# Patient Record
Sex: FEMALE | Race: WHITE | Employment: OTHER | URBAN - NONMETROPOLITAN AREA
[De-identification: names, ages, dates, MRNs, and addresses within clinical notes are randomized per-mention and may not be internally consistent; named-entity substitution may affect disease eponyms.]

---

## 2018-11-04 ENCOUNTER — APPOINTMENT (OUTPATIENT)
Dept: GENERAL RADIOLOGY | Age: 78
DRG: 216 | End: 2018-11-04
Payer: MEDICARE

## 2018-11-04 ENCOUNTER — APPOINTMENT (OUTPATIENT)
Dept: CT IMAGING | Age: 78
DRG: 216 | End: 2018-11-04
Payer: MEDICARE

## 2018-11-04 ENCOUNTER — HOSPITAL ENCOUNTER (INPATIENT)
Age: 78
LOS: 16 days | Discharge: NURSING, REHAB OR LONG-TERM CARE | DRG: 216 | End: 2018-11-20
Attending: INTERNAL MEDICINE | Admitting: INTERNAL MEDICINE
Payer: MEDICARE

## 2018-11-04 DIAGNOSIS — I48.91 ATRIAL FIBRILLATION WITH RVR (HCC): Primary | ICD-10-CM

## 2018-11-04 LAB
ALBUMIN SERPL-MCNC: 3.7 G/DL (ref 3.5–5.1)
ALP BLD-CCNC: 210 U/L (ref 38–126)
ALT SERPL-CCNC: 129 U/L (ref 11–66)
ANION GAP SERPL CALCULATED.3IONS-SCNC: 16 MEQ/L (ref 8–16)
APTT: 23.9 SECONDS (ref 22–38)
AST SERPL-CCNC: 105 U/L (ref 5–40)
BACTERIA: ABNORMAL /HPF
BASOPHILS # BLD: 0.3 %
BASOPHILS ABSOLUTE: 0 THOU/MM3 (ref 0–0.1)
BILIRUB SERPL-MCNC: 0.7 MG/DL (ref 0.3–1.2)
BILIRUBIN DIRECT: 0.3 MG/DL (ref 0–0.3)
BILIRUBIN URINE: NEGATIVE
BLOOD, URINE: NEGATIVE
BUN BLDV-MCNC: 20 MG/DL (ref 7–22)
CALCIUM SERPL-MCNC: 8.9 MG/DL (ref 8.5–10.5)
CASTS 2: ABNORMAL /LPF
CASTS UA: ABNORMAL /LPF
CHARACTER, URINE: CLEAR
CHLORIDE BLD-SCNC: 104 MEQ/L (ref 98–111)
CO2: 19 MEQ/L (ref 23–33)
COLOR: YELLOW
CREAT SERPL-MCNC: 0.8 MG/DL (ref 0.4–1.2)
CRYSTALS, UA: ABNORMAL
EKG ATRIAL RATE: 187 BPM
EKG Q-T INTERVAL: 244 MS
EKG QRS DURATION: 66 MS
EKG QTC CALCULATION (BAZETT): 410 MS
EKG R AXIS: 86 DEGREES
EKG T AXIS: 22 DEGREES
EKG VENTRICULAR RATE: 170 BPM
EOSINOPHIL # BLD: 0.9 %
EOSINOPHILS ABSOLUTE: 0.1 THOU/MM3 (ref 0–0.4)
EPITHELIAL CELLS, UA: ABNORMAL /HPF
ERYTHROCYTE [DISTWIDTH] IN BLOOD BY AUTOMATED COUNT: 14.1 % (ref 11.5–14.5)
ERYTHROCYTE [DISTWIDTH] IN BLOOD BY AUTOMATED COUNT: 49.1 FL (ref 35–45)
GFR SERPL CREATININE-BSD FRML MDRD: 70 ML/MIN/1.73M2
GLUCOSE BLD-MCNC: 139 MG/DL (ref 70–108)
GLUCOSE URINE: NEGATIVE MG/DL
HCT VFR BLD CALC: 37.9 % (ref 37–47)
HEMOGLOBIN: 12.4 GM/DL (ref 12–16)
IMMATURE GRANS (ABS): 0.02 THOU/MM3 (ref 0–0.07)
IMMATURE GRANULOCYTES: 0.2 %
KETONES, URINE: NEGATIVE
LEUKOCYTE ESTERASE, URINE: NEGATIVE
LIPASE: 22.2 U/L (ref 5.6–51.3)
LYMPHOCYTES # BLD: 11.1 %
LYMPHOCYTES ABSOLUTE: 1 THOU/MM3 (ref 1–4.8)
MCH RBC QN AUTO: 31.2 PG (ref 26–33)
MCHC RBC AUTO-ENTMCNC: 32.7 GM/DL (ref 32.2–35.5)
MCV RBC AUTO: 95.5 FL (ref 81–99)
MISCELLANEOUS 2: ABNORMAL
MONOCYTES # BLD: 9 %
MONOCYTES ABSOLUTE: 0.8 THOU/MM3 (ref 0.4–1.3)
MUCUS: ABNORMAL
NITRITE, URINE: NEGATIVE
NUCLEATED RED BLOOD CELLS: 0 /100 WBC
OSMOLALITY CALCULATION: 282.4 MOSMOL/KG (ref 275–300)
PH UA: 5
PLATELET # BLD: 202 THOU/MM3 (ref 130–400)
PMV BLD AUTO: 12.1 FL (ref 9.4–12.4)
POTASSIUM SERPL-SCNC: 4 MEQ/L (ref 3.5–5.2)
PRO-BNP: 3062 PG/ML (ref 0–1800)
PROTEIN UA: 30
RBC # BLD: 3.97 MILL/MM3 (ref 4.2–5.4)
RBC URINE: ABNORMAL /HPF
RENAL EPITHELIAL, UA: ABNORMAL
SEG NEUTROPHILS: 78.5 %
SEGMENTED NEUTROPHILS ABSOLUTE COUNT: 7.1 THOU/MM3 (ref 1.8–7.7)
SODIUM BLD-SCNC: 139 MEQ/L (ref 135–145)
SPECIFIC GRAVITY, URINE: > 1.03 (ref 1–1.03)
TOTAL PROTEIN: 6.8 G/DL (ref 6.1–8)
TROPONIN T: < 0.01 NG/ML
TSH SERPL DL<=0.05 MIU/L-ACNC: 2.64 UIU/ML (ref 0.4–4.2)
UROBILINOGEN, URINE: 1 EU/DL
WBC # BLD: 9.1 THOU/MM3 (ref 4.8–10.8)
WBC UA: ABNORMAL /HPF
YEAST: ABNORMAL

## 2018-11-04 PROCEDURE — 74177 CT ABD & PELVIS W/CONTRAST: CPT

## 2018-11-04 PROCEDURE — 80074 ACUTE HEPATITIS PANEL: CPT

## 2018-11-04 PROCEDURE — 71045 X-RAY EXAM CHEST 1 VIEW: CPT

## 2018-11-04 PROCEDURE — B32T1ZZ COMPUTERIZED TOMOGRAPHY (CT SCAN) OF LEFT PULMONARY ARTERY USING LOW OSMOLAR CONTRAST: ICD-10-PCS | Performed by: RADIOLOGY

## 2018-11-04 PROCEDURE — 93005 ELECTROCARDIOGRAM TRACING: CPT | Performed by: PHYSICIAN ASSISTANT

## 2018-11-04 PROCEDURE — B3201ZZ COMPUTERIZED TOMOGRAPHY (CT SCAN) OF THORACIC AORTA USING LOW OSMOLAR CONTRAST: ICD-10-PCS | Performed by: RADIOLOGY

## 2018-11-04 PROCEDURE — 80053 COMPREHEN METABOLIC PANEL: CPT

## 2018-11-04 PROCEDURE — 85025 COMPLETE CBC W/AUTO DIFF WBC: CPT

## 2018-11-04 PROCEDURE — 6360000004 HC RX CONTRAST MEDICATION: Performed by: PHYSICIAN ASSISTANT

## 2018-11-04 PROCEDURE — B32S1ZZ COMPUTERIZED TOMOGRAPHY (CT SCAN) OF RIGHT PULMONARY ARTERY USING LOW OSMOLAR CONTRAST: ICD-10-PCS | Performed by: RADIOLOGY

## 2018-11-04 PROCEDURE — 83690 ASSAY OF LIPASE: CPT

## 2018-11-04 PROCEDURE — 2500000003 HC RX 250 WO HCPCS: Performed by: PHYSICIAN ASSISTANT

## 2018-11-04 PROCEDURE — 96376 TX/PRO/DX INJ SAME DRUG ADON: CPT

## 2018-11-04 PROCEDURE — 84484 ASSAY OF TROPONIN QUANT: CPT

## 2018-11-04 PROCEDURE — 84443 ASSAY THYROID STIM HORMONE: CPT

## 2018-11-04 PROCEDURE — 83880 ASSAY OF NATRIURETIC PEPTIDE: CPT

## 2018-11-04 PROCEDURE — 6360000002 HC RX W HCPCS: Performed by: PHYSICIAN ASSISTANT

## 2018-11-04 PROCEDURE — 81001 URINALYSIS AUTO W/SCOPE: CPT

## 2018-11-04 PROCEDURE — 2709999900 HC NON-CHARGEABLE SUPPLY

## 2018-11-04 PROCEDURE — 36415 COLL VENOUS BLD VENIPUNCTURE: CPT

## 2018-11-04 PROCEDURE — 85730 THROMBOPLASTIN TIME PARTIAL: CPT

## 2018-11-04 PROCEDURE — 82248 BILIRUBIN DIRECT: CPT

## 2018-11-04 PROCEDURE — 71275 CT ANGIOGRAPHY CHEST: CPT

## 2018-11-04 PROCEDURE — 93010 ELECTROCARDIOGRAM REPORT: CPT | Performed by: INTERNAL MEDICINE

## 2018-11-04 PROCEDURE — 87086 URINE CULTURE/COLONY COUNT: CPT

## 2018-11-04 PROCEDURE — 96374 THER/PROPH/DIAG INJ IV PUSH: CPT

## 2018-11-04 PROCEDURE — 99285 EMERGENCY DEPT VISIT HI MDM: CPT

## 2018-11-04 PROCEDURE — 2140000000 HC CCU INTERMEDIATE R&B

## 2018-11-04 RX ORDER — HEPARIN SODIUM 1000 [USP'U]/ML
80 INJECTION, SOLUTION INTRAVENOUS; SUBCUTANEOUS ONCE
Status: COMPLETED | OUTPATIENT
Start: 2018-11-04 | End: 2018-11-04

## 2018-11-04 RX ORDER — HEPARIN SODIUM 1000 [USP'U]/ML
80 INJECTION, SOLUTION INTRAVENOUS; SUBCUTANEOUS PRN
Status: DISCONTINUED | OUTPATIENT
Start: 2018-11-04 | End: 2018-11-12

## 2018-11-04 RX ORDER — HEPARIN SODIUM 1000 [USP'U]/ML
40 INJECTION, SOLUTION INTRAVENOUS; SUBCUTANEOUS PRN
Status: DISCONTINUED | OUTPATIENT
Start: 2018-11-04 | End: 2018-11-12

## 2018-11-04 RX ORDER — DILTIAZEM HYDROCHLORIDE 5 MG/ML
10 INJECTION INTRAVENOUS ONCE
Status: COMPLETED | OUTPATIENT
Start: 2018-11-04 | End: 2018-11-04

## 2018-11-04 RX ORDER — BUMETANIDE 0.25 MG/ML
1 INJECTION, SOLUTION INTRAMUSCULAR; INTRAVENOUS ONCE
Status: COMPLETED | OUTPATIENT
Start: 2018-11-04 | End: 2018-11-04

## 2018-11-04 RX ORDER — HEPARIN SODIUM 10000 [USP'U]/100ML
18 INJECTION, SOLUTION INTRAVENOUS CONTINUOUS
Status: DISCONTINUED | OUTPATIENT
Start: 2018-11-04 | End: 2018-11-12

## 2018-11-04 RX ORDER — METOPROLOL TARTRATE 5 MG/5ML
5 INJECTION INTRAVENOUS EVERY 5 MIN PRN
Status: COMPLETED | OUTPATIENT
Start: 2018-11-04 | End: 2018-11-04

## 2018-11-04 RX ADMIN — DILTIAZEM HYDROCHLORIDE 10 MG: 5 INJECTION INTRAVENOUS at 15:49

## 2018-11-04 RX ADMIN — IOPAMIDOL 80 ML: 755 INJECTION, SOLUTION INTRAVENOUS at 16:57

## 2018-11-04 RX ADMIN — METOPROLOL TARTRATE 5 MG: 5 INJECTION, SOLUTION INTRAVENOUS at 18:07

## 2018-11-04 RX ADMIN — HEPARIN SODIUM 18 UNITS/KG/HR: 10000 INJECTION, SOLUTION INTRAVENOUS at 21:12

## 2018-11-04 RX ADMIN — METOPROLOL TARTRATE 5 MG: 5 INJECTION, SOLUTION INTRAVENOUS at 16:34

## 2018-11-04 RX ADMIN — AMIODARONE HYDROCHLORIDE 0.5 MG/MIN: 1.8 INJECTION, SOLUTION INTRAVENOUS at 18:54

## 2018-11-04 RX ADMIN — HEPARIN SODIUM 4540 UNITS: 1000 INJECTION INTRAVENOUS; SUBCUTANEOUS at 21:08

## 2018-11-04 RX ADMIN — METOPROLOL TARTRATE 5 MG: 5 INJECTION, SOLUTION INTRAVENOUS at 16:28

## 2018-11-04 RX ADMIN — BUMETANIDE 1 MG: 0.25 INJECTION INTRAMUSCULAR; INTRAVENOUS at 18:54

## 2018-11-04 ASSESSMENT — PAIN DESCRIPTION - LOCATION
LOCATION: CHEST

## 2018-11-04 ASSESSMENT — PAIN DESCRIPTION - PAIN TYPE
TYPE: ACUTE PAIN

## 2018-11-04 ASSESSMENT — PAIN DESCRIPTION - FREQUENCY
FREQUENCY: CONTINUOUS
FREQUENCY: CONTINUOUS

## 2018-11-04 ASSESSMENT — PAIN DESCRIPTION - ORIENTATION
ORIENTATION: MID

## 2018-11-04 ASSESSMENT — PAIN DESCRIPTION - ONSET: ONSET: GRADUAL

## 2018-11-04 ASSESSMENT — ENCOUNTER SYMPTOMS
RHINORRHEA: 0
EYE PAIN: 0
COUGH: 1
WHEEZING: 0
NAUSEA: 0
ABDOMINAL PAIN: 1
BACK PAIN: 0
EYE DISCHARGE: 0
DIARRHEA: 0
SHORTNESS OF BREATH: 1
SORE THROAT: 0
VOMITING: 0

## 2018-11-04 ASSESSMENT — PAIN SCALES - GENERAL
PAINLEVEL_OUTOF10: 0
PAINLEVEL_OUTOF10: 5
PAINLEVEL_OUTOF10: 5
PAINLEVEL_OUTOF10: 2

## 2018-11-04 ASSESSMENT — PAIN DESCRIPTION - DESCRIPTORS: DESCRIPTORS: TENDER

## 2018-11-04 NOTE — LETTER
1-800- MEDICARE (3-371.728.4837). TTY users should call 7-649.795.2008. · To find a different doctor, visit Medicare's Physician Compare website, DragonRADTapes.co.nz, or call 1-800-MEDICARE (132 1130). TTY users should call 6-126.798.1499. · To find a different skilled nursing facility, visit [employer] website, https://www.Iddiction/, or call 1-800-MEDICARE (8- 132-593-656-626-7802). TTY users should call 1-566.354.1187. · To find a different long term care hospital, visit [facility] [address] Compare website, [facility], or call 1-800- MEDICARE (118 7224). TTY users should call 9-644.359.1559. · To find a different inpatient rehabilitation facility, visit [facility] [address] Compare website, www.medicare.gov/ inpatientrehabilitation facilitycompare, or call 1-800-MEDICARE (0-353.377.5056). TTY users should call 5- [phone]. · To find a different home health agency, visit [address] website, www.medicare.gov/homehealthcompare, or call 1-800-MEDICARE (2-509- 215-7700). TTY users should call [phone].

## 2018-11-04 NOTE — ED PROVIDER NOTES
Radiologic studies within the department revealed small bilateral pleural effusions with adjacent atelectasis/infiltrate. Laboratory results showed elevated alk phos and BNP. She was treated initially with cardizem, followed by 3 doses of metoprolol. HR improved to the low 100s, patient's symptoms persist but are improved. I consulted Dr. Geovany Venegas (cardiology), per patient request, who graciously agreed to admit the patient. Amiodarone drip, heparin drip and IV diuretics initiated, per his recommendations. I explained my proposed course of treatment to the patient, who was amenable to my decision, and I answered all questions that were asked. T    CRITICAL CARE:   There was a high probability of clinically significant life threatening deterioration in this patient's condition which required the urgent intervention of the Midlevel provider. Total critical care time was 30 minutes. This excludes any time for separately reportable procedures. CONSULTS:  Dr. Geovany Venegas (cardiology)     PROCEDURES:  None     FINAL IMPRESSION      1. Atrial fibrillation with RVR (HCC)          DISPOSITION/PLAN   Admit     PATIENT REFERRED TO:  No follow-up provider specified. DISCHARGE MEDICATIONS:  New Prescriptions    No medications on file       (Please note that portions of this note were completed with a voice recognition program.  Efforts weremade to edit the dictations but occasionally words aremis-transcribed.)    The patient was given an opportunity to see the Emergency Attending. Thepatient voiced understanding that I was a Mid-LevelProvider and was in agreement with being seen independently by myself. Scribe:  Alex Chaves 11/4/18 4:24 PM Scribing for and in the presence of Yoni Edmondson, [facility].     Signed by: Raina Gutierrez, 11/04/18 6:19 PM    Provider:  I personallyperformed the services described in the documentation,reviewed and edited the documentation which was dictated to the scribe in my presence, and it

## 2018-11-05 LAB
APTT: 118.8 SECONDS (ref 22–38)
APTT: 68.9 SECONDS (ref 22–38)
APTT: 79 SECONDS (ref 22–38)
APTT: > 200 SECONDS (ref 22–38)
LV EF: 43 %
LVEF MODALITY: NORMAL
ORGANISM: ABNORMAL
TROPONIN T: < 0.01 NG/ML
TROPONIN T: < 0.01 NG/ML
URINE CULTURE REFLEX: ABNORMAL

## 2018-11-05 PROCEDURE — 2500000003 HC RX 250 WO HCPCS: Performed by: PHYSICIAN ASSISTANT

## 2018-11-05 PROCEDURE — 6360000002 HC RX W HCPCS: Performed by: PHYSICIAN ASSISTANT

## 2018-11-05 PROCEDURE — 2709999900 HC NON-CHARGEABLE SUPPLY

## 2018-11-05 PROCEDURE — 84484 ASSAY OF TROPONIN QUANT: CPT

## 2018-11-05 PROCEDURE — 2140000000 HC CCU INTERMEDIATE R&B

## 2018-11-05 PROCEDURE — 85730 THROMBOPLASTIN TIME PARTIAL: CPT

## 2018-11-05 PROCEDURE — 36415 COLL VENOUS BLD VENIPUNCTURE: CPT

## 2018-11-05 PROCEDURE — 6370000000 HC RX 637 (ALT 250 FOR IP): Performed by: INTERNAL MEDICINE

## 2018-11-05 PROCEDURE — 93306 TTE W/DOPPLER COMPLETE: CPT

## 2018-11-05 PROCEDURE — 84132 ASSAY OF SERUM POTASSIUM: CPT

## 2018-11-05 PROCEDURE — 6360000002 HC RX W HCPCS: Performed by: INTERNAL MEDICINE

## 2018-11-05 RX ORDER — FUROSEMIDE 10 MG/ML
20 INJECTION INTRAMUSCULAR; INTRAVENOUS 2 TIMES DAILY
Status: DISCONTINUED | OUTPATIENT
Start: 2018-11-05 | End: 2018-11-06

## 2018-11-05 RX ORDER — AMIODARONE HYDROCHLORIDE 200 MG/1
200 TABLET ORAL 2 TIMES DAILY
Status: DISCONTINUED | OUTPATIENT
Start: 2018-11-05 | End: 2018-11-07

## 2018-11-05 RX ADMIN — HEPARIN SODIUM 15 UNITS/KG/HR: 10000 INJECTION, SOLUTION INTRAVENOUS at 06:16

## 2018-11-05 RX ADMIN — AMIODARONE HYDROCHLORIDE 0.5 MG/MIN: 1.8 INJECTION, SOLUTION INTRAVENOUS at 18:20

## 2018-11-05 RX ADMIN — AMIODARONE HYDROCHLORIDE 0.5 MG/MIN: 1.8 INJECTION, SOLUTION INTRAVENOUS at 07:14

## 2018-11-05 RX ADMIN — FUROSEMIDE 20 MG: 10 INJECTION, SOLUTION INTRAMUSCULAR; INTRAVENOUS at 17:40

## 2018-11-05 RX ADMIN — AMIODARONE HYDROCHLORIDE 200 MG: 200 TABLET ORAL at 21:45

## 2018-11-05 RX ADMIN — METOPROLOL TARTRATE 25 MG: 25 TABLET ORAL at 21:45

## 2018-11-05 ASSESSMENT — PAIN SCALES - GENERAL
PAINLEVEL_OUTOF10: 0

## 2018-11-05 NOTE — FLOWSHEET NOTE
11/05/18 1303   Encounter Summary   Services provided to: Patient and family together   Referral/Consult From: Rounding   Continue Visiting Yes  (11/5/18)   Complexity of Encounter Low   Length of Encounter 15 minutes   Routine   Type Initial   Assessment Calm; Approachable   Intervention Active listening;Nurtured hope   Outcome Acceptance;Comfort;Expressed gratitude   Spiritual/Hindu   Type Spiritual support   During my contact with the patient, her family was also present. The pt was listed as a consult and asked about DNR information. The pt stated that she was fine and didn't need the information at this time. Plan: Continued support would be helpful.

## 2018-11-06 ENCOUNTER — APPOINTMENT (OUTPATIENT)
Dept: GENERAL RADIOLOGY | Age: 78
DRG: 216 | End: 2018-11-06
Payer: MEDICARE

## 2018-11-06 LAB
ANION GAP SERPL CALCULATED.3IONS-SCNC: 13 MEQ/L (ref 8–16)
APTT: 76.5 SECONDS (ref 22–38)
APTT: 79.5 SECONDS (ref 22–38)
APTT: 86.7 SECONDS (ref 22–38)
BUN BLDV-MCNC: 29 MG/DL (ref 7–22)
CALCIUM SERPL-MCNC: 8.6 MG/DL (ref 8.5–10.5)
CHLORIDE BLD-SCNC: 103 MEQ/L (ref 98–111)
CO2: 23 MEQ/L (ref 23–33)
CREAT SERPL-MCNC: 0.9 MG/DL (ref 0.4–1.2)
ERYTHROCYTE [DISTWIDTH] IN BLOOD BY AUTOMATED COUNT: 14.4 % (ref 11.5–14.5)
ERYTHROCYTE [DISTWIDTH] IN BLOOD BY AUTOMATED COUNT: 48.8 FL (ref 35–45)
GFR SERPL CREATININE-BSD FRML MDRD: 61 ML/MIN/1.73M2
GLUCOSE BLD-MCNC: 130 MG/DL (ref 70–108)
HCT VFR BLD CALC: 33.8 % (ref 37–47)
HEMOGLOBIN: 11.2 GM/DL (ref 12–16)
MCH RBC QN AUTO: 31 PG (ref 26–33)
MCHC RBC AUTO-ENTMCNC: 33.1 GM/DL (ref 32.2–35.5)
MCV RBC AUTO: 93.6 FL (ref 81–99)
PLATELET # BLD: 169 THOU/MM3 (ref 130–400)
PLATELET # BLD: 189 THOU/MM3 (ref 130–400)
PMV BLD AUTO: 12.8 FL (ref 9.4–12.4)
POTASSIUM SERPL-SCNC: 3.5 MEQ/L (ref 3.5–5.2)
POTASSIUM SERPL-SCNC: 4.4 MEQ/L (ref 3.5–5.2)
RBC # BLD: 3.61 MILL/MM3 (ref 4.2–5.4)
SODIUM BLD-SCNC: 139 MEQ/L (ref 135–145)
WBC # BLD: 10.4 THOU/MM3 (ref 4.8–10.8)

## 2018-11-06 PROCEDURE — 85027 COMPLETE CBC AUTOMATED: CPT

## 2018-11-06 PROCEDURE — 85730 THROMBOPLASTIN TIME PARTIAL: CPT

## 2018-11-06 PROCEDURE — 6360000002 HC RX W HCPCS: Performed by: PHYSICIAN ASSISTANT

## 2018-11-06 PROCEDURE — 6360000002 HC RX W HCPCS: Performed by: INTERNAL MEDICINE

## 2018-11-06 PROCEDURE — 71045 X-RAY EXAM CHEST 1 VIEW: CPT

## 2018-11-06 PROCEDURE — 2709999900 HC NON-CHARGEABLE SUPPLY

## 2018-11-06 PROCEDURE — 36415 COLL VENOUS BLD VENIPUNCTURE: CPT

## 2018-11-06 PROCEDURE — 6370000000 HC RX 637 (ALT 250 FOR IP): Performed by: INTERNAL MEDICINE

## 2018-11-06 PROCEDURE — 85049 AUTOMATED PLATELET COUNT: CPT

## 2018-11-06 PROCEDURE — 2140000000 HC CCU INTERMEDIATE R&B

## 2018-11-06 PROCEDURE — 80048 BASIC METABOLIC PNL TOTAL CA: CPT

## 2018-11-06 RX ORDER — ATORVASTATIN CALCIUM 20 MG/1
20 TABLET, FILM COATED ORAL NIGHTLY
Status: DISCONTINUED | OUTPATIENT
Start: 2018-11-06 | End: 2018-11-12

## 2018-11-06 RX ORDER — ASPIRIN 81 MG/1
81 TABLET, CHEWABLE ORAL DAILY
Status: DISCONTINUED | OUTPATIENT
Start: 2018-11-06 | End: 2018-11-12

## 2018-11-06 RX ADMIN — ATORVASTATIN CALCIUM 20 MG: 20 TABLET, FILM COATED ORAL at 21:09

## 2018-11-06 RX ADMIN — HEPARIN SODIUM 15 UNITS/KG/HR: 10000 INJECTION, SOLUTION INTRAVENOUS at 01:37

## 2018-11-06 RX ADMIN — METOPROLOL TARTRATE 25 MG: 25 TABLET ORAL at 21:09

## 2018-11-06 RX ADMIN — METOPROLOL TARTRATE 25 MG: 25 TABLET ORAL at 09:10

## 2018-11-06 RX ADMIN — AMIODARONE HYDROCHLORIDE 200 MG: 200 TABLET ORAL at 09:10

## 2018-11-06 RX ADMIN — POTASSIUM BICARBONATE 40 MEQ: 782 TABLET, EFFERVESCENT ORAL at 04:42

## 2018-11-06 RX ADMIN — ASPIRIN 81 MG 81 MG: 81 TABLET ORAL at 21:10

## 2018-11-06 RX ADMIN — FUROSEMIDE 20 MG: 10 INJECTION, SOLUTION INTRAMUSCULAR; INTRAVENOUS at 09:09

## 2018-11-06 RX ADMIN — AMIODARONE HYDROCHLORIDE 200 MG: 200 TABLET ORAL at 21:09

## 2018-11-06 ASSESSMENT — PAIN SCALES - GENERAL
PAINLEVEL_OUTOF10: 0
PAINLEVEL_OUTOF10: 4

## 2018-11-06 ASSESSMENT — PAIN DESCRIPTION - LOCATION: LOCATION: HEAD

## 2018-11-06 NOTE — PROGRESS NOTES
Patient had 2 black watery stools this AM. Patient just went to [facility] and did not have any BMs. Hemoglobin 11/4 was 12.4    And hemoglobin 11/6 is 11.2    Dr. Hope Just will be notified.

## 2018-11-07 ENCOUNTER — APPOINTMENT (OUTPATIENT)
Dept: INTERVENTIONAL RADIOLOGY/VASCULAR | Age: 78
DRG: 216 | End: 2018-11-07
Payer: MEDICARE

## 2018-11-07 LAB
ABO: NORMAL
ALBUMIN SERPL-MCNC: 3 G/DL (ref 3.5–5.1)
ALP BLD-CCNC: 148 U/L (ref 38–126)
ALT SERPL-CCNC: 63 U/L (ref 11–66)
ANION GAP SERPL CALCULATED.3IONS-SCNC: 13 MEQ/L (ref 8–16)
ANTIBODY SCREEN: NORMAL
APTT: 63.8 SECONDS (ref 22–38)
APTT: 75.4 SECONDS (ref 22–38)
APTT: 80.4 SECONDS (ref 22–38)
AST SERPL-CCNC: 30 U/L (ref 5–40)
BILIRUB SERPL-MCNC: 0.4 MG/DL (ref 0.3–1.2)
BUN BLDV-MCNC: 25 MG/DL (ref 7–22)
CALCIUM SERPL-MCNC: 8.6 MG/DL (ref 8.5–10.5)
CHLORIDE BLD-SCNC: 102 MEQ/L (ref 98–111)
CO2: 24 MEQ/L (ref 23–33)
COLLECTED BY:: ABNORMAL
COLLECTED BY:: NORMAL
CREAT SERPL-MCNC: 0.8 MG/DL (ref 0.4–1.2)
EKG ATRIAL RATE: 102 BPM
EKG Q-T INTERVAL: 316 MS
EKG QRS DURATION: 84 MS
EKG QTC CALCULATION (BAZETT): 452 MS
EKG R AXIS: 59 DEGREES
EKG T AXIS: 35 DEGREES
EKG VENTRICULAR RATE: 123 BPM
ERYTHROCYTE [DISTWIDTH] IN BLOOD BY AUTOMATED COUNT: 14.4 % (ref 11.5–14.5)
ERYTHROCYTE [DISTWIDTH] IN BLOOD BY AUTOMATED COUNT: 49.3 FL (ref 35–45)
GFR SERPL CREATININE-BSD FRML MDRD: 70 ML/MIN/1.73M2
GLUCOSE BLD-MCNC: 119 MG/DL (ref 70–108)
HCT VFR BLD CALC: 31.6 % (ref 37–47)
HEMOGLOBIN: 10.2 GM/DL (ref 12–16)
MCH RBC QN AUTO: 30.7 PG (ref 26–33)
MCHC RBC AUTO-ENTMCNC: 32.3 GM/DL (ref 32.2–35.5)
MCV RBC AUTO: 95.2 FL (ref 81–99)
MRSA SCREEN RT-PCR: NEGATIVE
PLATELET # BLD: 171 THOU/MM3 (ref 130–400)
PMV BLD AUTO: 12.5 FL (ref 9.4–12.4)
POC O2 SATURATION: 44 % (ref 94–97)
POC O2 SATURATION: 44 % (ref 94–97)
POC O2 SATURATION: 58 % (ref 94–97)
POC O2 SATURATION: 94 % (ref 94–97)
POTASSIUM REFLEX MAGNESIUM: 3.7 MEQ/L (ref 3.5–5.2)
RBC # BLD: 3.32 MILL/MM3 (ref 4.2–5.4)
RH FACTOR: NORMAL
SODIUM BLD-SCNC: 139 MEQ/L (ref 135–145)
SOURCE, BLOOD GAS: ABNORMAL
SOURCE, BLOOD GAS: NORMAL
TOTAL PROTEIN: 5.9 G/DL (ref 6.1–8)
VANCOMYCIN RESISTANT ENTEROCOCCUS: NEGATIVE
WBC # BLD: 8.2 THOU/MM3 (ref 4.8–10.8)

## 2018-11-07 PROCEDURE — 6370000000 HC RX 637 (ALT 250 FOR IP): Performed by: THORACIC SURGERY (CARDIOTHORACIC VASCULAR SURGERY)

## 2018-11-07 PROCEDURE — 2709999900 HC NON-CHARGEABLE SUPPLY

## 2018-11-07 PROCEDURE — 87641 MR-STAPH DNA AMP PROBE: CPT

## 2018-11-07 PROCEDURE — 99223 1ST HOSP IP/OBS HIGH 75: CPT | Performed by: THORACIC SURGERY (CARDIOTHORACIC VASCULAR SURGERY)

## 2018-11-07 PROCEDURE — C1894 INTRO/SHEATH, NON-LASER: HCPCS

## 2018-11-07 PROCEDURE — C1760 CLOSURE DEV, VASC: HCPCS

## 2018-11-07 PROCEDURE — 87500 VANOMYCIN DNA AMP PROBE: CPT

## 2018-11-07 PROCEDURE — C1887 CATHETER, GUIDING: HCPCS

## 2018-11-07 PROCEDURE — 85730 THROMBOPLASTIN TIME PARTIAL: CPT

## 2018-11-07 PROCEDURE — 2500000003 HC RX 250 WO HCPCS

## 2018-11-07 PROCEDURE — 2580000003 HC RX 258: Performed by: INTERNAL MEDICINE

## 2018-11-07 PROCEDURE — 86901 BLOOD TYPING SEROLOGIC RH(D): CPT

## 2018-11-07 PROCEDURE — 2000000000 HC ICU R&B

## 2018-11-07 PROCEDURE — 85027 COMPLETE CBC AUTOMATED: CPT

## 2018-11-07 PROCEDURE — 6360000002 HC RX W HCPCS: Performed by: THORACIC SURGERY (CARDIOTHORACIC VASCULAR SURGERY)

## 2018-11-07 PROCEDURE — 2500000003 HC RX 250 WO HCPCS: Performed by: INTERNAL MEDICINE

## 2018-11-07 PROCEDURE — 93880 EXTRACRANIAL BILAT STUDY: CPT

## 2018-11-07 PROCEDURE — 93010 ELECTROCARDIOGRAM REPORT: CPT | Performed by: NUCLEAR MEDICINE

## 2018-11-07 PROCEDURE — 4A023N8 MEASUREMENT OF CARDIAC SAMPLING AND PRESSURE, BILATERAL, PERCUTANEOUS APPROACH: ICD-10-PCS | Performed by: INTERNAL MEDICINE

## 2018-11-07 PROCEDURE — B2151ZZ FLUOROSCOPY OF LEFT HEART USING LOW OSMOLAR CONTRAST: ICD-10-PCS | Performed by: INTERNAL MEDICINE

## 2018-11-07 PROCEDURE — B2111ZZ FLUOROSCOPY OF MULTIPLE CORONARY ARTERIES USING LOW OSMOLAR CONTRAST: ICD-10-PCS | Performed by: INTERNAL MEDICINE

## 2018-11-07 PROCEDURE — 80053 COMPREHEN METABOLIC PANEL: CPT

## 2018-11-07 PROCEDURE — 6360000002 HC RX W HCPCS: Performed by: INTERNAL MEDICINE

## 2018-11-07 PROCEDURE — 82810 BLOOD GASES O2 SAT ONLY: CPT

## 2018-11-07 PROCEDURE — 6360000002 HC RX W HCPCS

## 2018-11-07 PROCEDURE — 86850 RBC ANTIBODY SCREEN: CPT

## 2018-11-07 PROCEDURE — 6370000000 HC RX 637 (ALT 250 FOR IP): Performed by: INTERNAL MEDICINE

## 2018-11-07 PROCEDURE — 93005 ELECTROCARDIOGRAM TRACING: CPT | Performed by: INTERNAL MEDICINE

## 2018-11-07 PROCEDURE — 93971 EXTREMITY STUDY: CPT

## 2018-11-07 PROCEDURE — 6360000002 HC RX W HCPCS: Performed by: PHYSICIAN ASSISTANT

## 2018-11-07 PROCEDURE — 93460 R&L HRT ART/VENTRICLE ANGIO: CPT | Performed by: INTERNAL MEDICINE

## 2018-11-07 PROCEDURE — 6360000004 HC RX CONTRAST MEDICATION: Performed by: INTERNAL MEDICINE

## 2018-11-07 PROCEDURE — 86900 BLOOD TYPING SEROLOGIC ABO: CPT

## 2018-11-07 PROCEDURE — 2700000000 HC OXYGEN THERAPY PER DAY

## 2018-11-07 PROCEDURE — B41F1ZZ FLUOROSCOPY OF RIGHT LOWER EXTREMITY ARTERIES USING LOW OSMOLAR CONTRAST: ICD-10-PCS | Performed by: INTERNAL MEDICINE

## 2018-11-07 PROCEDURE — 36415 COLL VENOUS BLD VENIPUNCTURE: CPT

## 2018-11-07 PROCEDURE — B41G1ZZ FLUOROSCOPY OF LEFT LOWER EXTREMITY ARTERIES USING LOW OSMOLAR CONTRAST: ICD-10-PCS | Performed by: INTERNAL MEDICINE

## 2018-11-07 PROCEDURE — C1769 GUIDE WIRE: HCPCS

## 2018-11-07 PROCEDURE — 87081 CULTURE SCREEN ONLY: CPT

## 2018-11-07 RX ORDER — SODIUM CHLORIDE 9 MG/ML
INJECTION, SOLUTION INTRAVENOUS CONTINUOUS
Status: DISCONTINUED | OUTPATIENT
Start: 2018-11-07 | End: 2018-11-07

## 2018-11-07 RX ORDER — FUROSEMIDE 10 MG/ML
40 INJECTION INTRAMUSCULAR; INTRAVENOUS 3 TIMES DAILY
Status: DISCONTINUED | OUTPATIENT
Start: 2018-11-07 | End: 2018-11-10

## 2018-11-07 RX ORDER — ATENOLOL 25 MG/1
25 TABLET ORAL DAILY
Status: DISCONTINUED | OUTPATIENT
Start: 2018-11-07 | End: 2018-11-12

## 2018-11-07 RX ORDER — ATROPINE SULFATE 0.4 MG/ML
0.5 AMPUL (ML) INJECTION
Status: ACTIVE | OUTPATIENT
Start: 2018-11-07 | End: 2018-11-07

## 2018-11-07 RX ORDER — DIGOXIN 0.25 MG/ML
250 INJECTION INTRAMUSCULAR; INTRAVENOUS ONCE
Status: COMPLETED | OUTPATIENT
Start: 2018-11-07 | End: 2018-11-07

## 2018-11-07 RX ORDER — DIPHENHYDRAMINE HCL 25 MG
50 TABLET ORAL ONCE
Status: DISCONTINUED | OUTPATIENT
Start: 2018-11-07 | End: 2018-11-12

## 2018-11-07 RX ORDER — SODIUM CHLORIDE 0.9 % (FLUSH) 0.9 %
10 SYRINGE (ML) INJECTION EVERY 12 HOURS SCHEDULED
Status: DISCONTINUED | OUTPATIENT
Start: 2018-11-07 | End: 2018-11-07 | Stop reason: SDUPTHER

## 2018-11-07 RX ORDER — ALPRAZOLAM 0.5 MG/1
0.5 TABLET ORAL
Status: ACTIVE | OUTPATIENT
Start: 2018-11-07 | End: 2018-11-07

## 2018-11-07 RX ORDER — FUROSEMIDE 10 MG/ML
40 INJECTION INTRAMUSCULAR; INTRAVENOUS 2 TIMES DAILY
Status: DISCONTINUED | OUTPATIENT
Start: 2018-11-07 | End: 2018-11-07

## 2018-11-07 RX ORDER — SODIUM CHLORIDE 0.9 % (FLUSH) 0.9 %
10 SYRINGE (ML) INJECTION PRN
Status: DISCONTINUED | OUTPATIENT
Start: 2018-11-07 | End: 2018-11-12

## 2018-11-07 RX ORDER — NITROGLYCERIN 0.4 MG/1
0.4 TABLET SUBLINGUAL EVERY 5 MIN PRN
Status: DISCONTINUED | OUTPATIENT
Start: 2018-11-07 | End: 2018-11-11

## 2018-11-07 RX ORDER — SODIUM CHLORIDE 0.9 % (FLUSH) 0.9 %
10 SYRINGE (ML) INJECTION PRN
Status: DISCONTINUED | OUTPATIENT
Start: 2018-11-07 | End: 2018-11-07 | Stop reason: SDUPTHER

## 2018-11-07 RX ORDER — ONDANSETRON 2 MG/ML
4 INJECTION INTRAMUSCULAR; INTRAVENOUS EVERY 6 HOURS PRN
Status: DISCONTINUED | OUTPATIENT
Start: 2018-11-07 | End: 2018-11-12

## 2018-11-07 RX ORDER — ACETAMINOPHEN 325 MG/1
650 TABLET ORAL EVERY 4 HOURS PRN
Status: DISCONTINUED | OUTPATIENT
Start: 2018-11-07 | End: 2018-11-12

## 2018-11-07 RX ORDER — DIPHENHYDRAMINE HCL 25 MG
25 TABLET ORAL
Status: ACTIVE | OUTPATIENT
Start: 2018-11-07 | End: 2018-11-07

## 2018-11-07 RX ORDER — POTASSIUM CHLORIDE 20 MEQ/1
20 TABLET, EXTENDED RELEASE ORAL
Status: DISCONTINUED | OUTPATIENT
Start: 2018-11-07 | End: 2018-11-12

## 2018-11-07 RX ORDER — SODIUM CHLORIDE 0.9 % (FLUSH) 0.9 %
10 SYRINGE (ML) INJECTION EVERY 12 HOURS SCHEDULED
Status: DISCONTINUED | OUTPATIENT
Start: 2018-11-07 | End: 2018-11-12

## 2018-11-07 RX ADMIN — ATORVASTATIN CALCIUM 20 MG: 20 TABLET, FILM COATED ORAL at 21:05

## 2018-11-07 RX ADMIN — DIGOXIN 250 MCG: 0.25 INJECTION INTRAMUSCULAR; INTRAVENOUS at 17:26

## 2018-11-07 RX ADMIN — ASPIRIN 81 MG 81 MG: 81 TABLET ORAL at 06:27

## 2018-11-07 RX ADMIN — AMIODARONE HYDROCHLORIDE 150 MG: 1.5 INJECTION, SOLUTION INTRAVENOUS at 17:29

## 2018-11-07 RX ADMIN — SODIUM CHLORIDE: 9 INJECTION, SOLUTION INTRAVENOUS at 05:03

## 2018-11-07 RX ADMIN — HEPARIN SODIUM 15 UNITS/KG/HR: 10000 INJECTION, SOLUTION INTRAVENOUS at 11:02

## 2018-11-07 RX ADMIN — METOPROLOL TARTRATE 25 MG: 25 TABLET ORAL at 12:02

## 2018-11-07 RX ADMIN — Medication 10 ML: at 21:05

## 2018-11-07 RX ADMIN — IOPAMIDOL 100 ML: 755 INJECTION, SOLUTION INTRAVENOUS at 07:37

## 2018-11-07 RX ADMIN — ACETAMINOPHEN 650 MG: 325 TABLET ORAL at 16:21

## 2018-11-07 RX ADMIN — ATENOLOL 25 MG: 25 TABLET ORAL at 17:31

## 2018-11-07 RX ADMIN — AMIODARONE HYDROCHLORIDE 1 MG/MIN: 1.8 INJECTION, SOLUTION INTRAVENOUS at 17:38

## 2018-11-07 RX ADMIN — ONDANSETRON HYDROCHLORIDE 4 MG: 2 SOLUTION INTRAMUSCULAR; INTRAVENOUS at 09:19

## 2018-11-07 RX ADMIN — FUROSEMIDE 40 MG: 10 INJECTION, SOLUTION INTRAMUSCULAR; INTRAVENOUS at 21:05

## 2018-11-07 RX ADMIN — FUROSEMIDE 40 MG: 10 INJECTION, SOLUTION INTRAMUSCULAR; INTRAVENOUS at 14:52

## 2018-11-07 RX ADMIN — AMIODARONE HYDROCHLORIDE 200 MG: 200 TABLET ORAL at 11:04

## 2018-11-07 RX ADMIN — POTASSIUM CHLORIDE 20 MEQ: 20 TABLET, EXTENDED RELEASE ORAL at 18:03

## 2018-11-07 RX ADMIN — Medication 10 ML: at 11:06

## 2018-11-07 RX ADMIN — POTASSIUM CHLORIDE 20 MEQ: 20 TABLET, EXTENDED RELEASE ORAL at 13:19

## 2018-11-07 ASSESSMENT — ENCOUNTER SYMPTOMS
EYE DISCHARGE: 0
ABDOMINAL PAIN: 0
SHORTNESS OF BREATH: 1
COLOR CHANGE: 0

## 2018-11-07 ASSESSMENT — PAIN SCALES - GENERAL
PAINLEVEL_OUTOF10: 0
PAINLEVEL_OUTOF10: 5
PAINLEVEL_OUTOF10: 0
PAINLEVEL_OUTOF10: 0

## 2018-11-07 NOTE — OP NOTE
[address] [address]  Sedation/Analgesia Post Sedation Record      Pt Name: Henrique Mcdaniel  MRN: [id number]  YOB: 1940  Procedure Performed By: Torie Choudhary MD  Primary Care Physician: Paloma Mccray MD    POST-PROCEDURE    Physician: Torie Choudhary MD    Procedure Performed:  Left Heart Catheterization and Right Heart Catheterization    Sedation/Anesthesia:  Local Anesthesia and IV Conscious Sedation with continuous O2 monitoring    Estimated Blood Loss:  Minimal    Specimens Removed:  None    Complications:  None     Post Procedure Diagnosis/Findings:  Coronary Artery Disease severe MR , PULMONARY HTN     Recommendations:  SURGICAL CONSULTATION , Natalie Saldivar MD  Electronically signed 11/7/2018 at 7:44 AM

## 2018-11-07 NOTE — PROCEDURES
[address] [id number]                            CARDIAC CATHETERIZATION    PATIENT NAME: Celestine Coreas                    :        1940  MED REC NO:   [id number]                           ROOM:       0006  ACCOUNT NO:   [de-identified]                           ADMIT DATE: 2018  PROVIDER:     Geovany Laguna M.D.      Bhupendra Aden:  2018    INDICATION FOR PROCEDURE:  This is a patient who is a 47-year-old lady  admitted with congestive heart failure, atrial fibrillation,  echocardiogram showed severe mitral regurgitation, and enlargement of  the left atrium with pulmonary hypertension. This patient admitted for  heart cath, possible intervention. The patient understands the  procedure, the benefits, the risks, and agrees to have it done. PROCEDURES PERFORMED:  1.  IV conscious sedation:  The patient was given IV conscious sedation  in incremental dosage by the circulating cath lab RN, monitored by the  cath lab monitor tech under my supervision. Procedure was started at  07:00 a.m. and finished at 07:30 a.m. The patient had to be given Romazicon and Narcan in order to reverse the  sedation effect at the end of the procedure she stopped coming around. 2.  Right heart cath. The right femoral artery was cannulated with the  5-Swiss sheath. The RA pressure was around 22, RV pressure was 52/8, and VA pressure was  53/15. No wedge was performed. There was no step-up in the oxygen  saturation in the different chambers of the right side of the heart. The multipurpose catheter was then removed. 3.  Left heart cath: The left femoral artery was cannulated with a  6-Swiss sheath. The coronary angiogram showed the RCA is a dominant  artery. RCA has recalcified in the distal part and there is severe  stenosis of the proximal RCA.   There is filling defect in the mid-RCA  with severe disease that

## 2018-11-07 NOTE — CONSULTS
was elevated. Right ventricular systolic pressure of 55 mm Hg consistent with moderate   pulmonary hypertension. Pericardial Effusion   There is a small pericardial effusion noted. Pleural Effusion   Moderate bilateral pleural effusion. Aorta / Great Vessels   The aorta is within normal limits.      M-Mode/2D Measurements & Calculations      LV Diastolic   LV Systolic Dimension:    AV Cusp Separation: 1.9 cmLA   Dimension: 4.1 2.9 cm                    Dimension: 3.9 cmAO Root   cm             LV Volume Diastolic: 75.4 Dimension: 2.9 cmLA Area: 27.5   LV FS:29.3 %   ml                        cm^2   LV PW          LV Volume Systolic: 80.8   Diastolic: 0.7 ml   cm             LV EDV/LV EDV Index: 74.2   Septum         ml/46 m^2LV ESV/LV ESV    RV Diastolic Dimension: 2.7 cm   Diastolic: 0.8 Index: 43.9 ml/20 m^2   cm             EF Calculated: 56.6 %     LA/Aorta: 1.34                                               LA volume/Index: 98.2 ml /61m^2     Doppler Measurements & Calculations      MV Peak E-Wave: 122 cm/s AV Peak Velocity: 112  LVOT Peak Velocity: 86.6                            cm/s                   cm/s   MV Peak Gradient: 5.95   AV Peak Gradient: 5.02 LVOT Peak Gradient: 3 mmHg   mmHg                     mmHg                                                   TV Peak E-Wave: 56.2 cm/s   MV Deceleration Time:   151 msec                                        TV Peak Gradient: 1.26                                                   mmHg                            IVRT: 49 msec          TR Velocity:338 cm/s                                                   TR Gradient:45.7 mmHg                                                   PV Peak Velocity: 56.8                            AV DVI (Vmax):0.77     cm/s   MR Velocity: 565 cm/s                           PV Peak Gradient: 1.29                                                   mmHg                                                      [place] [facility]

## 2018-11-07 NOTE — H&P
[address] [place] [address]                              HISTORY AND PHYSICAL    PATIENT NAME: Leilani Dubon                    :        1940  MED REC NO:   [id number]                           ROOM:       0006  ACCOUNT NO:   [de-identified]                           ADMIT DATE: 2018  PROVIDER:     Ronald Ojeda. Herminia Mckay M.D. REASON FOR EVALUATION:  Shortness of breath and chest pain. HISTORY OF PRESENT ILLNESS:  This is a patient, 60-year-old lady, who  has been healthy until the last two weeks when she started noticing that  she is getting more short of breath, pedal edema. Her symptoms got  worse to the point that she could not walk even across the vaz. She  came to the emergency room. She was found to be in atrial fibrillation  and also was found to be in congestive heart failure. The patient was  seen and evaluated by the ER physician and subsequently admitted to the  hospital under my service with diagnoses of an acute congestive heart  failure and new-onset atrial fib. The patient is not aware of prior  history of MI. She has no prior history of congestive heart failure. The patient has no history of CVA or TIA. She is not aware that she has  atrial fibrillation. The patient's liver enzymes were found to be  elevated in the emergency room. The patient has no fever, chills, or  rigors. She has no cough. She did notice pedal edema. She had  orthopnea. She had dyspnea on minimal exertion. She had no history of  GI bleed or bleeding disorder. She is not aware of the diabetes  mellitus. The patient has no change in her weight. She has no  psychological disorders. No musculoskeletal disorder. SOCIAL HISTORY:  The patient denied smoking or alcohol abuse. PRIOR SURGERY:  She had history of hysterectomy. ALLERGIES:  The patient has no known allergy. CODE STATUS:  She is a full code.     PHYSICAL

## 2018-11-08 LAB
ANION GAP SERPL CALCULATED.3IONS-SCNC: 11 MEQ/L (ref 8–16)
APTT: 107.2 SECONDS (ref 22–38)
APTT: 75.3 SECONDS (ref 22–38)
APTT: 92.3 SECONDS (ref 22–38)
BUN BLDV-MCNC: 17 MG/DL (ref 7–22)
CALCIUM SERPL-MCNC: 8.3 MG/DL (ref 8.5–10.5)
CHLORIDE BLD-SCNC: 98 MEQ/L (ref 98–111)
CO2: 29 MEQ/L (ref 23–33)
CREAT SERPL-MCNC: 0.8 MG/DL (ref 0.4–1.2)
ERYTHROCYTE [DISTWIDTH] IN BLOOD BY AUTOMATED COUNT: 14.1 % (ref 11.5–14.5)
ERYTHROCYTE [DISTWIDTH] IN BLOOD BY AUTOMATED COUNT: 48.7 FL (ref 35–45)
GFR SERPL CREATININE-BSD FRML MDRD: 70 ML/MIN/1.73M2
GLUCOSE BLD-MCNC: 111 MG/DL (ref 70–108)
HAV IGM SER IA-ACNC: NEGATIVE
HCT VFR BLD CALC: 29.9 % (ref 37–47)
HEMOGLOBIN: 10 GM/DL (ref 12–16)
HEPATITIS B CORE IGM ANTIBODY: NEGATIVE
HEPATITIS B SURFACE ANTIGEN: NEGATIVE
HEPATITIS C ANTIBODY: NEGATIVE
MCH RBC QN AUTO: 31.5 PG (ref 26–33)
MCHC RBC AUTO-ENTMCNC: 33.4 GM/DL (ref 32.2–35.5)
MCV RBC AUTO: 94.3 FL (ref 81–99)
PLATELET # BLD: 155 THOU/MM3 (ref 130–400)
PMV BLD AUTO: 12.2 FL (ref 9.4–12.4)
POTASSIUM SERPL-SCNC: 3.6 MEQ/L (ref 3.5–5.2)
RBC # BLD: 3.17 MILL/MM3 (ref 4.2–5.4)
SODIUM BLD-SCNC: 138 MEQ/L (ref 135–145)
WBC # BLD: 7.8 THOU/MM3 (ref 4.8–10.8)

## 2018-11-08 PROCEDURE — G8979 MOBILITY GOAL STATUS: HCPCS

## 2018-11-08 PROCEDURE — 6370000000 HC RX 637 (ALT 250 FOR IP): Performed by: INTERNAL MEDICINE

## 2018-11-08 PROCEDURE — 6370000000 HC RX 637 (ALT 250 FOR IP): Performed by: THORACIC SURGERY (CARDIOTHORACIC VASCULAR SURGERY)

## 2018-11-08 PROCEDURE — G8978 MOBILITY CURRENT STATUS: HCPCS

## 2018-11-08 PROCEDURE — 97166 OT EVAL MOD COMPLEX 45 MIN: CPT

## 2018-11-08 PROCEDURE — 2500000003 HC RX 250 WO HCPCS: Performed by: INTERNAL MEDICINE

## 2018-11-08 PROCEDURE — 2700000000 HC OXYGEN THERAPY PER DAY

## 2018-11-08 PROCEDURE — 80048 BASIC METABOLIC PNL TOTAL CA: CPT

## 2018-11-08 PROCEDURE — 97530 THERAPEUTIC ACTIVITIES: CPT

## 2018-11-08 PROCEDURE — 36415 COLL VENOUS BLD VENIPUNCTURE: CPT

## 2018-11-08 PROCEDURE — 6360000002 HC RX W HCPCS: Performed by: THORACIC SURGERY (CARDIOTHORACIC VASCULAR SURGERY)

## 2018-11-08 PROCEDURE — 97162 PT EVAL MOD COMPLEX 30 MIN: CPT

## 2018-11-08 PROCEDURE — 6360000002 HC RX W HCPCS: Performed by: PHYSICIAN ASSISTANT

## 2018-11-08 PROCEDURE — 2709999900 HC NON-CHARGEABLE SUPPLY

## 2018-11-08 PROCEDURE — G8987 SELF CARE CURRENT STATUS: HCPCS

## 2018-11-08 PROCEDURE — 85027 COMPLETE CBC AUTOMATED: CPT

## 2018-11-08 PROCEDURE — 99233 SBSQ HOSP IP/OBS HIGH 50: CPT | Performed by: THORACIC SURGERY (CARDIOTHORACIC VASCULAR SURGERY)

## 2018-11-08 PROCEDURE — 2580000003 HC RX 258: Performed by: INTERNAL MEDICINE

## 2018-11-08 PROCEDURE — 2140000000 HC CCU INTERMEDIATE R&B

## 2018-11-08 PROCEDURE — 85730 THROMBOPLASTIN TIME PARTIAL: CPT

## 2018-11-08 PROCEDURE — 97116 GAIT TRAINING THERAPY: CPT

## 2018-11-08 PROCEDURE — G8988 SELF CARE GOAL STATUS: HCPCS

## 2018-11-08 RX ORDER — AMIODARONE HYDROCHLORIDE 200 MG/1
200 TABLET ORAL 2 TIMES DAILY
Status: DISCONTINUED | OUTPATIENT
Start: 2018-11-08 | End: 2018-11-12

## 2018-11-08 RX ADMIN — FUROSEMIDE 40 MG: 10 INJECTION, SOLUTION INTRAMUSCULAR; INTRAVENOUS at 14:39

## 2018-11-08 RX ADMIN — HEPARIN SODIUM 13 UNITS/KG/HR: 10000 INJECTION, SOLUTION INTRAVENOUS at 16:16

## 2018-11-08 RX ADMIN — FUROSEMIDE 40 MG: 10 INJECTION, SOLUTION INTRAMUSCULAR; INTRAVENOUS at 09:21

## 2018-11-08 RX ADMIN — Medication 10 ML: at 20:09

## 2018-11-08 RX ADMIN — ATORVASTATIN CALCIUM 20 MG: 20 TABLET, FILM COATED ORAL at 20:09

## 2018-11-08 RX ADMIN — AMIODARONE HYDROCHLORIDE 200 MG: 200 TABLET ORAL at 20:09

## 2018-11-08 RX ADMIN — ASPIRIN 81 MG 81 MG: 81 TABLET ORAL at 09:19

## 2018-11-08 RX ADMIN — AMIODARONE HYDROCHLORIDE 200 MG: 200 TABLET ORAL at 09:19

## 2018-11-08 RX ADMIN — POTASSIUM CHLORIDE 20 MEQ: 20 TABLET, EXTENDED RELEASE ORAL at 13:27

## 2018-11-08 RX ADMIN — ATENOLOL 25 MG: 25 TABLET ORAL at 09:19

## 2018-11-08 RX ADMIN — FUROSEMIDE 40 MG: 10 INJECTION, SOLUTION INTRAMUSCULAR; INTRAVENOUS at 20:09

## 2018-11-08 RX ADMIN — POTASSIUM CHLORIDE 20 MEQ: 20 TABLET, EXTENDED RELEASE ORAL at 09:20

## 2018-11-08 RX ADMIN — AMIODARONE HYDROCHLORIDE 0.5 MG/MIN: 1.8 INJECTION, SOLUTION INTRAVENOUS at 01:18

## 2018-11-08 RX ADMIN — Medication 10 ML: at 09:25

## 2018-11-08 ASSESSMENT — PAIN SCALES - GENERAL
PAINLEVEL_OUTOF10: 0
PAINLEVEL_OUTOF10: 0

## 2018-11-08 NOTE — PROGRESS NOTES
[address] ([place] [address] THERAPY  [facility] ICU 4D  EVALUATION    Time:  Time In: 0802  Time Out: 0840  Timed Code Treatment Minutes: 23 Minutes  Minutes: 38          Date: 2018  Patient Name: Adin Fernández,   Gender: female      MRN: [id number]  : 1940  (70 y.o.)  Referring Practitioner: Hans Pierre MD  Diagnosis: atrial fibrillation  Additional Pertinent Hx:  Patricia Almazan is a 70 y.o. female who presents to the Emergency Department for the evaluation of intermittent shortness of breath onset around 1 week ago. She states the pain is an intermittent pain to the right chest that occurs with coughing. The patient does not take any medication, does not see a cardiologist and has not seen her PCP is 30 years. Cardiac cath on  showed MVCAD and CABG recommended. Restrictions/Precautions:  General Precautions, Fall Risk                            History reviewed. No pertinent past medical history. Past Surgical History:   Procedure Laterality Date    HYSTERECTOMY             Subjective  Chart Reviewed: Yes (H&P, orders, progress notes)  Patient assessed for rehabilitation services?: Yes  Family / Caregiver Present: No    Subjective: Pt supine in bed upon arrival, agreeable to OT session. Comments: RN ok'd session, reporting pt's surgery is tentatively scheduled for 18.      General:  Overall Orientation Status: Within Normal Limits    Vision: Within Functional Limits    Hearing: Within functional limits         Pain:  Pain Assessment  Patient Currently in Pain: Denies       Social/Functional History:  Lives With: Sp[facility] ([facility]been there ~1 week))  Type of Home: [address]: One level, Laundry in basement  Home Access: Stairs to enter with rails, Ramped entrance  [address] - Number of Steps: 3  Home Equipment: 4 wheeled walker, Walker-Lift, [address] 450 bed (all are 's DME; pt was not using

## 2018-11-08 NOTE — CARE COORDINATION
DISCHARGE BARRIERS  11/8/18, 8:53 AM    Reason for Referral: Follow for discharge needs please, ?TCU/ECF. Mental Status: Patient is alert and oriented  Decision Making: Patient is making her own decisions. Family/Social/Home Environment: Assessment completed with Patient. Patient resides at home with her spouse. Spouse is currently at [facility] [address] [place] [address] completing rehab and has been there approximately 1 week. Patient stated that she was mostly independent at home prior to admission. Patient stated that she mostly cared for her spouse at home when he needed assistance. They have one daughter who lives in [place] and who works. SW discussed discharge options with Patient. Patient is hopeful in being able to return home and but does not have anyone to assist her for 2 weeks. Patient stated that she would prefer to stay at [facility] for TCU/Rehab before having to go to [facility] [address] [place] [address] at discharge. Current Services: none  Current Equipment: none  Payment Source: Medicare  Concerns or Barriers to Discharge: not at this time  Collabrative List of ECF/HH were provided: offered and declined, discussed options of TCU/Rehab vs ECF. Teach Back Method used with Patient regarding care plan and discharge plan. Patient  verbalize understanding of the plan of care and contribute to goal setting. Anticipated Needs/Discharge Plan: Patient discharge to TCU/Rehab vs ECF after open heart surgery scheduled on 11/12/18.     Electronically signed by SHARON Nelson, ARTURO on 11/8/2018 at 8:53 AM

## 2018-11-09 LAB
APTT: 58.8 SECONDS (ref 22–38)
APTT: 60.3 SECONDS (ref 22–38)
APTT: 67.1 SECONDS (ref 22–38)
MRSA SCREEN: NORMAL

## 2018-11-09 PROCEDURE — 97530 THERAPEUTIC ACTIVITIES: CPT

## 2018-11-09 PROCEDURE — 97110 THERAPEUTIC EXERCISES: CPT

## 2018-11-09 PROCEDURE — 6370000000 HC RX 637 (ALT 250 FOR IP): Performed by: THORACIC SURGERY (CARDIOTHORACIC VASCULAR SURGERY)

## 2018-11-09 PROCEDURE — 6370000000 HC RX 637 (ALT 250 FOR IP): Performed by: INTERNAL MEDICINE

## 2018-11-09 PROCEDURE — 6360000002 HC RX W HCPCS: Performed by: THORACIC SURGERY (CARDIOTHORACIC VASCULAR SURGERY)

## 2018-11-09 PROCEDURE — 2580000003 HC RX 258: Performed by: INTERNAL MEDICINE

## 2018-11-09 PROCEDURE — 2709999900 HC NON-CHARGEABLE SUPPLY

## 2018-11-09 PROCEDURE — 36415 COLL VENOUS BLD VENIPUNCTURE: CPT

## 2018-11-09 PROCEDURE — 2140000000 HC CCU INTERMEDIATE R&B

## 2018-11-09 PROCEDURE — 2700000000 HC OXYGEN THERAPY PER DAY

## 2018-11-09 PROCEDURE — 85730 THROMBOPLASTIN TIME PARTIAL: CPT

## 2018-11-09 RX ADMIN — AMIODARONE HYDROCHLORIDE 200 MG: 200 TABLET ORAL at 08:07

## 2018-11-09 RX ADMIN — FUROSEMIDE 40 MG: 10 INJECTION, SOLUTION INTRAMUSCULAR; INTRAVENOUS at 20:31

## 2018-11-09 RX ADMIN — FUROSEMIDE 40 MG: 10 INJECTION, SOLUTION INTRAMUSCULAR; INTRAVENOUS at 14:00

## 2018-11-09 RX ADMIN — FUROSEMIDE 40 MG: 10 INJECTION, SOLUTION INTRAMUSCULAR; INTRAVENOUS at 08:08

## 2018-11-09 RX ADMIN — ATORVASTATIN CALCIUM 20 MG: 20 TABLET, FILM COATED ORAL at 20:31

## 2018-11-09 RX ADMIN — Medication 10 ML: at 20:31

## 2018-11-09 RX ADMIN — Medication 10 ML: at 08:08

## 2018-11-09 RX ADMIN — POTASSIUM CHLORIDE 20 MEQ: 20 TABLET, EXTENDED RELEASE ORAL at 08:08

## 2018-11-09 RX ADMIN — POTASSIUM CHLORIDE 20 MEQ: 20 TABLET, EXTENDED RELEASE ORAL at 17:17

## 2018-11-09 RX ADMIN — ATENOLOL 25 MG: 25 TABLET ORAL at 08:07

## 2018-11-09 RX ADMIN — AMIODARONE HYDROCHLORIDE 200 MG: 200 TABLET ORAL at 20:31

## 2018-11-09 RX ADMIN — POTASSIUM CHLORIDE 20 MEQ: 20 TABLET, EXTENDED RELEASE ORAL at 12:00

## 2018-11-09 RX ADMIN — ASPIRIN 81 MG 81 MG: 81 TABLET ORAL at 08:07

## 2018-11-09 ASSESSMENT — PAIN SCALES - GENERAL
PAINLEVEL_OUTOF10: 0

## 2018-11-09 NOTE — PROGRESS NOTES
Training, Home Exercise Program, Patient/Caregiver Education & Training, Equipment Evaluation, Education, & procurement    Goals:  Patient goals : return home    Short term goals  Time Frame for Short term goals: 2 weeks  Short term goal 1: pt will sit <> stand with S with sternal precautions to demonstrate safety with standing  Short term goal 2: pt will ambulate >300 feet with RW and SBA for improved endurance  Short term goal 3: pt will supine <> sit at EOB with CGA with sternal precautions for safety with bed mobility  Short term goal 4: pt will negotiate 4 6\" steps with SBA and 5 second pause between steps for safety with stairs                 AM-PAC Inpatient Mobility without Stair Climbing Raw Score : 15  AM-PAC Inpatient without Stair Climbing T-Scale Score : 43.03  Mobility Inpatient CMS 0-100% Score: 47.43  Mobility Inpatient without Stair CMS G-Code Modifier : CK

## 2018-11-10 LAB
ANION GAP SERPL CALCULATED.3IONS-SCNC: 10 MEQ/L (ref 8–16)
APTT: 53.2 SECONDS (ref 22–38)
APTT: 69 SECONDS (ref 22–38)
APTT: 86 SECONDS (ref 22–38)
BASOPHILS # BLD: 0.4 %
BASOPHILS ABSOLUTE: 0 THOU/MM3 (ref 0–0.1)
BUN BLDV-MCNC: 27 MG/DL (ref 7–22)
CALCIUM SERPL-MCNC: 9.7 MG/DL (ref 8.5–10.5)
CHLORIDE BLD-SCNC: 93 MEQ/L (ref 98–111)
CO2: 36 MEQ/L (ref 23–33)
CREAT SERPL-MCNC: 1 MG/DL (ref 0.4–1.2)
EOSINOPHIL # BLD: 3.3 %
EOSINOPHILS ABSOLUTE: 0.3 THOU/MM3 (ref 0–0.4)
ERYTHROCYTE [DISTWIDTH] IN BLOOD BY AUTOMATED COUNT: 14 % (ref 11.5–14.5)
ERYTHROCYTE [DISTWIDTH] IN BLOOD BY AUTOMATED COUNT: 49.8 FL (ref 35–45)
GFR SERPL CREATININE-BSD FRML MDRD: 54 ML/MIN/1.73M2
GLUCOSE BLD-MCNC: 83 MG/DL (ref 70–108)
HCT VFR BLD CALC: 35.9 % (ref 37–47)
HEMOGLOBIN: 11.5 GM/DL (ref 12–16)
IMMATURE GRANS (ABS): 0.02 THOU/MM3 (ref 0–0.07)
IMMATURE GRANULOCYTES: 0.2 %
LYMPHOCYTES # BLD: 10.2 %
LYMPHOCYTES ABSOLUTE: 1 THOU/MM3 (ref 1–4.8)
MCH RBC QN AUTO: 31.1 PG (ref 26–33)
MCHC RBC AUTO-ENTMCNC: 32 GM/DL (ref 32.2–35.5)
MCV RBC AUTO: 97 FL (ref 81–99)
MONOCYTES # BLD: 9.2 %
MONOCYTES ABSOLUTE: 0.9 THOU/MM3 (ref 0.4–1.3)
NUCLEATED RED BLOOD CELLS: 0 /100 WBC
PLATELET # BLD: 199 THOU/MM3 (ref 130–400)
PLATELET # BLD: 203 THOU/MM3 (ref 130–400)
PMV BLD AUTO: 12.5 FL (ref 9.4–12.4)
POTASSIUM SERPL-SCNC: 4.6 MEQ/L (ref 3.5–5.2)
RBC # BLD: 3.7 MILL/MM3 (ref 4.2–5.4)
SEG NEUTROPHILS: 76.7 %
SEGMENTED NEUTROPHILS ABSOLUTE COUNT: 7.3 THOU/MM3 (ref 1.8–7.7)
SODIUM BLD-SCNC: 139 MEQ/L (ref 135–145)
WBC # BLD: 9.5 THOU/MM3 (ref 4.8–10.8)

## 2018-11-10 PROCEDURE — 36415 COLL VENOUS BLD VENIPUNCTURE: CPT

## 2018-11-10 PROCEDURE — 2140000000 HC CCU INTERMEDIATE R&B

## 2018-11-10 PROCEDURE — 85049 AUTOMATED PLATELET COUNT: CPT

## 2018-11-10 PROCEDURE — 94760 N-INVAS EAR/PLS OXIMETRY 1: CPT

## 2018-11-10 PROCEDURE — 2580000003 HC RX 258: Performed by: INTERNAL MEDICINE

## 2018-11-10 PROCEDURE — 6360000002 HC RX W HCPCS: Performed by: PHYSICIAN ASSISTANT

## 2018-11-10 PROCEDURE — 6370000000 HC RX 637 (ALT 250 FOR IP): Performed by: INTERNAL MEDICINE

## 2018-11-10 PROCEDURE — 80048 BASIC METABOLIC PNL TOTAL CA: CPT

## 2018-11-10 PROCEDURE — 6370000000 HC RX 637 (ALT 250 FOR IP): Performed by: THORACIC SURGERY (CARDIOTHORACIC VASCULAR SURGERY)

## 2018-11-10 PROCEDURE — 85730 THROMBOPLASTIN TIME PARTIAL: CPT

## 2018-11-10 PROCEDURE — 85025 COMPLETE CBC W/AUTO DIFF WBC: CPT

## 2018-11-10 RX ORDER — BUMETANIDE 1 MG/1
1 TABLET ORAL 2 TIMES DAILY
Status: DISCONTINUED | OUTPATIENT
Start: 2018-11-10 | End: 2018-11-12

## 2018-11-10 RX ADMIN — POTASSIUM CHLORIDE 20 MEQ: 20 TABLET, EXTENDED RELEASE ORAL at 13:13

## 2018-11-10 RX ADMIN — AMIODARONE HYDROCHLORIDE 200 MG: 200 TABLET ORAL at 10:33

## 2018-11-10 RX ADMIN — ATENOLOL 25 MG: 25 TABLET ORAL at 15:07

## 2018-11-10 RX ADMIN — AMIODARONE HYDROCHLORIDE 200 MG: 200 TABLET ORAL at 19:56

## 2018-11-10 RX ADMIN — POTASSIUM CHLORIDE 20 MEQ: 20 TABLET, EXTENDED RELEASE ORAL at 16:38

## 2018-11-10 RX ADMIN — Medication 10 ML: at 19:56

## 2018-11-10 RX ADMIN — BUMETANIDE 1 MG: 1 TABLET ORAL at 17:37

## 2018-11-10 RX ADMIN — ASPIRIN 81 MG 81 MG: 81 TABLET ORAL at 08:49

## 2018-11-10 RX ADMIN — POTASSIUM CHLORIDE 20 MEQ: 20 TABLET, EXTENDED RELEASE ORAL at 08:50

## 2018-11-10 RX ADMIN — ATORVASTATIN CALCIUM 20 MG: 20 TABLET, FILM COATED ORAL at 19:56

## 2018-11-10 RX ADMIN — BUMETANIDE 1 MG: 1 TABLET ORAL at 12:46

## 2018-11-10 RX ADMIN — HEPARIN SODIUM 12 UNITS/KG/HR: 10000 INJECTION, SOLUTION INTRAVENOUS at 06:15

## 2018-11-10 ASSESSMENT — PAIN SCALES - GENERAL: PAINLEVEL_OUTOF10: 0

## 2018-11-11 LAB
ABO: NORMAL
ANTIBODY SCREEN: NORMAL
APTT: 119.1 SECONDS (ref 22–38)
APTT: 63.3 SECONDS (ref 22–38)
APTT: 78.4 SECONDS (ref 22–38)
RH FACTOR: NORMAL

## 2018-11-11 PROCEDURE — 2580000003 HC RX 258: Performed by: INTERNAL MEDICINE

## 2018-11-11 PROCEDURE — 2709999900 HC NON-CHARGEABLE SUPPLY

## 2018-11-11 PROCEDURE — 6370000000 HC RX 637 (ALT 250 FOR IP): Performed by: THORACIC SURGERY (CARDIOTHORACIC VASCULAR SURGERY)

## 2018-11-11 PROCEDURE — 6360000002 HC RX W HCPCS: Performed by: THORACIC SURGERY (CARDIOTHORACIC VASCULAR SURGERY)

## 2018-11-11 PROCEDURE — P9016 RBC LEUKOCYTES REDUCED: HCPCS

## 2018-11-11 PROCEDURE — 85730 THROMBOPLASTIN TIME PARTIAL: CPT

## 2018-11-11 PROCEDURE — 2060000000 HC ICU INTERMEDIATE R&B

## 2018-11-11 PROCEDURE — 86850 RBC ANTIBODY SCREEN: CPT

## 2018-11-11 PROCEDURE — 86901 BLOOD TYPING SEROLOGIC RH(D): CPT

## 2018-11-11 PROCEDURE — 6370000000 HC RX 637 (ALT 250 FOR IP): Performed by: INTERNAL MEDICINE

## 2018-11-11 PROCEDURE — 36415 COLL VENOUS BLD VENIPUNCTURE: CPT

## 2018-11-11 PROCEDURE — 2580000003 HC RX 258: Performed by: THORACIC SURGERY (CARDIOTHORACIC VASCULAR SURGERY)

## 2018-11-11 PROCEDURE — 86900 BLOOD TYPING SEROLOGIC ABO: CPT

## 2018-11-11 PROCEDURE — 6360000002 HC RX W HCPCS: Performed by: PHYSICIAN ASSISTANT

## 2018-11-11 PROCEDURE — 86923 COMPATIBILITY TEST ELECTRIC: CPT

## 2018-11-11 RX ORDER — CHLORHEXIDINE GLUCONATE 0.12 MG/ML
15 RINSE ORAL ONCE
Status: COMPLETED | OUTPATIENT
Start: 2018-11-12 | End: 2018-11-12

## 2018-11-11 RX ORDER — CHLORHEXIDINE GLUCONATE 4 G/100ML
SOLUTION TOPICAL SEE ADMIN INSTRUCTIONS
Status: DISCONTINUED | OUTPATIENT
Start: 2018-11-11 | End: 2018-11-12 | Stop reason: HOSPADM

## 2018-11-11 RX ADMIN — HEPARIN SODIUM 11 UNITS/KG/HR: 10000 INJECTION, SOLUTION INTRAVENOUS at 13:27

## 2018-11-11 RX ADMIN — AMIODARONE HYDROCHLORIDE 200 MG: 200 TABLET ORAL at 08:39

## 2018-11-11 RX ADMIN — AMIODARONE HYDROCHLORIDE 200 MG: 200 TABLET ORAL at 19:51

## 2018-11-11 RX ADMIN — ATENOLOL 25 MG: 25 TABLET ORAL at 12:18

## 2018-11-11 RX ADMIN — POTASSIUM CHLORIDE 20 MEQ: 20 TABLET, EXTENDED RELEASE ORAL at 17:36

## 2018-11-11 RX ADMIN — ATORVASTATIN CALCIUM 20 MG: 20 TABLET, FILM COATED ORAL at 19:51

## 2018-11-11 RX ADMIN — Medication 10 ML: at 08:40

## 2018-11-11 RX ADMIN — MAGNESIUM SULFATE HEPTAHYDRATE 1.5 G: 500 INJECTION, SOLUTION INTRAMUSCULAR; INTRAVENOUS at 20:55

## 2018-11-11 RX ADMIN — ASPIRIN 81 MG 81 MG: 81 TABLET ORAL at 08:39

## 2018-11-11 RX ADMIN — BUMETANIDE 1 MG: 1 TABLET ORAL at 08:39

## 2018-11-11 RX ADMIN — Medication 10 ML: at 20:59

## 2018-11-11 RX ADMIN — POTASSIUM CHLORIDE 20 MEQ: 20 TABLET, EXTENDED RELEASE ORAL at 08:39

## 2018-11-11 RX ADMIN — BUMETANIDE 1 MG: 1 TABLET ORAL at 17:36

## 2018-11-11 RX ADMIN — POTASSIUM CHLORIDE 20 MEQ: 20 TABLET, EXTENDED RELEASE ORAL at 12:19

## 2018-11-11 ASSESSMENT — PAIN SCALES - GENERAL
PAINLEVEL_OUTOF10: 0

## 2018-11-12 ENCOUNTER — APPOINTMENT (OUTPATIENT)
Dept: GENERAL RADIOLOGY | Age: 78
DRG: 216 | End: 2018-11-12
Payer: MEDICARE

## 2018-11-12 ENCOUNTER — ANESTHESIA EVENT (OUTPATIENT)
Dept: OPERATING ROOM | Age: 78
DRG: 216 | End: 2018-11-12
Payer: MEDICARE

## 2018-11-12 ENCOUNTER — ANESTHESIA (OUTPATIENT)
Dept: OPERATING ROOM | Age: 78
DRG: 216 | End: 2018-11-12
Payer: MEDICARE

## 2018-11-12 VITALS
SYSTOLIC BLOOD PRESSURE: 135 MMHG | TEMPERATURE: 96.6 F | DIASTOLIC BLOOD PRESSURE: 96 MMHG | RESPIRATION RATE: 9 BRPM | OXYGEN SATURATION: 100 %

## 2018-11-12 PROBLEM — E43 SEVERE MALNUTRITION (HCC): Chronic | Status: ACTIVE | Noted: 2018-11-12

## 2018-11-12 LAB
ACTIVATED CLOTTING TIME: 467 SECONDS (ref 99–130)
ACTIVATED CLOTTING TIME: 520 SECONDS (ref 99–130)
ACTIVATED CLOTTING TIME: 656 SECONDS (ref 99–130)
ACTIVATED CLOTTING TIME: 681 SECONDS (ref 99–130)
ACTIVATED CLOTTING TIME: 696 SECONDS (ref 99–130)
ACTIVATED CLOTTING TIME: 743 SECONDS (ref 99–130)
ACTIVATED CLOTTING TIME: 95 SECONDS (ref 99–130)
ALLEN TEST: POSITIVE
ANION GAP SERPL CALCULATED.3IONS-SCNC: 12 MEQ/L (ref 8–16)
APTT: 27.6 SECONDS (ref 22–38)
BASE EXCESS (CALCULATED): -0.8 MMOL/L (ref -2.5–2.5)
BASE EXCESS (CALCULATED): -2.5 MMOL/L (ref -2.5–2.5)
BASE EXCESS (CALCULATED): 2.7 MMOL/L (ref -2.5–2.5)
BASE EXCESS (CALCULATED): 3 MMOL/L (ref -2.5–2.5)
BASE EXCESS (CALCULATED): 3.5 MMOL/L (ref -2.5–2.5)
BASE EXCESS (CALCULATED): 3.8 MMOL/L (ref -2.5–2.5)
BASE EXCESS (CALCULATED): 5 MMOL/L (ref -2.5–2.5)
BASE EXCESS (CALCULATED): 7.6 MMOL/L (ref -2.5–2.5)
BASE EXCESS (CALCULATED): 8.2 MMOL/L (ref -2.5–2.5)
BASE EXCESS (CALCULATED): 8.6 MMOL/L (ref -2.5–2.5)
BASE EXCESS (CALCULATED): 9.6 MMOL/L (ref -2.5–2.5)
BASE EXCESS MIXED: 7.7 MMOL/L (ref -2–3)
BUN BLDV-MCNC: 21 MG/DL (ref 7–22)
CALCIUM IONIZED SERUM: 0.84 MMOL/L (ref 1.12–1.32)
CALCIUM IONIZED SERUM: 0.84 MMOL/L (ref 1.12–1.32)
CALCIUM IONIZED SERUM: 0.87 MMOL/L (ref 1.12–1.32)
CALCIUM IONIZED SERUM: 0.89 MMOL/L (ref 1.12–1.32)
CALCIUM IONIZED SERUM: 0.91 MMOL/L (ref 1.12–1.32)
CALCIUM IONIZED SERUM: 1.06 MMOL/L (ref 1.12–1.32)
CALCIUM IONIZED SERUM: 1.07 MMOL/L (ref 1.12–1.32)
CALCIUM IONIZED SERUM: 1.18 MMOL/L (ref 1.12–1.32)
CALCIUM IONIZED: 1.03 MMOL/L (ref 1.12–1.32)
CALCIUM IONIZED: 1.03 MMOL/L (ref 1.12–1.32)
CALCIUM SERPL-MCNC: 7 MG/DL (ref 8.5–10.5)
CARTRIDGE COLOR: NORMAL
CHLORIDE BLD-SCNC: 106 MEQ/L (ref 98–111)
CO2: 23 MEQ/L (ref 23–33)
COLLECTED BY:: ABNORMAL
COMMENT: ABNORMAL
CREAT SERPL-MCNC: 0.7 MG/DL (ref 0.4–1.2)
DEVICE: ABNORMAL
ERYTHROCYTE [DISTWIDTH] IN BLOOD BY AUTOMATED COUNT: 15.8 % (ref 11.5–14.5)
ERYTHROCYTE [DISTWIDTH] IN BLOOD BY AUTOMATED COUNT: 53.8 FL (ref 35–45)
GFR SERPL CREATININE-BSD FRML MDRD: 81 ML/MIN/1.73M2
GLUCOSE BLD-MCNC: 95 MG/DL (ref 70–108)
GLUCOSE, WHOLE BLOOD: 101 MG/DL (ref 70–108)
GLUCOSE, WHOLE BLOOD: 101 MG/DL (ref 70–108)
GLUCOSE, WHOLE BLOOD: 103 MG/DL (ref 70–108)
GLUCOSE, WHOLE BLOOD: 121 MG/DL (ref 70–108)
GLUCOSE, WHOLE BLOOD: 130 MG/DL (ref 70–108)
GLUCOSE, WHOLE BLOOD: 147 MG/DL (ref 70–108)
GLUCOSE, WHOLE BLOOD: 149 MG/DL (ref 70–108)
GLUCOSE, WHOLE BLOOD: 154 MG/DL (ref 70–108)
GLUCOSE, WHOLE BLOOD: 155 MG/DL (ref 70–108)
GLUCOSE, WHOLE BLOOD: 158 MG/DL (ref 70–108)
GLUCOSE, WHOLE BLOOD: 167 MG/DL (ref 70–108)
GLUCOSE, WHOLE BLOOD: 174 MG/DL (ref 70–108)
GLUCOSE, WHOLE BLOOD: 185 MG/DL (ref 70–108)
GLUCOSE, WHOLE BLOOD: 191 MG/DL (ref 70–108)
GLUCOSE, WHOLE BLOOD: 77 MG/DL (ref 70–108)
GLUCOSE, WHOLE BLOOD: 82 MG/DL (ref 70–108)
GLUCOSE, WHOLE BLOOD: 89 MG/DL (ref 70–108)
GLUCOSE, WHOLE BLOOD: 97 MG/DL (ref 70–108)
HCO3, MIXED: 32 MMOL/L (ref 23–28)
HCO3: 23 MMOL/L (ref 23–28)
HCO3: 24 MMOL/L (ref 23–28)
HCO3: 25 MMOL/L (ref 23–28)
HCO3: 26 MMOL/L (ref 23–28)
HCO3: 27 MMOL/L (ref 23–28)
HCO3: 30 MMOL/L (ref 23–28)
HCO3: 31 MMOL/L (ref 23–28)
HCO3: 31 MMOL/L (ref 23–28)
HCO3: 32 MMOL/L (ref 23–28)
HCO3: 32 MMOL/L (ref 23–28)
HCO3: 34 MMOL/L (ref 23–28)
HCT VFR BLD CALC: 21.7 % (ref 37–47)
HCT VFR BLD CALC: 24.5 % (ref 37–47)
HCT VFR BLD CALC: 32.3 % (ref 37–47)
HEMOGLOBIN FINGERSTICK, POC: 10.2 G/DL (ref 12–16)
HEMOGLOBIN FINGERSTICK, POC: 6.3 G/DL (ref 12–16)
HEMOGLOBIN FINGERSTICK, POC: 6.6 G/DL (ref 12–16)
HEMOGLOBIN FINGERSTICK, POC: 6.7 G/DL (ref 12–16)
HEMOGLOBIN FINGERSTICK, POC: 7.4 G/DL (ref 12–16)
HEMOGLOBIN FINGERSTICK, POC: 7.5 G/DL (ref 12–16)
HEMOGLOBIN FINGERSTICK, POC: 8 G/DL (ref 12–16)
HEMOGLOBIN: 11 GM/DL (ref 12–16)
HEMOGLOBIN: 7.4 GM/DL (ref 12–16)
HEMOGLOBIN: 8 GM/DL (ref 12–16)
IFIO2: 35
IFIO2: 35
IFIO2: 80
MAGNESIUM: 4.3 MG/DL (ref 1.6–2.4)
MCH RBC QN AUTO: 30.8 PG (ref 26–33)
MCHC RBC AUTO-ENTMCNC: 32.7 GM/DL (ref 32.2–35.5)
MCV RBC AUTO: 94.2 FL (ref 81–99)
MODE: ABNORMAL
MODE: ABNORMAL
O2 SAT, MIXED: 75 %
O2 SATURATION: 100 %
O2 SATURATION: 98 %
O2 SATURATION: 98 %
PATIENT HEPARIN CONCENTRATION: 0
PATIENT HEPARIN CONCENTRATION: 2
PATIENT HEPARIN CONCENTRATION: 4
PCO2, MIXED VENOUS: 41 MMHG (ref 41–51)
PCO2: 28 MMHG (ref 35–45)
PCO2: 34 MMHG (ref 35–45)
PCO2: 34 MMHG (ref 35–45)
PCO2: 35 MMHG (ref 35–45)
PCO2: 37 MMHG (ref 35–45)
PCO2: 40 MMHG (ref 35–45)
PCO2: 43 MMHG (ref 35–45)
PCO2: 44 MMHG (ref 35–45)
PCO2: 45 MMHG (ref 35–45)
PCO2: 52 MMHG (ref 35–45)
PCO2: 53 MMHG (ref 35–45)
PH BLOOD GAS: 7.34 (ref 7.35–7.45)
PH BLOOD GAS: 7.37 (ref 7.35–7.45)
PH BLOOD GAS: 7.38 (ref 7.35–7.45)
PH BLOOD GAS: 7.41 (ref 7.35–7.45)
PH BLOOD GAS: 7.47 (ref 7.35–7.45)
PH BLOOD GAS: 7.49 (ref 7.35–7.45)
PH BLOOD GAS: 7.49 (ref 7.35–7.45)
PH BLOOD GAS: 7.51 (ref 7.35–7.45)
PH BLOOD GAS: 7.52 (ref 7.35–7.45)
PH BLOOD GAS: 7.56 (ref 7.35–7.45)
PH BLOOD GAS: 7.57 (ref 7.35–7.45)
PH, MIXED: 7.49 (ref 7.31–7.41)
PLATELET # BLD: 224 THOU/MM3 (ref 130–400)
PLATELET # BLD: 227 THOU/MM3 (ref 130–400)
PLATELET # BLD: 65 THOU/MM3 (ref 130–400)
PLATELET # BLD: 99 THOU/MM3 (ref 130–400)
PMV BLD AUTO: 11.7 FL (ref 9.4–12.4)
PO2 MIXED: 37 MMHG (ref 25–40)
PO2: 105 MMHG (ref 71–104)
PO2: 111 MMHG (ref 71–104)
PO2: 186 MMHG (ref 71–104)
PO2: 272 MMHG (ref 71–104)
PO2: 315 MMHG (ref 71–104)
PO2: 316 MMHG (ref 71–104)
PO2: 318 MMHG (ref 71–104)
PO2: 330 MMHG (ref 71–104)
PO2: 490 MMHG (ref 71–104)
PO2: 522 MMHG (ref 71–104)
PO2: 558 MMHG (ref 71–104)
POTASSIUM SERPL-SCNC: 3.6 MEQ/L (ref 3.5–5.2)
POTASSIUM SERPL-SCNC: 4.3 MEQ/L (ref 3.5–5.2)
POTASSIUM SERPL-SCNC: 4.5 MEQ/L (ref 3.5–5.2)
POTASSIUM, WHOLE BLOOD: 3.9 MEQ/L (ref 3.5–4.9)
POTASSIUM, WHOLE BLOOD: 4.3 MEQ/L (ref 3.5–4.9)
POTASSIUM, WHOLE BLOOD: 4.4 MEQ/L (ref 3.5–4.9)
POTASSIUM, WHOLE BLOOD: 4.4 MEQ/L (ref 3.5–4.9)
POTASSIUM, WHOLE BLOOD: 4.5 MEQ/L (ref 3.5–4.9)
POTASSIUM, WHOLE BLOOD: 4.6 MEQ/L (ref 3.5–4.9)
POTASSIUM, WHOLE BLOOD: 4.8 MEQ/L (ref 3.5–4.9)
POTASSIUM, WHOLE BLOOD: 4.9 MEQ/L (ref 3.5–4.9)
RANGE: NORMAL
RBC # BLD: 2.6 MILL/MM3 (ref 4.2–5.4)
SCAN OF BLOOD SMEAR: NORMAL
SET PEEP: 5 MMHG
SET PRESS SUPP: 10 CMH2O
SET RESPIRATORY RATE: 10 BPM
SET RESPIRATORY RATE: 12 BPM
SET RESPIRATORY RATE: 16 BPM
SET TIDAL VOLUME: 470 ML
SET TIDAL VOLUME: 470 ML
SODIUM BLD-SCNC: 141 MEQ/L (ref 135–145)
SODIUM, WHOLE BLOOD: 136 MEQ/L (ref 138–146)
SODIUM, WHOLE BLOOD: 136 MEQ/L (ref 138–146)
SODIUM, WHOLE BLOOD: 138 MEQ/L (ref 138–146)
SODIUM, WHOLE BLOOD: 140 MEQ/L (ref 138–146)
SODIUM, WHOLE BLOOD: 141 MEQ/L (ref 138–146)
SOURCE, BLOOD GAS: ABNORMAL
WBC # BLD: 14.4 THOU/MM3 (ref 4.8–10.8)

## 2018-11-12 PROCEDURE — 2709999900 HC NON-CHARGEABLE SUPPLY: Performed by: THORACIC SURGERY (CARDIOTHORACIC VASCULAR SURGERY)

## 2018-11-12 PROCEDURE — 2000000000 HC ICU R&B

## 2018-11-12 PROCEDURE — 33464 VALVULOPLASTY TRICUSPID: CPT | Performed by: THORACIC SURGERY (CARDIOTHORACIC VASCULAR SURGERY)

## 2018-11-12 PROCEDURE — 36415 COLL VENOUS BLD VENIPUNCTURE: CPT

## 2018-11-12 PROCEDURE — 85018 HEMOGLOBIN: CPT

## 2018-11-12 PROCEDURE — 6360000002 HC RX W HCPCS: Performed by: THORACIC SURGERY (CARDIOTHORACIC VASCULAR SURGERY)

## 2018-11-12 PROCEDURE — 2580000003 HC RX 258: Performed by: THORACIC SURGERY (CARDIOTHORACIC VASCULAR SURGERY)

## 2018-11-12 PROCEDURE — 84132 ASSAY OF SERUM POTASSIUM: CPT

## 2018-11-12 PROCEDURE — 02100Z9 BYPASS CORONARY ARTERY, ONE ARTERY FROM LEFT INTERNAL MAMMARY, OPEN APPROACH: ICD-10-PCS | Performed by: THORACIC SURGERY (CARDIOTHORACIC VASCULAR SURGERY)

## 2018-11-12 PROCEDURE — 6370000000 HC RX 637 (ALT 250 FOR IP): Performed by: THORACIC SURGERY (CARDIOTHORACIC VASCULAR SURGERY)

## 2018-11-12 PROCEDURE — 02L70ZK OCCLUSION OF LEFT ATRIAL APPENDAGE, OPEN APPROACH: ICD-10-PCS | Performed by: THORACIC SURGERY (CARDIOTHORACIC VASCULAR SURGERY)

## 2018-11-12 PROCEDURE — 33426 REPAIR OF MITRAL VALVE: CPT | Performed by: THORACIC SURGERY (CARDIOTHORACIC VASCULAR SURGERY)

## 2018-11-12 PROCEDURE — 2500000003 HC RX 250 WO HCPCS: Performed by: THORACIC SURGERY (CARDIOTHORACIC VASCULAR SURGERY)

## 2018-11-12 PROCEDURE — 6360000002 HC RX W HCPCS: Performed by: NURSE ANESTHETIST, CERTIFIED REGISTERED

## 2018-11-12 PROCEDURE — 36620 INSERTION CATHETER ARTERY: CPT

## 2018-11-12 PROCEDURE — 36592 COLLECT BLOOD FROM PICC: CPT

## 2018-11-12 PROCEDURE — 5A1221Z PERFORMANCE OF CARDIAC OUTPUT, CONTINUOUS: ICD-10-PCS | Performed by: THORACIC SURGERY (CARDIOTHORACIC VASCULAR SURGERY)

## 2018-11-12 PROCEDURE — P9045 ALBUMIN (HUMAN), 5%, 250 ML: HCPCS | Performed by: NURSE ANESTHETIST, CERTIFIED REGISTERED

## 2018-11-12 PROCEDURE — 33970 AORTIC CIRCULATION ASSIST: CPT | Performed by: PHYSICIAN ASSISTANT

## 2018-11-12 PROCEDURE — 2500000003 HC RX 250 WO HCPCS: Performed by: NURSE ANESTHETIST, CERTIFIED REGISTERED

## 2018-11-12 PROCEDURE — 32551 INSERTION OF CHEST TUBE: CPT

## 2018-11-12 PROCEDURE — 3700000001 HC ADD 15 MINUTES (ANESTHESIA): Performed by: THORACIC SURGERY (CARDIOTHORACIC VASCULAR SURGERY)

## 2018-11-12 PROCEDURE — P9016 RBC LEUKOCYTES REDUCED: HCPCS

## 2018-11-12 PROCEDURE — 82330 ASSAY OF CALCIUM: CPT

## 2018-11-12 PROCEDURE — 3600000008 HC SURGERY OHS BASE: Performed by: THORACIC SURGERY (CARDIOTHORACIC VASCULAR SURGERY)

## 2018-11-12 PROCEDURE — 2500000003 HC RX 250 WO HCPCS

## 2018-11-12 PROCEDURE — 33464 VALVULOPLASTY TRICUSPID: CPT | Performed by: PHYSICIAN ASSISTANT

## 2018-11-12 PROCEDURE — 2720000010 HC SURG SUPPLY STERILE: Performed by: THORACIC SURGERY (CARDIOTHORACIC VASCULAR SURGERY)

## 2018-11-12 PROCEDURE — 80048 BASIC METABOLIC PNL TOTAL CA: CPT

## 2018-11-12 PROCEDURE — 85730 THROMBOPLASTIN TIME PARTIAL: CPT

## 2018-11-12 PROCEDURE — 3700000000 HC ANESTHESIA ATTENDED CARE: Performed by: THORACIC SURGERY (CARDIOTHORACIC VASCULAR SURGERY)

## 2018-11-12 PROCEDURE — 84295 ASSAY OF SERUM SODIUM: CPT

## 2018-11-12 PROCEDURE — 02580ZZ DESTRUCTION OF CONDUCTION MECHANISM, OPEN APPROACH: ICD-10-PCS | Performed by: THORACIC SURGERY (CARDIOTHORACIC VASCULAR SURGERY)

## 2018-11-12 PROCEDURE — 33259 ABLATE ATRIA W/BYPASS ADD-ON: CPT | Performed by: PHYSICIAN ASSISTANT

## 2018-11-12 PROCEDURE — 93503 INSERT/PLACE HEART CATHETER: CPT

## 2018-11-12 PROCEDURE — C1773 RET DEV, INSERTABLE: HCPCS | Performed by: THORACIC SURGERY (CARDIOTHORACIC VASCULAR SURGERY)

## 2018-11-12 PROCEDURE — 85027 COMPLETE CBC AUTOMATED: CPT

## 2018-11-12 PROCEDURE — 33259 ABLATE ATRIA W/BYPASS ADD-ON: CPT | Performed by: THORACIC SURGERY (CARDIOTHORACIC VASCULAR SURGERY)

## 2018-11-12 PROCEDURE — 2780000010 HC IMPLANT OTHER: Performed by: THORACIC SURGERY (CARDIOTHORACIC VASCULAR SURGERY)

## 2018-11-12 PROCEDURE — B246ZZ4 ULTRASONOGRAPHY OF RIGHT AND LEFT HEART, TRANSESOPHAGEAL: ICD-10-PCS | Performed by: THORACIC SURGERY (CARDIOTHORACIC VASCULAR SURGERY)

## 2018-11-12 PROCEDURE — 82947 ASSAY GLUCOSE BLOOD QUANT: CPT

## 2018-11-12 PROCEDURE — 02UG0JZ SUPPLEMENT MITRAL VALVE WITH SYNTHETIC SUBSTITUTE, OPEN APPROACH: ICD-10-PCS | Performed by: THORACIC SURGERY (CARDIOTHORACIC VASCULAR SURGERY)

## 2018-11-12 PROCEDURE — 33970 AORTIC CIRCULATION ASSIST: CPT | Performed by: THORACIC SURGERY (CARDIOTHORACIC VASCULAR SURGERY)

## 2018-11-12 PROCEDURE — 2780000006 HC MISC HEART VALVE: Performed by: THORACIC SURGERY (CARDIOTHORACIC VASCULAR SURGERY)

## 2018-11-12 PROCEDURE — 71045 X-RAY EXAM CHEST 1 VIEW: CPT

## 2018-11-12 PROCEDURE — 83735 ASSAY OF MAGNESIUM: CPT

## 2018-11-12 PROCEDURE — 33426 REPAIR OF MITRAL VALVE: CPT | Performed by: PHYSICIAN ASSISTANT

## 2018-11-12 PROCEDURE — 37799 UNLISTED PX VASCULAR SURGERY: CPT

## 2018-11-12 PROCEDURE — 2709999900 HC NON-CHARGEABLE SUPPLY

## 2018-11-12 PROCEDURE — 3600000018 HC SURGERY OHS ADDTL 15MIN: Performed by: THORACIC SURGERY (CARDIOTHORACIC VASCULAR SURGERY)

## 2018-11-12 PROCEDURE — 99999 PR OFFICE/OUTPT VISIT,PROCEDURE ONLY: CPT | Performed by: PHYSICIAN ASSISTANT

## 2018-11-12 PROCEDURE — 6370000000 HC RX 637 (ALT 250 FOR IP): Performed by: NURSE ANESTHETIST, CERTIFIED REGISTERED

## 2018-11-12 PROCEDURE — 36556 INSERT NON-TUNNEL CV CATH: CPT

## 2018-11-12 PROCEDURE — 94002 VENT MGMT INPAT INIT DAY: CPT

## 2018-11-12 PROCEDURE — 85049 AUTOMATED PLATELET COUNT: CPT

## 2018-11-12 PROCEDURE — 82803 BLOOD GASES ANY COMBINATION: CPT

## 2018-11-12 PROCEDURE — P9045 ALBUMIN (HUMAN), 5%, 250 ML: HCPCS | Performed by: THORACIC SURGERY (CARDIOTHORACIC VASCULAR SURGERY)

## 2018-11-12 PROCEDURE — 51702 INSERT TEMP BLADDER CATH: CPT

## 2018-11-12 PROCEDURE — C9248 INJ, CLEVIDIPINE BUTYRATE: HCPCS

## 2018-11-12 PROCEDURE — S0028 INJECTION, FAMOTIDINE, 20 MG: HCPCS | Performed by: THORACIC SURGERY (CARDIOTHORACIC VASCULAR SURGERY)

## 2018-11-12 PROCEDURE — 33533 CABG ARTERIAL SINGLE: CPT | Performed by: THORACIC SURGERY (CARDIOTHORACIC VASCULAR SURGERY)

## 2018-11-12 PROCEDURE — 33533 CABG ARTERIAL SINGLE: CPT | Performed by: PHYSICIAN ASSISTANT

## 2018-11-12 PROCEDURE — 2580000003 HC RX 258: Performed by: NURSE ANESTHETIST, CERTIFIED REGISTERED

## 2018-11-12 PROCEDURE — 5A02210 ASSISTANCE WITH CARDIAC OUTPUT USING BALLOON PUMP, CONTINUOUS: ICD-10-PCS | Performed by: THORACIC SURGERY (CARDIOTHORACIC VASCULAR SURGERY)

## 2018-11-12 PROCEDURE — 85520 HEPARIN ASSAY: CPT

## 2018-11-12 PROCEDURE — 02UJ0JZ SUPPLEMENT TRICUSPID VALVE WITH SYNTHETIC SUBSTITUTE, OPEN APPROACH: ICD-10-PCS | Performed by: THORACIC SURGERY (CARDIOTHORACIC VASCULAR SURGERY)

## 2018-11-12 PROCEDURE — 6360000002 HC RX W HCPCS

## 2018-11-12 DEVICE — ZINACTIVE USE 2540329 DEVICE OCCL CLP L40MM PLUNG GRP FLX SHFT FOR GILLINOV: Type: IMPLANTABLE DEVICE | Site: HEART | Status: FUNCTIONAL

## 2018-11-12 DEVICE — IMPLANTABLE DEVICE: Type: IMPLANTABLE DEVICE | Site: HEART | Status: FUNCTIONAL

## 2018-11-12 RX ORDER — PROPOFOL 10 MG/ML
INJECTION, EMULSION INTRAVENOUS PRN
Status: DISCONTINUED | OUTPATIENT
Start: 2018-11-12 | End: 2018-11-12 | Stop reason: SDUPTHER

## 2018-11-12 RX ORDER — DEXTROSE MONOHYDRATE 50 MG/ML
100 INJECTION, SOLUTION INTRAVENOUS PRN
Status: DISCONTINUED | OUTPATIENT
Start: 2018-11-12 | End: 2018-11-14

## 2018-11-12 RX ORDER — SODIUM CHLORIDE 0.9 % (FLUSH) 0.9 %
10 SYRINGE (ML) INJECTION PRN
Status: DISCONTINUED | OUTPATIENT
Start: 2018-11-12 | End: 2018-11-20 | Stop reason: HOSPADM

## 2018-11-12 RX ORDER — CALCIUM CHLORIDE 100 MG/ML
INJECTION INTRAVENOUS; INTRAVENTRICULAR
Status: COMPLETED
Start: 2018-11-12 | End: 2018-11-12

## 2018-11-12 RX ORDER — OXYCODONE HYDROCHLORIDE 5 MG/1
10 TABLET ORAL EVERY 4 HOURS PRN
Status: DISCONTINUED | OUTPATIENT
Start: 2018-11-12 | End: 2018-11-20 | Stop reason: HOSPADM

## 2018-11-12 RX ORDER — SENNOSIDES 8.8 MG/5ML
10 LIQUID ORAL DAILY PRN
Status: DISCONTINUED | OUTPATIENT
Start: 2018-11-12 | End: 2018-11-20 | Stop reason: HOSPADM

## 2018-11-12 RX ORDER — SUCCINYLCHOLINE CHLORIDE 20 MG/ML
INJECTION INTRAMUSCULAR; INTRAVENOUS PRN
Status: DISCONTINUED | OUTPATIENT
Start: 2018-11-12 | End: 2018-11-12 | Stop reason: SDUPTHER

## 2018-11-12 RX ORDER — M-VIT,TX,IRON,MINS/CALC/FOLIC 27MG-0.4MG
1 TABLET ORAL
Status: DISCONTINUED | OUTPATIENT
Start: 2018-11-13 | End: 2018-11-20 | Stop reason: HOSPADM

## 2018-11-12 RX ORDER — SODIUM CHLORIDE 9 MG/ML
INJECTION, SOLUTION INTRAVENOUS CONTINUOUS PRN
Status: DISCONTINUED | OUTPATIENT
Start: 2018-11-12 | End: 2018-11-12 | Stop reason: SDUPTHER

## 2018-11-12 RX ORDER — MILRINONE LACTATE 1 MG/ML
INJECTION, SOLUTION INTRAVENOUS PRN
Status: DISCONTINUED | OUTPATIENT
Start: 2018-11-12 | End: 2018-11-12 | Stop reason: SDUPTHER

## 2018-11-12 RX ORDER — NITROGLYCERIN 20 MG/100ML
INJECTION INTRAVENOUS PRN
Status: DISCONTINUED | OUTPATIENT
Start: 2018-11-12 | End: 2018-11-12 | Stop reason: SDUPTHER

## 2018-11-12 RX ORDER — ALBUTEROL SULFATE 90 UG/1
2 AEROSOL, METERED RESPIRATORY (INHALATION) EVERY 6 HOURS PRN
Status: DISCONTINUED | OUTPATIENT
Start: 2018-11-12 | End: 2018-11-20 | Stop reason: HOSPADM

## 2018-11-12 RX ORDER — AMIODARONE HYDROCHLORIDE 200 MG/1
200 TABLET ORAL 2 TIMES DAILY
Status: DISCONTINUED | OUTPATIENT
Start: 2018-11-12 | End: 2018-11-13

## 2018-11-12 RX ORDER — ALBUMIN, HUMAN INJ 5% 5 %
SOLUTION INTRAVENOUS PRN
Status: DISCONTINUED | OUTPATIENT
Start: 2018-11-12 | End: 2018-11-12 | Stop reason: SDUPTHER

## 2018-11-12 RX ORDER — MORPHINE SULFATE 2 MG/ML
2 INJECTION, SOLUTION INTRAMUSCULAR; INTRAVENOUS
Status: DISCONTINUED | OUTPATIENT
Start: 2018-11-12 | End: 2018-11-14

## 2018-11-12 RX ORDER — BISACODYL 10 MG
10 SUPPOSITORY, RECTAL RECTAL DAILY PRN
Status: DISCONTINUED | OUTPATIENT
Start: 2018-11-12 | End: 2018-11-20 | Stop reason: HOSPADM

## 2018-11-12 RX ORDER — ALBUMIN, HUMAN INJ 5% 5 %
25 SOLUTION INTRAVENOUS PRN
Status: DISCONTINUED | OUTPATIENT
Start: 2018-11-12 | End: 2018-11-14

## 2018-11-12 RX ORDER — FAMOTIDINE 20 MG/1
20 TABLET, FILM COATED ORAL 2 TIMES DAILY
Status: DISCONTINUED | OUTPATIENT
Start: 2018-11-14 | End: 2018-11-12

## 2018-11-12 RX ORDER — DOCUSATE SODIUM 100 MG/1
100 CAPSULE, LIQUID FILLED ORAL 2 TIMES DAILY
Status: DISCONTINUED | OUTPATIENT
Start: 2018-11-13 | End: 2018-11-20 | Stop reason: HOSPADM

## 2018-11-12 RX ORDER — FENTANYL CITRATE 50 UG/ML
INJECTION, SOLUTION INTRAMUSCULAR; INTRAVENOUS PRN
Status: DISCONTINUED | OUTPATIENT
Start: 2018-11-12 | End: 2018-11-12 | Stop reason: SDUPTHER

## 2018-11-12 RX ORDER — NICOTINE POLACRILEX 4 MG
15 LOZENGE BUCCAL PRN
Status: DISCONTINUED | OUTPATIENT
Start: 2018-11-12 | End: 2018-11-14

## 2018-11-12 RX ORDER — ACETAMINOPHEN 325 MG/1
650 TABLET ORAL EVERY 4 HOURS PRN
Status: DISCONTINUED | OUTPATIENT
Start: 2018-11-12 | End: 2018-11-20 | Stop reason: HOSPADM

## 2018-11-12 RX ORDER — OXYCODONE HYDROCHLORIDE 5 MG/1
5 TABLET ORAL EVERY 4 HOURS PRN
Status: DISCONTINUED | OUTPATIENT
Start: 2018-11-12 | End: 2018-11-20 | Stop reason: HOSPADM

## 2018-11-12 RX ORDER — DEXTROSE MONOHYDRATE 25 G/50ML
12.5 INJECTION, SOLUTION INTRAVENOUS PRN
Status: DISCONTINUED | OUTPATIENT
Start: 2018-11-12 | End: 2018-11-14

## 2018-11-12 RX ORDER — CALCIUM CHLORIDE 100 MG/ML
1 INJECTION INTRAVENOUS; INTRAVENTRICULAR ONCE
Status: COMPLETED | OUTPATIENT
Start: 2018-11-12 | End: 2018-11-12

## 2018-11-12 RX ORDER — PROTAMINE SULFATE 10 MG/ML
INJECTION, SOLUTION INTRAVENOUS PRN
Status: DISCONTINUED | OUTPATIENT
Start: 2018-11-12 | End: 2018-11-12 | Stop reason: SDUPTHER

## 2018-11-12 RX ORDER — SODIUM CHLORIDE 9 MG/ML
INJECTION, SOLUTION INTRAVENOUS CONTINUOUS
Status: DISCONTINUED | OUTPATIENT
Start: 2018-11-12 | End: 2018-11-14

## 2018-11-12 RX ORDER — HEPARIN SODIUM 1000 [USP'U]/ML
INJECTION, SOLUTION INTRAVENOUS; SUBCUTANEOUS PRN
Status: DISCONTINUED | OUTPATIENT
Start: 2018-11-12 | End: 2018-11-12 | Stop reason: SDUPTHER

## 2018-11-12 RX ORDER — ROCURONIUM BROMIDE 10 MG/ML
INJECTION, SOLUTION INTRAVENOUS PRN
Status: DISCONTINUED | OUTPATIENT
Start: 2018-11-12 | End: 2018-11-12 | Stop reason: SDUPTHER

## 2018-11-12 RX ORDER — VECURONIUM BROMIDE 1 MG/ML
INJECTION, POWDER, LYOPHILIZED, FOR SOLUTION INTRAVENOUS PRN
Status: DISCONTINUED | OUTPATIENT
Start: 2018-11-12 | End: 2018-11-12 | Stop reason: SDUPTHER

## 2018-11-12 RX ORDER — ONDANSETRON 2 MG/ML
4 INJECTION INTRAMUSCULAR; INTRAVENOUS EVERY 6 HOURS PRN
Status: DISCONTINUED | OUTPATIENT
Start: 2018-11-12 | End: 2018-11-20 | Stop reason: HOSPADM

## 2018-11-12 RX ORDER — ENALAPRILAT 2.5 MG/2ML
0.62 INJECTION INTRAVENOUS
Status: DISCONTINUED | OUTPATIENT
Start: 2018-11-12 | End: 2018-11-14

## 2018-11-12 RX ORDER — METOPROLOL TARTRATE 5 MG/5ML
2.5 INJECTION INTRAVENOUS EVERY 10 MIN PRN
Status: DISCONTINUED | OUTPATIENT
Start: 2018-11-12 | End: 2018-11-14

## 2018-11-12 RX ORDER — PAPAVERINE HYDROCHLORIDE 30 MG/ML
INJECTION INTRAMUSCULAR; INTRAVENOUS PRN
Status: DISCONTINUED | OUTPATIENT
Start: 2018-11-12 | End: 2018-11-12 | Stop reason: HOSPADM

## 2018-11-12 RX ORDER — FAMOTIDINE 20 MG/1
20 TABLET, FILM COATED ORAL DAILY
Status: DISCONTINUED | OUTPATIENT
Start: 2018-11-14 | End: 2018-11-14

## 2018-11-12 RX ORDER — ACETAMINOPHEN 650 MG/1
650 SUPPOSITORY RECTAL EVERY 4 HOURS PRN
Status: DISCONTINUED | OUTPATIENT
Start: 2018-11-12 | End: 2018-11-14

## 2018-11-12 RX ORDER — ATORVASTATIN CALCIUM 20 MG/1
20 TABLET, FILM COATED ORAL NIGHTLY
Status: DISCONTINUED | OUTPATIENT
Start: 2018-11-13 | End: 2018-11-20 | Stop reason: HOSPADM

## 2018-11-12 RX ORDER — PROTAMINE SULFATE 10 MG/ML
50 INJECTION, SOLUTION INTRAVENOUS
Status: ACTIVE | OUTPATIENT
Start: 2018-11-12 | End: 2018-11-12

## 2018-11-12 RX ORDER — POTASSIUM CHLORIDE 29.8 MG/ML
20 INJECTION INTRAVENOUS PRN
Status: DISCONTINUED | OUTPATIENT
Start: 2018-11-13 | End: 2018-11-14

## 2018-11-12 RX ORDER — CEFAZOLIN SODIUM 1 G/50ML
1 INJECTION, SOLUTION INTRAVENOUS EVERY 8 HOURS
Status: COMPLETED | OUTPATIENT
Start: 2018-11-12 | End: 2018-11-14

## 2018-11-12 RX ORDER — SODIUM CHLORIDE 0.9 % (FLUSH) 0.9 %
10 SYRINGE (ML) INJECTION EVERY 12 HOURS SCHEDULED
Status: DISCONTINUED | OUTPATIENT
Start: 2018-11-12 | End: 2018-11-20 | Stop reason: HOSPADM

## 2018-11-12 RX ORDER — EPINEPHRINE 1 MG/ML
INJECTION, SOLUTION, CONCENTRATE INTRAVENOUS PRN
Status: DISCONTINUED | OUTPATIENT
Start: 2018-11-12 | End: 2018-11-12 | Stop reason: SDUPTHER

## 2018-11-12 RX ORDER — MIDAZOLAM HYDROCHLORIDE 1 MG/ML
INJECTION INTRAMUSCULAR; INTRAVENOUS PRN
Status: DISCONTINUED | OUTPATIENT
Start: 2018-11-12 | End: 2018-11-12 | Stop reason: SDUPTHER

## 2018-11-12 RX ADMIN — MIDAZOLAM HYDROCHLORIDE 5 MG: 1 INJECTION, SOLUTION INTRAMUSCULAR; INTRAVENOUS at 12:07

## 2018-11-12 RX ADMIN — MORPHINE SULFATE 2 MG: 2 INJECTION, SOLUTION INTRAMUSCULAR; INTRAVENOUS at 23:47

## 2018-11-12 RX ADMIN — PROPOFOL 50 MG: 10 INJECTION, EMULSION INTRAVENOUS at 07:59

## 2018-11-12 RX ADMIN — NITROGLYCERIN 40 MCG: 20 INJECTION INTRAVENOUS at 12:50

## 2018-11-12 RX ADMIN — SODIUM CHLORIDE: 9 INJECTION, SOLUTION INTRAVENOUS at 15:19

## 2018-11-12 RX ADMIN — SODIUM CHLORIDE: 9 INJECTION, SOLUTION INTRAVENOUS at 07:58

## 2018-11-12 RX ADMIN — PHENYLEPHRINE HYDROCHLORIDE 400 MCG: 10 INJECTION INTRAVENOUS at 08:05

## 2018-11-12 RX ADMIN — AMINOCAPROIC ACID 5 G: 250 INJECTION, SOLUTION INTRAVENOUS at 09:01

## 2018-11-12 RX ADMIN — ROCURONIUM BROMIDE 50 MG: 10 INJECTION INTRAVENOUS at 08:18

## 2018-11-12 RX ADMIN — PHENYLEPHRINE HYDROCHLORIDE 200 MCG: 10 INJECTION INTRAVENOUS at 09:42

## 2018-11-12 RX ADMIN — MORPHINE SULFATE 2 MG: 2 INJECTION, SOLUTION INTRAMUSCULAR; INTRAVENOUS at 21:45

## 2018-11-12 RX ADMIN — FENTANYL CITRATE 100 MCG: 50 INJECTION INTRAMUSCULAR; INTRAVENOUS at 09:47

## 2018-11-12 RX ADMIN — MILRINONE LACTATE 0.12 MCG: 1 INJECTION, SOLUTION INTRAVENOUS at 13:48

## 2018-11-12 RX ADMIN — Medication 2 G: at 08:39

## 2018-11-12 RX ADMIN — FENTANYL CITRATE 150 MCG: 50 INJECTION INTRAMUSCULAR; INTRAVENOUS at 07:59

## 2018-11-12 RX ADMIN — FENTANYL CITRATE 50 MCG: 50 INJECTION INTRAMUSCULAR; INTRAVENOUS at 13:31

## 2018-11-12 RX ADMIN — CEFAZOLIN SODIUM 1 G: 1 INJECTION, SOLUTION INTRAVENOUS at 20:34

## 2018-11-12 RX ADMIN — FENTANYL CITRATE 100 MCG: 50 INJECTION INTRAMUSCULAR; INTRAVENOUS at 09:45

## 2018-11-12 RX ADMIN — FENTANYL CITRATE 100 MCG: 50 INJECTION INTRAMUSCULAR; INTRAVENOUS at 09:48

## 2018-11-12 RX ADMIN — PHENYLEPHRINE HYDROCHLORIDE 200 MCG: 10 INJECTION INTRAVENOUS at 08:03

## 2018-11-12 RX ADMIN — Medication 10 ML: at 21:46

## 2018-11-12 RX ADMIN — DEXMEDETOMIDINE 0.2 MCG/KG/HR: 100 INJECTION, SOLUTION, CONCENTRATE INTRAVENOUS at 21:37

## 2018-11-12 RX ADMIN — AMIODARONE HYDROCHLORIDE 150 MG: 1.5 INJECTION, SOLUTION INTRAVENOUS at 15:30

## 2018-11-12 RX ADMIN — HEPARIN SODIUM 18000 UNITS: 1000 INJECTION, SOLUTION INTRAVENOUS; SUBCUTANEOUS at 09:42

## 2018-11-12 RX ADMIN — Medication 1 G: at 12:37

## 2018-11-12 RX ADMIN — PHENYLEPHRINE HYDROCHLORIDE 200 MCG: 10 INJECTION INTRAVENOUS at 08:22

## 2018-11-12 RX ADMIN — AMIODARONE HYDROCHLORIDE 200 MG: 200 TABLET ORAL at 06:27

## 2018-11-12 RX ADMIN — PHENYLEPHRINE HYDROCHLORIDE 200 MCG: 10 INJECTION INTRAVENOUS at 08:04

## 2018-11-12 RX ADMIN — Medication 10 ML: at 23:48

## 2018-11-12 RX ADMIN — FENTANYL CITRATE 100 MCG: 50 INJECTION INTRAMUSCULAR; INTRAVENOUS at 08:53

## 2018-11-12 RX ADMIN — MORPHINE SULFATE 2 MG: 2 INJECTION, SOLUTION INTRAMUSCULAR; INTRAVENOUS at 21:02

## 2018-11-12 RX ADMIN — ALBUMIN (HUMAN) 25 G: 12.5 INJECTION, SOLUTION INTRAVENOUS at 14:22

## 2018-11-12 RX ADMIN — FENTANYL CITRATE 250 MCG: 50 INJECTION INTRAMUSCULAR; INTRAVENOUS at 08:54

## 2018-11-12 RX ADMIN — MIDAZOLAM HYDROCHLORIDE 5 MG: 1 INJECTION, SOLUTION INTRAMUSCULAR; INTRAVENOUS at 07:59

## 2018-11-12 RX ADMIN — HEPARIN SODIUM 2500 UNITS: 1000 INJECTION, SOLUTION INTRAVENOUS; SUBCUTANEOUS at 08:28

## 2018-11-12 RX ADMIN — ALBUMIN (HUMAN) 25 G: 12.5 INJECTION, SOLUTION INTRAVENOUS at 15:04

## 2018-11-12 RX ADMIN — EPINEPHRINE 20 MCG: 1 INJECTION, SOLUTION, CONCENTRATE INTRAVENOUS at 12:44

## 2018-11-12 RX ADMIN — HEPARIN SODIUM 2500 UNITS: 1000 INJECTION, SOLUTION INTRAVENOUS; SUBCUTANEOUS at 09:17

## 2018-11-12 RX ADMIN — FAMOTIDINE 20 MG: 10 INJECTION, SOLUTION INTRAVENOUS at 15:50

## 2018-11-12 RX ADMIN — EPINEPHRINE 2 MCG/MIN: 1 INJECTION, SOLUTION, CONCENTRATE INTRAVENOUS at 19:05

## 2018-11-12 RX ADMIN — AMINOCAPROIC ACID 5 G/HR: 250 INJECTION, SOLUTION INTRAVENOUS at 12:56

## 2018-11-12 RX ADMIN — ALBUMIN (HUMAN) 250 ML: 12.5 SOLUTION INTRAVENOUS at 13:17

## 2018-11-12 RX ADMIN — Medication 15 ML: at 06:27

## 2018-11-12 RX ADMIN — VECURONIUM BROMIDE 6 MG: 10 INJECTION, POWDER, LYOPHILIZED, FOR SOLUTION INTRAVENOUS at 09:30

## 2018-11-12 RX ADMIN — Medication 4 UNITS/HR: at 10:05

## 2018-11-12 RX ADMIN — PHENYLEPHRINE HYDROCHLORIDE 100 MCG: 10 INJECTION INTRAVENOUS at 12:57

## 2018-11-12 RX ADMIN — SUCCINYLCHOLINE CHLORIDE 100 MG: 20 INJECTION, SOLUTION INTRAMUSCULAR; INTRAVENOUS at 07:59

## 2018-11-12 RX ADMIN — PHENYLEPHRINE HYDROCHLORIDE 100 MCG: 10 INJECTION INTRAVENOUS at 09:58

## 2018-11-12 RX ADMIN — Medication 10 ML: at 21:32

## 2018-11-12 RX ADMIN — FENTANYL CITRATE 100 MCG: 50 INJECTION INTRAMUSCULAR; INTRAVENOUS at 12:48

## 2018-11-12 RX ADMIN — SODIUM CHLORIDE: 9 INJECTION, SOLUTION INTRAVENOUS at 13:27

## 2018-11-12 RX ADMIN — PHENYLEPHRINE HYDROCHLORIDE 200 MCG: 10 INJECTION INTRAVENOUS at 08:12

## 2018-11-12 RX ADMIN — POTASSIUM CHLORIDE 20 MEQ: 400 INJECTION, SOLUTION INTRAVENOUS at 23:08

## 2018-11-12 RX ADMIN — EPINEPHRINE 20 MCG: 1 INJECTION, SOLUTION, CONCENTRATE INTRAVENOUS at 12:42

## 2018-11-12 RX ADMIN — Medication 10 ML: at 20:30

## 2018-11-12 RX ADMIN — AMIODARONE HYDROCHLORIDE 200 MG: 200 TABLET ORAL at 15:38

## 2018-11-12 RX ADMIN — ONDANSETRON 4 MG: 2 INJECTION INTRAMUSCULAR; INTRAVENOUS at 20:22

## 2018-11-12 RX ADMIN — ALBUMIN (HUMAN) 25 G: 12.5 INJECTION, SOLUTION INTRAVENOUS at 17:54

## 2018-11-12 RX ADMIN — PROTAMINE SULFATE 160 MG: 10 INJECTION, SOLUTION INTRAVENOUS at 12:49

## 2018-11-12 RX ADMIN — PHENYLEPHRINE HYDROCHLORIDE 200 MCG: 10 INJECTION INTRAVENOUS at 08:23

## 2018-11-12 RX ADMIN — PHENYLEPHRINE HYDROCHLORIDE 200 MCG: 10 INJECTION INTRAVENOUS at 08:37

## 2018-11-12 RX ADMIN — CALCIUM CHLORIDE 1 G: 100 INJECTION INTRAVENOUS; INTRAVENTRICULAR at 21:11

## 2018-11-12 RX ADMIN — AMIODARONE HYDROCHLORIDE 200 MG: 200 TABLET ORAL at 22:09

## 2018-11-12 RX ADMIN — EPINEPHRINE 10 MCG: 1 INJECTION, SOLUTION, CONCENTRATE INTRAVENOUS at 12:43

## 2018-11-12 RX ADMIN — MAGNESIUM HYDROXIDE 30 ML: 400 SUSPENSION ORAL at 15:50

## 2018-11-12 RX ADMIN — Medication 17.6 MG: at 15:50

## 2018-11-12 RX ADMIN — POTASSIUM CHLORIDE 40 MEQ: 400 INJECTION, SOLUTION INTRAVENOUS at 15:05

## 2018-11-12 RX ADMIN — FENTANYL CITRATE 50 MCG: 50 INJECTION INTRAMUSCULAR; INTRAVENOUS at 13:25

## 2018-11-12 RX ADMIN — Medication 10 ML: at 21:42

## 2018-11-12 RX ADMIN — PHENYLEPHRINE HYDROCHLORIDE 200 MCG: 10 INJECTION INTRAVENOUS at 09:43

## 2018-11-12 RX ADMIN — CALCIUM CHLORIDE 1 G: 100 INJECTION, SOLUTION INTRAVENOUS; INTRAVENTRICULAR at 21:11

## 2018-11-12 RX ADMIN — FENTANYL CITRATE 100 MCG: 50 INJECTION INTRAMUSCULAR; INTRAVENOUS at 12:50

## 2018-11-12 RX ADMIN — EPINEPHRINE 3.33 MCG/MIN: 1 INJECTION, SOLUTION INTRAMUSCULAR; INTRAVENOUS; SUBCUTANEOUS at 12:33

## 2018-11-12 RX ADMIN — MILRINONE LACTATE 0.12 MCG/KG/MIN: 1 INJECTION, SOLUTION INTRAVENOUS at 14:05

## 2018-11-12 RX ADMIN — MORPHINE SULFATE 2 MG: 2 INJECTION, SOLUTION INTRAMUSCULAR; INTRAVENOUS at 20:23

## 2018-11-12 RX ADMIN — VECURONIUM BROMIDE 4 MG: 10 INJECTION, POWDER, LYOPHILIZED, FOR SOLUTION INTRAVENOUS at 12:07

## 2018-11-12 RX ADMIN — Medication 10 ML: at 20:22

## 2018-11-12 ASSESSMENT — PULMONARY FUNCTION TESTS
PIF_VALUE: 15
PIF_VALUE: 1
PIF_VALUE: 14
PIF_VALUE: 0
PIF_VALUE: 1
PIF_VALUE: 14
PIF_VALUE: 0
PIF_VALUE: 1
PIF_VALUE: 15
PIF_VALUE: 18
PIF_VALUE: 15
PIF_VALUE: 1
PIF_VALUE: 14
PIF_VALUE: 1
PIF_VALUE: 13
PIF_VALUE: 1
PIF_VALUE: 0
PIF_VALUE: 15
PIF_VALUE: 11
PIF_VALUE: 1
PIF_VALUE: 1
PIF_VALUE: 14
PIF_VALUE: 1
PIF_VALUE: 1
PIF_VALUE: 14
PIF_VALUE: 15
PIF_VALUE: 1
PIF_VALUE: 0
PIF_VALUE: 1
PIF_VALUE: 1
PIF_VALUE: 14
PIF_VALUE: 15
PIF_VALUE: 0
PIF_VALUE: 14
PIF_VALUE: 22
PIF_VALUE: 12
PIF_VALUE: 19
PIF_VALUE: 17
PIF_VALUE: 0
PIF_VALUE: 6
PIF_VALUE: 14
PIF_VALUE: 15
PIF_VALUE: 1
PIF_VALUE: 1
PIF_VALUE: 0
PIF_VALUE: 16
PIF_VALUE: 14
PIF_VALUE: 13
PIF_VALUE: 1
PIF_VALUE: 13
PIF_VALUE: 15
PIF_VALUE: 14
PIF_VALUE: 1
PIF_VALUE: 15
PIF_VALUE: 0
PIF_VALUE: 17
PIF_VALUE: 12
PIF_VALUE: 14
PIF_VALUE: 1
PIF_VALUE: 18
PIF_VALUE: 0
PIF_VALUE: 12
PIF_VALUE: 1
PIF_VALUE: 1
PIF_VALUE: 11
PIF_VALUE: 16
PIF_VALUE: 1
PIF_VALUE: 14
PIF_VALUE: 15
PIF_VALUE: 17
PIF_VALUE: 12
PIF_VALUE: 16
PIF_VALUE: 18
PIF_VALUE: 14
PIF_VALUE: 15
PIF_VALUE: 1
PIF_VALUE: 1
PIF_VALUE: 21
PIF_VALUE: 0
PIF_VALUE: 14
PIF_VALUE: 3
PIF_VALUE: 16
PIF_VALUE: 1
PIF_VALUE: 15
PIF_VALUE: 14
PIF_VALUE: 15
PIF_VALUE: 15
PIF_VALUE: 0
PIF_VALUE: 16
PIF_VALUE: 15
PIF_VALUE: 1
PIF_VALUE: 14
PIF_VALUE: 0
PIF_VALUE: 16
PIF_VALUE: 16
PIF_VALUE: 14
PIF_VALUE: 15
PIF_VALUE: 0
PIF_VALUE: 17
PIF_VALUE: 1
PIF_VALUE: 1
PIF_VALUE: 15
PIF_VALUE: 12
PIF_VALUE: 17
PIF_VALUE: 14
PIF_VALUE: 17
PIF_VALUE: 1
PIF_VALUE: 1
PIF_VALUE: 15
PIF_VALUE: 17
PIF_VALUE: 14
PIF_VALUE: 0
PIF_VALUE: 1
PIF_VALUE: 15
PIF_VALUE: 13
PIF_VALUE: 16
PIF_VALUE: 16
PIF_VALUE: 14
PIF_VALUE: 0
PIF_VALUE: 18
PIF_VALUE: 1
PIF_VALUE: 1
PIF_VALUE: 18
PIF_VALUE: 12
PIF_VALUE: 1
PIF_VALUE: 15
PIF_VALUE: 1
PIF_VALUE: 0
PIF_VALUE: 15
PIF_VALUE: 16
PIF_VALUE: 16
PIF_VALUE: 1
PIF_VALUE: 1
PIF_VALUE: 19
PIF_VALUE: 16
PIF_VALUE: 1
PIF_VALUE: 1
PIF_VALUE: 16
PIF_VALUE: 12
PIF_VALUE: 1
PIF_VALUE: 0
PIF_VALUE: 1
PIF_VALUE: 15
PIF_VALUE: 1
PIF_VALUE: 17
PIF_VALUE: 0
PIF_VALUE: 15
PIF_VALUE: 16
PIF_VALUE: 1
PIF_VALUE: 12
PIF_VALUE: 0
PIF_VALUE: 1
PIF_VALUE: 14
PIF_VALUE: 1
PIF_VALUE: 0
PIF_VALUE: 17
PIF_VALUE: 1
PIF_VALUE: 1
PIF_VALUE: 16
PIF_VALUE: 1
PIF_VALUE: 15
PIF_VALUE: 16
PIF_VALUE: 1
PIF_VALUE: 17
PIF_VALUE: 1
PIF_VALUE: 15
PIF_VALUE: 13
PIF_VALUE: 13
PIF_VALUE: 1
PIF_VALUE: 12
PIF_VALUE: 15
PIF_VALUE: 12
PIF_VALUE: 15
PIF_VALUE: 14
PIF_VALUE: 15
PIF_VALUE: 17
PIF_VALUE: 1
PIF_VALUE: 14
PIF_VALUE: 1
PIF_VALUE: 16
PIF_VALUE: 15
PIF_VALUE: 14
PIF_VALUE: 11
PIF_VALUE: 14
PIF_VALUE: 16
PIF_VALUE: 1
PIF_VALUE: 1
PIF_VALUE: 14
PIF_VALUE: 1
PIF_VALUE: 0
PIF_VALUE: 15
PIF_VALUE: 13
PIF_VALUE: 17
PIF_VALUE: 15
PIF_VALUE: 1
PIF_VALUE: 0
PIF_VALUE: 18
PIF_VALUE: 1
PIF_VALUE: 15
PIF_VALUE: 1
PIF_VALUE: 1
PIF_VALUE: 14
PIF_VALUE: 17
PIF_VALUE: 0
PIF_VALUE: 15
PIF_VALUE: 16
PIF_VALUE: 1
PIF_VALUE: 21
PIF_VALUE: 17
PIF_VALUE: 1
PIF_VALUE: 18
PIF_VALUE: 1
PIF_VALUE: 17
PIF_VALUE: 15
PIF_VALUE: 18
PIF_VALUE: 1
PIF_VALUE: 1
PIF_VALUE: 17
PIF_VALUE: 1
PIF_VALUE: 1
PIF_VALUE: 16
PIF_VALUE: 13
PIF_VALUE: 13
PIF_VALUE: 17
PIF_VALUE: 15
PIF_VALUE: 15
PIF_VALUE: 2
PIF_VALUE: 15
PIF_VALUE: 1
PIF_VALUE: 0
PIF_VALUE: 18
PIF_VALUE: 1
PIF_VALUE: 15
PIF_VALUE: 17
PIF_VALUE: 1
PIF_VALUE: 15
PIF_VALUE: 18
PIF_VALUE: 1
PIF_VALUE: 14
PIF_VALUE: 1
PIF_VALUE: 16
PIF_VALUE: 1
PIF_VALUE: 16
PIF_VALUE: 0
PIF_VALUE: 15
PIF_VALUE: 14
PIF_VALUE: 1
PIF_VALUE: 15
PIF_VALUE: 18
PIF_VALUE: 16
PIF_VALUE: 1
PIF_VALUE: 16
PIF_VALUE: 1
PIF_VALUE: 0
PIF_VALUE: 12
PIF_VALUE: 18
PIF_VALUE: 18
PIF_VALUE: 12
PIF_VALUE: 1
PIF_VALUE: 13
PIF_VALUE: 1
PIF_VALUE: 24
PIF_VALUE: 19
PIF_VALUE: 1
PIF_VALUE: 15
PIF_VALUE: 18
PIF_VALUE: 18
PIF_VALUE: 17
PIF_VALUE: 14
PIF_VALUE: 1
PIF_VALUE: 16
PIF_VALUE: 12
PIF_VALUE: 1
PIF_VALUE: 15
PIF_VALUE: 1
PIF_VALUE: 0
PIF_VALUE: 1
PIF_VALUE: 18
PIF_VALUE: 1
PIF_VALUE: 17
PIF_VALUE: 1
PIF_VALUE: 15
PIF_VALUE: 15
PIF_VALUE: 16
PIF_VALUE: 12
PIF_VALUE: 0
PIF_VALUE: 1
PIF_VALUE: 15
PIF_VALUE: 1
PIF_VALUE: 15
PIF_VALUE: 12
PIF_VALUE: 14
PIF_VALUE: 13
PIF_VALUE: 15
PIF_VALUE: 1
PIF_VALUE: 13
PIF_VALUE: 1
PIF_VALUE: 1
PIF_VALUE: 15
PIF_VALUE: 1
PIF_VALUE: 18
PIF_VALUE: 12
PIF_VALUE: 1
PIF_VALUE: 17
PIF_VALUE: 14
PIF_VALUE: 1
PIF_VALUE: 1
PIF_VALUE: 15
PIF_VALUE: 15
PIF_VALUE: 1
PIF_VALUE: 1
PIF_VALUE: 13
PIF_VALUE: 15
PIF_VALUE: 1
PIF_VALUE: 15
PIF_VALUE: 13
PIF_VALUE: 11
PIF_VALUE: 1
PIF_VALUE: 1
PIF_VALUE: 12
PIF_VALUE: 14
PIF_VALUE: 1
PIF_VALUE: 16
PIF_VALUE: 15
PIF_VALUE: 0
PIF_VALUE: 14
PIF_VALUE: 13
PIF_VALUE: 15
PIF_VALUE: 1
PIF_VALUE: 15
PIF_VALUE: 1
PIF_VALUE: 1
PIF_VALUE: 0
PIF_VALUE: 0
PIF_VALUE: 14
PIF_VALUE: 0
PIF_VALUE: 15
PIF_VALUE: 1
PIF_VALUE: 18
PIF_VALUE: 15
PIF_VALUE: 14
PIF_VALUE: 0
PIF_VALUE: 1
PIF_VALUE: 16
PIF_VALUE: 14
PIF_VALUE: 1
PIF_VALUE: 15
PIF_VALUE: 0
PIF_VALUE: 1
PIF_VALUE: 0
PIF_VALUE: 13
PIF_VALUE: 12
PIF_VALUE: 1
PIF_VALUE: 14
PIF_VALUE: 1
PIF_VALUE: 1
PIF_VALUE: 15
PIF_VALUE: 1
PIF_VALUE: 1
PIF_VALUE: 16
PIF_VALUE: 12
PIF_VALUE: 15
PIF_VALUE: 1

## 2018-11-12 ASSESSMENT — PAIN SCALES - GENERAL
PAINLEVEL_OUTOF10: 5
PAINLEVEL_OUTOF10: 2
PAINLEVEL_OUTOF10: 5
PAINLEVEL_OUTOF10: 6

## 2018-11-12 ASSESSMENT — ENCOUNTER SYMPTOMS: SHORTNESS OF BREATH: 1

## 2018-11-12 NOTE — PROGRESS NOTES
muscle loss    Objective Information:  · Nutrition-Focused Physical Findings: 1 BM noted past 24 hours. · Wound Type:  (11/12 CABG, valve replacement; surgical incision)  · Current Nutrition Therapies:  · Oral Diet Orders: NPO   · Oral Nutrition Supplement (ONS) Orders: Standard High Calorie Oral Supplement (Enlive TID; activia and decaf hot beverage TID)  · Anthropometric Measures:  · Ht: 5' 6\" (167.6 cm)   · Current Body Wt: 108 lb 14.4 oz (49.4 kg)  · Admission Body Wt: 128 lb (58.1 kg) (11/4 standing scale)  · Usual Body Wt: 125 lb (56.7 kg) (per pt. and family )  · % Weight Change:  ,  6# or 5% in 6 months  · Ideal Body Wt: 130 lb (59 kg),   · BMI Classification: BMI <18.5 Underweight (17.6)    Nutrition Interventions:   Continue NPO. Will resume ONS and ileus prevention protocol as able. Continued Inpatient Monitoring, Education not appropriate at this time, Coordination of Care (11/12 Received diet education 11/8; will follow up as appropriate)    Nutrition Evaluation:   · Evaluation: Goals set   · Goals: Pt. will receive adequate nutrition (FL diet or greater) in next 1-4 days.     · Monitoring: Nutrition Progression, Meal Intake, Supplement Intake, Diet Tolerance, Wound Healing, Weight, Pertinent Labs, Patient/Family Education, Monitor Hemodynamic Status, Monitor Bowel Function      Electronically signed by Jeremy Fregoso RD, LD on 11/12/18 at 3:08 PM    Contact Number: [phone]

## 2018-11-12 NOTE — PROGRESS NOTES
Admit Date: 11/4/2018  Hospital day 4    Subjective:     Patient no physical complaints. .   Medication side effects: none    Scheduled Meds:   [START ON 11/12/2018] ceFAZolin (ANCEF) IVPB  2 g Intravenous On Call to OR    [START ON 11/12/2018] chlorhexidine  15 mL Mouth/Throat Once    chlorhexidine   Topical See Admin Instructions    magnesium sulfate  1.5 g Intravenous Once    bumetanide  1 mg Oral BID    amiodarone  200 mg Oral BID    diphenhydrAMINE  50 mg Oral Once    sodium chloride flush  10 mL Intravenous 2 times per day    potassium chloride  20 mEq Oral TID     atenolol  25 mg Oral Daily    aspirin  81 mg Oral Daily    atorvastatin  20 mg Oral Nightly    potassium (CARDIAC) replacement protocol   Other RX Placeholder     Continuous Infusions:   heparin (porcine) 10.935 Units/kg/hr (11/11/18 2053)     PRN Meds:sodium chloride flush, acetaminophen, magnesium hydroxide, ondansetron, heparin (porcine), heparin (porcine)    Review of Systems  Pertinent items are noted in HPI. Objective:     Patient Vitals for the past 8 hrs:   BP Temp Temp src Pulse Resp SpO2   11/11/18 1945 105/73 98.5 °F (36.9 °C) Oral 106 16 93 %   11/11/18 1600 (!) 85/57 98.4 °F (36.9 °C) Oral 97 14 93 %     I/O last 3 completed shifts:   In: 1152.4 [P.O.:980; I.V.:172.4]  Out: 3837 [Urine:2775]    NO CHANGE     ECG: AF.   Data Review:   CBC:  Lab Results   Component Value Date    WBC 9.5 11/10/2018    RBC 3.70 11/10/2018    HGB 11.5 11/10/2018    HCT 35.9 11/10/2018    MCV 97.0 11/10/2018    MCH 31.1 11/10/2018    MCHC 32.0 11/10/2018     11/10/2018    MPV 12.5 11/10/2018     BMP  Lab Results   Component Value Date     11/10/2018    K 4.6 11/10/2018    K 3.7 11/07/2018    CL 93 11/10/2018    CO2 36 11/10/2018    BUN 27 11/10/2018    CREATININE 1.0 11/10/2018    CALCIUM 9.7 11/10/2018      COAG PROFILE:  Lab Results   Component Value Date    APTT 63.3 11/11/2018       Assessment:     Active Problems:    Atrial

## 2018-11-12 NOTE — ANESTHESIA PROCEDURE NOTES
Procedure Performed: DEANNA     Start Time:        End Time:      Preanesthesia Checklist:  Patient identified, IV assessed, risks and benefits discussed, monitors and equipment assessed, procedure being performed at surgeon's request and anesthesia consent obtained. General Procedure Information  Diagnostic Indications for Echo:  assessment of ascending aorta, assessment of surgical repair, defect repair evaluation, hemodynamic monitoring and assessment of valve function  Physician Requesting Echo: Jeancarlos Rafaels  CPT Code:  [id number]  Location performed:  OR  Intubated  Bite block placed  Heart visualized  Probe Insertion:  Easy  Probe Type:  3D  Modalities:  2D only, color flow mapping, continuous wave Doppler, pulse wave Doppler and M-mode    Echocardiographic and Doppler Measurements    Ventricles    Right Ventricle:  Cavity size dilated. Global function mildly impaired. Left Ventricle:  Cavity size normal.  Global Function mildly impaired. Ejection Fraction 35%. Ventricular Regional Function:  1- Basal Anteroseptal:  hypokinetic  2- Basal Anterior:  hypokinetic  3- Basal Anterolateral:  hypokinetic  4- Basal Inferolateral:  hypokinetic  5- Basal Inferior:  hypokinetic  6- Basal Inferoseptal:  hypokinetic  7- Mid Anteroseptal:  hypokinetic  8- Mid Anterior:  hypokinetic  9- Mid Anterolateral:  hypokinetic  10- Mid Inferolateral:  hypokinetic  11- Mid Inferior:  hypokinetic  12- Mid Inferoseptal:  hypokinetic  13- Apical Anterior:  hypokinetic  14- Apical Lateral:  hypokinetic  15- Apical Inferior:  hypokinetic  16- Apical Septal:  hypokinetic  17- [place]:  hypokinetic      Valves    Aortic Valve: Annulus normal.  Stenosis not present. Regurgitation none. Leaflets normal.  Leaflet motions normal.      Mitral Valve:  Stenosis not present. Regurgitation moderate. Leaflets normal.  Leaflet motions normal.      Tricuspid Valve: Annulus normal.  Stenosis not present. Regurgitation moderate. Leaflets normal.  Leaflet motions normal.    Pulmonic Valve: Annulus normal.  Stenosis not present. Regurgitation mild. Aorta    Ascending Aorta:  Size normal.  Dissection not present. Aortic Arch:  Size normal.  Dissection not present. Descending Aorta:  Size normal.  Dissection not present. Atria    Right Atrium:  Size normal.  Spontaneous echo contrast not present. Left Atrium:  Size normal.  Spontaneous echo contrast not present. Left atrial appendage normal.      Septa    Atrial Septum:  Intra-atrial septal morphology normal.      Ventricular Septum:  Intra-ventricular septum morphology normal.      Diastolic Function Measurements:  Diastolic Dysfunction Grade= indeterminate  E= ms  A= ms  E/A Ratio=   DT= ms  S/D=  IVRT=    Other Findings  Pericardium:  normal  Pleural Effusion:  bilateral      Anesthesia Information  Performed Personally  Anesthesiologist:  BRENNAN BAI      Echocardiogram Comments:       POST CPB REPORT  IABP in place, tip in position at beginning of descending thoracic aorta  LV: Moderately impaired systolic function. EF 35-40%  RV: Moderate to severely impaired systolic funciton. MV: Annuloplasty ring in place. No stenosis or regurgitaiton. TV: Annuloplasty ring in place. Trace TR. No stenosis. AV: Trace AI  PV: Mild regurgitation  No dissection  No effusion. No PFO. CECE ligated.

## 2018-11-12 NOTE — PROGRESS NOTES
Pt arrived in 4d05 from surgery. Scribe rn for primary  . ddavp 24 mcg hung per crna- infusing at 200 m,l per hour . 1447  -  Amicar drip started at 50 ml per hour as directed  . 5 gm in 250 normal saline .

## 2018-11-12 NOTE — OP NOTE
DATE: 11/12/18    PATIENT: Tevin Almazan    MRN: [id number]     Acct: [de-identified]    PREOP DIAGNOSIS:   1) Mitral regurgitation  2) Tricuspid insufficiency  3) Coronary artery disease    Postop diagnosis:  1) Mitral regurgitation  2) Tricuspid insufficiency  3) Coronary artery disease    Procedure:  1) Mitral valve repair with a 26 mm Femi Bari 3D Rechord annuloplasty band  2) Tricuspid valve repair with 30 mm Bernardo MC3 annuloplasty band  3) Coronary artery bypass grafting x 1, with the left internal mammary artery grafted to the left anterior descending artery  4) Left atrial maze procedure, with ablation of both the anterior and posterior mitral trigones  5) Ligation of the left atrial appendage  6) Insertion of left common femoral intra-aortic balloon pump   7) Endoscopic greater saphenous vein harvest    SURGEON:  Vicky Su MD    ASSISTANT: MARKUS James; Jazmine Cabrera, 1946 Matt Pretty    INDICATION:  The patient is a 70y.o. year-old female who presents with biventricular failure secondary to severe mitral and tricuspid regurgitation and atrial fibrillation with a rapid ventricular response, along with triplee-vessel coronary disease. FINDNGS: There were no intrapericardial adhesions. The mammary artery was somewhat small, but had adequate flow. The LAD was diffusely calcified throughout its length; its intraluminal diameter was only 1.8 mm. The mechanism for the mitral regurgitation was restrictive, due to a dilated cardiomyopathy. The original operative plan included a complete, three-vessel coronary revascularization. However, the patient's greater saphenous vein was found to be of small caliber and very thin-walled, and thus unsuitable for use as bypass conduit. Post-CPB DEANNA confirmed no residual mitral or tricuspid regurgitation, and a mean transvalvular gradient of 2 mm Hg.     CARDIOPULMONARY BYPASS TIME: 158    CROSSCLAMP TIME: 110    DESCRIPTION:  The patient was prepped and draped in sterile fashion

## 2018-11-13 ENCOUNTER — APPOINTMENT (OUTPATIENT)
Dept: GENERAL RADIOLOGY | Age: 78
DRG: 216 | End: 2018-11-13
Payer: MEDICARE

## 2018-11-13 LAB
ALLEN TEST: ABNORMAL
ALLEN TEST: NORMAL
ANION GAP SERPL CALCULATED.3IONS-SCNC: 9 MEQ/L (ref 8–16)
BASE EXCESS (CALCULATED): 0.4 MMOL/L (ref -2.5–2.5)
BASE EXCESS (CALCULATED): 2 MMOL/L (ref -2.5–2.5)
BUN BLDV-MCNC: 19 MG/DL (ref 7–22)
CALCIUM IONIZED: 1.21 MMOL/L (ref 1.12–1.32)
CALCIUM SERPL-MCNC: 8.6 MG/DL (ref 8.5–10.5)
CHLORIDE BLD-SCNC: 110 MEQ/L (ref 98–111)
CO2: 23 MEQ/L (ref 23–33)
COLLECTED BY:: ABNORMAL
COLLECTED BY:: NORMAL
CREAT SERPL-MCNC: 0.9 MG/DL (ref 0.4–1.2)
DEVICE: ABNORMAL
DEVICE: NORMAL
ERYTHROCYTE [DISTWIDTH] IN BLOOD BY AUTOMATED COUNT: 14.8 % (ref 11.5–14.5)
ERYTHROCYTE [DISTWIDTH] IN BLOOD BY AUTOMATED COUNT: 15.9 % (ref 11.5–14.5)
ERYTHROCYTE [DISTWIDTH] IN BLOOD BY AUTOMATED COUNT: 49.4 FL (ref 35–45)
ERYTHROCYTE [DISTWIDTH] IN BLOOD BY AUTOMATED COUNT: 54.8 FL (ref 35–45)
GFR SERPL CREATININE-BSD FRML MDRD: 61 ML/MIN/1.73M2
GLUCOSE BLD-MCNC: 101 MG/DL (ref 70–108)
GLUCOSE BLD-MCNC: 108 MG/DL (ref 70–108)
GLUCOSE BLD-MCNC: 113 MG/DL (ref 70–108)
GLUCOSE BLD-MCNC: 91 MG/DL (ref 70–108)
GLUCOSE BLD-MCNC: 93 MG/DL (ref 70–108)
GLUCOSE BLD-MCNC: 97 MG/DL (ref 70–108)
GLUCOSE BLD-MCNC: 97 MG/DL (ref 70–108)
GLUCOSE BLD-MCNC: 98 MG/DL (ref 70–108)
GLUCOSE, WHOLE BLOOD: 104 MG/DL (ref 70–108)
GLUCOSE, WHOLE BLOOD: 106 MG/DL (ref 70–108)
GLUCOSE, WHOLE BLOOD: 106 MG/DL (ref 70–108)
GLUCOSE, WHOLE BLOOD: 107 MG/DL (ref 70–108)
GLUCOSE, WHOLE BLOOD: 85 MG/DL (ref 70–108)
GLUCOSE, WHOLE BLOOD: 89 MG/DL (ref 70–108)
GLUCOSE, WHOLE BLOOD: 91 MG/DL (ref 70–108)
GLUCOSE, WHOLE BLOOD: 95 MG/DL (ref 70–108)
HCO3: 25 MMOL/L (ref 23–28)
HCO3: 27 MMOL/L (ref 23–28)
HCT VFR BLD CALC: 24 % (ref 37–47)
HCT VFR BLD CALC: 27.9 % (ref 37–47)
HEMOGLOBIN: 7.8 GM/DL (ref 12–16)
HEMOGLOBIN: 9.4 GM/DL (ref 12–16)
IFIO2: 21
IFIO2: 30
MAGNESIUM: 3.1 MG/DL (ref 1.6–2.4)
MCH RBC QN AUTO: 30.6 PG (ref 26–33)
MCH RBC QN AUTO: 31 PG (ref 26–33)
MCHC RBC AUTO-ENTMCNC: 32.5 GM/DL (ref 32.2–35.5)
MCHC RBC AUTO-ENTMCNC: 33.7 GM/DL (ref 32.2–35.5)
MCV RBC AUTO: 92.1 FL (ref 81–99)
MCV RBC AUTO: 94.1 FL (ref 81–99)
MODE: ABNORMAL
MODE: NORMAL
O2 SATURATION: 95 %
O2 SATURATION: 98 %
PATHOLOGIST REVIEW: ABNORMAL
PATHOLOGIST REVIEW: ABNORMAL
PCO2: 42 MMHG (ref 35–45)
PCO2: 42 MMHG (ref 35–45)
PH BLOOD GAS: 7.39 (ref 7.35–7.45)
PH BLOOD GAS: 7.41 (ref 7.35–7.45)
PLATELET # BLD: 48 THOU/MM3 (ref 130–400)
PLATELET # BLD: 49 THOU/MM3 (ref 130–400)
PMV BLD AUTO: 11.3 FL (ref 9.4–12.4)
PMV BLD AUTO: 11.5 FL (ref 9.4–12.4)
PO2: 110 MMHG (ref 71–104)
PO2: 76 MMHG (ref 71–104)
POTASSIUM SERPL-SCNC: 4.4 MEQ/L (ref 3.5–5.2)
POTASSIUM SERPL-SCNC: 5.2 MEQ/L (ref 3.5–5.2)
POTASSIUM SERPL-SCNC: 5.5 MEQ/L (ref 3.5–5.2)
POTASSIUM SERPL-SCNC: 5.5 MEQ/L (ref 3.5–5.2)
POTASSIUM SERPL-SCNC: 5.6 MEQ/L (ref 3.5–5.2)
RBC # BLD: 2.55 MILL/MM3 (ref 4.2–5.4)
RBC # BLD: 3.03 MILL/MM3 (ref 4.2–5.4)
SCAN OF BLOOD SMEAR: NORMAL
SET PEEP: 5 MMHG
SET PEEP: 5 MMHG
SET PRESS SUPP: 10 CMH2O
SET RESPIRATORY RATE: 8 BPM
SET TIDAL VOLUME: 470 ML
SODIUM BLD-SCNC: 142 MEQ/L (ref 135–145)
SOURCE, BLOOD GAS: ABNORMAL
SOURCE, BLOOD GAS: NORMAL
WBC # BLD: 10 THOU/MM3 (ref 4.8–10.8)
WBC # BLD: 8.7 THOU/MM3 (ref 4.8–10.8)

## 2018-11-13 PROCEDURE — 82803 BLOOD GASES ANY COMBINATION: CPT

## 2018-11-13 PROCEDURE — 82330 ASSAY OF CALCIUM: CPT

## 2018-11-13 PROCEDURE — 2709999900 HC NON-CHARGEABLE SUPPLY

## 2018-11-13 PROCEDURE — 6360000002 HC RX W HCPCS: Performed by: THORACIC SURGERY (CARDIOTHORACIC VASCULAR SURGERY)

## 2018-11-13 PROCEDURE — S0028 INJECTION, FAMOTIDINE, 20 MG: HCPCS | Performed by: THORACIC SURGERY (CARDIOTHORACIC VASCULAR SURGERY)

## 2018-11-13 PROCEDURE — 36415 COLL VENOUS BLD VENIPUNCTURE: CPT

## 2018-11-13 PROCEDURE — 83735 ASSAY OF MAGNESIUM: CPT

## 2018-11-13 PROCEDURE — 94003 VENT MGMT INPAT SUBQ DAY: CPT

## 2018-11-13 PROCEDURE — 85027 COMPLETE CBC AUTOMATED: CPT

## 2018-11-13 PROCEDURE — 84132 ASSAY OF SERUM POTASSIUM: CPT

## 2018-11-13 PROCEDURE — 94660 CPAP INITIATION&MGMT: CPT

## 2018-11-13 PROCEDURE — 2000000000 HC ICU R&B

## 2018-11-13 PROCEDURE — 2700000000 HC OXYGEN THERAPY PER DAY

## 2018-11-13 PROCEDURE — 36592 COLLECT BLOOD FROM PICC: CPT

## 2018-11-13 PROCEDURE — 6370000000 HC RX 637 (ALT 250 FOR IP): Performed by: THORACIC SURGERY (CARDIOTHORACIC VASCULAR SURGERY)

## 2018-11-13 PROCEDURE — 37799 UNLISTED PX VASCULAR SURGERY: CPT

## 2018-11-13 PROCEDURE — 71045 X-RAY EXAM CHEST 1 VIEW: CPT

## 2018-11-13 PROCEDURE — 2500000003 HC RX 250 WO HCPCS: Performed by: THORACIC SURGERY (CARDIOTHORACIC VASCULAR SURGERY)

## 2018-11-13 PROCEDURE — 82948 REAGENT STRIP/BLOOD GLUCOSE: CPT

## 2018-11-13 PROCEDURE — 80048 BASIC METABOLIC PNL TOTAL CA: CPT

## 2018-11-13 PROCEDURE — 33971 AORTIC CIRCULATION ASSIST: CPT | Performed by: THORACIC SURGERY (CARDIOTHORACIC VASCULAR SURGERY)

## 2018-11-13 PROCEDURE — 82947 ASSAY GLUCOSE BLOOD QUANT: CPT

## 2018-11-13 PROCEDURE — C9248 INJ, CLEVIDIPINE BUTYRATE: HCPCS | Performed by: THORACIC SURGERY (CARDIOTHORACIC VASCULAR SURGERY)

## 2018-11-13 PROCEDURE — 2580000003 HC RX 258: Performed by: THORACIC SURGERY (CARDIOTHORACIC VASCULAR SURGERY)

## 2018-11-13 RX ORDER — FUROSEMIDE 10 MG/ML
40 INJECTION INTRAMUSCULAR; INTRAVENOUS 2 TIMES DAILY
Status: DISCONTINUED | OUTPATIENT
Start: 2018-11-13 | End: 2018-11-18

## 2018-11-13 RX ORDER — POTASSIUM CHLORIDE 20 MEQ/1
20 TABLET, EXTENDED RELEASE ORAL 2 TIMES DAILY WITH MEALS
Status: DISCONTINUED | OUTPATIENT
Start: 2018-11-13 | End: 2018-11-20 | Stop reason: HOSPADM

## 2018-11-13 RX ADMIN — Medication 10 ML: at 20:54

## 2018-11-13 RX ADMIN — CEFAZOLIN SODIUM 1 G: 1 INJECTION, SOLUTION INTRAVENOUS at 05:59

## 2018-11-13 RX ADMIN — CEFAZOLIN SODIUM 1 G: 1 INJECTION, SOLUTION INTRAVENOUS at 12:52

## 2018-11-13 RX ADMIN — Medication 10 ML: at 06:57

## 2018-11-13 RX ADMIN — MORPHINE SULFATE 2 MG: 2 INJECTION, SOLUTION INTRAMUSCULAR; INTRAVENOUS at 01:19

## 2018-11-13 RX ADMIN — FUROSEMIDE 40 MG: 10 INJECTION, SOLUTION INTRAMUSCULAR; INTRAVENOUS at 08:23

## 2018-11-13 RX ADMIN — MULTIPLE VITAMINS W/ MINERALS TAB 1 TABLET: TAB at 08:20

## 2018-11-13 RX ADMIN — CEFAZOLIN SODIUM 1 G: 1 INJECTION, SOLUTION INTRAVENOUS at 20:54

## 2018-11-13 RX ADMIN — POTASSIUM CHLORIDE 20 MEQ: 20 TABLET, EXTENDED RELEASE ORAL at 16:56

## 2018-11-13 RX ADMIN — AMIODARONE HYDROCHLORIDE 0.5 MG/MIN: 1.8 INJECTION, SOLUTION INTRAVENOUS at 13:57

## 2018-11-13 RX ADMIN — DOCUSATE SODIUM 100 MG: 100 CAPSULE, LIQUID FILLED ORAL at 20:54

## 2018-11-13 RX ADMIN — FAMOTIDINE 20 MG: 10 INJECTION, SOLUTION INTRAVENOUS at 08:23

## 2018-11-13 RX ADMIN — ATORVASTATIN CALCIUM 20 MG: 20 TABLET, FILM COATED ORAL at 20:54

## 2018-11-13 RX ADMIN — FUROSEMIDE 40 MG: 10 INJECTION, SOLUTION INTRAMUSCULAR; INTRAVENOUS at 16:56

## 2018-11-13 RX ADMIN — Medication 10 ML: at 06:58

## 2018-11-13 RX ADMIN — Medication 10 ML: at 03:32

## 2018-11-13 RX ADMIN — Medication 10 ML: at 03:30

## 2018-11-13 RX ADMIN — AMIODARONE HYDROCHLORIDE 0.5 MG/MIN: 1.8 INJECTION, SOLUTION INTRAVENOUS at 03:45

## 2018-11-13 RX ADMIN — AMIODARONE HYDROCHLORIDE 200 MG: 200 TABLET ORAL at 08:20

## 2018-11-13 RX ADMIN — SODIUM CHLORIDE 1.4 UNITS/HR: 9 INJECTION, SOLUTION INTRAVENOUS at 23:14

## 2018-11-13 RX ADMIN — Medication 10 ML: at 03:35

## 2018-11-13 RX ADMIN — CLEVIPIDINE 1 MG/HR: 0.5 EMULSION INTRAVENOUS at 06:32

## 2018-11-13 RX ADMIN — CLEVIPIDINE 4 MG/HR: 0.5 EMULSION INTRAVENOUS at 01:17

## 2018-11-13 ASSESSMENT — PAIN SCALES - GENERAL
PAINLEVEL_OUTOF10: 0
PAINLEVEL_OUTOF10: 2
PAINLEVEL_OUTOF10: 6

## 2018-11-13 ASSESSMENT — PULMONARY FUNCTION TESTS
PIF_VALUE: 15
PIF_VALUE: 24

## 2018-11-13 NOTE — PROGRESS NOTES
Patient has been successfully weaned from Mechanical Ventilation. RSBI before extubation was 33 and SpO2 of 100 on 30% FiO2. Patient extubated and placed on 4 liters/min via nasal cannula. Post extubation SpO2 is 99% with HR  93 bpm and RR 19 breaths/min. Patient had moderate cough that was non-productive. Extubation Well tolerated by patient. Danilo Tapia

## 2018-11-13 NOTE — PROGRESS NOTES
Preoperative screening of patients at risk for post-operative complications to obtain baseline flow or volume. [] Restrictive lung defect associated with a dysfunctional diaphragm or involving the respiratory musculature. Example: Patients with inspiratory capacity < 2.5L, patients with neuromuscular disease and patients with spinal cord injury. [x] Presence of pulmonary atelectasis or conditions predisposing to the development of pulmonary atelectasis. Example: Upper/lower abdominal surgery, thoracic surgery, surgery in COPD patients, prolonged bedrest, lack of pain control, presence of thoracic or abdominal binders.   [] Acute chest syndrome in patients with sickle cell disease   [] No indications  Inhaled Medications Goal: Improve respiratory functions in patients with airway disease and decrease WOB  Albuterol Indications  [] Wheezing, diminished, or absent breath sounds associated with a pulmonary disorder  [] Known COPD  [] Peak flow < 80% predicted or personal best for known asthma patients  [] Documented obstructive defect on pulmonary function testing which is reversible   [] Home regimen  [] With a mucolytic to prevent bronchospasm  [x] No indications  Ipratropium Bromide Indications   [] Maintenance treatment associated with COPD, including chronic bronchitis and emphysema  [] Home regimen  Other Medications  [] Home regimen   Oxygenation  Goal: Reverse hypoxemia and improve tissue oxygenation (See oxygen protocol order)  [x] SpO2 < 92% (Check order for SpO2 goal)        [] Home oxygen   [] Cyanosis        [] Chest pain  [] Dyspnea      [] Altered level of consciousness        [] No indications    THERAPIES SELECTED BASED ON ALGORITHMS  Bronchial Hygiene  []Vest  []PD&P []Suction or Cobb cough  []Acapella []Metaneb [x]No therapy recommended  []Mechanical Insufflation exsufflation  Volume expansion  [x]Incentive Spirometry  []EZPAP  []Metaneb  []No therapy recommended  Inhaled Medication  []HHN

## 2018-11-13 NOTE — PROGRESS NOTES
1930-Bedside report received, pt is drowsy does start to wake to verbal command but is twitchy and very weak with responses, is unable to keep eyes open greater than 5 secs, does do very weak HG and toe wiggle bilat. L foot is dusky/cool, cap refill > 3 secs, pulses are present by dopplar, L groin insertion site with old blood present, site otherwise WNL. Pt coloring is pale with shadowing noted around eyelids, sm amt edema to L ankle only, all exts cool to touch, body T WNL at present. Currently on SIMV rate 12, FIO2 35%, does breathe above set rate. Paced AAI at rate 80 at present, is having some ectopy as well(see printouts). 1933-Significant ectopy noted rate 107-150, appears vent in nature(QRS .12). 1941-Similar ectopy again noted rate (QRS .12)    0679-5255-Vovi RRT attempts CPAP, pt tolerates poorly after first 5 mins, tachypneic and hypotensive, placed back on previous vent settings. 5392-2103-Ro is attempting to wake however is gagging and coughing aggressively, unable to calm pt, causing vagal response and arterial pressures drop as well as both ventricular and atrial ectopy, Zofran and Morphine IV given prn. 2043-Hemodynamics calculated, need to titrate Epi during calculation d/t hypotension. See printout/flowsheet. 2050-Dr Yost updated with hemodynamics, LOC/twitchiness with responses/ectopy, labs. See orders. Primacor decreased to 0.125 mcgs at present. 2137-Precedex started at 0.2mcgs per order. 2145-Pt able to answer yes/no questions briefly, also with mod HG bilat, and weak toe wiggle bilat to command, eyes able to remain open a bit longer at present however does c/o sternal pain when asked, ask if she needs pain med and shakes head \"yes\", medicating with Morphine. 2223-VT x10 beats noted, rate 112-150 during period of agitation, Precedex titrated. 2308-Replacing potassium level of 4.3 per protocol. 2353-Hemodynamics calculated, see printout/flowsheet.

## 2018-11-13 NOTE — PROGRESS NOTES
TCU/ Rehab consult received. Chart reviewed. Await medical stability. Will need to see how she does with therapy since her surgery. Will monitor.

## 2018-11-14 ENCOUNTER — APPOINTMENT (OUTPATIENT)
Dept: GENERAL RADIOLOGY | Age: 78
DRG: 216 | End: 2018-11-14
Payer: MEDICARE

## 2018-11-14 LAB
ANION GAP SERPL CALCULATED.3IONS-SCNC: 13 MEQ/L (ref 8–16)
BUN BLDV-MCNC: 23 MG/DL (ref 7–22)
CALCIUM SERPL-MCNC: 8.1 MG/DL (ref 8.5–10.5)
CHLORIDE BLD-SCNC: 102 MEQ/L (ref 98–111)
CO2: 23 MEQ/L (ref 23–33)
CREAT SERPL-MCNC: 0.8 MG/DL (ref 0.4–1.2)
ERYTHROCYTE [DISTWIDTH] IN BLOOD BY AUTOMATED COUNT: 15.4 % (ref 11.5–14.5)
ERYTHROCYTE [DISTWIDTH] IN BLOOD BY AUTOMATED COUNT: 53.1 FL (ref 35–45)
GFR SERPL CREATININE-BSD FRML MDRD: 70 ML/MIN/1.73M2
GLUCOSE BLD-MCNC: 100 MG/DL (ref 70–108)
GLUCOSE BLD-MCNC: 111 MG/DL (ref 70–108)
GLUCOSE BLD-MCNC: 169 MG/DL (ref 70–108)
GLUCOSE BLD-MCNC: 82 MG/DL (ref 70–108)
GLUCOSE, WHOLE BLOOD: 115 MG/DL (ref 70–108)
HCT VFR BLD CALC: 25.4 % (ref 37–47)
HEMOGLOBIN: 8.1 GM/DL (ref 12–16)
MAGNESIUM: 2.5 MG/DL (ref 1.6–2.4)
MCH RBC QN AUTO: 30.3 PG (ref 26–33)
MCHC RBC AUTO-ENTMCNC: 31.9 GM/DL (ref 32.2–35.5)
MCV RBC AUTO: 95.1 FL (ref 81–99)
PLATELET # BLD: 61 THOU/MM3 (ref 130–400)
PMV BLD AUTO: 12.8 FL (ref 9.4–12.4)
POTASSIUM SERPL-SCNC: 4.4 MEQ/L (ref 3.5–5.2)
RBC # BLD: 2.67 MILL/MM3 (ref 4.2–5.4)
SODIUM BLD-SCNC: 138 MEQ/L (ref 135–145)
WBC # BLD: 14.2 THOU/MM3 (ref 4.8–10.8)

## 2018-11-14 PROCEDURE — 2709999900 HC NON-CHARGEABLE SUPPLY

## 2018-11-14 PROCEDURE — C1729 CATH, DRAINAGE: HCPCS

## 2018-11-14 PROCEDURE — 2500000003 HC RX 250 WO HCPCS: Performed by: THORACIC SURGERY (CARDIOTHORACIC VASCULAR SURGERY)

## 2018-11-14 PROCEDURE — 97110 THERAPEUTIC EXERCISES: CPT

## 2018-11-14 PROCEDURE — 36415 COLL VENOUS BLD VENIPUNCTURE: CPT

## 2018-11-14 PROCEDURE — C9248 INJ, CLEVIDIPINE BUTYRATE: HCPCS | Performed by: THORACIC SURGERY (CARDIOTHORACIC VASCULAR SURGERY)

## 2018-11-14 PROCEDURE — 6370000000 HC RX 637 (ALT 250 FOR IP): Performed by: THORACIC SURGERY (CARDIOTHORACIC VASCULAR SURGERY)

## 2018-11-14 PROCEDURE — 2580000003 HC RX 258: Performed by: THORACIC SURGERY (CARDIOTHORACIC VASCULAR SURGERY)

## 2018-11-14 PROCEDURE — 2060000000 HC ICU INTERMEDIATE R&B

## 2018-11-14 PROCEDURE — 2700000000 HC OXYGEN THERAPY PER DAY

## 2018-11-14 PROCEDURE — 97164 PT RE-EVAL EST PLAN CARE: CPT

## 2018-11-14 PROCEDURE — 71045 X-RAY EXAM CHEST 1 VIEW: CPT

## 2018-11-14 PROCEDURE — 97168 OT RE-EVAL EST PLAN CARE: CPT

## 2018-11-14 PROCEDURE — 80048 BASIC METABOLIC PNL TOTAL CA: CPT

## 2018-11-14 PROCEDURE — 85027 COMPLETE CBC AUTOMATED: CPT

## 2018-11-14 PROCEDURE — 6370000000 HC RX 637 (ALT 250 FOR IP): Performed by: PHARMACIST

## 2018-11-14 PROCEDURE — 82948 REAGENT STRIP/BLOOD GLUCOSE: CPT

## 2018-11-14 PROCEDURE — 83735 ASSAY OF MAGNESIUM: CPT

## 2018-11-14 PROCEDURE — 6360000002 HC RX W HCPCS: Performed by: THORACIC SURGERY (CARDIOTHORACIC VASCULAR SURGERY)

## 2018-11-14 RX ORDER — ENALAPRIL MALEATE 2.5 MG/1
2.5 TABLET ORAL 2 TIMES DAILY
Status: DISCONTINUED | OUTPATIENT
Start: 2018-11-14 | End: 2018-11-18

## 2018-11-14 RX ORDER — FAMOTIDINE 20 MG/1
20 TABLET, FILM COATED ORAL 2 TIMES DAILY
Status: DISCONTINUED | OUTPATIENT
Start: 2018-11-14 | End: 2018-11-18

## 2018-11-14 RX ORDER — AMIODARONE HYDROCHLORIDE 200 MG/1
200 TABLET ORAL 2 TIMES DAILY
Status: DISCONTINUED | OUTPATIENT
Start: 2018-11-14 | End: 2018-11-15

## 2018-11-14 RX ADMIN — FAMOTIDINE 20 MG: 20 TABLET ORAL at 07:53

## 2018-11-14 RX ADMIN — FUROSEMIDE 40 MG: 10 INJECTION, SOLUTION INTRAMUSCULAR; INTRAVENOUS at 17:30

## 2018-11-14 RX ADMIN — ACETAMINOPHEN 650 MG: 325 TABLET ORAL at 20:01

## 2018-11-14 RX ADMIN — Medication 2 UNITS: at 21:43

## 2018-11-14 RX ADMIN — CEFAZOLIN SODIUM 1 G: 1 INJECTION, SOLUTION INTRAVENOUS at 04:09

## 2018-11-14 RX ADMIN — FAMOTIDINE 20 MG: 20 TABLET ORAL at 20:01

## 2018-11-14 RX ADMIN — ENALAPRIL MALEATE 2.5 MG: 2.5 TABLET ORAL at 09:09

## 2018-11-14 RX ADMIN — AMIODARONE HYDROCHLORIDE 200 MG: 200 TABLET ORAL at 20:02

## 2018-11-14 RX ADMIN — FUROSEMIDE 40 MG: 10 INJECTION, SOLUTION INTRAMUSCULAR; INTRAVENOUS at 07:53

## 2018-11-14 RX ADMIN — MULTIPLE VITAMINS W/ MINERALS TAB 1 TABLET: TAB at 07:54

## 2018-11-14 RX ADMIN — CLEVIPIDINE 2 MG/HR: 0.5 EMULSION INTRAVENOUS at 06:58

## 2018-11-14 RX ADMIN — DOCUSATE SODIUM 100 MG: 100 CAPSULE, LIQUID FILLED ORAL at 07:53

## 2018-11-14 RX ADMIN — DOCUSATE SODIUM 100 MG: 100 CAPSULE, LIQUID FILLED ORAL at 20:01

## 2018-11-14 RX ADMIN — AMIODARONE HYDROCHLORIDE 200 MG: 200 TABLET ORAL at 07:54

## 2018-11-14 RX ADMIN — Medication 10 ML: at 20:02

## 2018-11-14 RX ADMIN — ATORVASTATIN CALCIUM 20 MG: 20 TABLET, FILM COATED ORAL at 20:02

## 2018-11-14 RX ADMIN — AMIODARONE HYDROCHLORIDE 0.5 MG/MIN: 1.8 INJECTION, SOLUTION INTRAVENOUS at 02:03

## 2018-11-14 RX ADMIN — POTASSIUM CHLORIDE 20 MEQ: 20 TABLET, EXTENDED RELEASE ORAL at 07:53

## 2018-11-14 RX ADMIN — POTASSIUM CHLORIDE 20 MEQ: 20 TABLET, EXTENDED RELEASE ORAL at 17:30

## 2018-11-14 ASSESSMENT — PAIN SCALES - GENERAL
PAINLEVEL_OUTOF10: 0
PAINLEVEL_OUTOF10: 3
PAINLEVEL_OUTOF10: 3
PAINLEVEL_OUTOF10: 0
PAINLEVEL_OUTOF10: 3

## 2018-11-14 ASSESSMENT — PAIN DESCRIPTION - LOCATION: LOCATION: CHEST

## 2018-11-14 ASSESSMENT — PAIN DESCRIPTION - PAIN TYPE: TYPE: ACUTE PAIN

## 2018-11-14 NOTE — PROGRESS NOTES
Home: House  Home Layout: One level, Laundry in basement  Home Access: Stairs to enter with rails, Ramped entrance  [address] - Number of Steps: 3  Home Equipment: 4 wheeled walker, Walker-Lift, [employer] 450 bed (all are 's DME; pt was not using any prior)     Objective:  Sit to Supine: Moderate assistance    Transfers  Sit to Stand: Minimal Assistance (with verbal cues for timing)  Stand to sit: Minimal Assistance       Ambulation 1  Surface: level tile  Device: Hand-Held Assist  Assistance: Minimal assistance  Quality of Gait: short nearly shuffled steps with cues for each, very fwd head and flexed trunk  Distance: 4 ft          Exercises:    Pt was guided through completion of LE exercises including: hip abd/add, long arc quads, ankle pumps, and quad sets. Each x 10 reps for strengthening to improve functional mobility. Activity Tolerance:  Activity Tolerance: Patient limited by endurance; Patient limited by fatigue    Assessment: Body structures, Functions, Activity limitations: Decreased functional mobility , Decreased endurance, Decreased ROM, Decreased strength, Decreased balance  Assessment: Pt is now s/p CABG x 1 with Mitral and Tricuspid valve repairs, and Left atrial maze procedure. Pt has decreased energy and strength since initial evaluation and surgery and now is needing increased assist for bed mobility, transfers, and ambulation. Pt would benefit from continued skilled PT to address these deficits. Prognosis: Good     REQUIRES PT FOLLOW UP: Yes    Discharge Recommendations:  Discharge Recommendations: Continue to assess pending progress    Patient Education:  Patient Education: therex, POC    Equipment Recommendations: Other: monitor for needs    Safety:  Type of devices:  All fall risk precautions in place, Call light within reach, Gait belt, Patient at risk for falls, Left in bed, Bed alarm in place, Nurse notified    Plan:  Times per week: 6x CABG  Times per day: Daily  Specific instructions for Next Treatment: sternal precautions with all transfers, mobility and ambulation, CABG protocol  Current Treatment Recommendations: Balance Training, Endurance Training, Transfer Training, Gait Training, ADL/Self-care Training, Functional Mobility Training, ROM, Strengthening, Safety Education & Training, Home Exercise Program, Patient/Caregiver Education & Training, Equipment Evaluation, Education, & procurement    Goals:  Patient goals : return home    Short term goals  Time Frame for Short term goals: 2 weeks  Short term goal 1: pt will sit <> stand with S with sternal precautions to demonstrate safety with standing  Short term goal 2: pt will ambulate >300 feet with RW and SBA for improved endurance  Short term goal 3: pt will supine <> sit at EOB with CGA with sternal precautions for safety with bed mobility  Short term goal 4: pt will negotiate 4 6\" steps with SBA and 5 second pause between steps for safety with stairs  Revised Short-Term Goals:    Short term goals  Time Frame for Short term goals: 2 weeks  Short term goal 1: pt will sit <> stand with S with sternal precautions to demonstrate safety with standing  Short term goal 2: pt will ambulate >80 feet with RW and SBA for improved endurance  Short term goal 3: pt will supine <> sit at EOB with CGA with sternal precautions for safety with bed mobility  Short term goal 4: pt will negotiate 4 6\" steps with CGA and 5 second pause between steps for safety with stairs        Long term goals  Time Frame for Long term goals : not set due to short ELOS          AM-PAC Inpatient Mobility without Stair Climbing Raw Score : 12  AM-PAC Inpatient without Stair Climbing T-Scale Score : 37.26  Mobility Inpatient CMS 0-100% Score: 63.03  Mobility Inpatient without Stair CMS G-Code Modifier : RON Lara.  Héctor Albert [place] 8

## 2018-11-14 NOTE — FLOWSHEET NOTE
2030-Assessment complete at this time. Pt denies any pain at this time. Pt drowsy. Difficulty keeping awake for assessment. Pt able to follow commands. Difficulty with IS-pt barely able to get IS to budge. Will put pt on bipap for night time. Turned and repositioned. Will monitor. 2230-Pt placed on bipap per RRT    0030-Assessment complete at this time. No change from previous assessment. Pt denies any pain or needs. Still unable to do IS. Pt pulling off bipap. Placed back on 2.5L n/c. Hemodynamics calculated. Turned and repositioned. Will monitor. 0400-Assessment complete at this time. 0500-Complete bath and linen change done at this time. Site care done on chest incision. CT dsg changed done. Pt assisted up to chair. Pt very weak.

## 2018-11-14 NOTE — CARE COORDINATION
11/14/18, 2:47 PM      [address] [facility] day: 10  Location: -05/005-A Reason for admit: Atrial fibrillation [facility]) [I48.91]   Procedure:   11/4 CTA Chest: Neg for PE; Moderate bilateral pleural effusions with adjacent atelectasis/infiltrate; pulmonary edema  11/4 CT Abd/pelvis: Minimal ascites; Possible pericholecystic fluid. If there is clinical concern for acute cholecystitis, nuclear medicine hepatobiliary scan is recommended for further evaluation  11/5 Echo with EF 40-45%; mod to severe mitral regurg; severe tricuspid regurg  11/7 Cardiac Cath: CAD, Severe mitral regurg; pulmonary hypertension  11/7 Vein mapping and Bilateral carotid dopplers  11/12 Mitral valve repair with a 26 mm Femi Bari 3D Rechord annuloplasty band; Tricuspid valve repair with 30 mm Bernardo MC3 annuloplasty band; 1v CABG; Left atrial maze procedure, with ablation of both the anterior and posterior mitral trigones; Ligation of the left atrial appendage  11/12 CVC  11/12 IABP inserted- 11/13 IABP removed  11/12 Intubated - 11/13 Extubated  11/14 CXR:   Interval removal of the endotracheal and nasogastric tubes. Stable less than 5% right apical pneumothorax. Worsening bibasilar and new right parahilar infiltrates and/or atelectasis     Treatment Plan of Care: POD #2. Lines out today. All drips off today. Atrially paced with underlying SR 60's. CT x4 to -20sx. On room air. Bipap at HS. Afebrile. CR/PT/OT. Dietitian consulted. Dietary ileus prevention protocol. Telemetry, I&O, daily weight, IS, sternal precautions, CT care, wound care, SCDs, gregory care, ambulate. Amio, asa, lipitor, lovenox, pepcid, IV lasix 40 mg bid, lopressor, prn IV morphine, prn IV zofran, prn roxicodone, K+, electrolyte replacement protocols. PCP: Amy Rao MD  Readmission Risk Score: 17%  Discharge Plan: Plan for IP Rehab/TCU. Backup plan for [facility]. No precert required. SW on case.

## 2018-11-15 ENCOUNTER — APPOINTMENT (OUTPATIENT)
Dept: GENERAL RADIOLOGY | Age: 78
DRG: 216 | End: 2018-11-15
Payer: MEDICARE

## 2018-11-15 LAB
ANION GAP SERPL CALCULATED.3IONS-SCNC: 11 MEQ/L (ref 8–16)
BUN BLDV-MCNC: 32 MG/DL (ref 7–22)
CALCIUM IONIZED SERUM: 1.08 MMOL/L (ref 1.12–1.32)
CALCIUM SERPL-MCNC: 8.1 MG/DL (ref 8.5–10.5)
CHLORIDE BLD-SCNC: 97 MEQ/L (ref 98–111)
CO2: 26 MEQ/L (ref 23–33)
CREAT SERPL-MCNC: 0.9 MG/DL (ref 0.4–1.2)
ERYTHROCYTE [DISTWIDTH] IN BLOOD BY AUTOMATED COUNT: 14.4 % (ref 11.5–14.5)
ERYTHROCYTE [DISTWIDTH] IN BLOOD BY AUTOMATED COUNT: 49.2 FL (ref 35–45)
GFR SERPL CREATININE-BSD FRML MDRD: 61 ML/MIN/1.73M2
GLUCOSE BLD-MCNC: 102 MG/DL (ref 70–108)
GLUCOSE BLD-MCNC: 126 MG/DL (ref 70–108)
GLUCOSE BLD-MCNC: 129 MG/DL (ref 70–108)
GLUCOSE BLD-MCNC: 201 MG/DL (ref 70–108)
GLUCOSE BLD-MCNC: 203 MG/DL (ref 70–108)
GLUCOSE, WHOLE BLOOD: 175 MG/DL (ref 70–108)
HCT VFR BLD CALC: 24.2 % (ref 37–47)
HEMOGLOBIN: 7.7 GM/DL (ref 12–16)
MCH RBC QN AUTO: 30.2 PG (ref 26–33)
MCHC RBC AUTO-ENTMCNC: 31.8 GM/DL (ref 32.2–35.5)
MCV RBC AUTO: 94.9 FL (ref 81–99)
PLATELET # BLD: 66 THOU/MM3 (ref 130–400)
PMV BLD AUTO: 12.6 FL (ref 9.4–12.4)
POTASSIUM SERPL-SCNC: 4.4 MEQ/L (ref 3.5–5.2)
POTASSIUM, WHOLE BLOOD: 4.5 MEQ/L (ref 3.5–4.9)
RBC # BLD: 2.55 MILL/MM3 (ref 4.2–5.4)
SODIUM BLD-SCNC: 134 MEQ/L (ref 135–145)
SODIUM, WHOLE BLOOD: 136 MEQ/L (ref 138–146)
WBC # BLD: 12.5 THOU/MM3 (ref 4.8–10.8)

## 2018-11-15 PROCEDURE — 80048 BASIC METABOLIC PNL TOTAL CA: CPT

## 2018-11-15 PROCEDURE — 6370000000 HC RX 637 (ALT 250 FOR IP): Performed by: THORACIC SURGERY (CARDIOTHORACIC VASCULAR SURGERY)

## 2018-11-15 PROCEDURE — 2060000000 HC ICU INTERMEDIATE R&B

## 2018-11-15 PROCEDURE — 97110 THERAPEUTIC EXERCISES: CPT

## 2018-11-15 PROCEDURE — 71045 X-RAY EXAM CHEST 1 VIEW: CPT

## 2018-11-15 PROCEDURE — 36415 COLL VENOUS BLD VENIPUNCTURE: CPT

## 2018-11-15 PROCEDURE — 97530 THERAPEUTIC ACTIVITIES: CPT

## 2018-11-15 PROCEDURE — 6360000002 HC RX W HCPCS: Performed by: THORACIC SURGERY (CARDIOTHORACIC VASCULAR SURGERY)

## 2018-11-15 PROCEDURE — 82948 REAGENT STRIP/BLOOD GLUCOSE: CPT

## 2018-11-15 PROCEDURE — 2709999900 HC NON-CHARGEABLE SUPPLY

## 2018-11-15 PROCEDURE — 2580000003 HC RX 258: Performed by: THORACIC SURGERY (CARDIOTHORACIC VASCULAR SURGERY)

## 2018-11-15 PROCEDURE — 6370000000 HC RX 637 (ALT 250 FOR IP): Performed by: PHARMACIST

## 2018-11-15 PROCEDURE — 85027 COMPLETE CBC AUTOMATED: CPT

## 2018-11-15 PROCEDURE — 36592 COLLECT BLOOD FROM PICC: CPT

## 2018-11-15 RX ORDER — DRONABINOL 2.5 MG/1
5 CAPSULE ORAL 2 TIMES DAILY
Status: DISCONTINUED | OUTPATIENT
Start: 2018-11-15 | End: 2018-11-20 | Stop reason: HOSPADM

## 2018-11-15 RX ORDER — AMIODARONE HYDROCHLORIDE 200 MG/1
200 TABLET ORAL DAILY
Status: DISCONTINUED | OUTPATIENT
Start: 2018-11-15 | End: 2018-11-18

## 2018-11-15 RX ADMIN — ACETAMINOPHEN 650 MG: 325 TABLET ORAL at 20:09

## 2018-11-15 RX ADMIN — Medication 6 UNITS: at 12:14

## 2018-11-15 RX ADMIN — Medication 10 ML: at 20:10

## 2018-11-15 RX ADMIN — Medication 17.6 MG: at 08:58

## 2018-11-15 RX ADMIN — ACETAMINOPHEN 650 MG: 325 TABLET ORAL at 09:47

## 2018-11-15 RX ADMIN — DOCUSATE SODIUM 100 MG: 100 CAPSULE, LIQUID FILLED ORAL at 08:58

## 2018-11-15 RX ADMIN — POTASSIUM CHLORIDE 20 MEQ: 20 TABLET, EXTENDED RELEASE ORAL at 08:59

## 2018-11-15 RX ADMIN — OXYCODONE HYDROCHLORIDE 5 MG: 5 TABLET ORAL at 12:15

## 2018-11-15 RX ADMIN — FUROSEMIDE 40 MG: 10 INJECTION, SOLUTION INTRAMUSCULAR; INTRAVENOUS at 17:35

## 2018-11-15 RX ADMIN — FUROSEMIDE 40 MG: 10 INJECTION, SOLUTION INTRAMUSCULAR; INTRAVENOUS at 09:17

## 2018-11-15 RX ADMIN — FAMOTIDINE 20 MG: 20 TABLET ORAL at 20:09

## 2018-11-15 RX ADMIN — DRONABINOL 5 MG: 2.5 CAPSULE ORAL at 08:59

## 2018-11-15 RX ADMIN — POTASSIUM CHLORIDE 20 MEQ: 20 TABLET, EXTENDED RELEASE ORAL at 17:35

## 2018-11-15 RX ADMIN — MAGNESIUM HYDROXIDE 30 ML: 400 SUSPENSION ORAL at 08:58

## 2018-11-15 RX ADMIN — ATORVASTATIN CALCIUM 20 MG: 20 TABLET, FILM COATED ORAL at 20:09

## 2018-11-15 RX ADMIN — FAMOTIDINE 20 MG: 20 TABLET ORAL at 08:59

## 2018-11-15 RX ADMIN — DOCUSATE SODIUM 100 MG: 100 CAPSULE, LIQUID FILLED ORAL at 20:09

## 2018-11-15 RX ADMIN — MULTIPLE VITAMINS W/ MINERALS TAB 1 TABLET: TAB at 08:58

## 2018-11-15 RX ADMIN — AMIODARONE HYDROCHLORIDE 200 MG: 200 TABLET ORAL at 08:58

## 2018-11-15 RX ADMIN — Medication 10 ML: at 09:18

## 2018-11-15 RX ADMIN — DRONABINOL 5 MG: 2.5 CAPSULE ORAL at 20:15

## 2018-11-15 RX ADMIN — Medication 3 UNITS: at 20:22

## 2018-11-15 RX ADMIN — ACETAMINOPHEN 650 MG: 325 TABLET ORAL at 03:44

## 2018-11-15 ASSESSMENT — PAIN SCALES - GENERAL
PAINLEVEL_OUTOF10: 3
PAINLEVEL_OUTOF10: 1
PAINLEVEL_OUTOF10: 1
PAINLEVEL_OUTOF10: 0
PAINLEVEL_OUTOF10: 2
PAINLEVEL_OUTOF10: 3
PAINLEVEL_OUTOF10: 6
PAINLEVEL_OUTOF10: 3

## 2018-11-15 ASSESSMENT — PAIN DESCRIPTION - LOCATION
LOCATION: CHEST
LOCATION: CHEST
LOCATION: STERNUM;CHEST

## 2018-11-15 ASSESSMENT — PAIN DESCRIPTION - DESCRIPTORS: DESCRIPTORS: JABBING

## 2018-11-15 ASSESSMENT — PAIN DESCRIPTION - PAIN TYPE
TYPE: SURGICAL PAIN
TYPE: SURGICAL PAIN

## 2018-11-15 ASSESSMENT — PAIN DESCRIPTION - ORIENTATION: ORIENTATION: RIGHT

## 2018-11-15 NOTE — PROGRESS NOTES
[facility] ICU STEPDOWN TELEMETRY 4K - 4K-18/018-A    Time In: 3867  Time Out: 1350  Timed Code Treatment Minutes: 15 Minutes  Minutes: 15          Date: 11/15/2018  Patient Name: Nader Salazar,  Gender:  female        MRN: [id number]  : 1940  (70 y.o.)     Referring Practitioner: Bernard Kelley MD  Diagnosis: atrial fibrillation  Additional Pertinent Hx: Ragini Almazan is a 70 y.o. female who presents to the Emergency Department for the evaluation of intermittent shortness of breath onset around 1 week ago. She states the pain is an intermittent pain to the right chest that occurs with coughing. The patient does not take any medication, does not see a cardiologist and has not seen her PCP is 30 years. Cardiac cath on  showed MVCAD and CABG recommended. Update: pt is had CABG on 18. History reviewed. No pertinent past medical history. Past Surgical History:   Procedure Laterality Date    HYSTERECTOMY         Restrictions/Precautions:  General Precautions, Fall Risk, Cardiac, Surgical Protocols                    Sternal Precautions: No Pushing, No Pulling, 10# Lifting Restrictions       Prior Level of Function:  ADL Assistance: Independent  Homemaking Assistance: Independent  Ambulation Assistance: Independent  Transfer Assistance: Independent  Additional Comments: Pt reported has been taking care of  recently (would assist him with all transfers, mobility, and most ADL tasks). Pt does not wear O2 at home. Subjective:     Subjective: Pt mostly lethargic. Lying in bed with nearly untouched lunch tray in front of her. Pt mostly non verbal during session, mainly grunting in response to direct questions. Pt would breifly open eyes when asked to but very soon would close them again. Pt became progrressively weaker with session to point of being unable to hold onto her heart bear.  Nurse was initally asking for pt to be up to chair, PTA put on light and informed him of how weak pt was this session and he then agreed pt would be better off back to bed. Pts student nurse assisted with back to bed as pt had become too weak to safely manage with one staff member. Pts student nurse and regular nurse both informed of pts status during this session. Pain:   .      none indicated     Social/Functional:  Lives With: Spouse (currently at [facility] for rehab (been there ~1 week))  Type of Home: [address]: One level, Laundry in basement  Home Access: Stairs to enter with rails, Ramped entrance  [address] - Number of Steps: 3  Home Equipment: 4 wheeled walker, Walker-Lift, [employer] 450 bed (all are 's DME; pt was not using any prior)     Objective:  Rolling to Left: Dependent/Total  Rolling to Right: Dependent/Total  Supine to Sit: Maximum assistance (x1; HOB approx 30 degrees, and use of CHUX pad )  Sit to Supine: Maximum assistance (x2; nearly dependent; pt weakened while sitting EOB ; pt could not keep hold of heart bear even to lie down )  Scooting: Maximal assistance (x1 to foot flat at EOB )    Transfers  Sit to Stand:  (attempted to stand at walker for t-zamzam to chair; pt gave no effort to stand from legs or to push upright ) Dependent x 1- not fully achieved upright                  Balance  Comments: initally maxA x 1 ; after improved leg positioning (dependent to complete from PTA)  pt was able to maintain on own for less than one minute at CGA x 1 ; pt then became Ab -> modA -> maxA again.  After attempt to stand pt not even holding head up anymore; as weakness progressed pt became nearly dependent with PTA blocking knees from slipping out off edge of bed and required second assist for safely returning to bed with many lines ( 2 chest tubes, catheter, monitors, etc) - approx 5 to 6 minutes EOB total          Exercises:    PTA requested trial shoulder rolls while sitting; pt made no attempt

## 2018-11-15 NOTE — PROGRESS NOTES
PHASE 1 CARDIAC REHABILITATION   CABG, AVR, MVR, TVR, AMI, PCI's  Exercise Physiologists      Treatment Length (l: long, n: normal, s: short): N  Treatment Length Note:   Vitals Stable?: Yes. If No:     Any ECG-Rhythm Issues?: No.  If Yes:   Transfers (bc: Bed to Chair, cb: Chair to Bed): CB  Strengthening/ROM Treatment Exercises: Step 1; patient very slow and weak    FIDM:     Frequency: 2 x per day   Intensity: <15 RPE, <120bpm   Time: >30-60 seconds longer or >20% increase in distance each walk   Type: Bed/Chair Exercises/stationary marching or ambulation    Aerobic treatment: Sit to stand with moderate to heavy assist.  Patient needed to steady herself once she got up. Ambulation @ 5 feet and to the bed. Gait (i: Independent, s: Steady, u: Unsteady): U  Assists: 1  Patient tolerated treatment (w: well, f: fair, p: poor): Poor progression  Goals:    Short term:  Progression on each aerobic treatment. Long term: Independent ambulation and discharge. Jeanne Cabrera is to follow sternal precautions. Will learn techniques for getting in and out of bed/chairs/car without using the arms. Using stairs will be addressed if applicable. Home activity instructions (Frequency, Intensity, Time, Type), and progression will be addressed prior to discharge (unless Jeanne Cabrera goes to TCU or an ECF where they will follow PT/OT protocols). Notes: Call light left within reach.     Education given:   Phase II Information (Given upon Discharge):

## 2018-11-15 NOTE — PROGRESS NOTES
814 Patient sitting in chair, and denies pain at the point. Patient said her right heel hurt, and I floated her feet with a pillow. Patient said that her heel felt better. Patient is A&o X4, Pupils are PERRL with 2mm to 1mm dilation. Heart sounds are present with a friction rub, that is common after open heart surgery. Heart rate is paced at 90 and is currently at 90, with BP: of 118/78 in the right upper arm so her enalapril was held this morning. Pulse OX: was 96 on room air. Patient ROM was active x4, bowel sounds where hypoactive, Pedal pulses were present and equal on both sides at 2+. Patient is unable to walk and this point with being weak. Patient states she is very sleepy.     Yehuda Mortensen RSC/SN

## 2018-11-16 ENCOUNTER — APPOINTMENT (OUTPATIENT)
Dept: GENERAL RADIOLOGY | Age: 78
DRG: 216 | End: 2018-11-16
Payer: MEDICARE

## 2018-11-16 LAB
ANION GAP SERPL CALCULATED.3IONS-SCNC: 13 MEQ/L (ref 8–16)
BUN BLDV-MCNC: 31 MG/DL (ref 7–22)
CALCIUM SERPL-MCNC: 8.4 MG/DL (ref 8.5–10.5)
CHLORIDE BLD-SCNC: 100 MEQ/L (ref 98–111)
CO2: 25 MEQ/L (ref 23–33)
CREAT SERPL-MCNC: 0.8 MG/DL (ref 0.4–1.2)
ERYTHROCYTE [DISTWIDTH] IN BLOOD BY AUTOMATED COUNT: 14 % (ref 11.5–14.5)
ERYTHROCYTE [DISTWIDTH] IN BLOOD BY AUTOMATED COUNT: 49.6 FL (ref 35–45)
GFR SERPL CREATININE-BSD FRML MDRD: 70 ML/MIN/1.73M2
GLUCOSE BLD-MCNC: 116 MG/DL (ref 70–108)
GLUCOSE BLD-MCNC: 129 MG/DL (ref 70–108)
GLUCOSE BLD-MCNC: 132 MG/DL (ref 70–108)
GLUCOSE BLD-MCNC: 219 MG/DL (ref 70–108)
HCT VFR BLD CALC: 26.7 % (ref 37–47)
HEMOGLOBIN: 8.4 GM/DL (ref 12–16)
MCH RBC QN AUTO: 30.3 PG (ref 26–33)
MCHC RBC AUTO-ENTMCNC: 31.5 GM/DL (ref 32.2–35.5)
MCV RBC AUTO: 96.4 FL (ref 81–99)
PLATELET # BLD: 114 THOU/MM3 (ref 130–400)
PMV BLD AUTO: 12.2 FL (ref 9.4–12.4)
POTASSIUM SERPL-SCNC: 4.6 MEQ/L (ref 3.5–5.2)
RBC # BLD: 2.77 MILL/MM3 (ref 4.2–5.4)
SODIUM BLD-SCNC: 138 MEQ/L (ref 135–145)
WBC # BLD: 11.2 THOU/MM3 (ref 4.8–10.8)

## 2018-11-16 PROCEDURE — 2709999900 HC NON-CHARGEABLE SUPPLY

## 2018-11-16 PROCEDURE — 99024 POSTOP FOLLOW-UP VISIT: CPT | Performed by: PHYSICIAN ASSISTANT

## 2018-11-16 PROCEDURE — 6370000000 HC RX 637 (ALT 250 FOR IP): Performed by: PHARMACIST

## 2018-11-16 PROCEDURE — 82948 REAGENT STRIP/BLOOD GLUCOSE: CPT

## 2018-11-16 PROCEDURE — 71045 X-RAY EXAM CHEST 1 VIEW: CPT

## 2018-11-16 PROCEDURE — 6360000002 HC RX W HCPCS: Performed by: THORACIC SURGERY (CARDIOTHORACIC VASCULAR SURGERY)

## 2018-11-16 PROCEDURE — 97535 SELF CARE MNGMENT TRAINING: CPT

## 2018-11-16 PROCEDURE — 2580000003 HC RX 258: Performed by: THORACIC SURGERY (CARDIOTHORACIC VASCULAR SURGERY)

## 2018-11-16 PROCEDURE — 2060000000 HC ICU INTERMEDIATE R&B

## 2018-11-16 PROCEDURE — 85027 COMPLETE CBC AUTOMATED: CPT

## 2018-11-16 PROCEDURE — 97530 THERAPEUTIC ACTIVITIES: CPT

## 2018-11-16 PROCEDURE — C1729 CATH, DRAINAGE: HCPCS

## 2018-11-16 PROCEDURE — 36415 COLL VENOUS BLD VENIPUNCTURE: CPT

## 2018-11-16 PROCEDURE — 80048 BASIC METABOLIC PNL TOTAL CA: CPT

## 2018-11-16 PROCEDURE — 97110 THERAPEUTIC EXERCISES: CPT

## 2018-11-16 PROCEDURE — 6370000000 HC RX 637 (ALT 250 FOR IP): Performed by: THORACIC SURGERY (CARDIOTHORACIC VASCULAR SURGERY)

## 2018-11-16 RX ORDER — CARVEDILOL 3.12 MG/1
3.12 TABLET ORAL 2 TIMES DAILY WITH MEALS
Status: DISCONTINUED | OUTPATIENT
Start: 2018-11-16 | End: 2018-11-20 | Stop reason: HOSPADM

## 2018-11-16 RX ADMIN — ENOXAPARIN SODIUM 40 MG: 40 INJECTION SUBCUTANEOUS at 08:20

## 2018-11-16 RX ADMIN — MULTIPLE VITAMINS W/ MINERALS TAB 1 TABLET: TAB at 08:20

## 2018-11-16 RX ADMIN — ENALAPRIL MALEATE 2.5 MG: 2.5 TABLET ORAL at 08:20

## 2018-11-16 RX ADMIN — ACETAMINOPHEN 650 MG: 325 TABLET ORAL at 08:20

## 2018-11-16 RX ADMIN — ASPIRIN 325 MG: 325 TABLET, DELAYED RELEASE ORAL at 08:20

## 2018-11-16 RX ADMIN — Medication 10 ML: at 20:20

## 2018-11-16 RX ADMIN — DRONABINOL 5 MG: 2.5 CAPSULE ORAL at 20:19

## 2018-11-16 RX ADMIN — DRONABINOL 5 MG: 2.5 CAPSULE ORAL at 08:20

## 2018-11-16 RX ADMIN — AMIODARONE HYDROCHLORIDE 200 MG: 200 TABLET ORAL at 08:20

## 2018-11-16 RX ADMIN — DOCUSATE SODIUM 100 MG: 100 CAPSULE, LIQUID FILLED ORAL at 20:19

## 2018-11-16 RX ADMIN — Medication 10 ML: at 08:22

## 2018-11-16 RX ADMIN — ATORVASTATIN CALCIUM 20 MG: 20 TABLET, FILM COATED ORAL at 20:21

## 2018-11-16 RX ADMIN — POTASSIUM CHLORIDE 20 MEQ: 20 TABLET, EXTENDED RELEASE ORAL at 08:20

## 2018-11-16 RX ADMIN — CARVEDILOL 3.12 MG: 3.12 TABLET, FILM COATED ORAL at 13:53

## 2018-11-16 RX ADMIN — FAMOTIDINE 20 MG: 20 TABLET ORAL at 20:20

## 2018-11-16 RX ADMIN — FAMOTIDINE 20 MG: 20 TABLET ORAL at 08:20

## 2018-11-16 RX ADMIN — Medication 3 UNITS: at 20:24

## 2018-11-16 RX ADMIN — CARVEDILOL 3.12 MG: 3.12 TABLET, FILM COATED ORAL at 17:37

## 2018-11-16 RX ADMIN — FUROSEMIDE 40 MG: 10 INJECTION, SOLUTION INTRAMUSCULAR; INTRAVENOUS at 08:21

## 2018-11-16 RX ADMIN — POTASSIUM CHLORIDE 20 MEQ: 20 TABLET, EXTENDED RELEASE ORAL at 17:36

## 2018-11-16 RX ADMIN — DOCUSATE SODIUM 100 MG: 100 CAPSULE, LIQUID FILLED ORAL at 08:20

## 2018-11-16 RX ADMIN — FUROSEMIDE 40 MG: 10 INJECTION, SOLUTION INTRAMUSCULAR; INTRAVENOUS at 17:36

## 2018-11-16 RX ADMIN — ACETAMINOPHEN 650 MG: 325 TABLET ORAL at 13:53

## 2018-11-16 RX ADMIN — ACETAMINOPHEN 650 MG: 325 TABLET ORAL at 03:18

## 2018-11-16 ASSESSMENT — PAIN SCALES - GENERAL
PAINLEVEL_OUTOF10: 3
PAINLEVEL_OUTOF10: 0
PAINLEVEL_OUTOF10: 2
PAINLEVEL_OUTOF10: 0
PAINLEVEL_OUTOF10: 1
PAINLEVEL_OUTOF10: 0

## 2018-11-16 NOTE — PROGRESS NOTES
PHASE 1 CARDIAC REHABILITATION   CABG, AVR, MVR, TVR, AMI, PCI's  Exercise Physiologists      Treatment Length (l: long, n: normal, s: short): L  Treatment Length Note: Assisted RN with toilet transfer  Vitals Stable?: Yes. Any ECG-Rhythm Issues?: No.   Transfers (bc: Bed to Chair, cb: Chair to Bed): chair to bedside commode, back to bed. Strengthening/ROM Treatment Exercises: Step 2, patient's fatigue increase the longer she was doing each exercise. FIDM:     Frequency: 2 x per day   Intensity: <15 RPE, <120bpm   Time: >30-60 seconds longer or >20% increase in distance each walk   Type: Bed/Chair Exercises/stationary marching or ambulation    Aerobic treatment: Pivot transfer to bedside commode, sit to stand with RW to get cleaned up by nursing ~45sec, transfer back to chair with use of rolling walker. Gait (i: Independent, s: Steady, u: Unsteady): u  Assists: 2  Patient tolerated treatment (w: well, f: fair, p: poor): f  Goals:    Short term:  Progression on each aerobic treatment. Long term: Independent ambulation and discharge. Gaye Terrazas is to follow sternal precautions. Will learn techniques for getting in and out of bed/chairs/car without using the arms. Using stairs will be addressed if applicable. Home activity instructions (Frequency, Intensity, Time, Type), and progression will be addressed prior to discharge (unless Gaye Terrazas goes to TCU or an ECF where they will follow PT/OT protocols). Notes: Call light left within reach.     Education given:   Phase II Information (Given upon Discharge):

## 2018-11-16 NOTE — PROGRESS NOTES
Nutrition Assessment    Type and Reason for Visit: Reassess    Nutrition Recommendations: Continue current diet, MVI, appetite stimulant. Continue ONS TID. Nutrition Assessment: Pt. Severely malnourished as evidenced by poor po, severe subcutaneous fat loss and severe muscle loss. Remains at risk for further nutritional compromise r/t poor appetite, increased nutrient needs for wound healing. Will modify ONS. Monitor po intake for improvement with initiation of appetite stimulant. Pt. Seen - reports appetite is getting better. Dislikes Glucerna and Activia. Boost ONS was brought in from home. RN offering it to pt. Pt. Was agreeable to trial Magic cups TID. Rx includes Marinol BID, Lasix, Colace, Senokot, and MVI. 11/16: Glucose 116, BUN 31. Malnutrition Assessment:  · Malnutrition Status: Meets the criteria for severe malnutrition  · Context: Chronic illness  · Findings of the 6 clinical characteristics of malnutrition (Minimum of 2 out of 6 clinical characteristics is required to make the diagnosis of moderate or severe Protein Calorie Malnutrition based on AND/ASPEN Guidelines):  1. Energy Intake-Less than 75% of estimated energy requirement for greater than or equal to 1 month,      2. Fat Loss-Severe subcutaneous fat loss, Orbital, Fat overlying the ribs  3.  Muscle Loss-Severe muscle mass loss, Temples (temporalis muscle), Clavicles (pectoralis and deltoids), Calf (gastrocnemius)    Nutrition Risk Level: High    Nutrient Needs:  · Estimated Daily Total Kcal: 0411-0341 (30-35/kgm based on 54kgm 11/8)  · Estimated Daily Protein (g):  grams (1.5-2/kgm  based on 54kgm 11/7)    Nutrition Diagnosis:   · Problem: Severe malnutrition, In context of chronic illness  · Etiology: related to Insufficient energy/nutrient consumption     Signs and symptoms:  as evidenced by Diet history of poor intake, Severe loss of subcutaneous fat, Severe muscle loss    Objective

## 2018-11-16 NOTE — PROGRESS NOTES
there ~1 week))  Type of Home: House  Home Layout: One level, Laundry in basement  Home Access: Stairs to enter with rails, Ramped entrance  Entrance Stairs - Number of Steps: 3  Home Equipment: 4 wheeled walker, Walker-Lift, [employer] 450 bed (all are 's DME; pt was not using any prior)     Objective:  Sit to Supine: Minimal assistance (x2)    Transfers  Sit to Stand: Moderate Assistance (x1. Step by step directions given and frequent cuing to comply with sternal precautions. )  Stand to sit: Minimal Assistance (Cuing to back all the way up to chair and for compliance with sternal precautions. )       Ambulation 1  Surface: level tile  Device: Rolling Walker  Assistance: Contact guard assistance  Quality of Gait: Decreased B step lengths. Cuing for technique. Min instability but no LOB  Distance: 4 feet x2          Balance  Comments: Static/dynamic sit at EOB while completing exercises at [facility] 57. Occasional cue for posture. Exercises:  Exercises  Comments: Pt completed BLE ankle pumps, LAQ, marches, hip abd/add, B horizontal shoulder abd/add all x10 reps to increase strength for improved functional mobility. Pt requires increased time to complete exercises. Activity Tolerance:  Activity Tolerance: Patient limited by fatigue;Patient limited by endurance; Patient limited by cognitive status    Assessment: Body structures, Functions, Activity limitations: Decreased functional mobility , Decreased endurance, Decreased ROM, Decreased strength, Decreased balance  Assessment: Pt tolerated session fairly well. Limited by decreased strength decreased endurance, decreased mobility. Would benefit from continued therapy at discharge. Prognosis: Good     REQUIRES PT FOLLOW UP: Yes    Discharge Recommendations:  Discharge Recommendations: Continue to assess pending progress    Patient Education:  Patient Education: Transfers. Equipment Recommendations:   Other: monitor for

## 2018-11-16 NOTE — PROGRESS NOTES
CT/CV Surgery Progress Note    2018 7:37 AM  Surgeon:  Dr. Staples Prudent:  Ms. Almazan is resting comfortable in bed on RA, alert, and in no acute distress. Pt reports some minor SOB and incisional pain. No new events overnight. The pt is still atrial paced. Vital Signs: BP (!) 142/78   Pulse 90   Temp 97.8 °F (36.6 °C) (Oral)   Resp 16   Ht 5' 6\" (1.676 m)   Wt 116 lb 8 oz (52.8 kg)   SpO2 94%   BMI 18.80 kg/m²    Temp (24hrs), Av.1 °F (36.7 °C), Min:97.5 °F (36.4 °C), Max:98.4 °F (36.9 °C)      PULSE OXIMETRY RANGE: SpO2  Av.7 %  Min: 94 %  Max: 96 %    Labs:   CBC:     Recent Labs      18   0400  11/15/18   0400  18   0528   WBC  14.2*  12.5*  11.2*   HGB  8.1*  7.7*  8.4*   HCT  25.4*  24.2*  26.7*   MCV  95.1  94.9  96.4   PLT  61*  66*  114*     BMP: Recent Labs      18   0400  11/15/18   0400  18   0528   NA  138  134*  138   K  4.4  4.4  4.6   CL  102  97*  100   CO2  23  26  25   BUN  23*  32*  31*   CREATININE  0.8  0.9  0.8   MG  2.5*   --    --        Imaging:  CXR: I have reviewed the CXR image. Mildly worsened biapical pneumothoraces measuring approximately 10% of the right and 5% on the left. Essentially resolved interstitial pulmonary edema. Improved left basilar and mildly worsened right basilar atelectasis. Small bilateral pleural effusions, stable on the left and mildly worsened on the right. Interval removal of the right jugular central venous catheter.  Mild partial withdrawal of the right parasagittal chest tube as discussed above           Intake/Output Summary (Last 24 hours) at 18 0798  Last data filed at 18 9035   Gross per 24 hour   Intake             1147 ml   Output             2705 ml   Net            -1558 ml       Scheduled Meds:    amiodarone  200 mg Oral Daily    dronabinol  5 mg Oral BID    enalapril  2.5 mg Oral BID    famotidine  20 mg Oral BID    potassium (CARDIAC) replacement protocol   Other

## 2018-11-16 NOTE — PLAN OF CARE
[address] THERAPY MISSED TREATMENT NOTE  [facility] ICU 4D  4D-006-A      Date: 2018  Patient Name: Pepe Almazan        CSN: [id number]   : 1940  (70 y.o.)  Gender: female                REASON FOR MISSED TREATMENT:  Hold treatment per nursing request.  Received OT eval order when pt on 3b. Pt underwent heart cath this am being transferred to ICU after. Alpa Ross RN requesting to hold OT evaluation at this time and complete on 18.
Problem: DISCHARGE BARRIERS  Goal: Patient's continuum of care needs are met  Outcome: Ongoing  Patient discharge to rehab for skilled therapy. See SW notes 11/8/18.
Problem: Falls - Risk of:  Goal: Will remain free from falls  Will remain free from falls   Outcome: Met This Shift  Continue fall precautions, up with assist.  Goal: Absence of physical injury  Absence of physical injury   Outcome: Met This Shift      Problem: Pain:  Goal: Pain level will decrease  Pain level will decrease   Outcome: Met This Shift  Denies pain this shift. Goal: Control of acute pain  Control of acute pain   Outcome: Met This Shift    Goal: Control of chronic pain  Control of chronic pain   Outcome: Met This Shift  Denies pain this shift. Problem: Cardiac:  Goal: Ability to maintain vital signs within normal range will improve  Ability to maintain vital signs within normal range will improve   Outcome: Ongoing  Continue to assess VS. SBP 80's this morning. IV Lasix d/c and changed to PO bumex per Dr. Rei Wilkerson. Continue to monitor telemetry, HR 90's-110's A-fib. Goal: Cardiovascular alteration will improve  Cardiovascular alteration will improve   Outcome: Ongoing  Patient in Afib, rate 90's-100's. Problem: Health Behavior:  Goal: Will modify at least one risk factor affecting health status  Will modify at least one risk factor affecting health status   Outcome: Met This Shift    Goal: Identification of resources available to assist in meeting health care needs will improve  Identification of resources available to assist in meeting health care needs will improve   Outcome: Met This Shift      Problem: Physical Regulation:  Goal: Complications related to the disease process, condition or treatment will be avoided or minimized  Complications related to the disease process, condition or treatment will be avoided or minimized   Outcome: Met This Shift      Problem: Bleeding:  Goal: Will show no signs and symptoms of excessive bleeding  Will show no signs and symptoms of excessive bleeding   Outcome: Ongoing  Continue to monitor for bleeding. Patient continues on Heparin drip.     Problem:
Problem: Falls - Risk of:  Goal: Will remain free from falls  Will remain free from falls   Outcome: Ongoing  Assessment & interventions provided throughout shift. Bed locked & in low position, call light in reach, side-rails up x2, non-slip socks on when ambulating, reminded patient to use call light to call for assistance. Problem: Pain:  Goal: Pain level will decrease  Pain level will decrease   Outcome: Ongoing  Pt denies any pain this shift. Will continue to monitor. Problem: Cardiac:  Goal: Ability to maintain vital signs within normal range will improve  Ability to maintain vital signs within normal range will improve   Outcome: Ongoing  Vital signs WNL this shift. Will continue to assess. Problem: Bleeding:  Goal: Will show no signs and symptoms of excessive bleeding  Will show no signs and symptoms of excessive bleeding   Outcome: Ongoing  No s/s of bleeding this shift. Will continue to monitor. Problem: Daily Care:  Goal: Daily care needs are met  Daily care needs are met   Outcome: Ongoing  Expressed needs are met this shift. Reminded pt to utilize call light for needs. Problem: DISCHARGE BARRIERS  Goal: Patient's continuum of care needs are met  Outcome: Ongoing  Plan for open heart surgery on Monday.
Problem: Falls - Risk of:  Goal: Will remain free from falls  Will remain free from falls   Outcome: Ongoing  Fall risk wrist band and fall sign in place. Patient demonstrates proper use of call light. Bed alarm on. Patient close to nurses station. Problem: Pain:  Goal: Pain level will decrease  Pain level will decrease   Outcome: Ongoing  Pain Assessment: 0-10  Pain Level: 0   Pain goal:  3  Is pain goal met at this time? Yes  Pain Intervention(s): Rest  Additional interventions to be implemented: medications tylenol and position change      Problem: Cardiac:  Goal: Ability to maintain vital signs within normal range will improve  Ability to maintain vital signs within normal range will improve   Outcome: Met This Shift  Vitals wnl at this time. Problem: Health Behavior:  Goal: Will modify at least one risk factor affecting health status  Will modify at least one risk factor affecting health status   Outcome: Not Met This Shift  Patient does not demonstrate understanding of teaching. Problem: Bleeding:  Goal: Will show no signs and symptoms of excessive bleeding  Will show no signs and symptoms of excessive bleeding   Outcome: Met This Shift  No s/s of excess bleeding noted at this time. Comments: Care plan reviewed with patient. Patient verbalizes understanding of the plan of care and contributes to goal setting.
Problem: Falls - Risk of:  Goal: Will remain free from falls  Will remain free from falls   Outcome: Ongoing  No falls this shift. Problem: Pain:  Goal: Control of acute pain  Control of acute pain   Outcome: Ongoing  Pt denies any pain this shift. Problem: Cardiac:  Goal: Ability to maintain vital signs within normal range will improve  Ability to maintain vital signs within normal range will improve   Outcome: Ongoing  Continues on primacor to help cardiac output    Problem: Bleeding:  Goal: Will show no signs and symptoms of excessive bleeding  Will show no signs and symptoms of excessive bleeding   Outcome: Ongoing  No bleeding noted at this time. Problem: Discharge Planning:  Goal: Patients continuum of care needs are met  Patients continuum of care needs are met   Outcome: Ongoing  Discharge planning ongoing will need extended care at discharge    Problem: Nutrition  Goal: Optimal nutrition therapy  Outcome: Ongoing  Poor appetite    Comments: Care plan reviewed. Patient unable to verbalize understanding of the plan of care and contribute to goal setting.   NO family present
Problem: Falls - Risk of:  Goal: Will remain free from falls  Will remain free from falls   Outcome: Ongoing  Patient free of falls. Call light within reach. Bed in lowest position. Bed alarm on. Hourly rounding continues. Problem: Pain:  Goal: Pain level will decrease  Pain level will decrease   Outcome: Ongoing  Ongoing assessment & interventions provided throughout shift. Reminded patient to report any pain, pressure, or shortness of breath to the nurse. Pain medications provided per physician's orders. Problem: Cardiac:  Goal: Ability to maintain vital signs within normal range will improve  Ability to maintain vital signs within normal range will improve  Outcome: Ongoing  Vital signs monitored q4hr and PRN. Telemetry monitory applied to patient. HR remains from 120s-140s in Afib, elevated with activity. Dyspnea on exertion noted. O2 on 3L per NC per orders. Heparin and Amiodarone IV per oders. Will continue to monitor. Problem: Bleeding:  Goal: Will show no signs and symptoms of excessive bleeding  Will show no signs and symptoms of excessive bleeding  Outcome: Ongoing  Explained importance of Heparin gtt and SE of bleeding and bruising. Pt voiced understanding. Will continue to monitor. Comments: Care plan reviewed with patient. Patient verbalize understanding of the plan of care and contribute to goal setting.
Problem: Falls - Risk of:  Goal: Will remain free from falls  Will remain free from falls   Outcome: Ongoing  Patient remains on bedrest following open heart surgery - balloon pump remains in place. Patient follows commands but remains on ventilator. Fall prevention measures remain in place. No falls at this time. Goal: Absence of physical injury  Absence of physical injury   Outcome: Ongoing  Safety measures remain in place. Patient remains on ventilator. No evidence of injury at this time. Problem: Pain:  Goal: Pain level will decrease  Pain level will decrease   Outcome: Ongoing  No pain identified at this time. Continue to monitor. Goal: Control of acute pain  Control of acute pain   Outcome: Ongoing    Goal: Control of chronic pain  Control of chronic pain   Outcome: Ongoing      Problem: Cardiac:  Goal: Ability to maintain vital signs within normal range will improve  Ability to maintain vital signs within normal range will improve   Outcome: Ongoing  Patient remains on primacor post open heart surgery. Albumin given per order to support blood pressure and hemodynamic calculations. Continue to monitor continuous bedside monitor. Goal: Cardiovascular alteration will improve  Cardiovascular alteration will improve   Outcome: Ongoing  Patient remains atrial paced at 80 BPM. Primacor continues. Electrolytes sent - awaiting results at this time. Problem: Health Behavior:  Goal: Will modify at least one risk factor affecting health status  Will modify at least one risk factor affecting health status   Outcome: Not Met This Shift  Unable to assess at this time - patient remains on ventilator. Goal: Identification of resources available to assist in meeting health care needs will improve  Identification of resources available to assist in meeting health care needs will improve   Outcome: Not Met This Shift  Unable to assess at this time - patient remains on ventilator.      Problem: Physical
Problem: Impaired respiratory status  Goal: Clear lung sounds  Clear lung sounds    Outcome: Ongoing  Patient tried on cpap; pt went apneic placed back on simv
Problem: Impaired respiratory status  Goal: Normal spontaneous ventilation  Outcome: Ongoing  Post open heart pt remains on ventilator. Did not extubate within 6 hour target. Previous therapist tried CPAP x2, pt was not hemodynamically stable. Pt now not appropriately alert enough to extubate. Will continue to monitor pts alertness/ability to follow commands/readiness to wean. Pt is on SIMV Vt 470, rate 8, peep 5, 21% FiO2.
Problem: Nutrition  Goal: Optimal nutrition therapy  Outcome: Ongoing  Nutrition Problem: Severe malnutrition, In context of chronic illness  Intervention: Food and/or Nutrient Delivery: Continue current diet, Modify current ONS  Nutritional Goals: 75 % or more of po intake during LOS
Problem: Nutrition  Goal: Optimal nutrition therapy  Outcome: Ongoing  Nutrition Problem: Severe malnutrition, In context of chronic illness  Intervention: Food and/or Nutrient Delivery: Continue current diet, Start ONS  Nutritional Goals: consume greater than 75% meals during LOS
minimized  Complications related to the disease process, condition or treatment will be avoided or minimized   Outcome: Met This Shift  Patient able to tolerate activity well and denies any chest pain. Problem: Bleeding:  Goal: Will show no signs and symptoms of excessive bleeding  Will show no signs and symptoms of excessive bleeding   Outcome: Ongoing  Patient does not show any signs of excessive bleeding. Will continue to monitor aptt and assess for bleeding. Problem: Daily Care:  Goal: Daily care needs are met  Daily care needs are met   Outcome: Met This Shift      Problem: Discharge Planning:  Goal: Patients continuum of care needs are met  Patients continuum of care needs are met   Outcome: Ongoing  Continue to work on discharge plans. Problem: DISCHARGE BARRIERS  Goal: Patient's continuum of care needs are met  Outcome: Ongoing  Continue to work on discharge plans to possible ECF. Problem: Nutrition  Goal: Optimal nutrition therapy  Outcome: Ongoing  Patient eats % of meals throughout day. Will continue to encourage to eat for best healing outcomes after procedure. Comments: Care plan reviewed with patient. Patient verbalizes understanding of the care plan and contributed to goal setting.

## 2018-11-16 NOTE — CARE COORDINATION
11/16/18, 3:09 PM      [address] [facility] day: 12  Location: -18/018-A Reason for admit: Atrial fibrillation [facility]) [I48.91]   Procedure:   11/4 CTA Chest: Neg for PE; Moderate bilateral pleural effusions with adjacent atelectasis/infiltrate; pulmonary edema  11/4 CT Abd/pelvis: Minimal ascites; Possible pericholecystic fluid. If there is clinical concern for acute cholecystitis, nuclear medicine hepatobiliary scan is recommended for further evaluation  11/5 Echo with EF 40-45%; mod to severe mitral regurg; severe tricuspid regurg  11/7 Cardiac Cath: CAD, Severe mitral regurg; pulmonary hypertension  11/7 Vein mapping and Bilateral carotid dopplers  11/12 Mitral valve repair with a 26 mm Femi Bari 3D Rechord annuloplasty band; Tricuspid valve repair with 30 mm Bernardo MC3 annuloplasty band; 1v CABG; Left atrial maze procedure, with ablation of both the anterior and posterior mitral trigones; Ligation of the left atrial appendage  11/12 CVC  11/12 IABP inserted- 11/13 IABP removed  11/12 Intubated - 11/13 Extubated  11/16 CXR:   Mildly worsened biapical pneumothoraces measuring approximately 10% of the right and 5% on the left. Essentially resolved interstitial pulmonary edema. Improved left basilar and mildly worsened right basilar atelectasis. Small bilateral pleural effusions, stable on the left and mildly worsened on the right. Interval removal of the right jugular central venous catheter. Mild partial withdrawal of the right parasagittal chest tube as discussed above     Treatment Plan of Care: POD #4. Atrially paced - underlying SR 70's. CT x4 to -20 sx; 930 ml/24 hours. Walked 4' x2 with RW and PT today. No BM yet, +flatus, soft abdomen per primary RN, Abdirahman Howard. On room air. Afebrile. CR/PT/OT. Dietitian consulted. Dietary ileus prevention protocol. Telemetry, I&O, daily weight, IS, sternal precautions, CT care, wound care, SCDs, gregory care, ambulate.  Amio, asa, lipitor, coreg, marinol,

## 2018-11-17 ENCOUNTER — APPOINTMENT (OUTPATIENT)
Dept: GENERAL RADIOLOGY | Age: 78
DRG: 216 | End: 2018-11-17
Payer: MEDICARE

## 2018-11-17 LAB
ANION GAP SERPL CALCULATED.3IONS-SCNC: 11 MEQ/L (ref 8–16)
ANION GAP SERPL CALCULATED.3IONS-SCNC: 9 MEQ/L (ref 8–16)
BUN BLDV-MCNC: 30 MG/DL (ref 7–22)
BUN BLDV-MCNC: 33 MG/DL (ref 7–22)
CALCIUM SERPL-MCNC: 8.2 MG/DL (ref 8.5–10.5)
CALCIUM SERPL-MCNC: 8.7 MG/DL (ref 8.5–10.5)
CHLORIDE BLD-SCNC: 94 MEQ/L (ref 98–111)
CHLORIDE BLD-SCNC: 96 MEQ/L (ref 98–111)
CO2: 26 MEQ/L (ref 23–33)
CO2: 28 MEQ/L (ref 23–33)
CREAT SERPL-MCNC: 0.8 MG/DL (ref 0.4–1.2)
CREAT SERPL-MCNC: 0.8 MG/DL (ref 0.4–1.2)
ERYTHROCYTE [DISTWIDTH] IN BLOOD BY AUTOMATED COUNT: 13.8 % (ref 11.5–14.5)
ERYTHROCYTE [DISTWIDTH] IN BLOOD BY AUTOMATED COUNT: 47.8 FL (ref 35–45)
GFR SERPL CREATININE-BSD FRML MDRD: 70 ML/MIN/1.73M2
GFR SERPL CREATININE-BSD FRML MDRD: 70 ML/MIN/1.73M2
GLUCOSE BLD-MCNC: 108 MG/DL (ref 70–108)
GLUCOSE BLD-MCNC: 112 MG/DL (ref 70–108)
GLUCOSE BLD-MCNC: 116 MG/DL (ref 70–108)
GLUCOSE BLD-MCNC: 127 MG/DL (ref 70–108)
GLUCOSE BLD-MCNC: 133 MG/DL (ref 70–108)
GLUCOSE BLD-MCNC: 233 MG/DL (ref 70–108)
HCT VFR BLD CALC: 26.4 % (ref 37–47)
HEMOGLOBIN: 8.4 GM/DL (ref 12–16)
MCH RBC QN AUTO: 30.3 PG (ref 26–33)
MCHC RBC AUTO-ENTMCNC: 31.8 GM/DL (ref 32.2–35.5)
MCV RBC AUTO: 95.3 FL (ref 81–99)
PLATELET # BLD: 161 THOU/MM3 (ref 130–400)
PMV BLD AUTO: 12.2 FL (ref 9.4–12.4)
POTASSIUM SERPL-SCNC: 4.5 MEQ/L (ref 3.5–5.2)
POTASSIUM SERPL-SCNC: 4.7 MEQ/L (ref 3.5–5.2)
RBC # BLD: 2.77 MILL/MM3 (ref 4.2–5.4)
SODIUM BLD-SCNC: 129 MEQ/L (ref 135–145)
SODIUM BLD-SCNC: 135 MEQ/L (ref 135–145)
WBC # BLD: 10.7 THOU/MM3 (ref 4.8–10.8)

## 2018-11-17 PROCEDURE — 2709999900 HC NON-CHARGEABLE SUPPLY

## 2018-11-17 PROCEDURE — 85027 COMPLETE CBC AUTOMATED: CPT

## 2018-11-17 PROCEDURE — 82948 REAGENT STRIP/BLOOD GLUCOSE: CPT

## 2018-11-17 PROCEDURE — 71045 X-RAY EXAM CHEST 1 VIEW: CPT

## 2018-11-17 PROCEDURE — 2580000003 HC RX 258: Performed by: THORACIC SURGERY (CARDIOTHORACIC VASCULAR SURGERY)

## 2018-11-17 PROCEDURE — 6370000000 HC RX 637 (ALT 250 FOR IP): Performed by: PHARMACIST

## 2018-11-17 PROCEDURE — 6360000002 HC RX W HCPCS: Performed by: THORACIC SURGERY (CARDIOTHORACIC VASCULAR SURGERY)

## 2018-11-17 PROCEDURE — 2060000000 HC ICU INTERMEDIATE R&B

## 2018-11-17 PROCEDURE — 97110 THERAPEUTIC EXERCISES: CPT

## 2018-11-17 PROCEDURE — 36415 COLL VENOUS BLD VENIPUNCTURE: CPT

## 2018-11-17 PROCEDURE — 99024 POSTOP FOLLOW-UP VISIT: CPT | Performed by: THORACIC SURGERY (CARDIOTHORACIC VASCULAR SURGERY)

## 2018-11-17 PROCEDURE — 97116 GAIT TRAINING THERAPY: CPT

## 2018-11-17 PROCEDURE — 80048 BASIC METABOLIC PNL TOTAL CA: CPT

## 2018-11-17 PROCEDURE — 6370000000 HC RX 637 (ALT 250 FOR IP): Performed by: THORACIC SURGERY (CARDIOTHORACIC VASCULAR SURGERY)

## 2018-11-17 RX ADMIN — FUROSEMIDE 40 MG: 10 INJECTION, SOLUTION INTRAMUSCULAR; INTRAVENOUS at 10:01

## 2018-11-17 RX ADMIN — Medication 10 ML: at 19:46

## 2018-11-17 RX ADMIN — FUROSEMIDE 40 MG: 10 INJECTION, SOLUTION INTRAMUSCULAR; INTRAVENOUS at 17:11

## 2018-11-17 RX ADMIN — AMIODARONE HYDROCHLORIDE 200 MG: 200 TABLET ORAL at 08:21

## 2018-11-17 RX ADMIN — MAGNESIUM HYDROXIDE 30 ML: 400 SUSPENSION ORAL at 07:04

## 2018-11-17 RX ADMIN — Medication 17.6 MG: at 07:04

## 2018-11-17 RX ADMIN — DRONABINOL 5 MG: 2.5 CAPSULE ORAL at 08:21

## 2018-11-17 RX ADMIN — DRONABINOL 5 MG: 2.5 CAPSULE ORAL at 19:44

## 2018-11-17 RX ADMIN — CARVEDILOL 3.12 MG: 3.12 TABLET, FILM COATED ORAL at 08:21

## 2018-11-17 RX ADMIN — MULTIPLE VITAMINS W/ MINERALS TAB 1 TABLET: TAB at 08:21

## 2018-11-17 RX ADMIN — FAMOTIDINE 20 MG: 20 TABLET ORAL at 19:44

## 2018-11-17 RX ADMIN — CARVEDILOL 3.12 MG: 3.12 TABLET, FILM COATED ORAL at 17:11

## 2018-11-17 RX ADMIN — POTASSIUM CHLORIDE 20 MEQ: 20 TABLET, EXTENDED RELEASE ORAL at 17:11

## 2018-11-17 RX ADMIN — POTASSIUM CHLORIDE 20 MEQ: 20 TABLET, EXTENDED RELEASE ORAL at 08:21

## 2018-11-17 RX ADMIN — ATORVASTATIN CALCIUM 20 MG: 20 TABLET, FILM COATED ORAL at 19:45

## 2018-11-17 RX ADMIN — ACETAMINOPHEN 650 MG: 325 TABLET ORAL at 15:16

## 2018-11-17 RX ADMIN — Medication 10 ML: at 10:01

## 2018-11-17 RX ADMIN — DOCUSATE SODIUM 100 MG: 100 CAPSULE, LIQUID FILLED ORAL at 19:45

## 2018-11-17 RX ADMIN — ASPIRIN 325 MG: 325 TABLET, DELAYED RELEASE ORAL at 08:21

## 2018-11-17 RX ADMIN — Medication 6 UNITS: at 17:12

## 2018-11-17 RX ADMIN — ENOXAPARIN SODIUM 40 MG: 40 INJECTION SUBCUTANEOUS at 08:21

## 2018-11-17 RX ADMIN — ENALAPRIL MALEATE 2.5 MG: 2.5 TABLET ORAL at 08:21

## 2018-11-17 RX ADMIN — FAMOTIDINE 20 MG: 20 TABLET ORAL at 08:21

## 2018-11-17 RX ADMIN — DOCUSATE SODIUM 100 MG: 100 CAPSULE, LIQUID FILLED ORAL at 08:20

## 2018-11-17 ASSESSMENT — PAIN SCALES - GENERAL
PAINLEVEL_OUTOF10: 0
PAINLEVEL_OUTOF10: 5
PAINLEVEL_OUTOF10: 0

## 2018-11-17 NOTE — PROGRESS NOTES
Hospital day 13    Subjective:     Ms. Almazan denies chest pain    Medication side effects: none    Scheduled Meds:   carvedilol  3.125 mg Oral BID WC    amiodarone  200 mg Oral Daily    dronabinol  5 mg Oral BID    enalapril  2.5 mg Oral BID    famotidine  20 mg Oral BID    potassium (CARDIAC) replacement protocol   Other RX Placeholder    insulin lispro  0-18 Units Subcutaneous TID WC    insulin lispro  0-9 Units Subcutaneous Nightly    furosemide  40 mg Intravenous BID    potassium chloride  20 mEq Oral BID     sodium chloride flush  10 mL Intravenous 2 times per day    docusate sodium  100 mg Oral BID    therapeutic multivitamin-minerals  1 tablet Oral Daily with breakfast    atorvastatin  20 mg Oral Nightly    enoxaparin  40 mg Subcutaneous Daily    aspirin  325 mg Oral Daily     Continuous Infusions:  PRN Meds:sodium chloride flush, acetaminophen, oxyCODONE **OR** oxyCODONE, magnesium hydroxide, bisacodyl, senna, ondansetron, albuterol sulfate HFA      Objective:      Physical Exam:   /70   Pulse 80   Temp 98.3 °F (36.8 °C) (Oral)   Resp 14   Ht 5' 6\" (1.676 m)   Wt 113 lb (51.3 kg)   SpO2 98%   BMI 18.24 kg/m²     General Appearance:  Alert, cooperative, no distress, appears stated age   Head:  Normocephalic, without obvious abnormality, atraumatic   Eyes:  PERRL, conjunctiva/corneas clear, EOM's intact, fundi benign, both eyes   Ears:  Normal TM's and external ear canals, both ears   Nose: Nares normal, septum midline,mucosa normal, no drainage or sinus tenderness   Throat: Lips, mucosa, and tongue normal; teeth and gums normal   Neck: Supple, symmetrical, trachea midline, no adenopathy;  thyroid: not enlarged, symmetric, no tenderness/mass/nodules; no carotid bruit or JVD   Back:   Symmetric, no curvature, ROM normal, no CVA tenderness   Lungs:   Clear to auscultation bilaterally, respirations unlabored   Breasts:  No masses or tenderness   Heart:  Regular rate and rhythm, S1

## 2018-11-18 ENCOUNTER — APPOINTMENT (OUTPATIENT)
Dept: GENERAL RADIOLOGY | Age: 78
DRG: 216 | End: 2018-11-18
Payer: MEDICARE

## 2018-11-18 LAB
ANION GAP SERPL CALCULATED.3IONS-SCNC: 11 MEQ/L (ref 8–16)
BUN BLDV-MCNC: 30 MG/DL (ref 7–22)
CALCIUM SERPL-MCNC: 8.4 MG/DL (ref 8.5–10.5)
CHLORIDE BLD-SCNC: 100 MEQ/L (ref 98–111)
CO2: 28 MEQ/L (ref 23–33)
CREAT SERPL-MCNC: 0.9 MG/DL (ref 0.4–1.2)
ERYTHROCYTE [DISTWIDTH] IN BLOOD BY AUTOMATED COUNT: 13.7 % (ref 11.5–14.5)
ERYTHROCYTE [DISTWIDTH] IN BLOOD BY AUTOMATED COUNT: 48.9 FL (ref 35–45)
GFR SERPL CREATININE-BSD FRML MDRD: 61 ML/MIN/1.73M2
GLUCOSE BLD-MCNC: 105 MG/DL (ref 70–108)
GLUCOSE BLD-MCNC: 107 MG/DL (ref 70–108)
GLUCOSE BLD-MCNC: 112 MG/DL (ref 70–108)
GLUCOSE BLD-MCNC: 114 MG/DL (ref 70–108)
GLUCOSE BLD-MCNC: 291 MG/DL (ref 70–108)
HCT VFR BLD CALC: 26.9 % (ref 37–47)
HEMOGLOBIN: 8.5 GM/DL (ref 12–16)
MCH RBC QN AUTO: 30.6 PG (ref 26–33)
MCHC RBC AUTO-ENTMCNC: 31.6 GM/DL (ref 32.2–35.5)
MCV RBC AUTO: 96.8 FL (ref 81–99)
PLATELET # BLD: 215 THOU/MM3 (ref 130–400)
PMV BLD AUTO: 11.4 FL (ref 9.4–12.4)
POTASSIUM SERPL-SCNC: 4.7 MEQ/L (ref 3.5–5.2)
RBC # BLD: 2.78 MILL/MM3 (ref 4.2–5.4)
SODIUM BLD-SCNC: 139 MEQ/L (ref 135–145)
WBC # BLD: 11.7 THOU/MM3 (ref 4.8–10.8)

## 2018-11-18 PROCEDURE — 6370000000 HC RX 637 (ALT 250 FOR IP): Performed by: THORACIC SURGERY (CARDIOTHORACIC VASCULAR SURGERY)

## 2018-11-18 PROCEDURE — 6360000002 HC RX W HCPCS: Performed by: THORACIC SURGERY (CARDIOTHORACIC VASCULAR SURGERY)

## 2018-11-18 PROCEDURE — 2580000003 HC RX 258: Performed by: THORACIC SURGERY (CARDIOTHORACIC VASCULAR SURGERY)

## 2018-11-18 PROCEDURE — 2060000000 HC ICU INTERMEDIATE R&B

## 2018-11-18 PROCEDURE — 97535 SELF CARE MNGMENT TRAINING: CPT

## 2018-11-18 PROCEDURE — 85027 COMPLETE CBC AUTOMATED: CPT

## 2018-11-18 PROCEDURE — 6370000000 HC RX 637 (ALT 250 FOR IP): Performed by: PHARMACIST

## 2018-11-18 PROCEDURE — 71045 X-RAY EXAM CHEST 1 VIEW: CPT

## 2018-11-18 PROCEDURE — 80048 BASIC METABOLIC PNL TOTAL CA: CPT

## 2018-11-18 PROCEDURE — 36415 COLL VENOUS BLD VENIPUNCTURE: CPT

## 2018-11-18 PROCEDURE — 82948 REAGENT STRIP/BLOOD GLUCOSE: CPT

## 2018-11-18 PROCEDURE — 97110 THERAPEUTIC EXERCISES: CPT

## 2018-11-18 PROCEDURE — 99024 POSTOP FOLLOW-UP VISIT: CPT | Performed by: THORACIC SURGERY (CARDIOTHORACIC VASCULAR SURGERY)

## 2018-11-18 RX ORDER — AMIODARONE HYDROCHLORIDE 200 MG/1
200 TABLET ORAL 2 TIMES DAILY
Status: DISCONTINUED | OUTPATIENT
Start: 2018-11-18 | End: 2018-11-18

## 2018-11-18 RX ORDER — FUROSEMIDE 20 MG/1
20 TABLET ORAL DAILY
Status: DISCONTINUED | OUTPATIENT
Start: 2018-11-18 | End: 2018-11-19

## 2018-11-18 RX ORDER — FAMOTIDINE 20 MG/1
20 TABLET, FILM COATED ORAL DAILY
Status: DISCONTINUED | OUTPATIENT
Start: 2018-11-19 | End: 2018-11-20 | Stop reason: HOSPADM

## 2018-11-18 RX ORDER — AMIODARONE HYDROCHLORIDE 200 MG/1
200 TABLET ORAL DAILY
Status: DISCONTINUED | OUTPATIENT
Start: 2018-11-19 | End: 2018-11-20 | Stop reason: HOSPADM

## 2018-11-18 RX ADMIN — CARVEDILOL 3.12 MG: 3.12 TABLET, FILM COATED ORAL at 09:33

## 2018-11-18 RX ADMIN — POTASSIUM CHLORIDE 20 MEQ: 20 TABLET, EXTENDED RELEASE ORAL at 17:29

## 2018-11-18 RX ADMIN — POTASSIUM CHLORIDE 20 MEQ: 20 TABLET, EXTENDED RELEASE ORAL at 09:33

## 2018-11-18 RX ADMIN — DOCUSATE SODIUM 100 MG: 100 CAPSULE, LIQUID FILLED ORAL at 09:33

## 2018-11-18 RX ADMIN — AMIODARONE HYDROCHLORIDE 200 MG: 200 TABLET ORAL at 09:33

## 2018-11-18 RX ADMIN — Medication 10 ML: at 09:33

## 2018-11-18 RX ADMIN — MULTIPLE VITAMINS W/ MINERALS TAB 1 TABLET: TAB at 09:33

## 2018-11-18 RX ADMIN — CARVEDILOL 3.12 MG: 3.12 TABLET, FILM COATED ORAL at 17:29

## 2018-11-18 RX ADMIN — ACETAMINOPHEN 650 MG: 325 TABLET ORAL at 10:43

## 2018-11-18 RX ADMIN — DOCUSATE SODIUM 100 MG: 100 CAPSULE, LIQUID FILLED ORAL at 19:51

## 2018-11-18 RX ADMIN — ACETAMINOPHEN 650 MG: 325 TABLET ORAL at 19:51

## 2018-11-18 RX ADMIN — DRONABINOL 5 MG: 2.5 CAPSULE ORAL at 09:33

## 2018-11-18 RX ADMIN — FAMOTIDINE 20 MG: 20 TABLET ORAL at 09:33

## 2018-11-18 RX ADMIN — Medication 10 ML: at 19:52

## 2018-11-18 RX ADMIN — FUROSEMIDE 20 MG: 20 TABLET ORAL at 13:44

## 2018-11-18 RX ADMIN — ATORVASTATIN CALCIUM 20 MG: 20 TABLET, FILM COATED ORAL at 19:51

## 2018-11-18 RX ADMIN — ENOXAPARIN SODIUM 40 MG: 40 INJECTION SUBCUTANEOUS at 09:34

## 2018-11-18 RX ADMIN — ASPIRIN 325 MG: 325 TABLET, DELAYED RELEASE ORAL at 09:34

## 2018-11-18 RX ADMIN — DRONABINOL 5 MG: 2.5 CAPSULE ORAL at 19:51

## 2018-11-18 ASSESSMENT — PAIN SCALES - GENERAL
PAINLEVEL_OUTOF10: 5
PAINLEVEL_OUTOF10: 0
PAINLEVEL_OUTOF10: 0
PAINLEVEL_OUTOF10: 6
PAINLEVEL_OUTOF10: 5

## 2018-11-18 ASSESSMENT — PAIN DESCRIPTION - ORIENTATION: ORIENTATION: RIGHT

## 2018-11-18 ASSESSMENT — PAIN DESCRIPTION - PAIN TYPE: TYPE: ACUTE PAIN

## 2018-11-18 ASSESSMENT — PAIN DESCRIPTION - ONSET: ONSET: ON-GOING

## 2018-11-18 ASSESSMENT — PAIN DESCRIPTION - LOCATION: LOCATION: SHOULDER

## 2018-11-18 ASSESSMENT — PAIN DESCRIPTION - FREQUENCY: FREQUENCY: CONTINUOUS

## 2018-11-19 ENCOUNTER — APPOINTMENT (OUTPATIENT)
Dept: GENERAL RADIOLOGY | Age: 78
DRG: 216 | End: 2018-11-19
Payer: MEDICARE

## 2018-11-19 LAB
ANION GAP SERPL CALCULATED.3IONS-SCNC: 8 MEQ/L (ref 8–16)
BUN BLDV-MCNC: 26 MG/DL (ref 7–22)
CALCIUM SERPL-MCNC: 8.3 MG/DL (ref 8.5–10.5)
CHLORIDE BLD-SCNC: 101 MEQ/L (ref 98–111)
CO2: 26 MEQ/L (ref 23–33)
CREAT SERPL-MCNC: 0.7 MG/DL (ref 0.4–1.2)
ERYTHROCYTE [DISTWIDTH] IN BLOOD BY AUTOMATED COUNT: 13.7 % (ref 11.5–14.5)
ERYTHROCYTE [DISTWIDTH] IN BLOOD BY AUTOMATED COUNT: 47.5 FL (ref 35–45)
GFR SERPL CREATININE-BSD FRML MDRD: 81 ML/MIN/1.73M2
GLUCOSE BLD-MCNC: 104 MG/DL (ref 70–108)
GLUCOSE BLD-MCNC: 104 MG/DL (ref 70–108)
GLUCOSE BLD-MCNC: 110 MG/DL (ref 70–108)
GLUCOSE BLD-MCNC: 99 MG/DL (ref 70–108)
HCT VFR BLD CALC: 25.4 % (ref 37–47)
HEMOGLOBIN: 8 GM/DL (ref 12–16)
MCH RBC QN AUTO: 30.1 PG (ref 26–33)
MCHC RBC AUTO-ENTMCNC: 31.5 GM/DL (ref 32.2–35.5)
MCV RBC AUTO: 95.5 FL (ref 81–99)
PLATELET # BLD: 262 THOU/MM3 (ref 130–400)
PMV BLD AUTO: 10.6 FL (ref 9.4–12.4)
POTASSIUM SERPL-SCNC: 4.8 MEQ/L (ref 3.5–5.2)
RBC # BLD: 2.66 MILL/MM3 (ref 4.2–5.4)
SODIUM BLD-SCNC: 135 MEQ/L (ref 135–145)
WBC # BLD: 10.1 THOU/MM3 (ref 4.8–10.8)

## 2018-11-19 PROCEDURE — 71045 X-RAY EXAM CHEST 1 VIEW: CPT

## 2018-11-19 PROCEDURE — 6370000000 HC RX 637 (ALT 250 FOR IP): Performed by: THORACIC SURGERY (CARDIOTHORACIC VASCULAR SURGERY)

## 2018-11-19 PROCEDURE — 80048 BASIC METABOLIC PNL TOTAL CA: CPT

## 2018-11-19 PROCEDURE — 2060000000 HC ICU INTERMEDIATE R&B

## 2018-11-19 PROCEDURE — 85027 COMPLETE CBC AUTOMATED: CPT

## 2018-11-19 PROCEDURE — 82948 REAGENT STRIP/BLOOD GLUCOSE: CPT

## 2018-11-19 PROCEDURE — 97110 THERAPEUTIC EXERCISES: CPT

## 2018-11-19 PROCEDURE — 99024 POSTOP FOLLOW-UP VISIT: CPT | Performed by: PHYSICIAN ASSISTANT

## 2018-11-19 PROCEDURE — 36415 COLL VENOUS BLD VENIPUNCTURE: CPT

## 2018-11-19 PROCEDURE — 6370000000 HC RX 637 (ALT 250 FOR IP): Performed by: PHARMACIST

## 2018-11-19 PROCEDURE — 2709999900 HC NON-CHARGEABLE SUPPLY

## 2018-11-19 PROCEDURE — 97535 SELF CARE MNGMENT TRAINING: CPT

## 2018-11-19 PROCEDURE — 6370000000 HC RX 637 (ALT 250 FOR IP): Performed by: PHYSICIAN ASSISTANT

## 2018-11-19 PROCEDURE — 97530 THERAPEUTIC ACTIVITIES: CPT

## 2018-11-19 PROCEDURE — 97116 GAIT TRAINING THERAPY: CPT

## 2018-11-19 PROCEDURE — 2580000003 HC RX 258: Performed by: THORACIC SURGERY (CARDIOTHORACIC VASCULAR SURGERY)

## 2018-11-19 PROCEDURE — 6360000002 HC RX W HCPCS: Performed by: THORACIC SURGERY (CARDIOTHORACIC VASCULAR SURGERY)

## 2018-11-19 PROCEDURE — 6370000000 HC RX 637 (ALT 250 FOR IP): Performed by: INTERNAL MEDICINE

## 2018-11-19 RX ORDER — FUROSEMIDE 10 MG/ML
20 INJECTION INTRAMUSCULAR; INTRAVENOUS 2 TIMES DAILY
Status: DISCONTINUED | OUTPATIENT
Start: 2018-11-19 | End: 2018-11-20 | Stop reason: HOSPADM

## 2018-11-19 RX ADMIN — POTASSIUM CHLORIDE 20 MEQ: 20 TABLET, EXTENDED RELEASE ORAL at 08:42

## 2018-11-19 RX ADMIN — ACETAMINOPHEN 650 MG: 325 TABLET ORAL at 22:00

## 2018-11-19 RX ADMIN — DOCUSATE SODIUM 100 MG: 100 CAPSULE, LIQUID FILLED ORAL at 08:42

## 2018-11-19 RX ADMIN — Medication 10 ML: at 19:45

## 2018-11-19 RX ADMIN — ASPIRIN 325 MG: 325 TABLET, DELAYED RELEASE ORAL at 08:42

## 2018-11-19 RX ADMIN — OXYCODONE HYDROCHLORIDE 5 MG: 5 TABLET ORAL at 00:14

## 2018-11-19 RX ADMIN — ATORVASTATIN CALCIUM 20 MG: 20 TABLET, FILM COATED ORAL at 22:00

## 2018-11-19 RX ADMIN — OXYCODONE HYDROCHLORIDE 5 MG: 5 TABLET ORAL at 04:32

## 2018-11-19 RX ADMIN — ACETAMINOPHEN 650 MG: 325 TABLET ORAL at 00:14

## 2018-11-19 RX ADMIN — ENOXAPARIN SODIUM 40 MG: 40 INJECTION SUBCUTANEOUS at 08:42

## 2018-11-19 RX ADMIN — POTASSIUM CHLORIDE 20 MEQ: 20 TABLET, EXTENDED RELEASE ORAL at 16:43

## 2018-11-19 RX ADMIN — CARVEDILOL 3.12 MG: 3.12 TABLET, FILM COATED ORAL at 17:27

## 2018-11-19 RX ADMIN — Medication 10 ML: at 08:42

## 2018-11-19 RX ADMIN — FAMOTIDINE 20 MG: 20 TABLET ORAL at 08:42

## 2018-11-19 RX ADMIN — FUROSEMIDE 20 MG: 10 INJECTION, SOLUTION INTRAMUSCULAR; INTRAVENOUS at 16:43

## 2018-11-19 RX ADMIN — ACETAMINOPHEN 650 MG: 325 TABLET ORAL at 17:27

## 2018-11-19 RX ADMIN — CARVEDILOL 3.12 MG: 3.12 TABLET, FILM COATED ORAL at 11:34

## 2018-11-19 RX ADMIN — MULTIPLE VITAMINS W/ MINERALS TAB 1 TABLET: TAB at 11:34

## 2018-11-19 RX ADMIN — DRONABINOL 5 MG: 2.5 CAPSULE ORAL at 08:42

## 2018-11-19 RX ADMIN — AMIODARONE HYDROCHLORIDE 200 MG: 200 TABLET ORAL at 08:47

## 2018-11-19 RX ADMIN — ACETAMINOPHEN 650 MG: 325 TABLET ORAL at 08:47

## 2018-11-19 RX ADMIN — FUROSEMIDE 20 MG: 10 INJECTION, SOLUTION INTRAMUSCULAR; INTRAVENOUS at 11:34

## 2018-11-19 RX ADMIN — MAGESIUM CITRATE 296 ML: 1.75 LIQUID ORAL at 11:34

## 2018-11-19 RX ADMIN — DRONABINOL 5 MG: 2.5 CAPSULE ORAL at 19:45

## 2018-11-19 ASSESSMENT — PAIN SCALES - GENERAL
PAINLEVEL_OUTOF10: 3
PAINLEVEL_OUTOF10: 3
PAINLEVEL_OUTOF10: 5
PAINLEVEL_OUTOF10: 3
PAINLEVEL_OUTOF10: 0
PAINLEVEL_OUTOF10: 6
PAINLEVEL_OUTOF10: 3

## 2018-11-19 ASSESSMENT — PAIN DESCRIPTION - PAIN TYPE
TYPE: ACUTE PAIN

## 2018-11-19 ASSESSMENT — PAIN DESCRIPTION - DESCRIPTORS
DESCRIPTORS: ACHING
DESCRIPTORS: DISCOMFORT

## 2018-11-19 ASSESSMENT — PAIN DESCRIPTION - FREQUENCY
FREQUENCY: INTERMITTENT

## 2018-11-19 ASSESSMENT — PAIN DESCRIPTION - ORIENTATION
ORIENTATION: LOWER
ORIENTATION: MID

## 2018-11-19 ASSESSMENT — PAIN DESCRIPTION - LOCATION
LOCATION: CHEST

## 2018-11-19 ASSESSMENT — PAIN DESCRIPTION - ONSET
ONSET: ON-GOING

## 2018-11-19 NOTE — CARE COORDINATION
11/19/18, 11:34 AM    DISCHARGE BARRIERS    SW spoke with patient to confirm discharge plan, [facility] and [address] [place] [address] as back up plan. SW to call and make referral, No Precert needed. [place] PA states discharge planned for tomorrow.

## 2018-11-19 NOTE — PROGRESS NOTES
putting on face cream and brushing teeth)  UE Bathing: Setup (sitting in bedside chair )  LE Bathing: Minimal assistance (for lower legs and feet and bottom while standing )  UE Dressing: Minimal assistance (to tie gown )  LE Dressing: Maximum assistance (to don shoes and don slippersocks and ricky hose )          Bed mobility  Sit to Supine: Minimal assistance (for LE's in bed )    Transfers  Sit to stand: Minimal assistance  Stand to sit: Minimal assistance       Balance  Sitting Balance: Contact guard assistance  Standing Balance: Contact guard assistance     Time: x2 min   Activity: bathing shantell and bottom area   Comment: moderate vcs for sternal precautions     Functional Mobility  Functional - Mobility Device: Rolling Walker  Activity: Other  Assist Level: Contact guard assistance  Functional Mobility Comments: pt ambulated from recliner to other side of bed declined to go to door of room. Pt ambulated approx 15 feet with CGA. Pt encouraged to do more but declined stated was too tired. pt had no LOB with min unsteadiness noted            Comment: pt completed CABG step II exercises while sitting up in recliner [id number] x1 set to protocal.  Pt completed to increase strength and endurance for ADLS and transfers . restbreaks between each ex       Activity Tolerance:  Activity Tolerance: Patient limited by fatigue  Activity Tolerance: pt does have decreased motivation and requires lots of encouragment to part in therapy. Assessment:     Performance deficits / Impairments: Decreased functional mobility , Decreased ADL status, Decreased strength, Decreased endurance, Decreased balance, Decreased high-level IADLs  Prognosis: Good    Discharge Recommendations:  Discharge Recommendations: Patient would benefit from continued therapy after discharge, Subacute/Skilled Nursing Facility    Patient Education:  Patient Education: pt needing to part in therapy and increase mobility     Equipment Recommendations:   Other: will continue to assess    Safety:  Safety Devices in place: Yes  Type of devices:  All fall risk precautions in place, Call light within reach, Bed alarm in place, Left in bed, Gait belt, Nurse notified (RN wanted pt back in bed to take out chest tubes )    Plan:  Times per week: 6x  Current Treatment Recommendations: Strengthening, Balance Training, Functional Mobility Training, Endurance Training, Safety Education & Training, Patient/Caregiver Education & Training, Equipment Evaluation, Education, & procurement, Self-Care / ADL, Home Management Training    Goals:  Patient goals : \"get better\"    Short term goals  Time Frame for Short term goals: 2 weeks  Short term goal 1: Pt to complete functional mobility to/from bedside chair or BSC using RW as needed with mind A to increase indep with toileting tasks  Short term goal 2: Pt to complete UB ADL tasks with min A and LB ADL tasks with mod A to increase indep with self care tasks  Short term goal 3: Pt to dmeo static standing > 2 min with min A and bilateral hand release wiht min A to complete clothing management after dressign and toileting   Short term goal 4: Pt to increse UB activityt olerance to complete ADL tasks with 2 or less rst breaks   Long term goals  Time Frame for Long term goals : not set due to ELOS

## 2018-11-19 NOTE — PROGRESS NOTES
therapeutic multivitamin-minerals  1 tablet Oral Daily with breakfast    atorvastatin  20 mg Oral Nightly    enoxaparin  40 mg Subcutaneous Daily    aspirin  325 mg Oral Daily       ROS: All neg unless specifically mentioned in subjective section. Exam:  General Appearance: alert ,conversing, in no acute distress  Cardiovascular: normal rate, regular rhythm, normal S1 and S2, no murmurs, rubs, clicks, or gallops  Pulmonary/Chest: clear to auscultation bilaterally- no wheezes, rales or rhonchi, normal air movement, no respiratory distress  Neurological: alert, oriented, normal speech, no focal findings or movement disorder noted  Sternum: Incision healing appropriately and no wound dehiscence noted. Assessment:   Patient Active Problem List   Diagnosis    Ganglion cyst    Atrial fibrillation with RVR ([facility]    Severe malnutrition ([facility]       Plan: 11/19/18  1. CXR reviewed- Continue daily CXR's   2. Chest tubes out today. 3. Hgb noted at 8.0   4.  Begin planning d/c     The plan of care was discussed in detail with Nandini Smyth PA-C

## 2018-11-20 ENCOUNTER — APPOINTMENT (OUTPATIENT)
Dept: GENERAL RADIOLOGY | Age: 78
DRG: 216 | End: 2018-11-20
Payer: MEDICARE

## 2018-11-20 ENCOUNTER — TELEPHONE (OUTPATIENT)
Dept: FAMILY MEDICINE CLINIC | Age: 78
End: 2018-11-20

## 2018-11-20 VITALS
TEMPERATURE: 98.1 F | HEART RATE: 90 BPM | DIASTOLIC BLOOD PRESSURE: 69 MMHG | RESPIRATION RATE: 16 BRPM | BODY MASS INDEX: 17.34 KG/M2 | OXYGEN SATURATION: 96 % | WEIGHT: 107.9 LBS | SYSTOLIC BLOOD PRESSURE: 110 MMHG | HEIGHT: 66 IN

## 2018-11-20 LAB
ANION GAP SERPL CALCULATED.3IONS-SCNC: 12 MEQ/L (ref 8–16)
BUN BLDV-MCNC: 21 MG/DL (ref 7–22)
CALCIUM SERPL-MCNC: 8.8 MG/DL (ref 8.5–10.5)
CHLORIDE BLD-SCNC: 99 MEQ/L (ref 98–111)
CO2: 26 MEQ/L (ref 23–33)
CREAT SERPL-MCNC: 0.9 MG/DL (ref 0.4–1.2)
ERYTHROCYTE [DISTWIDTH] IN BLOOD BY AUTOMATED COUNT: 13.8 % (ref 11.5–14.5)
ERYTHROCYTE [DISTWIDTH] IN BLOOD BY AUTOMATED COUNT: 48 FL (ref 35–45)
GFR SERPL CREATININE-BSD FRML MDRD: 61 ML/MIN/1.73M2
GLUCOSE BLD-MCNC: 101 MG/DL (ref 70–108)
HCT VFR BLD CALC: 28.5 % (ref 37–47)
HEMOGLOBIN: 9 GM/DL (ref 12–16)
MCH RBC QN AUTO: 30.2 PG (ref 26–33)
MCHC RBC AUTO-ENTMCNC: 31.6 GM/DL (ref 32.2–35.5)
MCV RBC AUTO: 95.6 FL (ref 81–99)
PLATELET # BLD: 345 THOU/MM3 (ref 130–400)
PMV BLD AUTO: 10.4 FL (ref 9.4–12.4)
POTASSIUM SERPL-SCNC: 5.1 MEQ/L (ref 3.5–5.2)
RBC # BLD: 2.98 MILL/MM3 (ref 4.2–5.4)
SODIUM BLD-SCNC: 137 MEQ/L (ref 135–145)
WBC # BLD: 11.2 THOU/MM3 (ref 4.8–10.8)

## 2018-11-20 PROCEDURE — 2580000003 HC RX 258: Performed by: THORACIC SURGERY (CARDIOTHORACIC VASCULAR SURGERY)

## 2018-11-20 PROCEDURE — 6370000000 HC RX 637 (ALT 250 FOR IP): Performed by: PHARMACIST

## 2018-11-20 PROCEDURE — 80048 BASIC METABOLIC PNL TOTAL CA: CPT

## 2018-11-20 PROCEDURE — 97116 GAIT TRAINING THERAPY: CPT

## 2018-11-20 PROCEDURE — 97530 THERAPEUTIC ACTIVITIES: CPT

## 2018-11-20 PROCEDURE — 99024 POSTOP FOLLOW-UP VISIT: CPT | Performed by: PHYSICIAN ASSISTANT

## 2018-11-20 PROCEDURE — 97110 THERAPEUTIC EXERCISES: CPT

## 2018-11-20 PROCEDURE — 6370000000 HC RX 637 (ALT 250 FOR IP): Performed by: THORACIC SURGERY (CARDIOTHORACIC VASCULAR SURGERY)

## 2018-11-20 PROCEDURE — 85027 COMPLETE CBC AUTOMATED: CPT

## 2018-11-20 PROCEDURE — 6370000000 HC RX 637 (ALT 250 FOR IP): Performed by: INTERNAL MEDICINE

## 2018-11-20 PROCEDURE — 71046 X-RAY EXAM CHEST 2 VIEWS: CPT

## 2018-11-20 PROCEDURE — 6360000002 HC RX W HCPCS: Performed by: THORACIC SURGERY (CARDIOTHORACIC VASCULAR SURGERY)

## 2018-11-20 PROCEDURE — 97535 SELF CARE MNGMENT TRAINING: CPT

## 2018-11-20 PROCEDURE — 36415 COLL VENOUS BLD VENIPUNCTURE: CPT

## 2018-11-20 RX ORDER — FUROSEMIDE 20 MG/1
20 TABLET ORAL DAILY
Qty: 30 TABLET | Refills: 3 | DISCHARGE
Start: 2018-11-20 | End: 2018-12-12 | Stop reason: ALTCHOICE

## 2018-11-20 RX ORDER — POTASSIUM CHLORIDE 20 MEQ/1
20 TABLET, EXTENDED RELEASE ORAL DAILY
Qty: 30 TABLET | Refills: 1 | DISCHARGE
Start: 2018-11-20 | End: 2019-03-11 | Stop reason: SDUPTHER

## 2018-11-20 RX ORDER — ACETAMINOPHEN 325 MG/1
325 TABLET ORAL EVERY 4 HOURS PRN
Qty: 120 TABLET | Refills: 3 | DISCHARGE
Start: 2018-11-20

## 2018-11-20 RX ORDER — AMIODARONE HYDROCHLORIDE 200 MG/1
200 TABLET ORAL DAILY
Qty: 30 TABLET | Refills: 3 | DISCHARGE
Start: 2018-11-20 | End: 2018-12-12 | Stop reason: ALTCHOICE

## 2018-11-20 RX ORDER — M-VIT,TX,IRON,MINS/CALC/FOLIC 27MG-0.4MG
1 TABLET ORAL
Qty: 30 TABLET | Refills: 1 | Status: ON HOLD | DISCHARGE
Start: 2018-11-20 | End: 2021-02-15

## 2018-11-20 RX ORDER — CARVEDILOL 3.12 MG/1
3.12 TABLET ORAL 2 TIMES DAILY WITH MEALS
Qty: 60 TABLET | Refills: 3 | Status: ON HOLD | DISCHARGE
Start: 2018-11-20 | End: 2019-02-28

## 2018-11-20 RX ORDER — ATORVASTATIN CALCIUM 20 MG/1
20 TABLET, FILM COATED ORAL NIGHTLY
Qty: 30 TABLET | Refills: 3 | DISCHARGE
Start: 2018-11-20 | End: 2019-01-14 | Stop reason: SINTOL

## 2018-11-20 RX ADMIN — FUROSEMIDE 20 MG: 10 INJECTION, SOLUTION INTRAMUSCULAR; INTRAVENOUS at 09:10

## 2018-11-20 RX ADMIN — AMIODARONE HYDROCHLORIDE 200 MG: 200 TABLET ORAL at 09:12

## 2018-11-20 RX ADMIN — POTASSIUM CHLORIDE 20 MEQ: 20 TABLET, EXTENDED RELEASE ORAL at 09:12

## 2018-11-20 RX ADMIN — CARVEDILOL 3.12 MG: 3.12 TABLET, FILM COATED ORAL at 09:12

## 2018-11-20 RX ADMIN — DRONABINOL 5 MG: 2.5 CAPSULE ORAL at 09:11

## 2018-11-20 RX ADMIN — MULTIPLE VITAMINS W/ MINERALS TAB 1 TABLET: TAB at 09:12

## 2018-11-20 RX ADMIN — ASPIRIN 325 MG: 325 TABLET, DELAYED RELEASE ORAL at 09:12

## 2018-11-20 RX ADMIN — DOCUSATE SODIUM 100 MG: 100 CAPSULE, LIQUID FILLED ORAL at 09:12

## 2018-11-20 RX ADMIN — FAMOTIDINE 20 MG: 20 TABLET ORAL at 09:12

## 2018-11-20 RX ADMIN — ENOXAPARIN SODIUM 40 MG: 40 INJECTION SUBCUTANEOUS at 09:12

## 2018-11-20 RX ADMIN — Medication 10 ML: at 09:11

## 2018-11-20 ASSESSMENT — PAIN SCALES - GENERAL
PAINLEVEL_OUTOF10: 0
PAINLEVEL_OUTOF10: 0

## 2018-11-20 NOTE — TELEPHONE ENCOUNTER
[facility] called to make a hosp f/u for Karen Flores with Dr Nataliia Flores, last seen was in 2012. GS=12%, dx=one vessel CABG, and A fib with RVR, she is being DC 11-20 to [facility]. Is it okay to schedule her, if so where?

## 2018-11-20 NOTE — DISCHARGE INSTR - COC
Continuity of Care Form    Patient Name: Jazz Almazan   :  1940  MRN:  [id number]    Admit date:  2018  Discharge date:  2018    Code Status Order: Prior   Advance Directives:   [facility] Documentation     Date/Time Healthcare Directive Type of Healthcare Directive Copy in [address] Agent's Name Healthcare Agent's Phone Number    18 1734  Unknown, patient unable to respond due to medical condition  Durable power of  for health care  No, copy requested from family  --   Daughter Frances Castro  Daughter Frances Castro  [phone]          Admitting Physician:  Nat Garsia MD  PCP: Dionne Borjas MD    Discharging Nurse: [facility] Unit/Room#: 2F-23/795-Y  Discharging Unit Phone Number: [phone]    Emergency Contact:   Extended Emergency Contact Information  Primary Emergency Contact: [address] [place] [address] Phone: [phone]  Relation: Child  Secondary Emergency Contact: Cristino Almazan  Address: [address] [place] [address], [address] [place] [id number] Phone: [phone]  Relation: Spouse    Past Surgical History:  Past Surgical History:   Procedure Laterality Date    HYSTERECTOMY         Immunization History: There is no immunization history on file for this patient.     Active Problems:  Patient Active Problem List   Diagnosis Code    Ganglion cyst M67.40    Atrial fibrillation with RVR (HCC) I48.91    Severe malnutrition (HCC) E43       Isolation/Infection:   Isolation          No Isolation            Nurse Assessment:  Last Vital Signs: /69   Pulse 90   Temp 98.1 °F (36.7 °C) (Oral)   Resp 16   Ht 5' 6\" (1.676 m)   Wt 107 lb 14.4 oz (48.9 kg)   SpO2 96%   BMI 17.42 kg/m²     Last documented pain score (0-10 scale): Pain Level: 0  Last Weight:   Wt Readings from Last 1 Encounters:   18 107 lb 14.4 oz (48.9 kg)     Mental Status:

## 2018-11-20 NOTE — PROGRESS NOTES
CLINICAL PHARMACY: DISCHARGE MED RECONCILIATION/REVIEW    [facility] ([facility]) Select Patient?: No  Total # of Interventions Recommended: 1 -   - Increased Dose #: 1   -   Total # Interventions Accepted: 0  Intervention Severity:   - Level 1 Intervention Present?: No   - Level 2 #: 0   - Level 3 #: 0   Time Spent (min): 15    Additional Documentation:

## 2018-11-20 NOTE — PROGRESS NOTES
[facility] [address]  INPATIENT OCCUPATIONAL THERAPY  [facility] ICU STEPDOWN TELEMETRY 4K  DAILY NOTE    Time:  Time In: 745  Time Out:   Timed Code Treatment Minutes: 23 Minutes  Minutes: 23        Date: 2018  Patient Name: Priya Hubbard,   Gender: female      Room: [facility]18/018-A  MRN: [id number]  : 1940  (70 y.o.)  Referring Practitioner: Ras Garsia MD  Diagnosis: atrial fibrillation  Additional Pertinent Hx:  Sheila Almazna is a 70 y.o. female who presents to the Emergency Department for the evaluation of intermittent shortness of breath onset around 1 week ago. She states the pain is an intermittent pain to the right chest that occurs with coughing. The patient does not take any medication, does not see a cardiologist and has not seen her PCP is 30 years. Cardiac cath on  showed MVCAD and CABG recommended. History reviewed. No pertinent past medical history. Past Surgical History:   Procedure Laterality Date    HYSTERECTOMY         Restrictions/Precautions:  General Precautions, Fall Risk, Cardiac, Surgical Protocols      Sternal Precautions: No Pushing, No Pulling, 10# Lifting Restrictions       Prior Level of Function:  ADL Assistance: Independent  Homemaking Assistance: Independent  Ambulation Assistance: Independent  Transfer Assistance: Independent  Additional Comments: Pt reported has been taking care of  recently (would assist him with all transfers, mobility, and most ADL tasks). Pt does not wear O2 at home. Subjective   Subjective: Pt seated in chair and agreeable to OT treatment    Pain:  Pain Assessment  Patient Currently in Pain: Denies     Objective  Overall Cognitive Status: WNL    ADL  Grooming: Contact guard assistance (standing at sink to wash hands)  Toileting: Minimal assistance (for thoroughness of hygiene care.  Pt required min encouragement to attempt hygiene care which she tolerated well)     Transfers  Sit to stand: Minimal assistance (from

## 2018-11-20 NOTE — PROGRESS NOTES
Admit Date: 11/4/2018  Hospital day a few    Subjective:     Patient WEAK . Medication side effects: none    Scheduled Meds:   furosemide  20 mg Intravenous BID    famotidine  20 mg Oral Daily    amiodarone  200 mg Oral Daily    carvedilol  3.125 mg Oral BID WC    dronabinol  5 mg Oral BID    potassium (CARDIAC) replacement protocol   Other RX Placeholder    potassium chloride  20 mEq Oral BID WC    sodium chloride flush  10 mL Intravenous 2 times per day    docusate sodium  100 mg Oral BID    therapeutic multivitamin-minerals  1 tablet Oral Daily with breakfast    atorvastatin  20 mg Oral Nightly    enoxaparin  40 mg Subcutaneous Daily    aspirin  325 mg Oral Daily     Continuous Infusions:  PRN Meds:sodium chloride flush, acetaminophen, oxyCODONE **OR** oxyCODONE, magnesium hydroxide, bisacodyl, senna, ondansetron, albuterol sulfate HFA    Review of Systems  Pertinent items are noted in HPI. Objective:     Patient Vitals for the past 8 hrs:   BP Temp Temp src Pulse Resp SpO2   11/20/18 1154 110/69 98.1 °F (36.7 °C) Oral 90 16 96 %   11/20/18 0902 109/63 98.3 °F (36.8 °C) Oral 95 17 97 %     I/O last 3 completed shifts:   In: 375 [P.O.:365; I.V.:10]  Out: 475 [Urine:475]    NO CHANGE     ECG: SR.   Data Review:   CBC:  Lab Results   Component Value Date    WBC 11.2 11/20/2018    RBC 2.98 11/20/2018    HGB 9.0 11/20/2018    HCT 28.5 11/20/2018    MCV 95.6 11/20/2018    MCH 30.2 11/20/2018    MCHC 31.6 11/20/2018     11/20/2018    MPV 10.4 11/20/2018     BMP  Lab Results   Component Value Date     11/20/2018    K 5.1 11/20/2018    K 3.7 11/07/2018    CL 99 11/20/2018    CO2 26 11/20/2018    BUN 21 11/20/2018    CREATININE 0.9 11/20/2018    CALCIUM 8.8 11/20/2018      COAG PROFILE:  Lab Results   Component Value Date    APTT 27.6 11/12/2018       Assessment:     Active Problems:    Atrial fibrillation with RVR (HCC)    Severe malnutrition (HCC)  Resolved Problems:    * No resolved

## 2018-12-11 ENCOUNTER — TELEPHONE (OUTPATIENT)
Dept: CARDIOTHORACIC SURGERY | Age: 78
End: 2018-12-11

## 2018-12-12 ENCOUNTER — OFFICE VISIT (OUTPATIENT)
Dept: CARDIOTHORACIC SURGERY | Age: 78
End: 2018-12-12

## 2018-12-12 ENCOUNTER — TELEPHONE (OUTPATIENT)
Dept: CARDIOTHORACIC SURGERY | Age: 78
End: 2018-12-12

## 2018-12-12 ENCOUNTER — HOSPITAL ENCOUNTER (OUTPATIENT)
Dept: GENERAL RADIOLOGY | Age: 78
Discharge: HOME | End: 2018-12-12
Payer: COMMERCIAL

## 2018-12-12 ENCOUNTER — HOSPITAL ENCOUNTER (OUTPATIENT)
Age: 78
Discharge: HOME | End: 2018-12-12
Payer: COMMERCIAL

## 2018-12-12 VITALS
BODY MASS INDEX: 20.41 KG/M2 | HEART RATE: 121 BPM | WEIGHT: 122.5 LBS | HEIGHT: 65 IN | SYSTOLIC BLOOD PRESSURE: 118 MMHG | DIASTOLIC BLOOD PRESSURE: 80 MMHG

## 2018-12-12 DIAGNOSIS — J90 PLEURAL EFFUSION: Primary | ICD-10-CM

## 2018-12-12 DIAGNOSIS — I48.91 ATRIAL FIBRILLATION WITH RVR (HCC): ICD-10-CM

## 2018-12-12 DIAGNOSIS — Z95.1 S/P CABG X 1: ICD-10-CM

## 2018-12-12 DIAGNOSIS — Z95.1 S/P CABG X 1: Primary | ICD-10-CM

## 2018-12-12 LAB
ANION GAP SERPL CALCULATED.3IONS-SCNC: 14 MEQ/L (ref 8–16)
BASOPHILS # BLD: 0.5 %
BASOPHILS ABSOLUTE: 0 THOU/MM3 (ref 0–0.1)
BUN BLDV-MCNC: 18 MG/DL (ref 7–22)
CALCIUM SERPL-MCNC: 8.5 MG/DL (ref 8.5–10.5)
CHLORIDE BLD-SCNC: 100 MEQ/L (ref 98–111)
CO2: 23 MEQ/L (ref 23–33)
CREAT SERPL-MCNC: 1 MG/DL (ref 0.4–1.2)
EKG ATRIAL RATE: 91 BPM
EKG Q-T INTERVAL: 336 MS
EKG QRS DURATION: 80 MS
EKG QTC CALCULATION (BAZETT): 458 MS
EKG R AXIS: 57 DEGREES
EKG T AXIS: 41 DEGREES
EKG VENTRICULAR RATE: 112 BPM
EOSINOPHIL # BLD: 6.1 %
EOSINOPHILS ABSOLUTE: 0.5 THOU/MM3 (ref 0–0.4)
ERYTHROCYTE [DISTWIDTH] IN BLOOD BY AUTOMATED COUNT: 14.3 % (ref 11.5–14.5)
ERYTHROCYTE [DISTWIDTH] IN BLOOD BY AUTOMATED COUNT: 49.7 FL (ref 35–45)
GFR SERPL CREATININE-BSD FRML MDRD: 54 ML/MIN/1.73M2
GLUCOSE BLD-MCNC: 156 MG/DL (ref 70–108)
HCT VFR BLD CALC: 27.5 % (ref 37–47)
HEMOGLOBIN: 8.5 GM/DL (ref 12–16)
IMMATURE GRANS (ABS): 0.05 THOU/MM3 (ref 0–0.07)
IMMATURE GRANULOCYTES: 0.6 %
LYMPHOCYTES # BLD: 7 %
LYMPHOCYTES ABSOLUTE: 0.6 THOU/MM3 (ref 1–4.8)
MCH RBC QN AUTO: 29.9 PG (ref 26–33)
MCHC RBC AUTO-ENTMCNC: 30.9 GM/DL (ref 32.2–35.5)
MCV RBC AUTO: 96.8 FL (ref 81–99)
MONOCYTES # BLD: 10 %
MONOCYTES ABSOLUTE: 0.9 THOU/MM3 (ref 0.4–1.3)
NUCLEATED RED BLOOD CELLS: 0 /100 WBC
PLATELET # BLD: 385 THOU/MM3 (ref 130–400)
PMV BLD AUTO: 10.5 FL (ref 9.4–12.4)
POTASSIUM SERPL-SCNC: 4.2 MEQ/L (ref 3.5–5.2)
RBC # BLD: 2.84 MILL/MM3 (ref 4.2–5.4)
SEG NEUTROPHILS: 75.8 %
SEGMENTED NEUTROPHILS ABSOLUTE COUNT: 6.7 THOU/MM3 (ref 1.8–7.7)
SODIUM BLD-SCNC: 137 MEQ/L (ref 135–145)
WBC # BLD: 8.9 THOU/MM3 (ref 4.8–10.8)

## 2018-12-12 PROCEDURE — 99024 POSTOP FOLLOW-UP VISIT: CPT | Performed by: PHYSICIAN ASSISTANT

## 2018-12-12 PROCEDURE — 71046 X-RAY EXAM CHEST 2 VIEWS: CPT

## 2018-12-12 PROCEDURE — 36415 COLL VENOUS BLD VENIPUNCTURE: CPT

## 2018-12-12 PROCEDURE — 80048 BASIC METABOLIC PNL TOTAL CA: CPT

## 2018-12-12 PROCEDURE — 93005 ELECTROCARDIOGRAM TRACING: CPT

## 2018-12-12 PROCEDURE — 85025 COMPLETE CBC W/AUTO DIFF WBC: CPT

## 2018-12-12 PROCEDURE — APPSS30 APP SPLIT SHARED TIME 16-30 MINUTES: Performed by: PHYSICIAN ASSISTANT

## 2018-12-12 RX ORDER — RANITIDINE 150 MG/1
150 TABLET ORAL DAILY
COMMUNITY
End: 2019-02-18

## 2018-12-12 RX ORDER — TRAMADOL HYDROCHLORIDE 50 MG/1
50 TABLET ORAL EVERY 6 HOURS PRN
COMMUNITY
End: 2019-02-18 | Stop reason: ALTCHOICE

## 2018-12-12 RX ORDER — POTASSIUM CHLORIDE 1.5 G/1.77G
20 POWDER, FOR SOLUTION ORAL DAILY
COMMUNITY
End: 2018-12-12 | Stop reason: ALTCHOICE

## 2018-12-12 RX ORDER — DOCUSATE SODIUM 100 MG/1
100 CAPSULE, LIQUID FILLED ORAL 2 TIMES DAILY
Status: ON HOLD | COMMUNITY
End: 2019-02-28

## 2018-12-12 RX ORDER — FERROUS SULFATE 325(65) MG
325 TABLET ORAL 2 TIMES DAILY
Status: ON HOLD | COMMUNITY
End: 2019-02-28

## 2018-12-12 RX ORDER — ROSUVASTATIN CALCIUM 5 MG/1
5 TABLET, COATED ORAL DAILY
COMMUNITY
End: 2019-02-18

## 2018-12-12 RX ORDER — POTASSIUM CHLORIDE 20 MEQ/1
20 TABLET, EXTENDED RELEASE ORAL DAILY
Qty: 30 TABLET | Refills: 0 | Status: SHIPPED | OUTPATIENT
Start: 2018-12-12 | End: 2019-02-18 | Stop reason: SDUPTHER

## 2018-12-12 RX ORDER — BISACODYL 10 MG
10 SUPPOSITORY, RECTAL RECTAL DAILY
Status: ON HOLD | COMMUNITY
End: 2019-02-28

## 2018-12-12 RX ORDER — FUROSEMIDE 20 MG/1
20 TABLET ORAL DAILY
Qty: 30 TABLET | Refills: 0 | Status: SHIPPED | OUTPATIENT
Start: 2018-12-12 | End: 2019-01-04 | Stop reason: ALTCHOICE

## 2018-12-12 RX ORDER — SENNOSIDES 8.8 MG/5ML
5 LIQUID ORAL NIGHTLY
Status: ON HOLD | COMMUNITY
End: 2019-02-28

## 2018-12-12 RX ORDER — DRONABINOL 5 MG/1
5 CAPSULE ORAL
COMMUNITY
End: 2019-02-06 | Stop reason: SDUPTHER

## 2018-12-12 NOTE — PROGRESS NOTES
nightly, Disp: , Rfl:     traMADol (ULTRAM) 50 MG tablet, Take 50 mg by mouth every 6 hours as needed for Pain. ., Disp: , Rfl:     acetaminophen (TYLENOL) 325 MG tablet, Take 1 tablet by mouth every 4 hours as needed for Pain, Disp: 120 tablet, Rfl: 3    aspirin 325 MG EC tablet, Take 1 tablet by mouth daily, Disp: 30 tablet, Rfl: 3    amiodarone (CORDARONE) 200 MG tablet, Take 1 tablet by mouth daily, Disp: 30 tablet, Rfl: 3    atorvastatin (LIPITOR) 20 MG tablet, Take 1 tablet by mouth nightly, Disp: 30 tablet, Rfl: 3    carvedilol (COREG) 3.125 MG tablet, Take 1 tablet by mouth 2 times daily (with meals), Disp: 60 tablet, Rfl: 3    Multiple Vitamins-Minerals (THERAPEUTIC MULTIVITAMIN-MINERALS) tablet, Take 1 tablet by mouth daily (with breakfast), Disp: 30 tablet, Rfl: 1    furosemide (LASIX) 20 MG tablet, Take 1 tablet by mouth daily, Disp: 30 tablet, Rfl: 3    potassium chloride (KLOR-CON M) 20 MEQ extended release tablet, Take 1 tablet by mouth daily, Disp: 30 tablet, Rfl: 1    Social History:  Paul Blankenship  reports that she has never smoked. She has never used smokeless tobacco. She reports that she drinks alcohol. She reports that she does not use drugs. ROS:   Constitutional: Positive for poor appetite and some fatigue. Negative for chills,fever and unexpected weight change. Respiratory: Negative for apnea, shortness of breath, wheezing and stridor. Cardiovascular: Negative for chest pain, palpitations and leg swelling. Gastrointestinal: Negative for hematochezia, melana, constipation, and N/V/D. Musculoskeletal: Negative for myalgias  Skin: Negative for color change, rash and wound. Neurological: Negative for dizziness or syncope. Physical Exam:  General appearance:  No acute distress, appears stated age and cooperative. Neck: No jugular venous distention. Trachea midline. No carotid bruits. Respiratory:  Normal respiratory effort.  Clear to auscultation, bilaterally without

## 2018-12-13 ENCOUNTER — TELEPHONE (OUTPATIENT)
Dept: CARDIOTHORACIC SURGERY | Age: 78
End: 2018-12-13

## 2018-12-13 PROCEDURE — 93010 ELECTROCARDIOGRAM REPORT: CPT | Performed by: INTERNAL MEDICINE

## 2018-12-13 NOTE — TELEPHONE ENCOUNTER
[facility] pt sister called Ct/Cv office stating our office staff needs to call the nursing home staff and inform them pt needs to stop ASA as of today 12/13//18 for procedure on 12/18/18. I called nursing home and spoke with [facility]. Informed RN pt is to stop taking ASA as of today. Sigrid Calle states pt was given 325mg ASA this morning at 0700. Notified Geni. He stated that was fine. Sigrid Calle RN voiced understanding no questions at this time.

## 2018-12-18 ENCOUNTER — HOSPITAL ENCOUNTER (OUTPATIENT)
Dept: GENERAL RADIOLOGY | Age: 78
Discharge: HOME | End: 2018-12-18
Payer: MEDICARE

## 2018-12-18 ENCOUNTER — HOSPITAL ENCOUNTER (OUTPATIENT)
Dept: ULTRASOUND IMAGING | Age: 78
Discharge: HOME | End: 2018-12-18
Payer: MEDICARE

## 2018-12-18 DIAGNOSIS — J90 PLEURAL EFFUSION: ICD-10-CM

## 2018-12-18 PROCEDURE — 71045 X-RAY EXAM CHEST 1 VIEW: CPT

## 2018-12-18 PROCEDURE — 32555 ASPIRATE PLEURA W/ IMAGING: CPT

## 2018-12-18 NOTE — PROGRESS NOTES
Formulation and discussion of sedation / procedure plans, risks, benefits, side effects and alternatives with patient and/or responsible adult completed.     Electronically signed by Monse Christian MD on 12/18/2018 at 1:00 PM

## 2018-12-19 ENCOUNTER — HOSPITAL ENCOUNTER (OUTPATIENT)
Age: 78
Discharge: HOME | End: 2018-12-19
Payer: COMMERCIAL

## 2018-12-19 ENCOUNTER — HOSPITAL ENCOUNTER (OUTPATIENT)
Dept: GENERAL RADIOLOGY | Age: 78
Discharge: HOME | End: 2018-12-19
Payer: COMMERCIAL

## 2018-12-19 DIAGNOSIS — J95.811 POSTPROCEDURAL PNEUMOTHORAX: ICD-10-CM

## 2018-12-19 PROCEDURE — 71045 X-RAY EXAM CHEST 1 VIEW: CPT

## 2018-12-21 ENCOUNTER — HOSPITAL ENCOUNTER (OUTPATIENT)
Dept: GENERAL RADIOLOGY | Age: 78
Discharge: HOME | End: 2018-12-21
Payer: COMMERCIAL

## 2018-12-21 ENCOUNTER — HOSPITAL ENCOUNTER (OUTPATIENT)
Age: 78
Discharge: HOME | End: 2018-12-21
Payer: COMMERCIAL

## 2018-12-21 DIAGNOSIS — J95.811 POSTPROCEDURAL PNEUMOTHORAX: ICD-10-CM

## 2018-12-21 PROCEDURE — 71045 X-RAY EXAM CHEST 1 VIEW: CPT

## 2018-12-26 ENCOUNTER — HOSPITAL ENCOUNTER (OUTPATIENT)
Age: 78
Discharge: HOME | End: 2018-12-26
Payer: COMMERCIAL

## 2018-12-26 ENCOUNTER — HOSPITAL ENCOUNTER (OUTPATIENT)
Dept: GENERAL RADIOLOGY | Age: 78
Discharge: HOME | End: 2018-12-26
Payer: COMMERCIAL

## 2018-12-26 ENCOUNTER — TELEPHONE (OUTPATIENT)
Dept: CARDIOTHORACIC SURGERY | Age: 78
End: 2018-12-26

## 2018-12-26 DIAGNOSIS — J95.811 PNEUMOTHORAX AFTER BIOPSY: ICD-10-CM

## 2018-12-26 PROCEDURE — 71045 X-RAY EXAM CHEST 1 VIEW: CPT

## 2019-01-02 ENCOUNTER — TELEPHONE (OUTPATIENT)
Dept: CARDIOTHORACIC SURGERY | Age: 79
End: 2019-01-02

## 2019-01-02 ENCOUNTER — OFFICE VISIT (OUTPATIENT)
Dept: CARDIOTHORACIC SURGERY | Age: 79
End: 2019-01-02

## 2019-01-02 VITALS
BODY MASS INDEX: 19.89 KG/M2 | HEART RATE: 123 BPM | WEIGHT: 119.4 LBS | SYSTOLIC BLOOD PRESSURE: 132 MMHG | HEIGHT: 65 IN | DIASTOLIC BLOOD PRESSURE: 88 MMHG

## 2019-01-02 DIAGNOSIS — J90 PLEURAL EFFUSION: Primary | ICD-10-CM

## 2019-01-02 PROCEDURE — APPSS30 APP SPLIT SHARED TIME 16-30 MINUTES: Performed by: PHYSICIAN ASSISTANT

## 2019-01-02 PROCEDURE — 99024 POSTOP FOLLOW-UP VISIT: CPT | Performed by: PHYSICIAN ASSISTANT

## 2019-01-03 ENCOUNTER — TELEPHONE (OUTPATIENT)
Dept: CARDIOTHORACIC SURGERY | Age: 79
End: 2019-01-03

## 2019-01-04 ENCOUNTER — TELEPHONE (OUTPATIENT)
Dept: CARDIOTHORACIC SURGERY | Age: 79
End: 2019-01-04

## 2019-01-04 DIAGNOSIS — Z98.890 S/P MITRAL VALVE REPAIR: Primary | ICD-10-CM

## 2019-01-04 RX ORDER — FUROSEMIDE 20 MG/1
20 TABLET ORAL 2 TIMES DAILY
Qty: 60 TABLET | Refills: 3 | Status: SHIPPED | OUTPATIENT
Start: 2019-01-04 | End: 2019-01-25 | Stop reason: SDUPTHER

## 2019-01-07 ENCOUNTER — TELEPHONE (OUTPATIENT)
Dept: CARDIOTHORACIC SURGERY | Age: 79
End: 2019-01-07

## 2019-01-08 ENCOUNTER — TELEPHONE (OUTPATIENT)
Dept: CARDIOTHORACIC SURGERY | Age: 79
End: 2019-01-08

## 2019-01-14 ENCOUNTER — HOSPITAL ENCOUNTER (OUTPATIENT)
Dept: NON INVASIVE DIAGNOSTICS | Age: 79
Discharge: HOME | End: 2019-01-14
Payer: MEDICARE

## 2019-01-14 ENCOUNTER — HOSPITAL ENCOUNTER (OUTPATIENT)
Dept: GENERAL RADIOLOGY | Age: 79
Discharge: HOME | End: 2019-01-14
Payer: MEDICARE

## 2019-01-14 ENCOUNTER — OFFICE VISIT (OUTPATIENT)
Dept: FAMILY MEDICINE CLINIC | Age: 79
End: 2019-01-14
Payer: MEDICARE

## 2019-01-14 ENCOUNTER — HOSPITAL ENCOUNTER (OUTPATIENT)
Age: 79
Discharge: HOME | End: 2019-01-14
Payer: MEDICARE

## 2019-01-14 VITALS
RESPIRATION RATE: 14 BRPM | WEIGHT: 112.8 LBS | OXYGEN SATURATION: 95 % | SYSTOLIC BLOOD PRESSURE: 124 MMHG | DIASTOLIC BLOOD PRESSURE: 70 MMHG | HEART RATE: 72 BPM | TEMPERATURE: 97.8 F | BODY MASS INDEX: 18.77 KG/M2

## 2019-01-14 DIAGNOSIS — Z98.890 S/P MITRAL VALVE REPAIR: ICD-10-CM

## 2019-01-14 DIAGNOSIS — F43.21 SITUATIONAL DEPRESSION: ICD-10-CM

## 2019-01-14 DIAGNOSIS — I48.91 ATRIAL FIBRILLATION WITH RVR (HCC): ICD-10-CM

## 2019-01-14 DIAGNOSIS — E78.2 MIXED HYPERLIPIDEMIA: ICD-10-CM

## 2019-01-14 DIAGNOSIS — Z95.3 S/P MITRAL VALVE REPLACEMENT WITH BIOPROSTHETIC VALVE: ICD-10-CM

## 2019-01-14 DIAGNOSIS — Z95.1 S/P CORONARY ARTERY BYPASS GRAFT X 1: Primary | ICD-10-CM

## 2019-01-14 DIAGNOSIS — I25.810 CORONARY ARTERY DISEASE INVOLVING CORONARY BYPASS GRAFT OF NATIVE HEART WITHOUT ANGINA PECTORIS: ICD-10-CM

## 2019-01-14 DIAGNOSIS — Z98.890 STATUS POST TRICUSPID VALVE REPAIR: ICD-10-CM

## 2019-01-14 PROBLEM — Z09 FOLLOW-UP VISIT FOR MITRAL VALVE REPLACEMENT WITH BIOPROSTHETIC VALVE: Status: ACTIVE | Noted: 2019-01-14

## 2019-01-14 LAB
LV EF: 43 %
LVEF MODALITY: NORMAL

## 2019-01-14 PROCEDURE — G8484 FLU IMMUNIZE NO ADMIN: HCPCS | Performed by: FAMILY MEDICINE

## 2019-01-14 PROCEDURE — 1123F ACP DISCUSS/DSCN MKR DOCD: CPT | Performed by: FAMILY MEDICINE

## 2019-01-14 PROCEDURE — 1090F PRES/ABSN URINE INCON ASSESS: CPT | Performed by: FAMILY MEDICINE

## 2019-01-14 PROCEDURE — 99214 OFFICE O/P EST MOD 30 MIN: CPT | Performed by: FAMILY MEDICINE

## 2019-01-14 PROCEDURE — G8427 DOCREV CUR MEDS BY ELIG CLIN: HCPCS | Performed by: FAMILY MEDICINE

## 2019-01-14 PROCEDURE — G8400 PT W/DXA NO RESULTS DOC: HCPCS | Performed by: FAMILY MEDICINE

## 2019-01-14 PROCEDURE — G8598 ASA/ANTIPLAT THER USED: HCPCS | Performed by: FAMILY MEDICINE

## 2019-01-14 PROCEDURE — G8420 CALC BMI NORM PARAMETERS: HCPCS | Performed by: FAMILY MEDICINE

## 2019-01-14 PROCEDURE — 4040F PNEUMOC VAC/ADMIN/RCVD: CPT | Performed by: FAMILY MEDICINE

## 2019-01-14 PROCEDURE — 71046 X-RAY EXAM CHEST 2 VIEWS: CPT

## 2019-01-14 PROCEDURE — 93306 TTE W/DOPPLER COMPLETE: CPT

## 2019-01-14 PROCEDURE — 1036F TOBACCO NON-USER: CPT | Performed by: FAMILY MEDICINE

## 2019-01-14 PROCEDURE — 1101F PT FALLS ASSESS-DOCD LE1/YR: CPT | Performed by: FAMILY MEDICINE

## 2019-01-14 PROCEDURE — G0444 DEPRESSION SCREEN ANNUAL: HCPCS | Performed by: FAMILY MEDICINE

## 2019-01-14 RX ORDER — APIXABAN 2.5 MG/1
TABLET, FILM COATED ORAL
Refills: 0 | Status: ON HOLD | COMMUNITY
Start: 2019-01-07 | End: 2019-04-12 | Stop reason: HOSPADM

## 2019-01-14 RX ORDER — ATENOLOL 25 MG/1
TABLET ORAL
Refills: 0 | Status: ON HOLD | COMMUNITY
Start: 2019-01-07 | End: 2019-02-28

## 2019-01-14 RX ORDER — AMIODARONE HYDROCHLORIDE 200 MG/1
TABLET ORAL
Refills: 0 | Status: ON HOLD | COMMUNITY
Start: 2019-01-07 | End: 2019-02-28

## 2019-01-14 RX ORDER — FUROSEMIDE 40 MG/1
TABLET ORAL
Refills: 0 | COMMUNITY
Start: 2019-01-07 | End: 2019-02-18 | Stop reason: DRUGHIGH

## 2019-01-14 ASSESSMENT — PATIENT HEALTH QUESTIONNAIRE - PHQ9
3. TROUBLE FALLING OR STAYING ASLEEP: 3
10. IF YOU CHECKED OFF ANY PROBLEMS, HOW DIFFICULT HAVE THESE PROBLEMS MADE IT FOR YOU TO DO YOUR WORK, TAKE CARE OF THINGS AT HOME, OR GET ALONG WITH OTHER PEOPLE: 3
8. MOVING OR SPEAKING SO SLOWLY THAT OTHER PEOPLE COULD HAVE NOTICED. OR THE OPPOSITE, BEING SO FIGETY OR RESTLESS THAT YOU HAVE BEEN MOVING AROUND A LOT MORE THAN USUAL: 3
4. FEELING TIRED OR HAVING LITTLE ENERGY: 3
DEPRESSION UNABLE TO ASSESS: FUNCTIONAL CAPACITY MOTIVATION LIMITS ACCURACY
2. FEELING DOWN, DEPRESSED OR HOPELESS: 3
5. POOR APPETITE OR OVEREATING: 3
SUM OF ALL RESPONSES TO PHQ9 QUESTIONS 1 & 2: 6
SUM OF ALL RESPONSES TO PHQ QUESTIONS 1-9: 25
7. TROUBLE CONCENTRATING ON THINGS, SUCH AS READING THE NEWSPAPER OR WATCHING TELEVISION: 2
6. FEELING BAD ABOUT YOURSELF - OR THAT YOU ARE A FAILURE OR HAVE LET YOURSELF OR YOUR FAMILY DOWN: 2
1. LITTLE INTEREST OR PLEASURE IN DOING THINGS: 3
SUM OF ALL RESPONSES TO PHQ QUESTIONS 1-9: 25
9. THOUGHTS THAT YOU WOULD BE BETTER OFF DEAD, OR OF HURTING YOURSELF: 3

## 2019-01-14 ASSESSMENT — ENCOUNTER SYMPTOMS
GASTROINTESTINAL NEGATIVE: 1
RESPIRATORY NEGATIVE: 1
SHORTNESS OF BREATH: 0
ALLERGIC/IMMUNOLOGIC NEGATIVE: 1
COUGH: 0
BACK PAIN: 1

## 2019-01-16 ENCOUNTER — TELEPHONE (OUTPATIENT)
Dept: CARDIOTHORACIC SURGERY | Age: 79
End: 2019-01-16

## 2019-01-16 ENCOUNTER — OFFICE VISIT (OUTPATIENT)
Dept: CARDIOTHORACIC SURGERY | Age: 79
End: 2019-01-16

## 2019-01-16 VITALS
DIASTOLIC BLOOD PRESSURE: 85 MMHG | HEIGHT: 65 IN | HEART RATE: 116 BPM | WEIGHT: 112.6 LBS | BODY MASS INDEX: 18.76 KG/M2 | SYSTOLIC BLOOD PRESSURE: 120 MMHG

## 2019-01-16 DIAGNOSIS — Z86.79 S/P MAZE OPERATION FOR ATRIAL FIBRILLATION: ICD-10-CM

## 2019-01-16 DIAGNOSIS — Z98.890 S/P MVR (MITRAL VALVE REPAIR): Primary | ICD-10-CM

## 2019-01-16 DIAGNOSIS — Z98.890 S/P MAZE OPERATION FOR ATRIAL FIBRILLATION: ICD-10-CM

## 2019-01-16 DIAGNOSIS — J90 PLEURAL EFFUSION: ICD-10-CM

## 2019-01-16 DIAGNOSIS — Z98.890 S/P TVR (TRICUSPID VALVE REPAIR): ICD-10-CM

## 2019-01-16 DIAGNOSIS — I48.92 ATRIAL FLUTTER, UNSPECIFIED TYPE (HCC): ICD-10-CM

## 2019-01-16 DIAGNOSIS — J90 PLEURAL EFFUSION: Primary | ICD-10-CM

## 2019-01-16 DIAGNOSIS — Z95.1 S/P CABG (CORONARY ARTERY BYPASS GRAFT): ICD-10-CM

## 2019-01-16 PROCEDURE — 99024 POSTOP FOLLOW-UP VISIT: CPT | Performed by: THORACIC SURGERY (CARDIOTHORACIC VASCULAR SURGERY)

## 2019-01-22 ENCOUNTER — HOSPITAL ENCOUNTER (OUTPATIENT)
Dept: GENERAL RADIOLOGY | Age: 79
Discharge: HOME | End: 2019-01-22
Payer: MEDICARE

## 2019-01-22 ENCOUNTER — HOSPITAL ENCOUNTER (OUTPATIENT)
Dept: ULTRASOUND IMAGING | Age: 79
Discharge: HOME | End: 2019-01-22
Payer: MEDICARE

## 2019-01-22 DIAGNOSIS — J90 PLEURAL EFFUSION: ICD-10-CM

## 2019-01-22 PROCEDURE — 32555 ASPIRATE PLEURA W/ IMAGING: CPT

## 2019-01-22 PROCEDURE — 71045 X-RAY EXAM CHEST 1 VIEW: CPT

## 2019-01-23 ENCOUNTER — TELEPHONE (OUTPATIENT)
Dept: CARDIOTHORACIC SURGERY | Age: 79
End: 2019-01-23

## 2019-01-23 DIAGNOSIS — J90 PLEURAL EFFUSION: Primary | ICD-10-CM

## 2019-01-24 ENCOUNTER — TELEPHONE (OUTPATIENT)
Dept: CARDIOTHORACIC SURGERY | Age: 79
End: 2019-01-24

## 2019-01-25 DIAGNOSIS — I25.810 CORONARY ARTERY DISEASE INVOLVING CORONARY BYPASS GRAFT OF NATIVE HEART WITHOUT ANGINA PECTORIS: Primary | ICD-10-CM

## 2019-01-25 RX ORDER — FUROSEMIDE 20 MG/1
20 TABLET ORAL 2 TIMES DAILY
Qty: 30 TABLET | Refills: 1 | Status: SHIPPED | OUTPATIENT
Start: 2019-01-25 | End: 2019-03-11 | Stop reason: SDUPTHER

## 2019-01-25 RX ORDER — POTASSIUM CHLORIDE 20 MEQ/1
20 TABLET, EXTENDED RELEASE ORAL DAILY
Qty: 30 TABLET | Refills: 1 | Status: SHIPPED | OUTPATIENT
Start: 2019-01-25 | End: 2019-02-25 | Stop reason: SDUPTHER

## 2019-02-06 DIAGNOSIS — Z98.890 STATUS POST TRICUSPID VALVE REPAIR: ICD-10-CM

## 2019-02-06 DIAGNOSIS — Z95.3 S/P MITRAL VALVE REPLACEMENT WITH BIOPROSTHETIC VALVE: ICD-10-CM

## 2019-02-06 DIAGNOSIS — I25.810 CORONARY ARTERY DISEASE INVOLVING CORONARY BYPASS GRAFT OF NATIVE HEART WITHOUT ANGINA PECTORIS: ICD-10-CM

## 2019-02-06 DIAGNOSIS — E43 SEVERE MALNUTRITION (HCC): Primary | Chronic | ICD-10-CM

## 2019-02-06 RX ORDER — DRONABINOL 5 MG/1
5 CAPSULE ORAL
Qty: 60 CAPSULE | Refills: 0 | Status: SHIPPED | OUTPATIENT
Start: 2019-02-06 | End: 2019-02-18

## 2019-02-06 RX ORDER — DRONABINOL 5 MG/1
5 CAPSULE ORAL
Qty: 60 CAPSULE | Refills: 0 | Status: SHIPPED | OUTPATIENT
Start: 2019-02-06 | End: 2019-02-06 | Stop reason: SDUPTHER

## 2019-02-07 ENCOUNTER — TELEPHONE (OUTPATIENT)
Dept: FAMILY MEDICINE CLINIC | Age: 79
End: 2019-02-07

## 2019-02-07 DIAGNOSIS — Z91.81 RISK FOR FALLS: ICD-10-CM

## 2019-02-07 DIAGNOSIS — R53.81 PHYSICAL DECONDITIONING: ICD-10-CM

## 2019-02-18 ENCOUNTER — OFFICE VISIT (OUTPATIENT)
Dept: FAMILY MEDICINE CLINIC | Age: 79
End: 2019-02-18
Payer: MEDICARE

## 2019-02-18 VITALS
DIASTOLIC BLOOD PRESSURE: 76 MMHG | RESPIRATION RATE: 14 BRPM | WEIGHT: 111.3 LBS | SYSTOLIC BLOOD PRESSURE: 126 MMHG | HEART RATE: 84 BPM | BODY MASS INDEX: 18.52 KG/M2

## 2019-02-18 DIAGNOSIS — Z95.3 S/P MITRAL VALVE REPLACEMENT WITH BIOPROSTHETIC VALVE: ICD-10-CM

## 2019-02-18 DIAGNOSIS — Z98.890 STATUS POST TRICUSPID VALVE REPAIR: ICD-10-CM

## 2019-02-18 DIAGNOSIS — R53.81 PHYSICAL DECONDITIONING: ICD-10-CM

## 2019-02-18 DIAGNOSIS — E43 SEVERE MALNUTRITION (HCC): ICD-10-CM

## 2019-02-18 DIAGNOSIS — Z78.0 POST-MENOPAUSAL: ICD-10-CM

## 2019-02-18 DIAGNOSIS — F43.21 SITUATIONAL DEPRESSION: ICD-10-CM

## 2019-02-18 DIAGNOSIS — I25.810 CORONARY ARTERY DISEASE INVOLVING CORONARY BYPASS GRAFT OF NATIVE HEART WITHOUT ANGINA PECTORIS: ICD-10-CM

## 2019-02-18 DIAGNOSIS — Z91.81 RISK FOR FALLS: ICD-10-CM

## 2019-02-18 DIAGNOSIS — I48.91 ATRIAL FIBRILLATION WITH RVR (HCC): Primary | ICD-10-CM

## 2019-02-18 PROCEDURE — G8400 PT W/DXA NO RESULTS DOC: HCPCS | Performed by: FAMILY MEDICINE

## 2019-02-18 PROCEDURE — 4040F PNEUMOC VAC/ADMIN/RCVD: CPT | Performed by: FAMILY MEDICINE

## 2019-02-18 PROCEDURE — G8420 CALC BMI NORM PARAMETERS: HCPCS | Performed by: FAMILY MEDICINE

## 2019-02-18 PROCEDURE — 1036F TOBACCO NON-USER: CPT | Performed by: FAMILY MEDICINE

## 2019-02-18 PROCEDURE — 1101F PT FALLS ASSESS-DOCD LE1/YR: CPT | Performed by: FAMILY MEDICINE

## 2019-02-18 PROCEDURE — G8427 DOCREV CUR MEDS BY ELIG CLIN: HCPCS | Performed by: FAMILY MEDICINE

## 2019-02-18 PROCEDURE — 99214 OFFICE O/P EST MOD 30 MIN: CPT | Performed by: FAMILY MEDICINE

## 2019-02-18 PROCEDURE — G8484 FLU IMMUNIZE NO ADMIN: HCPCS | Performed by: FAMILY MEDICINE

## 2019-02-18 PROCEDURE — 1123F ACP DISCUSS/DSCN MKR DOCD: CPT | Performed by: FAMILY MEDICINE

## 2019-02-18 PROCEDURE — G8598 ASA/ANTIPLAT THER USED: HCPCS | Performed by: FAMILY MEDICINE

## 2019-02-18 PROCEDURE — 1090F PRES/ABSN URINE INCON ASSESS: CPT | Performed by: FAMILY MEDICINE

## 2019-02-18 ASSESSMENT — ENCOUNTER SYMPTOMS
SHORTNESS OF BREATH: 1
WHEEZING: 0
GASTROINTESTINAL NEGATIVE: 1

## 2019-02-28 PROBLEM — I25.10 CAD IN NATIVE ARTERY: Status: ACTIVE | Noted: 2019-02-28

## 2019-03-01 ENCOUNTER — TELEPHONE (OUTPATIENT)
Dept: FAMILY MEDICINE CLINIC | Age: 79
End: 2019-03-01

## 2019-03-01 PROBLEM — E43 SEVERE MALNUTRITION (HCC): Chronic | Status: ACTIVE | Noted: 2019-03-01

## 2019-03-04 ENCOUNTER — TELEPHONE (OUTPATIENT)
Dept: FAMILY MEDICINE CLINIC | Age: 79
End: 2019-03-04

## 2019-03-11 ENCOUNTER — OFFICE VISIT (OUTPATIENT)
Dept: FAMILY MEDICINE CLINIC | Age: 79
End: 2019-03-11
Payer: MEDICARE

## 2019-03-11 VITALS
BODY MASS INDEX: 18.39 KG/M2 | HEART RATE: 70 BPM | WEIGHT: 110.5 LBS | SYSTOLIC BLOOD PRESSURE: 122 MMHG | OXYGEN SATURATION: 98 % | DIASTOLIC BLOOD PRESSURE: 74 MMHG | RESPIRATION RATE: 15 BRPM

## 2019-03-11 DIAGNOSIS — Z98.890 STATUS POST TRICUSPID VALVE REPAIR: ICD-10-CM

## 2019-03-11 DIAGNOSIS — E78.2 MIXED HYPERLIPIDEMIA: ICD-10-CM

## 2019-03-11 DIAGNOSIS — I25.810 CORONARY ARTERY DISEASE INVOLVING CORONARY BYPASS GRAFT OF NATIVE HEART WITHOUT ANGINA PECTORIS: Primary | ICD-10-CM

## 2019-03-11 DIAGNOSIS — Z95.820 S/P ANGIOPLASTY WITH STENT: ICD-10-CM

## 2019-03-11 DIAGNOSIS — Z95.3 S/P MITRAL VALVE REPLACEMENT WITH BIOPROSTHETIC VALVE: ICD-10-CM

## 2019-03-11 PROCEDURE — 99495 TRANSJ CARE MGMT MOD F2F 14D: CPT | Performed by: NURSE PRACTITIONER

## 2019-03-11 RX ORDER — POTASSIUM CHLORIDE 20 MEQ/1
20 TABLET, EXTENDED RELEASE ORAL DAILY
Qty: 30 TABLET | Refills: 5 | Status: ON HOLD | OUTPATIENT
Start: 2019-03-11 | End: 2021-02-19 | Stop reason: HOSPADM

## 2019-03-11 RX ORDER — FUROSEMIDE 20 MG/1
20 TABLET ORAL 2 TIMES DAILY
Qty: 30 TABLET | Refills: 5 | Status: SHIPPED | OUTPATIENT
Start: 2019-03-11 | End: 2019-06-20 | Stop reason: SDUPTHER

## 2019-03-11 ASSESSMENT — ENCOUNTER SYMPTOMS
SHORTNESS OF BREATH: 0
ALLERGIC/IMMUNOLOGIC NEGATIVE: 1
GASTROINTESTINAL NEGATIVE: 1
COUGH: 0
RESPIRATORY NEGATIVE: 1
BACK PAIN: 1

## 2019-04-10 RX ORDER — DIPHENHYDRAMINE HCL 25 MG
50 TABLET ORAL ONCE
Status: CANCELLED | OUTPATIENT
Start: 2019-04-10 | End: 2019-04-10

## 2019-04-10 RX ORDER — SODIUM CHLORIDE 0.9 % (FLUSH) 0.9 %
10 SYRINGE (ML) INJECTION EVERY 12 HOURS SCHEDULED
Status: CANCELLED | OUTPATIENT
Start: 2019-04-10

## 2019-04-11 ENCOUNTER — HOSPITAL ENCOUNTER (OUTPATIENT)
Dept: INPATIENT UNIT | Age: 79
Discharge: HOME | End: 2019-04-12
Attending: INTERNAL MEDICINE | Admitting: INTERNAL MEDICINE
Payer: MEDICARE

## 2019-04-11 DIAGNOSIS — Z98.61 POST PTCA: ICD-10-CM

## 2019-04-11 LAB
ABO: NORMAL
ALBUMIN SERPL-MCNC: 3.8 G/DL (ref 3.5–5.1)
ALP BLD-CCNC: 120 U/L (ref 38–126)
ALT SERPL-CCNC: 21 U/L (ref 11–66)
ANION GAP SERPL CALCULATED.3IONS-SCNC: 12 MEQ/L (ref 8–16)
ANTIBODY SCREEN: NORMAL
AST SERPL-CCNC: 26 U/L (ref 5–40)
BILIRUB SERPL-MCNC: 0.3 MG/DL (ref 0.3–1.2)
BUN BLDV-MCNC: 23 MG/DL (ref 7–22)
CALCIUM SERPL-MCNC: 8.9 MG/DL (ref 8.5–10.5)
CHLORIDE BLD-SCNC: 103 MEQ/L (ref 98–111)
CHOLESTEROL, TOTAL: 179 MG/DL (ref 100–199)
CO2: 24 MEQ/L (ref 23–33)
CREAT SERPL-MCNC: 0.7 MG/DL (ref 0.4–1.2)
ERYTHROCYTE [DISTWIDTH] IN BLOOD BY AUTOMATED COUNT: 14.1 % (ref 11.5–14.5)
ERYTHROCYTE [DISTWIDTH] IN BLOOD BY AUTOMATED COUNT: 46.8 FL (ref 35–45)
GFR SERPL CREATININE-BSD FRML MDRD: 81 ML/MIN/1.73M2
GLUCOSE BLD-MCNC: 105 MG/DL (ref 70–108)
HCT VFR BLD CALC: 28.7 % (ref 37–47)
HDLC SERPL-MCNC: 79 MG/DL
HEMOGLOBIN: 9.1 GM/DL (ref 12–16)
LDL CHOLESTEROL CALCULATED: 86 MG/DL
MCH RBC QN AUTO: 28.9 PG (ref 26–33)
MCHC RBC AUTO-ENTMCNC: 31.7 GM/DL (ref 32.2–35.5)
MCV RBC AUTO: 91.1 FL (ref 81–99)
PLATELET # BLD: 257 THOU/MM3 (ref 130–400)
PMV BLD AUTO: 10.3 FL (ref 9.4–12.4)
POTASSIUM REFLEX MAGNESIUM: 4.6 MEQ/L (ref 3.5–5.2)
RBC # BLD: 3.15 MILL/MM3 (ref 4.2–5.4)
RH FACTOR: NORMAL
SODIUM BLD-SCNC: 139 MEQ/L (ref 135–145)
TOTAL PROTEIN: 7.4 G/DL (ref 6.1–8)
TRIGL SERPL-MCNC: 71 MG/DL (ref 0–199)
WBC # BLD: 8.8 THOU/MM3 (ref 4.8–10.8)

## 2019-04-11 PROCEDURE — C1769 GUIDE WIRE: HCPCS

## 2019-04-11 PROCEDURE — 6360000002 HC RX W HCPCS

## 2019-04-11 PROCEDURE — 36415 COLL VENOUS BLD VENIPUNCTURE: CPT

## 2019-04-11 PROCEDURE — 2709999900 HC NON-CHARGEABLE SUPPLY

## 2019-04-11 PROCEDURE — C1874 STENT, COATED/COV W/DEL SYS: HCPCS

## 2019-04-11 PROCEDURE — 85027 COMPLETE CBC AUTOMATED: CPT

## 2019-04-11 PROCEDURE — 6370000000 HC RX 637 (ALT 250 FOR IP): Performed by: INTERNAL MEDICINE

## 2019-04-11 PROCEDURE — 86850 RBC ANTIBODY SCREEN: CPT

## 2019-04-11 PROCEDURE — 93005 ELECTROCARDIOGRAM TRACING: CPT | Performed by: INTERNAL MEDICINE

## 2019-04-11 PROCEDURE — 80053 COMPREHEN METABOLIC PANEL: CPT

## 2019-04-11 PROCEDURE — 86900 BLOOD TYPING SEROLOGIC ABO: CPT

## 2019-04-11 PROCEDURE — 2580000003 HC RX 258: Performed by: INTERNAL MEDICINE

## 2019-04-11 PROCEDURE — 96360 HYDRATION IV INFUSION INIT: CPT

## 2019-04-11 PROCEDURE — C1887 CATHETER, GUIDING: HCPCS

## 2019-04-11 PROCEDURE — 93307 TTE W/O DOPPLER COMPLETE: CPT

## 2019-04-11 PROCEDURE — 86901 BLOOD TYPING SEROLOGIC RH(D): CPT

## 2019-04-11 PROCEDURE — C9602 PERC D-E COR STENT ATHER S: HCPCS | Performed by: INTERNAL MEDICINE

## 2019-04-11 PROCEDURE — 6360000004 HC RX CONTRAST MEDICATION: Performed by: INTERNAL MEDICINE

## 2019-04-11 PROCEDURE — C1760 CLOSURE DEV, VASC: HCPCS

## 2019-04-11 PROCEDURE — C1894 INTRO/SHEATH, NON-LASER: HCPCS

## 2019-04-11 PROCEDURE — 2500000003 HC RX 250 WO HCPCS

## 2019-04-11 PROCEDURE — C1725 CATH, TRANSLUMIN NON-LASER: HCPCS

## 2019-04-11 PROCEDURE — 80061 LIPID PANEL: CPT

## 2019-04-11 RX ORDER — SODIUM CHLORIDE 9 MG/ML
INJECTION, SOLUTION INTRAVENOUS CONTINUOUS
Status: DISCONTINUED | OUTPATIENT
Start: 2019-04-11 | End: 2019-04-12 | Stop reason: HOSPADM

## 2019-04-11 RX ORDER — ASPIRIN 325 MG
325 TABLET ORAL DAILY
Status: DISCONTINUED | OUTPATIENT
Start: 2019-04-12 | End: 2019-04-12 | Stop reason: HOSPADM

## 2019-04-11 RX ORDER — ACETAMINOPHEN 325 MG/1
650 TABLET ORAL EVERY 4 HOURS PRN
Status: DISCONTINUED | OUTPATIENT
Start: 2019-04-11 | End: 2019-04-12 | Stop reason: HOSPADM

## 2019-04-11 RX ORDER — SODIUM CHLORIDE 9 MG/ML
INJECTION, SOLUTION INTRAVENOUS CONTINUOUS
Status: ACTIVE | OUTPATIENT
Start: 2019-04-11 | End: 2019-04-11

## 2019-04-11 RX ORDER — DIGOXIN 125 MCG
125 TABLET ORAL DAILY
Status: DISCONTINUED | OUTPATIENT
Start: 2019-04-11 | End: 2019-04-12 | Stop reason: HOSPADM

## 2019-04-11 RX ORDER — SODIUM CHLORIDE 0.9 % (FLUSH) 0.9 %
10 SYRINGE (ML) INJECTION EVERY 12 HOURS SCHEDULED
Status: DISCONTINUED | OUTPATIENT
Start: 2019-04-11 | End: 2019-04-12 | Stop reason: HOSPADM

## 2019-04-11 RX ORDER — ATROPINE SULFATE 0.4 MG/ML
0.5 AMPUL (ML) INJECTION
Status: ACTIVE | OUTPATIENT
Start: 2019-04-11 | End: 2019-04-11

## 2019-04-11 RX ORDER — SODIUM CHLORIDE 0.9 % (FLUSH) 0.9 %
10 SYRINGE (ML) INJECTION PRN
Status: DISCONTINUED | OUTPATIENT
Start: 2019-04-11 | End: 2019-04-11

## 2019-04-11 RX ORDER — POTASSIUM CHLORIDE 20 MEQ/1
20 TABLET, EXTENDED RELEASE ORAL DAILY
Status: DISCONTINUED | OUTPATIENT
Start: 2019-04-11 | End: 2019-04-12 | Stop reason: HOSPADM

## 2019-04-11 RX ORDER — ONDANSETRON 2 MG/ML
4 INJECTION INTRAMUSCULAR; INTRAVENOUS EVERY 6 HOURS PRN
Status: DISCONTINUED | OUTPATIENT
Start: 2019-04-11 | End: 2019-04-12 | Stop reason: HOSPADM

## 2019-04-11 RX ORDER — ASPIRIN 325 MG
325 TABLET ORAL ONCE
Status: DISCONTINUED | OUTPATIENT
Start: 2019-04-11 | End: 2019-04-12 | Stop reason: HOSPADM

## 2019-04-11 RX ORDER — FUROSEMIDE 20 MG/1
20 TABLET ORAL 2 TIMES DAILY
Status: DISCONTINUED | OUTPATIENT
Start: 2019-04-11 | End: 2019-04-12 | Stop reason: HOSPADM

## 2019-04-11 RX ORDER — SODIUM CHLORIDE 0.9 % (FLUSH) 0.9 %
10 SYRINGE (ML) INJECTION PRN
Status: DISCONTINUED | OUTPATIENT
Start: 2019-04-11 | End: 2019-04-12 | Stop reason: HOSPADM

## 2019-04-11 RX ORDER — CLOPIDOGREL BISULFATE 75 MG/1
75 TABLET ORAL DAILY
Status: DISCONTINUED | OUTPATIENT
Start: 2019-04-11 | End: 2019-04-12 | Stop reason: HOSPADM

## 2019-04-11 RX ORDER — LOSARTAN POTASSIUM 25 MG/1
25 TABLET ORAL DAILY
Status: DISCONTINUED | OUTPATIENT
Start: 2019-04-11 | End: 2019-04-12 | Stop reason: HOSPADM

## 2019-04-11 RX ADMIN — IOPAMIDOL 250 ML: 755 INJECTION, SOLUTION INTRAVENOUS at 09:42

## 2019-04-11 RX ADMIN — SODIUM CHLORIDE: 9 INJECTION, SOLUTION INTRAVENOUS at 05:38

## 2019-04-11 RX ADMIN — APIXABAN 2.5 MG: 2.5 TABLET, FILM COATED ORAL at 19:59

## 2019-04-11 RX ADMIN — FUROSEMIDE 20 MG: 20 TABLET ORAL at 17:07

## 2019-04-11 NOTE — H&P
[facility]   Sedation/Analgesia History and Physical    Pt Name: Yuridia Adjutant  MRN: [id number]  YOB: 1940  Provider Performing Procedure: Yumi Hernandez MD  Primary Care Physician: Evelyn De Guzman MD      Pre-Procedure: Chest pain, possible coronary artery disease, abnormal stress test    Consent: I have discussed with the patient and/or the patient representative the indication, alternatives, and the possible risks and/or complications of the planned procedure and the anesthesia methods. The patient and/or representative appear to understand and agree to proceed. Medical History:   has a past medical history of Atrial fibrillation ([facility] CAD (coronary artery disease), Congestive heart failure (CHF) ([facility] Hyperlipidemia, and Hypertension. .    Surgical History:     has a past surgical history that includes Hysterectomy; pr office/outpt visit,procedure only (N/A, 11/12/2018); Cardiac valve replacement; Coronary artery bypass graft; Mitral valve replacement; Tricuspid valve replacement; thoracentesis; Cardiac catheterization; and Coronary angioplasty with stent (02/28/2019). Allergies: Allergies as of 04/11/2019 - Review Complete 03/11/2019   Allergen Reaction Noted    Crestor [rosuvastatin] Itching 01/14/2019       Medications:   Coumadin use last 5 days:  No  Antiplatelet drug therapy use last 5 days:  Yes  Other anticoagulant use last 5 days:  No   aspirin  325 mg Oral Once     Prior to Admission medications    Medication Sig Start Date End Date Taking?  Authorizing Provider   potassium chloride (KLOR-CON M) 20 MEQ extended release tablet Take 1 tablet by mouth daily 3/11/19  Yes PHIL Page - CNP   clopidogrel (PLAVIX) 75 MG tablet Take 1 tablet by mouth daily 3/1/19  Yes Yumi Hernandez MD   aspirin 81 MG tablet Take 81 mg by mouth daily   Yes Historical Provider, MD   digoxin (LANOXIN) 125 MCG tablet Take 125 mcg by mouth daily   Yes Historical Provider, MD losartan (COZAAR) 25 MG tablet Take 25 mg by mouth daily   Yes Historical Provider, MD   sertraline (ZOLOFT) 50 MG tablet Take 1 tablet by mouth daily 2/18/19  Yes Eileen Laura MD   Multiple Vitamins-Minerals (THERAPEUTIC MULTIVITAMIN-MINERALS) tablet Take 1 tablet by mouth daily (with breakfast) 11/20/18  Yes Nesha Keller PA-C   furosemide (LASIX) 20 MG tablet Take 1 tablet by mouth 2 times daily 3/11/19   Sonia Grandchild, APRN - CNP   ELIQUIS 2.5 MG TABS tablet TAKE 1 TABLET BY MOUTH TWO TIMES A DAY 1/7/19   Historical Provider, MD   acetaminophen (TYLENOL) 325 MG tablet Take 1 tablet by mouth every 4 hours as needed for Pain 11/20/18   Nesha Keller PA-C       Vital Signs  Vitals:    04/11/19 0628   BP: (!) 144/97   Pulse: 138   Resp: 17   Temp:    SpO2:        Physical:  Heart:  regular rate and rhythm  Lungs:  Clear  Abdomen:  Soft  Mental Status:  Alert and Oriented    Planned Procedure:  Left Heart Cath and Possible Percutaneous Coronary Intervention    Sedation/ Anesthesia Plan: Midazolam and Sublimaze    ASA Classification: Class 3 - A patient with severe systemic disease that limits activity but is not incapacitating    Mallampati Airway Classification: II (soft palate, uvula, fauces visible)    · Pre-procedure diagnostic studies complete and results available. · Previous sedation/anesthesia experiences assessed. · The patient is an appropriate candidate to undergo the planned procedure sedation and anesthesia. (Refer to nursing sedation/analgesia documentation record)  · Formulation and discussion of sedation/procedure plan, risks, and expectations with patient and/or responsible adult completed. · Patient examined immediately prior to the procedure.  (Refer to nursing sedation/analgesia documentation record)    Liat Foy MD  Electronically signed 4/11/2019 at 7:25 AM  Patient Name: Kaylee Almazan   Medical Record Number: [id number]  Date: 4/11/2019   Time: 7:25 AM   Room/Bed: 2E-17/017-A

## 2019-04-11 NOTE — PROGRESS NOTES
Pt taking diet/fluids - carlo well  2 different attempts to discontinue oxygen, sats would dropped into the mid 80's both times, O2 cont'd @ 2l/min

## 2019-04-11 NOTE — PROGRESS NOTES
Pt transferred per bed to [facility]  Left femerol site stable  Condition stable  Pt pain free, bleeding from gums appears to have subsided

## 2019-04-11 NOTE — PROGRESS NOTES
Inpatient Cardiac Rehabilitation Consult    Received consult for Phase II Cardiac Rehabilitation. Cardiac Rehab education completed with patient. Patient is not interested in cardiac rehab at this time  Brochure given.

## 2019-04-11 NOTE — OP NOTE
[address] [address]  Sedation/Analgesia Post Sedation Record      Pt Name: Greta Cummings  MRN: [id number]  YOB: 1940  Procedure Performed By: Shane Vasques MD  Primary Care Physician: Nathalie Moore MD    POST-PROCEDURE    Physician: Shane Vasques MD    Procedure Performed:   Percutaneous Coronary Intervention and Left Heart Cath csi and stenting of lad     Sedation/Anesthesia:  Local Anesthesia and IV Conscious Sedation with continuous O2 monitoring    Estimated Blood Loss:  Minimal    Specimens Removed:  None    Complications:  None     Post Procedure Diagnosis/Findings:  Coronary Artery Disease    Recommendations:  Medical treatment and review films.        Shane Vasques MD  Electronically signed 4/11/2019 at 9:50 AM

## 2019-04-11 NOTE — PROGRESS NOTES
Pt admitted to  2E17 ambulatory for PCI. Pt NPO. Patient accompanied by daughter. Vital signs obtained. Assessment and data collection initiated. Oriented to room. Policies and procedures for 2E explained   All questions answered with no further questions at this time. Fall prevention and safety precautions discussed with patient.

## 2019-04-12 VITALS
OXYGEN SATURATION: 94 % | HEIGHT: 65 IN | TEMPERATURE: 98.2 F | RESPIRATION RATE: 16 BRPM | WEIGHT: 120.4 LBS | BODY MASS INDEX: 20.06 KG/M2 | HEART RATE: 136 BPM | DIASTOLIC BLOOD PRESSURE: 67 MMHG | SYSTOLIC BLOOD PRESSURE: 109 MMHG

## 2019-04-12 LAB
EKG ATRIAL RATE: 138 BPM
EKG Q-T INTERVAL: 266 MS
EKG QRS DURATION: 70 MS
EKG QTC CALCULATION (BAZETT): 407 MS
EKG R AXIS: 70 DEGREES
EKG T AXIS: -93 DEGREES
EKG VENTRICULAR RATE: 141 BPM

## 2019-04-12 PROCEDURE — 2580000003 HC RX 258: Performed by: INTERNAL MEDICINE

## 2019-04-12 PROCEDURE — C1725 CATH, TRANSLUMIN NON-LASER: HCPCS

## 2019-04-12 PROCEDURE — 2709999900 HC NON-CHARGEABLE SUPPLY

## 2019-04-12 PROCEDURE — 93010 ELECTROCARDIOGRAM REPORT: CPT | Performed by: INTERNAL MEDICINE

## 2019-04-12 PROCEDURE — C1894 INTRO/SHEATH, NON-LASER: HCPCS

## 2019-04-12 PROCEDURE — 6370000000 HC RX 637 (ALT 250 FOR IP): Performed by: INTERNAL MEDICINE

## 2019-04-12 PROCEDURE — 2700000000 HC OXYGEN THERAPY PER DAY

## 2019-04-12 RX ORDER — AMIODARONE HYDROCHLORIDE 200 MG/1
200 TABLET ORAL DAILY
Qty: 30 TABLET | Refills: 3 | Status: ON HOLD | OUTPATIENT
Start: 2019-04-12 | End: 2019-04-17 | Stop reason: HOSPADM

## 2019-04-12 RX ORDER — ATENOLOL 25 MG/1
25 TABLET ORAL DAILY
Qty: 30 TABLET | Refills: 3 | Status: ON HOLD | OUTPATIENT
Start: 2019-04-12 | End: 2021-04-14 | Stop reason: SDUPTHER

## 2019-04-12 RX ORDER — AMIODARONE HYDROCHLORIDE 200 MG/1
200 TABLET ORAL DAILY
Status: DISCONTINUED | OUTPATIENT
Start: 2019-04-12 | End: 2019-04-12 | Stop reason: HOSPADM

## 2019-04-12 RX ADMIN — FUROSEMIDE 20 MG: 20 TABLET ORAL at 08:12

## 2019-04-12 RX ADMIN — LOSARTAN POTASSIUM 25 MG: 25 TABLET, FILM COATED ORAL at 08:12

## 2019-04-12 RX ADMIN — SERTRALINE 50 MG: 50 TABLET, FILM COATED ORAL at 08:12

## 2019-04-12 RX ADMIN — ASPIRIN 325 MG: 325 TABLET ORAL at 08:12

## 2019-04-12 RX ADMIN — Medication 10 ML: at 08:12

## 2019-04-12 RX ADMIN — POTASSIUM CHLORIDE 20 MEQ: 20 TABLET, EXTENDED RELEASE ORAL at 08:12

## 2019-04-12 RX ADMIN — AMIODARONE HYDROCHLORIDE 200 MG: 200 TABLET ORAL at 15:13

## 2019-04-12 RX ADMIN — CLOPIDOGREL BISULFATE 75 MG: 75 TABLET, FILM COATED ORAL at 08:12

## 2019-04-12 RX ADMIN — DIGOXIN 125 MCG: 125 TABLET ORAL at 08:12

## 2019-04-12 ASSESSMENT — PAIN SCALES - GENERAL
PAINLEVEL_OUTOF10: 0
PAINLEVEL_OUTOF10: 0

## 2019-04-12 NOTE — PROGRESS NOTES
Discharge teaching and instructions for diagnosis/procedure of PCI completed with patient using teachback method. AVS reviewed. Printed prescriptions given to patient. Patient voiced understanding regarding prescriptions, follow up appointments, and care of self at home. Discharged in a wheelchair to  home with support per family.

## 2019-04-12 NOTE — PROCEDURES
[address] [place] [address]                            CARDIAC CATHETERIZATION    PATIENT NAME: Reno Frazier                    :        1940  MED REC NO:   [id number]                           ROOM:       0038  ACCOUNT NO:   [de-identified]                           ADMIT DATE: 2019  PROVIDER:     Luis Fernández M.D.    Michael Mcfarlane:  2019    CATH INTERVENTION PROCEDURE    INDICATION FOR PROCEDURE:  A 61-year-old lady with a history of multiple  heart disease. This patient had a history of CABG. Unfortunately, the  LIMA was attached to the distal end of the LAD, where the severe  stenosis also distal to the site, so as to prefer the site of  anastomosis. Multiple diagonal branches were not bypassed up to the  stenosis of proximal and the mid LAD with heavy calcification. The  patient also had intervention of the artery not before long. The  patient continued to be symptomatic. She had atrial fib rapid  ventricular response. The patient admitted for intervention. The  patient understands the procedure, the benefits, the risks, the  alternative methods of treatment, possible complications and she wants  it to be done. PROCEDURE PERFORMED  1. IV conscious sedation. The patient was given IV conscious sedation  in incremental dosage by the circulating cath lab RN, monitored by the  cath lab monitor tech, under my supervision. This procedure was started at 8:00 a.m. and finished at 9:45 a.m. She  had no acute complication from the procedure. The procedure was somewhat prolonged and very complex, time consuming  and equipment consuming. 2.  Limited left heart cath. The left femoral artery was cannulated  with a 6-Georgian long sheath. The coronary angiogram showed the RCA is a  dominant artery. The stented area of the RCA is still patent.   Heavy  calcification at the ostium and proximal portion of the RCA, diffuse  disease of the posterolateral branch of the RCA. The left main was short, patent, bifurcates to the LAD and circumflex. The circumflex is a nondominant artery. It has calcification at the  ostium and also severe stenosis at the obtuse marginal, it is about 2 mm  in diameter. The LAD has heavy calcification proximally with severe  stenosis and intracoronary calcification on the mid LAD. The LAD is  closed after the origin of the last diagonal artery. 3.  The rotablation of the LAD. The patient was given the Angiomax. The patient's left main cannulated with 6 x 3.5 left Voda with side  holes. The CSI wire was placed in the diagonal artery. It was somewhat  difficult to pass the wire due to the tortuosity and calcification and  severe stenosis. The CSI device was then utilized to debulk the proximal lesion and the  ostial LAD lesions. Three passes were made 25 seconds each. After  that, the CSI device was advanced into the mid LAD and multiple runs  were performed into the mid LAD. There was some dissection in the mid  LAD, and the device was then removed. We tried the primary stenting to  the mid LAD lesion, but the stent would not pass the area of heavy  calcification with the bulk of calcium in that area. The stent was then  removed, and we utilized a noncompliant balloon, a 3.0 x 12 mm balloon. With extensive manipulation, we were able to pass the balloon around the  area of the severe stenosis. The balloon was inflated at 20 atmospheric  pressure for 25 seconds. Some dissection was noted proximal to the  lesion. The balloon was then removed and we utilized again _____ guide  catheter to the mid LAD. We utilized a 3.0 x 15 mm Xience drug-eluting  stent which was placed into the area of the maximum stenosis of mid LAD,  it was deployed at 15 atmospheric pressure for 25 seconds.   The balloon  was then removed and we placed another 3.0 x 33 Xience drug-eluting  stent perform given the  patient is in sinus rhythm. We felt the best option is to do a AV node  ablation and pacemaker for this patient as the ablation treatment for  atrial fib would be highly unsuccessful if after the maze procedure was  performed. Hilda Jefferson M.D.    D: 04/11/2019 10:00:26       T: 04/11/2019 11:48:12     [email or web address]  Job#: [id number]     Doc#: [id number]    CC:  Percy Goodson M.D.

## 2019-04-13 NOTE — DISCHARGE SUMMARY
[address] [place] [address]                               DISCHARGE SUMMARY    PATIENT NAME: Reno Frazier                    :        1940  MED REC NO:   [id number]                           ROOM:       0038  ACCOUNT NO:   [de-identified]                           ADMIT DATE: 2019  PROVIDER:     Luis Fernández M.D.               Leahia Mari: 2019      FINAL DIAGNOSES:  1. Crescendo angina pectoris. 2.  Atherosclerotic coronary artery disease, native coronary arteries. 3.  Heart disease. 4.  Atrial fibrillation with bradycardia-tachycardia syndrome. 5.  History of congestive heart failure. 6.  History of anemia. 7.  History of dyslipidemia. BRIEF HISTORY:  The patient is a 42-year-old lady with a history of  valvular heart disease, history of mitral valve repair, tricuspid valve  repair. The patient had history of bypass to the distal LAD. The  patient had a history of prior intervention of the RCA, and she had  significant disease of the LAD. STAFFORD was placed at the apical portion  of the LAD with a total occlusion. The mid LAD and large diagonal  septal  branch, long segment of the LAD has not been bypassed. The patient admitted for intervention. The patient understands the  procedure, the benefit, risks. She had intervention of the RCA and  again this was not bypassed. PROCEDURES PERFORMED DURING THIS HOSPITALIZATION:  1. Limited left heart cath. 2.  CSI Rotablator and angioplasty stent deployment of the mid and the  proximal LAD using the stenosis from 90% to 0% SARAHI-3 flow. The patient was admitted to the cath lab, underwent a very high risk  intervention of the RCA of the left main LAD. The patient tolerated the  procedure well. The RCA prior intervention is still patent. Please see  the cath report. The patient tolerated the procedure well.   The  patient's atrial fib, rapid ventricular response, somewhat hard to  control the rate. The patient has Maze procedure. The patient will  have pacemaker implanted, and arrangements will be made for an AV node  ablation. The patient placed on the antiarrhythmic medication now to  control the heart rate. The patient will have her Eliquis on hold now  for two days and before the pacemaker inserted. The patient seek  medical attention if she had any change in clinical condition. The  patient was discharged home in a stable condition to be on  dual-antiplatelet treatment for now. Kecia Luong M.D.    D: 04/12/2019 18:52:03       T: 04/12/2019 23:03:14     [email or web address]  Job#: [id number]     Doc#: [id number]    CC:  Kieran Spain. Marleen Perdomo M.D.        Referring Services

## 2019-04-15 ENCOUNTER — TELEPHONE (OUTPATIENT)
Dept: FAMILY MEDICINE CLINIC | Age: 79
End: 2019-04-15

## 2019-04-15 ENCOUNTER — HOSPITAL ENCOUNTER (OUTPATIENT)
Dept: INPATIENT UNIT | Age: 79
Discharge: HOME | End: 2019-04-15
Attending: INTERNAL MEDICINE
Payer: MEDICARE

## 2019-04-15 ENCOUNTER — APPOINTMENT (OUTPATIENT)
Dept: CARDIAC CATH/INVASIVE PROCEDURES | Age: 79
End: 2019-04-15
Attending: INTERNAL MEDICINE
Payer: MEDICARE

## 2019-04-15 ENCOUNTER — APPOINTMENT (OUTPATIENT)
Dept: GENERAL RADIOLOGY | Age: 79
End: 2019-04-15
Attending: INTERNAL MEDICINE
Payer: MEDICARE

## 2019-04-15 ENCOUNTER — HOSPITAL ENCOUNTER (OUTPATIENT)
Dept: INPATIENT UNIT | Age: 79
Discharge: HOME | End: 2019-04-17
Attending: INTERNAL MEDICINE | Admitting: INTERNAL MEDICINE
Payer: MEDICARE

## 2019-04-15 ENCOUNTER — HOSPITAL ENCOUNTER (OUTPATIENT)
Age: 79
Discharge: HOME | End: 2019-04-15
Attending: INTERNAL MEDICINE | Admitting: INTERNAL MEDICINE
Payer: MEDICARE

## 2019-04-15 PROBLEM — Z95.0 PACEMAKER: Status: ACTIVE | Noted: 2019-04-15

## 2019-04-15 LAB
ANION GAP SERPL CALCULATED.3IONS-SCNC: 10 MEQ/L (ref 8–16)
BASOPHILS # BLD: 0.4 %
BASOPHILS ABSOLUTE: 0 THOU/MM3 (ref 0–0.1)
BUN BLDV-MCNC: 26 MG/DL (ref 7–22)
CALCIUM SERPL-MCNC: 8.8 MG/DL (ref 8.5–10.5)
CHLORIDE BLD-SCNC: 104 MEQ/L (ref 98–111)
CO2: 24 MEQ/L (ref 23–33)
CREAT SERPL-MCNC: 0.8 MG/DL (ref 0.4–1.2)
EOSINOPHIL # BLD: 1.7 %
EOSINOPHILS ABSOLUTE: 0.2 THOU/MM3 (ref 0–0.4)
ERYTHROCYTE [DISTWIDTH] IN BLOOD BY AUTOMATED COUNT: 14.5 % (ref 11.5–14.5)
ERYTHROCYTE [DISTWIDTH] IN BLOOD BY AUTOMATED COUNT: 50.8 FL (ref 35–45)
GFR SERPL CREATININE-BSD FRML MDRD: 70 ML/MIN/1.73M2
GLUCOSE BLD-MCNC: 109 MG/DL (ref 70–108)
HCT VFR BLD CALC: 28.3 % (ref 37–47)
HEMOGLOBIN: 8.4 GM/DL (ref 12–16)
IMMATURE GRANS (ABS): 0.02 THOU/MM3 (ref 0–0.07)
IMMATURE GRANULOCYTES: 0.2 %
LYMPHOCYTES # BLD: 11.6 %
LYMPHOCYTES ABSOLUTE: 1.1 THOU/MM3 (ref 1–4.8)
MCH RBC QN AUTO: 28.8 PG (ref 26–33)
MCHC RBC AUTO-ENTMCNC: 29.7 GM/DL (ref 32.2–35.5)
MCV RBC AUTO: 96.9 FL (ref 81–99)
MONOCYTES # BLD: 7.7 %
MONOCYTES ABSOLUTE: 0.7 THOU/MM3 (ref 0.4–1.3)
NUCLEATED RED BLOOD CELLS: 0 /100 WBC
PLATELET # BLD: 225 THOU/MM3 (ref 130–400)
PMV BLD AUTO: 10.8 FL (ref 9.4–12.4)
POTASSIUM SERPL-SCNC: 5.1 MEQ/L (ref 3.5–5.2)
RBC # BLD: 2.92 MILL/MM3 (ref 4.2–5.4)
SEG NEUTROPHILS: 78.4 %
SEGMENTED NEUTROPHILS ABSOLUTE COUNT: 7.2 THOU/MM3 (ref 1.8–7.7)
SODIUM BLD-SCNC: 138 MEQ/L (ref 135–145)
WBC # BLD: 9.2 THOU/MM3 (ref 4.8–10.8)

## 2019-04-15 PROCEDURE — 2500000003 HC RX 250 WO HCPCS

## 2019-04-15 PROCEDURE — 6370000000 HC RX 637 (ALT 250 FOR IP): Performed by: INTERNAL MEDICINE

## 2019-04-15 PROCEDURE — C1898 LEAD, PMKR, OTHER THAN TRANS: HCPCS

## 2019-04-15 PROCEDURE — 6360000002 HC RX W HCPCS

## 2019-04-15 PROCEDURE — C1786 PMKR, SINGLE, RATE-RESP: HCPCS

## 2019-04-15 PROCEDURE — 6370000000 HC RX 637 (ALT 250 FOR IP)

## 2019-04-15 PROCEDURE — 2580000003 HC RX 258: Performed by: INTERNAL MEDICINE

## 2019-04-15 PROCEDURE — 2709999900 HC NON-CHARGEABLE SUPPLY

## 2019-04-15 PROCEDURE — 80048 BASIC METABOLIC PNL TOTAL CA: CPT

## 2019-04-15 PROCEDURE — 6360000002 HC RX W HCPCS: Performed by: INTERNAL MEDICINE

## 2019-04-15 PROCEDURE — 36415 COLL VENOUS BLD VENIPUNCTURE: CPT

## 2019-04-15 PROCEDURE — 85025 COMPLETE CBC W/AUTO DIFF WBC: CPT

## 2019-04-15 PROCEDURE — 71045 X-RAY EXAM CHEST 1 VIEW: CPT

## 2019-04-15 PROCEDURE — 33207 INSERT HEART PM VENTRICULAR: CPT | Performed by: INTERNAL MEDICINE

## 2019-04-15 PROCEDURE — C1894 INTRO/SHEATH, NON-LASER: HCPCS

## 2019-04-15 RX ORDER — POTASSIUM CHLORIDE 20 MEQ/1
10 TABLET, EXTENDED RELEASE ORAL 2 TIMES DAILY
Status: DISCONTINUED | OUTPATIENT
Start: 2019-04-15 | End: 2019-04-16

## 2019-04-15 RX ORDER — SODIUM CHLORIDE 0.9 % (FLUSH) 0.9 %
10 SYRINGE (ML) INJECTION EVERY 12 HOURS SCHEDULED
Status: DISCONTINUED | OUTPATIENT
Start: 2019-04-15 | End: 2019-04-16

## 2019-04-15 RX ORDER — DIGOXIN 125 MCG
125 TABLET ORAL DAILY
Status: DISCONTINUED | OUTPATIENT
Start: 2019-04-16 | End: 2019-04-16

## 2019-04-15 RX ORDER — POTASSIUM CHLORIDE 20 MEQ/1
20 TABLET, EXTENDED RELEASE ORAL DAILY
Status: DISCONTINUED | OUTPATIENT
Start: 2019-04-16 | End: 2019-04-16

## 2019-04-15 RX ORDER — ONDANSETRON 2 MG/ML
4 INJECTION INTRAMUSCULAR; INTRAVENOUS EVERY 6 HOURS PRN
Status: DISCONTINUED | OUTPATIENT
Start: 2019-04-15 | End: 2019-04-17 | Stop reason: HOSPADM

## 2019-04-15 RX ORDER — AMIODARONE HYDROCHLORIDE 200 MG/1
200 TABLET ORAL DAILY
Status: DISCONTINUED | OUTPATIENT
Start: 2019-04-15 | End: 2019-04-16

## 2019-04-15 RX ORDER — SODIUM CHLORIDE 0.9 % (FLUSH) 0.9 %
10 SYRINGE (ML) INJECTION PRN
Status: DISCONTINUED | OUTPATIENT
Start: 2019-04-15 | End: 2019-04-16

## 2019-04-15 RX ORDER — ACETAMINOPHEN 325 MG/1
650 TABLET ORAL EVERY 4 HOURS PRN
Status: DISCONTINUED | OUTPATIENT
Start: 2019-04-15 | End: 2019-04-16 | Stop reason: SDUPTHER

## 2019-04-15 RX ORDER — SODIUM CHLORIDE 9 MG/ML
INJECTION, SOLUTION INTRAVENOUS CONTINUOUS
Status: DISCONTINUED | OUTPATIENT
Start: 2019-04-15 | End: 2019-04-16

## 2019-04-15 RX ORDER — M-VIT,TX,IRON,MINS/CALC/FOLIC 27MG-0.4MG
1 TABLET ORAL
Status: DISCONTINUED | OUTPATIENT
Start: 2019-04-16 | End: 2019-04-17 | Stop reason: HOSPADM

## 2019-04-15 RX ORDER — ATENOLOL 25 MG/1
25 TABLET ORAL DAILY
Status: DISCONTINUED | OUTPATIENT
Start: 2019-04-15 | End: 2019-04-17 | Stop reason: HOSPADM

## 2019-04-15 RX ORDER — ASPIRIN 81 MG/1
81 TABLET, CHEWABLE ORAL DAILY
Status: DISCONTINUED | OUTPATIENT
Start: 2019-04-16 | End: 2019-04-17 | Stop reason: HOSPADM

## 2019-04-15 RX ORDER — LOSARTAN POTASSIUM 25 MG/1
25 TABLET ORAL DAILY
Status: DISCONTINUED | OUTPATIENT
Start: 2019-04-16 | End: 2019-04-17 | Stop reason: HOSPADM

## 2019-04-15 RX ORDER — CLOPIDOGREL BISULFATE 75 MG/1
75 TABLET ORAL DAILY
Status: DISCONTINUED | OUTPATIENT
Start: 2019-04-16 | End: 2019-04-17 | Stop reason: HOSPADM

## 2019-04-15 RX ORDER — FUROSEMIDE 20 MG/1
20 TABLET ORAL 2 TIMES DAILY
Status: DISCONTINUED | OUTPATIENT
Start: 2019-04-15 | End: 2019-04-16

## 2019-04-15 RX ORDER — BUMETANIDE 1 MG/1
2 TABLET ORAL DAILY
Status: DISCONTINUED | OUTPATIENT
Start: 2019-04-15 | End: 2019-04-17 | Stop reason: HOSPADM

## 2019-04-15 RX ADMIN — FUROSEMIDE 20 MG: 20 TABLET ORAL at 19:55

## 2019-04-15 RX ADMIN — ACETAMINOPHEN 650 MG: 325 TABLET ORAL at 16:43

## 2019-04-15 RX ADMIN — Medication 10 ML: at 19:54

## 2019-04-15 RX ADMIN — ATENOLOL 25 MG: 25 TABLET ORAL at 16:36

## 2019-04-15 RX ADMIN — POTASSIUM CHLORIDE 10 MEQ: 20 TABLET, EXTENDED RELEASE ORAL at 19:55

## 2019-04-15 RX ADMIN — SODIUM CHLORIDE: 9 INJECTION, SOLUTION INTRAVENOUS at 07:41

## 2019-04-15 RX ADMIN — AMIODARONE HYDROCHLORIDE 200 MG: 200 TABLET ORAL at 16:35

## 2019-04-15 RX ADMIN — BUMETANIDE 2 MG: 1 TABLET ORAL at 14:51

## 2019-04-15 RX ADMIN — DEXTROSE MONOHYDRATE 2 G: 50 INJECTION, SOLUTION INTRAVENOUS at 16:37

## 2019-04-15 ASSESSMENT — PAIN SCALES - GENERAL
PAINLEVEL_OUTOF10: 0
PAINLEVEL_OUTOF10: 4
PAINLEVEL_OUTOF10: 5

## 2019-04-15 NOTE — OP NOTE
[facility]  Sedation/Analgesia Post Sedation Record      Pt Name: Jaclyn Zamorano  MRN: [id number]  YOB: 1940  Procedure Performed By: Chuyita Barragan MD  Primary Care Physician: Isidro Silver MD    POST-PROCEDURE    Physician: Chuyita Barragan MD    Procedure Performed:  Permanent Pacemaker Insertion    Sedation/Anesthesia:  Local Anesthesia and IV Conscious Sedation with continuous O2 monitoring    Estimated Blood Loss:  Minimal    Specimens Removed:  None    Complications:  None     Post Procedure Diagnosis/Findings:  Coronary Artery Disease and Atrial Fibrillation    Recommendations:  Medical treatment and review films.        Chuyita Barragan MD  Electronically signed 4/15/2019 at 9:23 AM

## 2019-04-15 NOTE — TELEPHONE ENCOUNTER
Unable to make the patients KOLBY call due to she is currently admitted to [facility].

## 2019-04-15 NOTE — PROCEDURES
[address] [place] [address]                            CARDIAC CATHETERIZATION    PATIENT NAME: Jose Suero                    :        1940  MED REC NO:   [id number]                           ROOM:       0007  ACCOUNT NO:   [de-identified]                           ADMIT DATE: 04/15/2019  PROVIDER:     KENDALL Key Perfect:  04/15/2019    PACEMAKER PROCEDURE    INDICATION FOR PROCEDURE:  This is a patient, 70years old lady, history  of coronary artery disease, history of prior intervention, valvular  heart disease, history of atrial fibrillation. She had an open heart  surgery. She had a maze surgery. She had a clipping of her left atrial  appendage. This patient was in sinus rhythm briefly after her surgery. She had  multiple cardioversions. She continued to have the atrial fib with  rapid ventricular response, the bradycardia-tachycardia syndrome. It  was very hard to control the symptoms of the patient. She has an  intolerance to a lot of antiarrhythmic medications, shortness of breath. The patient is very symptomatic from atrial fib. The decision was made  to consider an AV node ablation, pacemaker implantation. She was  admitted for a pacemaker implantation for the above. The patient  understands the procedure, the benefits, the risks, and agrees to have  it done. PROCEDURE PERFORMED:  1.  IV conscious sedation. The patient was given IV conscious sedation  in incremental dosage by the circulating cath lab RN, monitored by the  cath lab monitor tech under my supervision. The procedure was started  at 08:00 a.m. and finished at 09:15 a.m. No acute complication from the  procedure. 2.  Insertion of a single-chamber pacemaker, an [employer]onic [employer]  MRI-compatible device. The area of the left intrapericardial region was  prepped in the usual fashion.   The patient did receive IV antibiotics. Under local anesthesia and under IV conscious sedation, the subclavian  vein was cannulated via a micropuncture needle. Pacemaker pocket was  then made. The patient had capillary oozing secondary to the patient  being on the dual antiplatelet treatments. The subclavian vein was cannulated in the usual fashion. A  micropuncture needle was utilized. The pacemaker wire, Medtronic, an  active fixation lead was placed into the septal wall of the right  ventricle. The patient had good parameters. The stimulation threshold  was less than 1 V. Impedance was normal.  The patient's R waves were  _____. The patient tolerated the procedure well. The pacemaker wire  was secured in place utilizing 2-0 silk. After securing good ______,  the pacemaker pocket was irrigated with copious amounts of fluid and  antibiotics. Pacemaker lead connected to the generator and we used a  Medtronic Kelsea XT SR MRI-compatible device. The pacemaker pocket then  closed in layers with a lower rate of 60 and upper rate of 130 and VVIR. The patient had no acute complications from the procedure. Successful insertion of a single-chamber Medtronic pacemaker  MRI-compatible device for bradycardia-tachycardia syndrome and  preparation for the ablation treatment for atrial fib rapid ventricular  response. The procedure performed under IV conscious sedation. The patient had no  acute complications from the procedure.         Samantha Crockett M.D.    D: 04/15/2019 12:58:39       T: 04/15/2019 13:32:32     [email or web address]  Job#: [id number]     Doc#: [id number]    CC:

## 2019-04-16 ENCOUNTER — TELEPHONE (OUTPATIENT)
Dept: FAMILY MEDICINE CLINIC | Age: 79
End: 2019-04-16

## 2019-04-16 ENCOUNTER — APPOINTMENT (OUTPATIENT)
Dept: CARDIAC CATH/INVASIVE PROCEDURES | Age: 79
End: 2019-04-16
Attending: INTERNAL MEDICINE
Payer: MEDICARE

## 2019-04-16 LAB
EKG ATRIAL RATE: 250 BPM
EKG Q-T INTERVAL: 398 MS
EKG QRS DURATION: 88 MS
EKG QTC CALCULATION (BAZETT): 410 MS
EKG R AXIS: 77 DEGREES
EKG T AXIS: 27 DEGREES
EKG VENTRICULAR RATE: 64 BPM
HCT VFR BLD CALC: 26.1 % (ref 37–47)
HEMOGLOBIN: 8.3 GM/DL (ref 12–16)

## 2019-04-16 PROCEDURE — C1894 INTRO/SHEATH, NON-LASER: HCPCS

## 2019-04-16 PROCEDURE — 6360000002 HC RX W HCPCS: Performed by: INTERNAL MEDICINE

## 2019-04-16 PROCEDURE — C1733 CATH, EP, OTHR THAN COOL-TIP: HCPCS

## 2019-04-16 PROCEDURE — 85014 HEMATOCRIT: CPT

## 2019-04-16 PROCEDURE — 93650 ICAR CATH ABLTJ AV NODE FUNC: CPT | Performed by: INTERNAL MEDICINE

## 2019-04-16 PROCEDURE — 6360000002 HC RX W HCPCS

## 2019-04-16 PROCEDURE — 2500000003 HC RX 250 WO HCPCS

## 2019-04-16 PROCEDURE — 6370000000 HC RX 637 (ALT 250 FOR IP): Performed by: INTERNAL MEDICINE

## 2019-04-16 PROCEDURE — 85018 HEMOGLOBIN: CPT

## 2019-04-16 PROCEDURE — 2709999900 HC NON-CHARGEABLE SUPPLY

## 2019-04-16 PROCEDURE — 2580000003 HC RX 258: Performed by: INTERNAL MEDICINE

## 2019-04-16 PROCEDURE — 93005 ELECTROCARDIOGRAM TRACING: CPT | Performed by: INTERNAL MEDICINE

## 2019-04-16 PROCEDURE — 36415 COLL VENOUS BLD VENIPUNCTURE: CPT

## 2019-04-16 PROCEDURE — 93010 ELECTROCARDIOGRAM REPORT: CPT | Performed by: INTERNAL MEDICINE

## 2019-04-16 RX ORDER — SODIUM CHLORIDE 0.9 % (FLUSH) 0.9 %
10 SYRINGE (ML) INJECTION EVERY 12 HOURS SCHEDULED
Status: DISCONTINUED | OUTPATIENT
Start: 2019-04-16 | End: 2019-04-17 | Stop reason: HOSPADM

## 2019-04-16 RX ORDER — POTASSIUM CHLORIDE 20 MEQ/1
10 TABLET, EXTENDED RELEASE ORAL DAILY
Status: DISCONTINUED | OUTPATIENT
Start: 2019-04-16 | End: 2019-04-17 | Stop reason: HOSPADM

## 2019-04-16 RX ORDER — ACETAMINOPHEN 325 MG/1
650 TABLET ORAL EVERY 4 HOURS PRN
Status: DISCONTINUED | OUTPATIENT
Start: 2019-04-16 | End: 2019-04-17 | Stop reason: HOSPADM

## 2019-04-16 RX ORDER — 0.9 % SODIUM CHLORIDE 0.9 %
500 INTRAVENOUS SOLUTION INTRAVENOUS ONCE
Status: COMPLETED | OUTPATIENT
Start: 2019-04-16 | End: 2019-04-17

## 2019-04-16 RX ORDER — SODIUM CHLORIDE 0.9 % (FLUSH) 0.9 %
10 SYRINGE (ML) INJECTION PRN
Status: DISCONTINUED | OUTPATIENT
Start: 2019-04-16 | End: 2019-04-17 | Stop reason: HOSPADM

## 2019-04-16 RX ADMIN — Medication 10 ML: at 07:56

## 2019-04-16 RX ADMIN — Medication 10 ML: at 20:47

## 2019-04-16 RX ADMIN — SODIUM CHLORIDE 500 ML: 9 INJECTION, SOLUTION INTRAVENOUS at 20:41

## 2019-04-16 RX ADMIN — SERTRALINE 50 MG: 50 TABLET, FILM COATED ORAL at 07:53

## 2019-04-16 RX ADMIN — ATENOLOL 25 MG: 25 TABLET ORAL at 07:50

## 2019-04-16 RX ADMIN — ACETAMINOPHEN 650 MG: 325 TABLET ORAL at 18:25

## 2019-04-16 RX ADMIN — BUMETANIDE 2 MG: 1 TABLET ORAL at 10:08

## 2019-04-16 RX ADMIN — DEXTROSE MONOHYDRATE 2 G: 50 INJECTION, SOLUTION INTRAVENOUS at 01:42

## 2019-04-16 RX ADMIN — POTASSIUM CHLORIDE 20 MEQ: 20 TABLET, EXTENDED RELEASE ORAL at 07:51

## 2019-04-16 RX ADMIN — AMIODARONE HYDROCHLORIDE 200 MG: 200 TABLET ORAL at 07:51

## 2019-04-16 RX ADMIN — ACETAMINOPHEN 650 MG: 325 TABLET ORAL at 01:42

## 2019-04-16 RX ADMIN — LOSARTAN POTASSIUM 25 MG: 25 TABLET ORAL at 07:52

## 2019-04-16 RX ADMIN — ACETAMINOPHEN 650 MG: 325 TABLET ORAL at 08:06

## 2019-04-16 ASSESSMENT — PAIN SCALES - GENERAL
PAINLEVEL_OUTOF10: 0
PAINLEVEL_OUTOF10: 5
PAINLEVEL_OUTOF10: 4
PAINLEVEL_OUTOF10: 0
PAINLEVEL_OUTOF10: 3
PAINLEVEL_OUTOF10: 0

## 2019-04-16 NOTE — PLAN OF CARE
Problem: Discharge Planning:  Goal: Discharged to appropriate level of care  Description  Discharged to appropriate level of care  Note:   Plan is to go home at discharge. Problem: Pain - Acute:  Goal: Pain level will decrease  Description  Pain level will decrease  Note:   Pain improved with medication and ice. Problem: Pain:  Goal: Pain level will decrease  Description  Pain level will decrease  Note:   Pain improved with medication and ice. Goal: Control of acute pain  Description  Control of acute pain  Note:   Pain improved with medication and ice. Goal: Control of chronic pain  Description  Control of chronic pain  Note:   Pain improved with medication and ice. Problem: Tissue Perfusion - Cardiopulmonary, Altered:  Goal: Absence of angina  Description  Absence of angina  Note:   Pain improved with medication and ice. New pacemaker. AV node ablation in am.  Goal: Hemodynamic stability will improve  Description  Hemodynamic stability will improve  Note:   VS monitored. New pacemaker. AV node ablation in am.   Care plan reviewed with patient. Patient verbalize understanding of the plan of care and contribute to goal setting.

## 2019-04-16 NOTE — TELEPHONE ENCOUNTER
Attempted to contact the patientand do a KOLBY call. No answer and the VM is not set up. Will cont to try and contact the patient.

## 2019-04-16 NOTE — OP NOTE
[facility]  Sedation/Analgesia Post Sedation Record    Pt Name: Tiara Almazan  Account number: [de-identified]  MRN: [id number]  YOB: 1940  Procedure Performed By: Cherylene Pour MD [facility]  Primary Care Physician: Jason Hernandez MD  Date: 4/16/2019    POST-PROCEDURE    Physicians/Assistants: Cherylene Pour MD [facility]  Procedure Performed:     1- AV node RFA     Sedation/Anesthesia: Versed/ Fentanyl and 2% xylocaine local anesthesia. Estimated Blood Loss: < 10 ml. Specimens Removed: None       Disposition of Specimen: N/A      Complications: None Immediate      Post-procedure Diagnosis/Findings:     1- Atrial fibrillation with RVR  2- CAD  3- S/p CABG   4- S/p MAZE  5- S/p CECE clip  6- S/p MV and TV repairs. Recommendations:   1- No lifting > 10 lb for 10 days. 2- No swimming for 10 days. 3- No bath-tub or whirlpool for > 10 minutes for 10 days.   4-  Continue on same cardiac medications except amiodarone an ddigoxin  6- Follow up with Dr. Samir Calderón in 1-2 weeks               Cherylene Pour MD [facility]  Electronically signed 4/16/2019 at 5:07 PM

## 2019-04-16 NOTE — PLAN OF CARE
Problem: Discharge Planning:  Goal: Discharged to appropriate level of care  Description  Discharged to appropriate level of care  Note:   Plans to go home at discharge. Problem: Pain - Acute:  Goal: Pain level will decrease  Description  Pain level will decrease  Note:   Pain improved with medications. Problem: Pain:  Goal: Pain level will decrease  Description  Pain level will decrease  Note:   Pain improved with medications. Goal: Control of acute pain  Description  Control of acute pain  Note:   Pain improved with medications. Goal: Control of chronic pain  Description  Control of chronic pain  Note:   Pain improved with medications. Problem: Tissue Perfusion - Cardiopulmonary, Altered:  Goal: Absence of angina  Description  Absence of angina  Note:   Denies angina. Goal: Hemodynamic stability will improve  Description  Hemodynamic stability will improve  Note:   VS monitored. Care plan reviewed with patient. Patient verbalize understanding of the plan of care and contribute to goal setting.

## 2019-04-16 NOTE — PRE SEDATION
04/15/19 1400    sodium chloride flush 0.9 % injection 10 mL, 10 mL, Intravenous, 2 times per day, Ras Garsia MD    bacitracin 50,000 Units in sodium chloride 0.9 % 500 mL irrigation, 50,000 Units, Topical, Once, [address], MD    sodium chloride flush 0.9 % injection 10 mL, 10 mL, Intravenous, 2 times per day, [address] G[address] MD, 10 mL at 04/16/19 0756    sodium chloride flush 0.9 % injection 10 mL, 10 mL, Intravenous, PRN, [address] Garsia MD    acetaminophen (TYLENOL) tablet 650 mg, 650 mg, Oral, Q4H PRN, [address] Garsia MD, 650 mg at 04/16/19 0806    ondansetron (ZOFRAN) injection 4 mg, 4 mg, Intravenous, Q6H PRN, Ras Garsia MD    bumetanide (BUMEX) tablet 2 mg, 2 mg, Oral, Daily, Percy Dasilva MD, 2 mg at 04/16/19 1008    amiodarone (CORDARONE) tablet 200 mg, 200 mg, Oral, Daily, [address], MD, 200 mg at 04/16/19 0751    aspirin chewable tablet 81 mg, 81 mg, Oral, Daily, Percy Dasilva MD    atenolol (TENORMIN) tablet 25 mg, 25 mg, Oral, Daily, Percy Dasilva MD, 25 mg at 04/16/19 0750    clopidogrel (PLAVIX) tablet 75 mg, 75 mg, Oral, Daily, Percy Dasilva MD    digoxin (LANOXIN) tablet 125 mcg, 125 mcg, Oral, Daily, Percy Dasilva MD    losartan (COZAAR) tablet 25 mg, 25 mg, Oral, Daily, Percy Dasilva MD, 25 mg at 04/16/19 0469    sertraline (ZOLOFT) tablet 50 mg, 50 mg, Oral, Daily, Ras Garsia MD, 50 mg at 04/16/19 0753    therapeutic multivitamin-minerals 1 tablet, 1 tablet, Oral, Daily with breakfast, [address], MD  Prior to Admission medications    Medication Sig Start Date End Date Taking?  Authorizing Provider   atenolol (TENORMIN) 25 MG tablet Take 1 tablet by mouth daily 4/12/19  Yes [address] Garsia MD   amiodarone (CORDARONE) 200 MG tablet Take 1 tablet by mouth daily 4/12/19  Yes [address] Garsia MD   furosemide (LASIX) 20 MG tablet Take 1 tablet by mouth 2 times daily 3/11/19  Yes Carissa Ellsworth, APRN - CNP   potassium chloride (KLOR-CON M) 20 MEQ extended release tablet Take 1 tablet by mouth daily 3/11/19  Yes Audrey Thacker APRN - CNP   clopidogrel (PLAVIX) 75 MG tablet Take 1 tablet by mouth daily 3/1/19  Yes Viraj Shields MD   aspirin 81 MG tablet Take 81 mg by mouth daily   Yes Historical Provider, MD   digoxin (LANOXIN) 125 MCG tablet Take 125 mcg by mouth daily   Yes Historical Provider, MD   losartan (COZAAR) 25 MG tablet Take 25 mg by mouth daily   Yes Historical Provider, MD   sertraline (ZOLOFT) 50 MG tablet Take 1 tablet by mouth daily 2/18/19  Yes Michelle Candelaria MD   Multiple Vitamins-Minerals (THERAPEUTIC MULTIVITAMIN-MINERALS) tablet Take 1 tablet by mouth daily (with breakfast) 11/20/18  Yes Aniceto Beauchamp PA-C   acetaminophen (TYLENOL) 325 MG tablet Take 1 tablet by mouth every 4 hours as needed for Pain 11/20/18   Aniceto Beauchamp PA-C     Additional information:       VITAL SIGNS   Vitals:    04/16/19 1346   BP: 125/72   Pulse:    Resp:    Temp:    SpO2:        PHYSICAL:   General: No acute distress  HEENT:  Unremarkable for age  Neck: without increased JVD, carotid pulses 2+ bilaterally without bruits  Heart: RRR, S1 & S2 WNL, S4 gallop, without murmurs or rubs    Lungs: Clear to auscultation    Abdomen: BS present, without HSM, masses, or tenderness    Extremities: without C,C,E.  Pulses 2+ bilaterally  Mental Status: Alert & Oriented        PLANNED PROCEDURE   [x]AV node RFA  SEDATION  Planned agent:[x]Midazolam  [x]Sublimaze [x]Lidoacine 1%    ASA Classification:  []1 []2 [x]3 [x]4 []5  Class 1: A normal healthy patient  Class 2: Pt with mild to moderate systemic disease  Class 3: Severe systemic disease or disturbance  Class 4: Severe systemic disorders that are already life threatening. Class 5: Moribund pt with little chances of survival, for more than 24 hours. Mallampati I Airway Classification:   []1 []2 [x]3 []4    [x]Pre-procedure diagnostic studies complete and results available.    Comment:    [x]Previous sedation/anesthesia experiences assessed. Comment:    [x]The patient is an appropriate candidate to undergo the planned procedure sedation and anesthesia. (Refer to nursing sedation/analgesia documentation record)  [x]Formulation and discussion of sedation/procedure plan, risks, and expectations with patient and/or responsible adult completed. [x]Patient examined immediately prior to the procedure.  (Refer to nursing sedation/analgesia documentation record)    Zara Eller MD [facility] [place]  Electronically signed 4/16/2019 at 3:55 PM

## 2019-04-17 VITALS
OXYGEN SATURATION: 96 % | TEMPERATURE: 98.8 F | RESPIRATION RATE: 18 BRPM | BODY MASS INDEX: 18.73 KG/M2 | SYSTOLIC BLOOD PRESSURE: 90 MMHG | HEIGHT: 65 IN | DIASTOLIC BLOOD PRESSURE: 56 MMHG | HEART RATE: 50 BPM | WEIGHT: 112.4 LBS

## 2019-04-17 LAB
EKG ATRIAL RATE: 78 BPM
EKG Q-T INTERVAL: 422 MS
EKG QRS DURATION: 136 MS
EKG QTC CALCULATION (BAZETT): 486 MS
EKG R AXIS: 71 DEGREES
EKG T AXIS: -89 DEGREES
EKG VENTRICULAR RATE: 80 BPM

## 2019-04-17 PROCEDURE — 2580000003 HC RX 258: Performed by: INTERNAL MEDICINE

## 2019-04-17 PROCEDURE — 6370000000 HC RX 637 (ALT 250 FOR IP): Performed by: INTERNAL MEDICINE

## 2019-04-17 PROCEDURE — 93005 ELECTROCARDIOGRAM TRACING: CPT | Performed by: INTERNAL MEDICINE

## 2019-04-17 PROCEDURE — 93010 ELECTROCARDIOGRAM REPORT: CPT | Performed by: INTERNAL MEDICINE

## 2019-04-17 PROCEDURE — 2709999900 HC NON-CHARGEABLE SUPPLY

## 2019-04-17 RX ADMIN — LOSARTAN POTASSIUM 25 MG: 25 TABLET ORAL at 13:02

## 2019-04-17 RX ADMIN — ASPIRIN 81 MG: 81 TABLET, CHEWABLE ORAL at 09:11

## 2019-04-17 RX ADMIN — ATENOLOL 25 MG: 25 TABLET ORAL at 09:09

## 2019-04-17 RX ADMIN — Medication 10 ML: at 11:36

## 2019-04-17 RX ADMIN — BUMETANIDE 2 MG: 1 TABLET ORAL at 09:12

## 2019-04-17 RX ADMIN — CLOPIDOGREL BISULFATE 75 MG: 75 TABLET ORAL at 09:10

## 2019-04-17 RX ADMIN — POTASSIUM CHLORIDE 10 MEQ: 20 TABLET, EXTENDED RELEASE ORAL at 09:11

## 2019-04-17 RX ADMIN — SERTRALINE 50 MG: 50 TABLET, FILM COATED ORAL at 09:09

## 2019-04-17 ASSESSMENT — PAIN SCALES - GENERAL
PAINLEVEL_OUTOF10: 0
PAINLEVEL_OUTOF10: 0

## 2019-04-17 NOTE — PLAN OF CARE
Problem: Discharge Planning:  Goal: Discharged to appropriate level of care  Description  Discharged to appropriate level of care  4/17/2019 1045 by Sony Valle RN  Outcome: Ongoing  Note:   Patient plans to be discharged home today   4/17/2019 0159 by Sandra Box RN  Outcome: Ongoing  Note:   Patient in 9U55 from home. Plans to go home when ready for discharge. Problem: Pain - Acute:  Goal: Pain level will decrease  Description  Pain level will decrease  4/17/2019 1045 by Sony Valle RN  Outcome: Ongoing  Note:   Patient denies pain, will continue to monitor   4/17/2019 0159 by Sandra Box RN  Outcome: Ongoing  Note:   Patient having no complaints of pain this shift, will continue to monitor. Patient's stated pain goal is 5 out of 10. Problem: Pain:  Goal: Pain level will decrease  Description  Pain level will decrease  4/17/2019 1045 by Sony Valle RN  Outcome: Ongoing  Note:   Patient denies pain, will continue to monitor   4/17/2019 0159 by Sandra Box RN  Outcome: Ongoing  Note:   Patient having no complaints of pain this shift, will continue to monitor. Patient's stated pain goal is 5 out of 10. Goal: Control of acute pain  Description  Control of acute pain  Outcome: Ongoing  Goal: Control of chronic pain  Description  Control of chronic pain  Outcome: Ongoing     Problem: Tissue Perfusion - Cardiopulmonary, Altered:  Goal: Absence of angina  Description  Absence of angina  Outcome: Ongoing  Goal: Hemodynamic stability will improve  Description  Hemodynamic stability will improve  4/17/2019 1045 by Sony Valle RN  Outcome: Ongoing  Note:   Right femoral dressing clean/dry/intact. Heart monitor on, CPM   4/17/2019 0159 by Sandra Box RN  Outcome: Ongoing  Note:   Patient's blood pressure at 2000 assessment 80/48, 500 mls bolus given, blood pressure at 0000 97/57. Will continue to monitor. Care plan reviewed with patient.   Patient verbalize understanding of the plan of care and contribute to goal setting.

## 2019-04-17 NOTE — PROCEDURES
[address] [place] [address]                                 PROCEDURE NOTE    PATIENT NAME: Valeria Reese                    :        1940  MED REC NO:   [id number]                           ROOM:       0030  ACCOUNT NO:   [de-identified]                           ADMIT DATE: 04/15/2019  PROVIDER:     Danii Muller M.D.    Khalida Jones:  2019    REFERRING PROVIDER:  Christine Torres. Ml Spencer M.D.    Juan Jose Gonzales:  5:01 p.m. OPERATING SURGEON:  Danii Muller M.D. PREOPERATIVE DIAGNOSES:  1. Atrial fibrillation with rapid ventricular response. 2.  Status post coronary artery bypass graft. 3.  Status post mitral valve repair. 4.  Status post tricuspid valve repair with ring. 5.  Status post Maze surgery. 6.  Status post LAD, RCA, PCI as well who was found to have atrial  fibrillation with rapid ventricular response after her surgery back in  2018. The patient has been having tachycardia with cardiomyopathy with EF of  about 40% to 45%; therefore, Dr. Ml Spencer implanted single lead  pacemaker, and the patient was referred for AV node ablation. At  baseline, the patient had SV at 55 msec from the right side mapping  through the tricuspid valve. Unfortunately, due to the ring, it was  difficult to ablate the AV node, therefore, we went retrograde to the  aortic and we have good mapping of the His Purkinje system, and with  ablation over 10 to 12 seconds, the patient has AV block. The patient  was programmed to final programming of VVIR 80 to 120 and this will be  adjusted in one month to VVIR 127584, then VVIR 24590 in two months. The patient tolerated the procedure well. COMPLICATIONS:  None. IV FLUID:  Normal saline. ESTIMATED BLOOD LOSS:  None. Thanks for letting us participate in the management of this patient. If  you have any questions or concerns, please let us know.         KENDALL Thibodeaux:

## 2019-04-17 NOTE — FLOWSHEET NOTE
04/16/19 2025   Provider Notification   Reason for Communication New orders   Provider Name [facility]. [facility]   Provider Notification Physician   Method of Communication Face to face   Response In department       Updated [facility] on patient's low blood pressure; orders for 500 mls bolus to infuse and 2D echo for morning. If blood pressure does not increase call Dr. [facility] and get a stat echo. Orders placed.

## 2019-04-17 NOTE — PROGRESS NOTES
Discharge teaching and instructions for diagnosis/procedure of paemaker completed with patient using teachback method. AVS reviewed. Printed prescriptions given to patient. Patient voiced understanding regarding prescriptions, follow up appointments, and care of self at home.  Discharged in a wheelchair to  independent living per family
Pt admitted to  2E07 per wheelchair for pacemaker implant . Pt NPO. Patient accompanied by daughter. Vital signs obtained. Assessment and data collection initiated. Oriented to room. Policies and procedures for 2E explained   All questions answered with no further questions at this time. Fall prevention and safety precautions discussed with patient. Care plan reviewed with patient and family. Patient and family verbalize understanding of the plan of care and contribute to goal setting.     0800 to procedure per bed
Transferred to 8B30 per bed per transport team with personal belongings, clothes, home meds    Resting comfortable   Daughter with pt
RBC 2.92 04/15/2019    HGB 8.3 04/16/2019    HCT 26.1 04/16/2019    MCV 96.9 04/15/2019    MCH 28.8 04/15/2019    MCHC 29.7 04/15/2019     04/15/2019    MPV 10.8 04/15/2019     BMP  Lab Results   Component Value Date     04/15/2019    K 5.1 04/15/2019    K 4.6 04/11/2019     04/15/2019    CO2 24 04/15/2019    BUN 26 04/15/2019    CREATININE 0.8 04/15/2019    CALCIUM 8.8 04/15/2019      COAG PROFILE:  Lab Results   Component Value Date    APTT 27.6 11/12/2018    INR 0.98 02/28/2019       Assessment:     Active Problems:    Pacemaker  Resolved Problems:    * No resolved hospital problems.  *      Plan:     *PATIENT S/P AV NODE ABLATION    HAS LOW BP    WILL GET ECHO    WILL GIVE IV FLUIDS    CONTINUE SUPPORTIVE RX    HOME SOON IF STABLE **

## 2019-04-17 NOTE — PLAN OF CARE
Problem: Discharge Planning:  Goal: Discharged to appropriate level of care  Description  Discharged to appropriate level of care  4/17/2019 0159 by Junior Mario RN  Outcome: Ongoing  Note:   Patient in 8Y26 from home. Plans to go home when ready for discharge. Problem: Pain - Acute:  Goal: Pain level will decrease  Description  Pain level will decrease  4/17/2019 0159 by Junior Mario RN  Outcome: Ongoing  Note:   Patient having no complaints of pain this shift, will continue to monitor. Patient's stated pain goal is 5 out of 10. Problem: Pain:  Goal: Pain level will decrease  Description  Pain level will decrease  4/17/2019 0159 by Junior Mario RN  Outcome: Ongoing  Note:   Patient having no complaints of pain this shift, will continue to monitor. Patient's stated pain goal is 5 out of 10. Problem: Tissue Perfusion - Cardiopulmonary, Altered:  Goal: Hemodynamic stability will improve  Description  Hemodynamic stability will improve  4/17/2019 0159 by Junior Mario RN  Outcome: Ongoing  Note:   Patient's blood pressure at 2000 assessment 80/48, 500 mls bolus given, blood pressure at 0000 97/57. Will continue to monitor. Care plan reviewed with patient. Patient verbalizes understanding of the plan of care and contribute to goal setting.

## 2019-04-18 ENCOUNTER — TELEPHONE (OUTPATIENT)
Dept: FAMILY MEDICINE CLINIC | Age: 79
End: 2019-04-18

## 2019-04-18 NOTE — DISCHARGE SUMMARY
[address] [place] [address]                               DISCHARGE SUMMARY    PATIENT NAME: Reno Frazier                    :        1940  MED REC NO:   [id number]                           ROOM:       0030  ACCOUNT NO:   [de-identified]                           ADMIT DATE: 04/15/2019  PROVIDER:     Luis Fernández M.D.               Leahmartha Sharmael: 2019    FINAL DIAGNOSES:  1. Atherosclerotic coronary artery disease with multiple interventions. 2.  Multivalvular heart disease and recent surgery. 3.  Intervention of the RCA and intervention of the LAD. 4.  The placement of a pacemaker for bradycardia-tachycardia syndrome. 5.  AV node ablation treatment for atrial fib with rapid ventricular  response, uncontrolled with medication. 6.  Anemia. 7.  Episode of hypotension. 8.  Anxiety. PROCEDURES PERFORMED DURING THIS HOSPITALIZATION:  1. Insertion of a single-chamber pacemaker device utilizing a AQS  MRI-compatible device. 2.  AV node ablation treatment by Dr. Robert Palomo. HOSPITAL COURSE:  This is a patient who is a 73-year-old nice lady with  a history of extensive coronary artery disease, history of mitral valve  repair, tricuspid repair with a past history of CABG. The patient has  extensive coronary artery disease and Rotablator intervention of the RCA  and the LAD. The patient has a history of LIMA to the LAD which _____  renal function. The patient had Rotablator surgery for _____ LAD. The  patient had atrial fib with rapid ventricular response, very hard to  control the rate. She has a bradycardia-tachycardia syndrome. She had  a pacemaker implantation. She had an AV node ablation. The patient has  history of congestive heart failure. She had episode of hypotension. The patient tolerated the procedure well. The patient was ambulating  and the patient continued to be stable.   The patient is pacemaker  dependent now. The patient was discharged home. She will be seen in  the office in 7 to 10 days. She is to seek medical attention if she has  any change in clinical condition. She was discharged home in a stable  condition. She was given wound care instruction and the patient to  follow up with family physician.         Alonzo Camacho M.D.    D: 04/17/2019 13:09:13       T: 04/17/2019 23:02:45     [email or web address]  Job#: [id number]     Doc#: [id number]    CC:

## 2019-05-15 ENCOUNTER — OFFICE VISIT (OUTPATIENT)
Dept: FAMILY MEDICINE CLINIC | Age: 79
End: 2019-05-15
Payer: MEDICARE

## 2019-05-15 VITALS
SYSTOLIC BLOOD PRESSURE: 126 MMHG | RESPIRATION RATE: 16 BRPM | DIASTOLIC BLOOD PRESSURE: 82 MMHG | WEIGHT: 116.4 LBS | HEART RATE: 80 BPM | BODY MASS INDEX: 19.37 KG/M2

## 2019-05-15 DIAGNOSIS — I25.810 CORONARY ARTERY DISEASE INVOLVING CORONARY BYPASS GRAFT OF NATIVE HEART WITHOUT ANGINA PECTORIS: Primary | ICD-10-CM

## 2019-05-15 DIAGNOSIS — Z98.890 STATUS POST TRICUSPID VALVE REPAIR: ICD-10-CM

## 2019-05-15 DIAGNOSIS — Z95.3 S/P MITRAL VALVE REPLACEMENT WITH BIOPROSTHETIC VALVE: ICD-10-CM

## 2019-05-15 DIAGNOSIS — Z79.899 ENCOUNTER FOR MEDICATION MANAGEMENT: ICD-10-CM

## 2019-05-15 DIAGNOSIS — I48.91 ATRIAL FIBRILLATION WITH RVR (HCC): ICD-10-CM

## 2019-05-15 DIAGNOSIS — R53.81 PHYSICAL DECONDITIONING: ICD-10-CM

## 2019-05-15 DIAGNOSIS — Z78.0 POST-MENOPAUSAL: ICD-10-CM

## 2019-05-15 PROCEDURE — G8427 DOCREV CUR MEDS BY ELIG CLIN: HCPCS | Performed by: FAMILY MEDICINE

## 2019-05-15 PROCEDURE — 1123F ACP DISCUSS/DSCN MKR DOCD: CPT | Performed by: FAMILY MEDICINE

## 2019-05-15 PROCEDURE — 1036F TOBACCO NON-USER: CPT | Performed by: FAMILY MEDICINE

## 2019-05-15 PROCEDURE — G8598 ASA/ANTIPLAT THER USED: HCPCS | Performed by: FAMILY MEDICINE

## 2019-05-15 PROCEDURE — G8420 CALC BMI NORM PARAMETERS: HCPCS | Performed by: FAMILY MEDICINE

## 2019-05-15 PROCEDURE — 99214 OFFICE O/P EST MOD 30 MIN: CPT | Performed by: FAMILY MEDICINE

## 2019-05-15 PROCEDURE — 4040F PNEUMOC VAC/ADMIN/RCVD: CPT | Performed by: FAMILY MEDICINE

## 2019-05-15 PROCEDURE — 1090F PRES/ABSN URINE INCON ASSESS: CPT | Performed by: FAMILY MEDICINE

## 2019-05-15 PROCEDURE — G8400 PT W/DXA NO RESULTS DOC: HCPCS | Performed by: FAMILY MEDICINE

## 2019-05-15 ASSESSMENT — ENCOUNTER SYMPTOMS
GASTROINTESTINAL NEGATIVE: 1
SHORTNESS OF BREATH: 0
RESPIRATORY NEGATIVE: 1

## 2019-05-15 NOTE — PROGRESS NOTES
Chronic Disease Visit Information    BP Readings from Last 3 Encounters:   05/15/19 126/82   04/17/19 (!) 90/56   04/12/19 109/67          LDL Calculated (mg/dL)   Date Value   04/11/2019 86     HDL (mg/dL)   Date Value   04/11/2019 79     BUN (mg/dL)   Date Value   04/15/2019 26 (H)     CREATININE (mg/dL)   Date Value   04/15/2019 0.8     Glucose (mg/dL)   Date Value   04/15/2019 109 (H)            Have you changed or started any medications since your last visit including any over-the-counter medicines, vitamins, or herbal medicines? no   Are you having any side effects from any of your medications? -  no  Have you stopped taking any of your medications? Is so, why? -  yes - see updated med list.    Have you seen any other physician or provider since your last visit? Yes - Records Requested  Have you had any other diagnostic tests since your last visit? Yes - Records Obtained  Have you been seen in the emergency room and/or had an admission to a hospital since we last saw you? Yes - Records Obtained  Have you had your annual diabetic retinal (eye) exam? N/A  Have you had your routine dental cleaning in the past 6 months? n/a    Have you activated your [employer] account? If not, what are your barriers?  No: declined     Patient Care Team:  Mitch Maier MD as PCP - General (Family Medicine)         Medical History Review  Past Medical, Family, and Social History reviewed and does contribute to the patient presenting condition    Health Maintenance   Topic Date Due    DTaP/Tdap/Td vaccine (1 - Tdap) 02/22/1961    Shingles Vaccine (1 of 2) 02/22/1992    DEXA (modify frequency per FRAX score)  02/22/2007    Pneumococcal 65+ years Vaccine (1 of 2 - PCV13) 02/22/2007    Flu vaccine (Season Ended) 09/01/2019    Potassium monitoring  04/15/2020    Creatinine monitoring  04/15/2020

## 2019-05-15 NOTE — PATIENT INSTRUCTIONS
You may receive a survey about your visit with us today. The feedback from our patients helps us identify what is working well and where the service to all patients can be enhanced. Thank you! Continue present medications. Increase walking to increase strength and endurance.

## 2019-05-15 NOTE — PROGRESS NOTES
Subjective:      Patient ID: Shantal Almazan is a 70 y.o. female. HPI  Follow up of chronic conditions. Encounter Diagnoses   Name Primary? 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  Atrial fibrillation with RVR ([address] treated with ablation, April, 2019, pacemaker inserted 5 days prior.  Physical deconditioning     Encounter for medication management      Alejandra Nunez is here for follow-up after having multiple cardiac procedures done in the last 6 months. She has had both mitral valve and tricuspid valve repair and replacement. She was admitted last month with atrial fibrillation and rapid ventricular response which ultimately was treated with an ablation therapy followed by pacemaker insertion. She also had a 90% stenosis of the LAD that was cleared with a \"Roto-Rooter\" followed by stent placement. The stenosis was completely opened to 0% obstruction. She has had no subsequent angina. She is now been released for physical activity and is not going through cardiac rehab but has been told to start walking. She has a strong motivation and desire to get back on the golf course I believe she will pursue increasing her own strength and endurance. She is here with her sister-in-law who confirms that Oksana Posada is headstrong\" so there is no doubt she will achieve her goal now that her heart has been repaired. She continues to live independent and up to this point had been still doing all of her yard work which her children basically had to stop by not filling her mower with gas or oil for a while. She does not have a car because it was at leased vehicle in her 's name so now she will be getting her vehicle again and she will be driving on her own.     Patient is gradually putting some weight back on after present. No thyromegaly present. Cardiovascular: Normal rate, regular rhythm, S1 normal, S2 normal and normal pulses. No extrasystoles are present. Exam reveals no gallop. No murmur heard. Pulmonary/Chest: Effort normal. She has no wheezes. She has no rhonchi. She has no rales. Abdominal: Soft. Musculoskeletal: She exhibits no edema. Lymphadenopathy:     She has no cervical adenopathy. Neurological: She is alert and oriented to person, place, and time. Skin: Skin is warm and dry. Psychiatric: She has a normal mood and affect. Nursing note and vitals reviewed. Assessment:       Diagnosis Orders   1. Coronary artery disease involving coronary bypass graft of native heart without angina pectoris     2. Post-menopausal  DEXA BONE DENSITY AXIAL SKELETON   3. S/P mitral valve replacement with bioprosthetic valve     4. 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 5. Atrial fibrillation with RVR ([address] treated with ablation, April, 2019, pacemaker inserted 5 days prior. 6. Physical deconditioning     7.  Encounter for medication management             Plan:      Orders Placed This Encounter   Procedures    DEXA BONE DENSITY AXIAL SKELETON     Standing Status:   Future     Standing Expiration Date:   5/15/2020     Medications Discontinued During This Encounter   Medication Reason    apixaban (ELIQUIS) 2.5 MG TABS tablet LIST CLEANUP     Current Outpatient Medications   Medication Sig Dispense Refill    atenolol (TENORMIN) 25 MG tablet Take 1 tablet by mouth daily 30 tablet 3    furosemide (LASIX) 20 MG tablet Take 1 tablet by mouth 2 times daily 30 tablet 5    potassium chloride (KLOR-CON M) 20 MEQ extended release tablet Take 1 tablet by mouth daily 30 tablet 5    clopidogrel (PLAVIX) 75 MG tablet Take 1 tablet by mouth daily 30 tablet 3    aspirin 81 MG tablet Take 81 mg by mouth daily      sertraline (ZOLOFT) 50 MG tablet Take 1 tablet by mouth daily 30 tablet 5    acetaminophen

## 2019-05-22 ENCOUNTER — HOSPITAL ENCOUNTER (OUTPATIENT)
Dept: WOMENS IMAGING | Age: 79
Discharge: HOME | End: 2019-05-22
Payer: MEDICARE

## 2019-05-22 DIAGNOSIS — Z78.0 POST-MENOPAUSAL: ICD-10-CM

## 2019-05-22 PROCEDURE — 77080 DXA BONE DENSITY AXIAL: CPT

## 2019-05-23 DIAGNOSIS — M81.0 AGE-RELATED OSTEOPOROSIS WITHOUT CURRENT PATHOLOGICAL FRACTURE: Primary | ICD-10-CM

## 2019-06-05 ENCOUNTER — OFFICE VISIT (OUTPATIENT)
Dept: RHEUMATOLOGY | Age: 79
End: 2019-06-05
Payer: MEDICARE

## 2019-06-05 ENCOUNTER — NURSE ONLY (OUTPATIENT)
Dept: LAB | Age: 79
End: 2019-06-05

## 2019-06-05 VITALS
HEIGHT: 65 IN | BODY MASS INDEX: 19.39 KG/M2 | WEIGHT: 116.4 LBS | SYSTOLIC BLOOD PRESSURE: 123 MMHG | HEART RATE: 84 BPM | DIASTOLIC BLOOD PRESSURE: 84 MMHG

## 2019-06-05 DIAGNOSIS — M81.0 AGE-RELATED OSTEOPOROSIS WITHOUT CURRENT PATHOLOGICAL FRACTURE: ICD-10-CM

## 2019-06-05 DIAGNOSIS — R53.83 OTHER FATIGUE: ICD-10-CM

## 2019-06-05 DIAGNOSIS — M81.0 AGE-RELATED OSTEOPOROSIS WITHOUT CURRENT PATHOLOGICAL FRACTURE: Primary | ICD-10-CM

## 2019-06-05 LAB
C-REACTIVE PROTEIN: 0.04 MG/DL (ref 0–1)
MAGNESIUM: 2.5 MG/DL (ref 1.6–2.4)
PHOSPHORUS: 4.6 MG/DL (ref 2.4–4.7)
SEDIMENTATION RATE, ERYTHROCYTE: 24 MM/HR (ref 0–20)
TSH SERPL DL<=0.05 MIU/L-ACNC: 1.75 UIU/ML (ref 0.4–4.2)
VITAMIN D 25-HYDROXY: 36 NG/ML (ref 30–100)

## 2019-06-05 PROCEDURE — 1123F ACP DISCUSS/DSCN MKR DOCD: CPT | Performed by: NURSE PRACTITIONER

## 2019-06-05 PROCEDURE — G8399 PT W/DXA RESULTS DOCUMENT: HCPCS | Performed by: NURSE PRACTITIONER

## 2019-06-05 PROCEDURE — 1090F PRES/ABSN URINE INCON ASSESS: CPT | Performed by: NURSE PRACTITIONER

## 2019-06-05 PROCEDURE — G8420 CALC BMI NORM PARAMETERS: HCPCS | Performed by: NURSE PRACTITIONER

## 2019-06-05 PROCEDURE — 1036F TOBACCO NON-USER: CPT | Performed by: NURSE PRACTITIONER

## 2019-06-05 PROCEDURE — 99204 OFFICE O/P NEW MOD 45 MIN: CPT | Performed by: NURSE PRACTITIONER

## 2019-06-05 PROCEDURE — G8598 ASA/ANTIPLAT THER USED: HCPCS | Performed by: NURSE PRACTITIONER

## 2019-06-05 PROCEDURE — 4040F PNEUMOC VAC/ADMIN/RCVD: CPT | Performed by: NURSE PRACTITIONER

## 2019-06-05 PROCEDURE — G8427 DOCREV CUR MEDS BY ELIG CLIN: HCPCS | Performed by: NURSE PRACTITIONER

## 2019-06-05 ASSESSMENT — ENCOUNTER SYMPTOMS
CONSTIPATION: 0
VOMITING: 0
BACK PAIN: 1
TROUBLE SWALLOWING: 0
EYE ITCHING: 0
COUGH: 0
EYE REDNESS: 0
DIARRHEA: 0
SHORTNESS OF BREATH: 1
NAUSEA: 0
EYE PAIN: 0

## 2019-06-05 NOTE — PROGRESS NOTES
[facility]  Bone Fragility Adult Consult/Referral     Visit Date: 6/5/2019  MRN: [id number]  Cc:   Chief Complaint   Patient presents with    New Patient     OSTEOPOROSIS          HPI:   Gerry Almazan  is a(n)79 y.o. female with a hx of HTN, HLP, CHF, CAD, afib here today as a consultation from Dr. Gillian Mars for the evaluation of Osteoporosis. She was diagnosed with osteoporosis after bone density scan on 5/22/2019 revealed T score -3.80 at left femur. Patient has history of right clavicular fracture 15 years ago, but otherwise no other fractures. Denies family history of osteoporosis or fracture. Not sure if hysterectomy was partial or total, thinks total, around the age of 30. She is . Denies any current or history of alcohol or tobacco use. She denies any caffeine intake. She drinks 1 bottle of ensure daily and also regularly consumes chocolate milk. Eats cheese several times per week. She just recently started taking vitamin D, unsure on how much. Denies any frequent falls. She does not exercise regularly.         + Pet, - throw rugs, + nightlights, +  railing in bathroom, - falls or near fall, - muscle weakness      ROS:  Review of Systems   Constitutional: Positive for fatigue. Negative for chills and fever. HENT: Negative for congestion and trouble swallowing. Eyes: Negative for pain, redness and itching. Respiratory: Positive for shortness of breath (with increased activity). Negative for cough. Cardiovascular: Negative for chest pain and leg swelling. Gastrointestinal: Negative for constipation, diarrhea, nausea and vomiting. Endocrine: Negative for cold intolerance and heat intolerance. Genitourinary: Negative for difficulty urinating and urgency. Musculoskeletal: Positive for back pain. Negative for arthralgias and joint swelling. Skin: Negative for rash and wound. Neurological: Negative for weakness, numbness and headaches.    Hematological: Does not bruise/bleed easily. Psychiatric/Behavioral: Negative for sleep disturbance. The patient is not nervous/anxious. PAST MEDICAL HISTORY  Past Medical History:   Diagnosis Date    Atrial fibrillation ([address]     CAD (coronary artery disease)     Congestive heart failure (CHF) (HCC)     Hyperlipidemia     Hypertension        SOCIAL HISTORY  Social History     Socioeconomic History    Marital status:       Spouse name: None    Number of children: 1    Years of education: None    Highest education level: None   Occupational History    None   Social Needs    Financial resource strain: None    Food insecurity:     Worry: None     Inability: None    Transportation needs:     Medical: None     Non-medical: None   Tobacco Use    Smoking status: Never Smoker    Smokeless tobacco: Never Used   Substance and Sexual Activity    Alcohol use: No    Drug use: No    Sexual activity: Not Currently   Lifestyle    Physical activity:     Days per week: None     Minutes per session: None    Stress: None   Relationships    Social connections:     Talks on phone: None     Gets together: None     Attends Sikh service: None     Active member of club or organization: None     Attends meetings of clubs or organizations: None     Relationship status: None    Intimate partner violence:     Fear of current or ex partner: None     Emotionally abused: None     Physically abused: None     Forced sexual activity: None   Other Topics Concern    None   Social History Narrative    None       FAMILY HISTORY  Family History   Problem Relation Age of Onset    Other Mother         alzheimers    Heart Attack Father     Heart Attack Brother        SURGICAL HISTORY  Past Surgical History:   Procedure Laterality Date    ATRIAL ABLATION SURGERY      CARDIAC CATHETERIZATION      CARDIAC VALVE REPLACEMENT      CORONARY ANGIOPLASTY WITH STENT PLACEMENT  02/28/2019    CORONARY ARTERY BYPASS GRAFT      x1    FROM, No Synovitis. [place] and william's nodes L>R    Lower Extremities:   Muscle strength 5/5, FROM, No Synovitis     Neuro: CN II-XII grosslyintact, DTR's 2/4 bilat and equal  Psych: Oriented to person, place, time. No anxiety or agitation. Skin: Warm and dry. No nodule on exposed extremities. No rash on exposed extremities. Lymph: No cervical LAD. No supraclavicular LAD. Labs:  CBC  Lab Results   Component Value Date    WBC 9.2 04/15/2019    RBC 2.92 04/15/2019    HGB 8.3 04/16/2019    HCT 26.1 04/16/2019    MCV 96.9 04/15/2019    MCH 28.8 04/15/2019    MCHC 29.7 04/15/2019     04/15/2019       CMP  Lab Results   Component Value Date    CALCIUM 8.8 04/15/2019    LABALBU 3.8 04/11/2019    PROT 7.4 04/11/2019     04/15/2019    K 5.1 04/15/2019    K 4.6 04/11/2019    CO2 24 04/15/2019     04/15/2019    BUN 26 04/15/2019    CREATININE 0.8 04/15/2019    ALKPHOS 120 04/11/2019    ALT 21 04/11/2019    AST 26 04/11/2019       Lab Results   Component Value Date    TSH 2.640 11/04/2018       Lab Results   Component Value Date    CALCIUM 8.8 04/15/2019    MG 2.5 (H) 11/14/2018     Lab Results   Component Value Date     04/15/2019    K 5.1 04/15/2019     04/15/2019    CO2 24 04/15/2019    BUN 26 (H) 04/15/2019    CREATININE 0.8 04/15/2019    GLUCOSE 109 (H) 04/15/2019    CALCIUM 8.8 04/15/2019    PROT 7.4 04/11/2019    LABALBU 3.8 04/11/2019    BILITOT 0.3 04/11/2019    ALKPHOS 120 04/11/2019    AST 26 04/11/2019    ALT 21 04/11/2019         RADIOLOGY:     DEXA 5/22/2019  Left hip: -3.80  Right hip: -3.60  Lumbar spine: -3.40      Assessment and plan:  Osteoporosis, postmenopausal  - A 70year old  female diagnosed with osteoporosis by recent DEXA on 5/22/2019 with T score -3.80 at left femoral neck. Patient with history of right clavicular fracture 15 years ago, otherwise no other fractures. No family history of osteoporosis or fracture.  Has not been on any prior

## 2019-06-06 ENCOUNTER — TELEPHONE (OUTPATIENT)
Dept: RHEUMATOLOGY | Age: 79
End: 2019-06-06

## 2019-06-06 DIAGNOSIS — M81.0 AGE-RELATED OSTEOPOROSIS WITHOUT CURRENT PATHOLOGICAL FRACTURE: ICD-10-CM

## 2019-06-06 RX ORDER — DIPHENHYDRAMINE HYDROCHLORIDE 50 MG/ML
50 INJECTION INTRAMUSCULAR; INTRAVENOUS ONCE
Status: CANCELLED | OUTPATIENT
Start: 2019-06-06

## 2019-06-06 RX ORDER — SODIUM CHLORIDE 9 MG/ML
INJECTION, SOLUTION INTRAVENOUS CONTINUOUS
Status: CANCELLED | OUTPATIENT
Start: 2019-06-06

## 2019-06-06 RX ORDER — METHYLPREDNISOLONE SODIUM SUCCINATE 125 MG/2ML
125 INJECTION, POWDER, LYOPHILIZED, FOR SOLUTION INTRAMUSCULAR; INTRAVENOUS ONCE
Status: CANCELLED | OUTPATIENT
Start: 2019-06-06

## 2019-06-06 RX ORDER — 0.9 % SODIUM CHLORIDE 0.9 %
10 VIAL (ML) INJECTION ONCE
Status: CANCELLED | OUTPATIENT
Start: 2019-06-06

## 2019-06-07 ENCOUNTER — NURSE ONLY (OUTPATIENT)
Dept: LAB | Age: 79
End: 2019-06-07

## 2019-06-07 DIAGNOSIS — M81.0 AGE-RELATED OSTEOPOROSIS WITHOUT CURRENT PATHOLOGICAL FRACTURE: ICD-10-CM

## 2019-06-07 LAB
CALCIUM URINE: 160 MG/24HR (ref 100–240)
CALCIUM URINE: 18.8 MG/DL
CREATININE URINE: 0.6 GM/24HR
CREATININE URINE: 67.6 MG/DL
HOURS COLLECTED: 24 HRS
SODIUM URINE: 63 MEQ/24HR (ref 75–200)
SODIUM URINE: 74 MEQ/L
URINE VOLUME MEASURE: 850 ML
URINE VOLUME, 24 HOUR: 850 ML
URINE VOLUME, 24 HOUR: 850 ML

## 2019-06-11 NOTE — TELEPHONE ENCOUNTER
Patient called and notified. She voiced understanding. She will call to schedule her Prolia. Phone number provided to central scheduling.

## 2019-06-14 ENCOUNTER — HOSPITAL ENCOUNTER (OUTPATIENT)
Dept: NURSING | Age: 79
Discharge: HOME | End: 2019-06-14
Payer: MEDICARE

## 2019-06-14 VITALS
DIASTOLIC BLOOD PRESSURE: 62 MMHG | SYSTOLIC BLOOD PRESSURE: 138 MMHG | OXYGEN SATURATION: 100 % | HEART RATE: 78 BPM | RESPIRATION RATE: 16 BRPM | WEIGHT: 116 LBS | TEMPERATURE: 98.2 F | BODY MASS INDEX: 19.3 KG/M2

## 2019-06-14 DIAGNOSIS — M81.0 AGE-RELATED OSTEOPOROSIS WITHOUT CURRENT PATHOLOGICAL FRACTURE: Primary | ICD-10-CM

## 2019-06-14 PROCEDURE — 6360000002 HC RX W HCPCS: Performed by: NURSE PRACTITIONER

## 2019-06-14 PROCEDURE — 96372 THER/PROPH/DIAG INJ SC/IM: CPT

## 2019-06-14 RX ORDER — DIPHENHYDRAMINE HYDROCHLORIDE 50 MG/ML
50 INJECTION INTRAMUSCULAR; INTRAVENOUS ONCE
Status: CANCELLED | OUTPATIENT
Start: 2019-12-13

## 2019-06-14 RX ORDER — METHYLPREDNISOLONE SODIUM SUCCINATE 125 MG/2ML
125 INJECTION, POWDER, LYOPHILIZED, FOR SOLUTION INTRAMUSCULAR; INTRAVENOUS ONCE
Status: CANCELLED | OUTPATIENT
Start: 2019-12-13

## 2019-06-14 RX ORDER — 0.9 % SODIUM CHLORIDE 0.9 %
10 VIAL (ML) INJECTION ONCE
Status: CANCELLED | OUTPATIENT
Start: 2019-12-13

## 2019-06-14 RX ORDER — SODIUM CHLORIDE 9 MG/ML
INJECTION, SOLUTION INTRAVENOUS CONTINUOUS
Status: CANCELLED | OUTPATIENT
Start: 2019-12-13

## 2019-06-14 RX ADMIN — DENOSUMAB 60 MG: 60 INJECTION SUBCUTANEOUS at 11:10

## 2019-06-14 NOTE — PROGRESS NOTES
[address] [facility] FOR [facility]CTION.  ALL QUESTIONS AND CONCERNS ADDRESSED  36 PATIENT RIGHTS AND RESPONSIBILITIES SHEET OFFERED TO PT TO READ.  12 _M___ Safety:       (Environmental)   [place] to environment   Ensure ID band is correct and in place/ allergy band as needed   Assess for fall risk   Initiate fall precautions as applicable (fall band, side rails, etc.)   Call light within reach   Bed in low position/ wheels locked    __M__ Pain:        Assess pain level and characteristics   Administer analgesics as ordered   Assess effectiveness of pain management and report to MD as needed    _M___ Knowledge Deficit:   Assess baseline knowledge   Provide teaching at level of understanding   Provide teaching via preferred learning method   Evaluate teaching effectiveness    _M___ Hemodynamic/Respiratory Status:       (Pre and Post Procedure Monitoring)   Assess/Monitor vital signs and LOC   Assess Baseline SpO2 prior to any sedation   Obtain weight/height   Assess vital signs/ LOC until patient meets discharge criteria   Monitor procedure site and notify MD of any issues       1106 WRITTEN DISCHARGE INSTRUCTIONS GIVEN TO PT-VERBALIZES UNDERSTANDING  1110   PT DISCHARGED PER WHEEL CHAIR IN SATISFACTORY CONDITION

## 2019-06-19 ENCOUNTER — OFFICE VISIT (OUTPATIENT)
Dept: FAMILY MEDICINE CLINIC | Age: 79
End: 2019-06-19
Payer: MEDICARE

## 2019-06-19 VITALS
WEIGHT: 116.9 LBS | SYSTOLIC BLOOD PRESSURE: 136 MMHG | BODY MASS INDEX: 19.45 KG/M2 | RESPIRATION RATE: 16 BRPM | HEART RATE: 84 BPM | DIASTOLIC BLOOD PRESSURE: 86 MMHG

## 2019-06-19 DIAGNOSIS — Z95.3 S/P MITRAL VALVE REPLACEMENT WITH BIOPROSTHETIC VALVE: ICD-10-CM

## 2019-06-19 DIAGNOSIS — R53.81 PHYSICAL DECONDITIONING: ICD-10-CM

## 2019-06-19 DIAGNOSIS — R42 DIZZINESS ON STANDING: ICD-10-CM

## 2019-06-19 DIAGNOSIS — R26.81 UNSTEADY GAIT: Primary | ICD-10-CM

## 2019-06-19 DIAGNOSIS — M43.6 NECK STIFFNESS: ICD-10-CM

## 2019-06-19 DIAGNOSIS — Z98.61 POST PTCA: ICD-10-CM

## 2019-06-19 DIAGNOSIS — I25.810 CORONARY ARTERY DISEASE INVOLVING CORONARY BYPASS GRAFT OF NATIVE HEART WITHOUT ANGINA PECTORIS: ICD-10-CM

## 2019-06-19 PROCEDURE — 1123F ACP DISCUSS/DSCN MKR DOCD: CPT | Performed by: NURSE PRACTITIONER

## 2019-06-19 PROCEDURE — 99213 OFFICE O/P EST LOW 20 MIN: CPT | Performed by: NURSE PRACTITIONER

## 2019-06-19 PROCEDURE — 1036F TOBACCO NON-USER: CPT | Performed by: NURSE PRACTITIONER

## 2019-06-19 PROCEDURE — 1090F PRES/ABSN URINE INCON ASSESS: CPT | Performed by: NURSE PRACTITIONER

## 2019-06-19 PROCEDURE — G8427 DOCREV CUR MEDS BY ELIG CLIN: HCPCS | Performed by: NURSE PRACTITIONER

## 2019-06-19 PROCEDURE — 4040F PNEUMOC VAC/ADMIN/RCVD: CPT | Performed by: NURSE PRACTITIONER

## 2019-06-19 PROCEDURE — G8399 PT W/DXA RESULTS DOCUMENT: HCPCS | Performed by: NURSE PRACTITIONER

## 2019-06-19 PROCEDURE — G8420 CALC BMI NORM PARAMETERS: HCPCS | Performed by: NURSE PRACTITIONER

## 2019-06-19 PROCEDURE — G8598 ASA/ANTIPLAT THER USED: HCPCS | Performed by: NURSE PRACTITIONER

## 2019-06-19 RX ORDER — MECLIZINE HCL 12.5 MG/1
12.5 TABLET ORAL 3 TIMES DAILY PRN
Qty: 30 TABLET | Refills: 0 | Status: SHIPPED | OUTPATIENT
Start: 2019-06-19 | End: 2019-06-28 | Stop reason: SDUPTHER

## 2019-06-19 NOTE — PROGRESS NOTES
Visit Information    Have you changed or started any medications since your last visit including any over-the-counter medicines, vitamins, or herbal medicines? yes - see updated med list   Are you having any side effects from any of your medications? -  no  Have you stopped taking any of your medications? Is so, why? -  no    Have you seen any other physician or provider since your last visit? Yes - Records Obtained  Have you had any other diagnostic tests since your last visit? Yes - Records Obtained  Have you been seen in the emergency room and/or had an admission to a hospital since we last saw you? No  Have you had your routine dental cleaning in the past 6 months? n/a    Have you activated your [employer] account? If not, what are your barriers?  No: declined     Patient Care Team:  Ora Butt MD as PCP - General (Family Medicine)  Ora Butt MD as PCP - [facility] Provider    Medical History Review  Past Medical, Family, and Social History reviewed and does not contribute to the patient presenting condition    Health Maintenance   Topic Date Due    DTaP/Tdap/Td vaccine (1 - Tdap) 02/22/1961    Shingles Vaccine (1 of 2) 02/22/1992    Pneumococcal 65+ years Vaccine (1 of 2 - PCV13) 02/22/2007    Flu vaccine (Season Ended) 09/01/2019    Annual Wellness Visit (AWV)  02/18/2020    Potassium monitoring  04/15/2020    Creatinine monitoring  04/15/2020    DEXA (modify frequency per FRAX score)  Completed

## 2019-06-19 NOTE — PROGRESS NOTES
Subjective:      Patient ID: Lisa Almazan is a 70 y.o. female. HPI: Acute for dizziness    Chief Complaint   Patient presents with    Dizziness     upon standing and while walking       Ongoing x 3 months with dizziness upon standing and walking. Feels off balance or \"drunk\"  Drifting to the right side. Feels like she is on a boat. No worsening or resolution when standing - symptoms stay the same. Denies symptoms sitting or turning over in bed. Denies lightheadedness or sensation of passing out or weakness. Can continue to walk just as to be very careful or holding on to something. Complains of neck tightness. CARDIAC procedures with in the last 2 months for PTCA and valve replacement. Denies CP, SOB or chest tightness. Was having symtoms prior to CARDIO procedures and no change after. Vitals:    06/19/19 1430   BP: 136/86   Pulse: 84   Resp: 16       Patient Active Problem List   Diagnosis    Atrial fibrillation with RVR (HCC)    Severe malnutrition (HCC)    S/P coronary artery bypass graft x 1, 11/12/18. 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     S/P mitral valve replacement with bioprosthetic valve    Situational depression    Mixed hyperlipidemia    Coronary artery disease involving coronary bypass graft of native heart without angina pectoris    Risk for falls    Physical deconditioning    CAD in native artery    Severe malnutrition ([address]    Post PTCA    Pacemaker    Age-related osteoporosis without current pathological fracture         BP Readings from Last 3 Encounters:   06/19/19 136/86   06/14/19 138/62   06/05/19 123/84         No results found for: LABA1C  No results found for: EAG    No components found for: CHLPL  Lab Results   Component Value Date    TRIG 71 04/11/2019    TRIG 100 02/28/2019     Lab Results   Component Value Date    HDL 79 04/11/2019    HDL 79 02/28/2019     Lab Results   Component Value Date    LDLCALC 86 04/11/2019    LDLCALC 120 02/28/2019     No results found for: LABVLDL      Chemistry        Component Value Date/Time     04/15/2019 0749    K 5.1 04/15/2019 0749    K 4.6 04/11/2019 0527     04/15/2019 0749    CO2 24 04/15/2019 0749    BUN 26 (H) 04/15/2019 0749    CREATININE 0.8 04/15/2019 0749        Component Value Date/Time    CALCIUM 8.8 04/15/2019 0749    ALKPHOS 120 04/11/2019 0527    AST 26 04/11/2019 0527    ALT 21 04/11/2019 0527    BILITOT 0.3 04/11/2019 0527            Lab Results   Component Value Date    TSH 1.750 06/05/2019       Lab Results   Component Value Date    WBC 9.2 04/15/2019    HGB 8.3 (L) 04/16/2019    HCT 26.1 (L) 04/16/2019    MCV 96.9 04/15/2019     04/15/2019         Health Maintenance   Topic Date Due    DTaP/Tdap/Td vaccine (1 - Tdap) 02/22/1961    Shingles Vaccine (1 of 2) 02/22/1992    Pneumococcal 65+ years Vaccine (1 of 2 - PCV13) 02/22/2007    Flu vaccine (Season Ended) 09/01/2019    Annual Wellness Visit (AWV)  02/18/2020    Potassium monitoring  04/15/2020    Creatinine monitoring  04/15/2020    DEXA (modify frequency per FRAX score)  Completed         There is no immunization history on file for this patient. Review of Systems   Constitutional: Negative for chills and fever. HENT: Negative. Respiratory: Negative for cough and shortness of breath. Cardiovascular: Negative for chest pain. Gastrointestinal: Negative for abdominal pain and nausea. Musculoskeletal: Positive for gait problem. Skin: Negative for rash. Neurological: Positive for dizziness. Negative for syncope, weakness, light-headedness and headaches. Psychiatric/Behavioral: Negative. Objective:   Physical Exam   Constitutional: Vital signs are normal. No distress. Eyes: Pupils are equal, round, and reactive to light. EOM are normal.   Neck: Normal range of motion. Neck supple. Cardiovascular: Normal rate and regular rhythm. No murmur heard.   Pulmonary/Chest: Effort normal and breath sounds normal. She has no wheezes. Abdominal: Soft. Bowel sounds are normal. She exhibits no distension. There is no tenderness. Musculoskeletal: Normal range of motion. She exhibits no tenderness. Neurological: She has normal strength. No cranial nerve deficit or sensory deficit. Gait abnormal.   Unable to perform Epley Maneuver    Skin: Skin is warm and dry. No rash noted. Assessment:       Diagnosis Orders   1. Unsteady gait  [facility] -  [facility]   2. Dizziness on standing  [facility] -  [facility]    meclizine (ANTIVERT) 12.5 MG tablet   3. Neck stiffness     4. Physical deconditioning     5. S/P mitral valve replacement with bioprosthetic valve     6. Post PTCA     7. Coronary artery disease involving coronary bypass graft of native heart without angina pectoris             Plan:      Symptoms are not CARDIO or lightheadedness related  Refer to PT for evaluation  Atypical Vertigo? Generalized Weakness?   Trial 12.5 mg Antivert  AAMIR Lozoya, APRN - CNP

## 2019-06-20 DIAGNOSIS — I25.810 CORONARY ARTERY DISEASE INVOLVING CORONARY BYPASS GRAFT OF NATIVE HEART WITHOUT ANGINA PECTORIS: ICD-10-CM

## 2019-06-20 RX ORDER — FUROSEMIDE 20 MG/1
TABLET ORAL
Qty: 30 TABLET | Refills: 4 | Status: SHIPPED | OUTPATIENT
Start: 2019-06-20 | End: 2019-10-14 | Stop reason: SDUPTHER

## 2019-06-20 ASSESSMENT — ENCOUNTER SYMPTOMS
ABDOMINAL PAIN: 0
SHORTNESS OF BREATH: 0
NAUSEA: 0
COUGH: 0

## 2019-06-28 ENCOUNTER — HOSPITAL ENCOUNTER (OUTPATIENT)
Dept: PHYSICAL THERAPY | Age: 79
Setting detail: THERAPIES SERIES
Discharge: HOME | End: 2019-06-28
Payer: MEDICARE

## 2019-06-28 DIAGNOSIS — R42 DIZZINESS ON STANDING: ICD-10-CM

## 2019-06-28 PROCEDURE — 97162 PT EVAL MOD COMPLEX 30 MIN: CPT

## 2019-06-28 RX ORDER — MECLIZINE HCL 12.5 MG/1
12.5 TABLET ORAL 3 TIMES DAILY PRN
Qty: 30 TABLET | Refills: 0 | Status: SHIPPED | OUTPATIENT
Start: 2019-06-28 | End: 2019-11-15 | Stop reason: SDUPTHER

## 2019-06-28 NOTE — PROGRESS NOTES
** PLEASE SIGN, DATE AND TIME CERTIFICATION BELOW AND RETURN TO [facility] OUTPATIENT REHABILITATION (FAX #: [phone]). ATTEST/CO-SIGN IF ACCESSING VIA [employer]. THANK YOU.**    I certify that I have examined the patient below and determined that Physical Medicine and Rehabilitation service is necessary and that I approve the established plan of care for up to 90 days or as specifically noted. Attestation, signature or co-signature of physician indicates approval of certification requirements.    ________________________ ____________ __________  Physician Signature   Date   Time       [address]     Time In: 0845  Time Out: 6132  Minutes: 60  Timed Code Treatment Minutes: 0 Minutes                Date: 2019  Patient Name: Abby Ralph,  Gender:  female        CSN: [id number]   : 1940  (70 y.o.)  Referral Date : 19     Referring Practitioner: PHIL Rowland CNP      Diagnosis: R26.81 (ICD-10-CM) - Unsteady gait, R42 (ICD-10-CM) - Dizziness on standing  Treatment Diagnosis: Decreased balance, decreased strength. Additional Pertinent Hx: Pacemaker, 5 heart surgeries since 2018, Osteoporosis       General:  PT Visit Information  Onset Date: 19  PT Insurance Information: MEDICARE-Patient has unlimited visits based on medical necessity. Aquatics and modalities are covered except ionto and hot/cold packs. Total # of Visits to Date: 1  Plan of Care/Certification Expiration Date: 19  Progress Note Counter: 1/10 for PN                        See Medical History Questionnaire for information related to allergies and medications. Subjective:  Chart Reviewed: Yes  Patient assessed for rehabilitation services?: Yes  Family / Caregiver Present: Yes  Comments: Follow up as needed with doctor. Subjective: Patient has had 5 seugeries on her heart since 2018.   (Open Heart, Pacemaker, and Stents)  Patient has been off balance for awhile, but it has gotten worse since surgery. Sister-in-law present and reporting patient sleeps with multiple pillows under neck pushing head down. Patient reports she stumbles and falls into walls. Patient reports having no dizziness, but feels off balance. Pain:  Patient Currently in Pain: No        Social/Functional History:    Type of Home: Apartment  Home Layout: One level  Home Access: Level entry  Home Equipment: Cane, Rolling walker     Bathroom Equipment: Built-in shower seat, Grab bars around toilet, Grab bars in shower    Receives Help From: Family  ADL Assistance: Independent  Homemaking Assistance: Independent  Homemaking Responsibilities: Yes  Meal Prep Responsibility: Primary  Laundry Responsibility: Primary  Cleaning Responsibility: Primary  Ambulation Assistance: Independent  Transfer Assistance: Independent    Active : No  Occupation: Retired  Leisure & Hobbies: Used to [place] and play golf    Objective  Overall Orientation Status: Within Normal Limits             Vision: Impaired(Wears glasses)    Hearing: Within functional limits      Observation/Palpation  Posture: Poor  Palpation: Increased tightness neck and shoulders  Observation: Patient tends to keep head pointed down, forward rounded shoulders                               RUE AROM : (WFL)         LUE AROM : (WFL)    Cervical: Flexion=WFL, Extension=50% loss, Sidebend=75% loss, Rotation=25% loss         R Hip Flexion: 4/5  R Hip ABduction: 4/5  R Hip ADduction: 4/5    Strength LLE: (Core=moderate cues needed for abdominal bracing.)  L Hip Flexion: 4/5  L Hip ABduction: 4/5  L Hip ADduction: 4/5         L Shoulder Flexion: 4-/5  L Shoulder ABduction: 4-/5    R Shoulder Flexion: 4-/5  R Shoulder ABduction: 4-/5    Tone RLE  RLE Tone: Normotonic  Tone LLE  LLE Tone: Normotonic       Other: Sleeps on back or right side with 2 pillows under head and neck.   Head is pointed down. Has an older mattress. Overall Sensation Status: WNL                            Transfers  Sit to Stand: Modified independent  Stand to sit: Modified independent            WB Status: Full WB  Ambulation 1  Surface: carpet  Device: No Device  Assistance: Supervision  Quality of Gait: Wide stance, drags right leg at times, decreased pace, decreased heel strike to toe off  Distance: 200 feet      Stairs  # Steps : 4  Rails: Bilateral  Assistance: Supervision  Comment: Non-reciprocal pattern                        Exercises  Exercise 1: Next session: NuStep  Exercise 2: Next session: 3-way hip  Exercise 3: Next sessoin: rockerboard  Exercise 4: Next session: UE tband rows, shoulder flexoin/extension               TINETTI BALANCE TEST    BALANCE Patient is seated in a hard, armless chair   1 SITTING BALANCE 1 - Steady, safe   2. RISES FROM CHAIR 1 - Able, uses arms to help   3. ATTEMPTS TO RISE 2 - Able to rise, 1 attempt   4. IMMEDIATE STANDING BALANCE (FIRST 5 SECONDS) 2 - Steady without walker or other support   5. STANDING BALANCE 2 - Narrow stance without support   6. NUDGED 2 - Steady   7. EYES CLOSED 1 - Steady   8. TURNING 360 DEGREES 0 - Discontinuous steps and 0 - Unsteady (grabs,staggers)   9. SITTING DOWN 1 - Uses arms or not a smooth motion   BALANCE SCORE 12/16       GAIT SECTION Patient stands with therapist, walks across room (+/- aids), fist at usual pace, then at rapid pace. 1.  INDICATION OF GAIT (Immediately after told to \"go\" 1 - No hesitancy   2. STEP LENGTH AND HEIGHT 1 - Step through Right and 1 - Step through Left   3. FOOT CLEARANCE 1 - Left foot clears floor and 1 - Right foot clears floor   4. STEP SYMMETRY 1 - Right and left step length appear equal   5. STEP CONTINUITY 0 - Stopping or discontinuity between steps   6. PATH 1 - Mild/moderate deviation or uses walking aid   7. TRUNK 2 - No sway, flexion, use of arms or walking aid   8.   WALKING TIME 0 - Heels apart   GAIT SCORE 9/12   TOTAL SCORE 21/28   Risk Indicators:  Less than/equal to 18 = high risk  19-23 Moderate risk  Greater than/equal to 24 = low risk                        Activity Tolerance:  Activity Tolerance: Patient Tolerated treatment well    Assessment: Body structures, Functions, Activity limitations: Decreased functional mobility , Decreased strength, Decreased endurance, Decreased ADL status, Decreased sensation, Decreased ROM, Decreased balance, Increased Pain, Decreased safe awareness  Assessment: Patient presents to therapy with decreased balance and decreased strength. Patient to benefit from therapy to assist with safety as she lives alone. Prognosis: Good  REQUIRES PT FOLLOW UP: Yes  Discharge Recommendations: Continue to assess pending progress    Patient Education:  Patient Education: Patient educated on POC                   Plan:  Times per week: 2 times per week  Plan weeks: Up to 12 weeks  Specific instructions for Next Treatment: Whole body stretches and strengthening, posture and body mechanics, balance, ambulation, modalities as needed  Current Treatment Recommendations: Strengthening, ROM, Balance Training, Endurance Training, Stair training, Positioning, Modalities, Safety Education & Training, Gait Training, Transfer Training, Functional Mobility Training, Home Exercise Program  Plan Comment: POC initiated    History: Personal factors or comorbidities that impact plan of care -  Moderate Complexity: 1-2 personal factors or comorbidities. See history section above for details. Examination: Body structures and functions, activity limitations, participation restrictions; using standardized tests and measures - Moderate Complexity: 3 or morebody structures and functional, activity limitations and/or participation restrictions. See restrictions and objective section above for details.     Clinical Presentation: Moderate - Evolving with Changing Characteristics: Decreased balance and strength, falling into wall. Decision Making: Moderate Complexity due to see above. Decision making was based on patient assessment and decision making process in determining plan of care and establishing reasonable expectations for measurable functional outcomes. Evaluation Complexity: Based on the findings of patient history, examination, clinical presentation, and decision making during this evaluation, the evaluation of Abraham Almazan  is of medium complexity. Goals:  Patient goals : \"Improve balance\"    Short term goals  Time Frame for Short term goals: 4 weeks  Short term goal 1: Improve balance with Tinetti score of 23/28 to assist with safety walking around home. Short term goal 2: Increase leg strength to 4+/5 to assist with stability going up and down steps. Short term goal 3: Improve ambulation to allow patient to ambulate at improved pace and no path deviation to assist with safety at home. Short term goal 4: Patient to report have no falls or episodes of loss of balance into wall to assist with walking into therapy. Long term goals  Time Frame for Long term goals : Up to 12 weeks  Long term goal 1: Independent with HEP and with progression to assist with improving balance.       Jesus Swenson

## 2019-07-02 ENCOUNTER — HOSPITAL ENCOUNTER (OUTPATIENT)
Dept: PHYSICAL THERAPY | Age: 79
Setting detail: THERAPIES SERIES
Discharge: HOME | End: 2019-07-02
Payer: MEDICARE

## 2019-07-02 PROCEDURE — 97110 THERAPEUTIC EXERCISES: CPT

## 2019-07-02 PROCEDURE — 97112 NEUROMUSCULAR REEDUCATION: CPT

## 2019-07-02 NOTE — PROGRESS NOTES
[place]     Time In: 719  Time Out: 1430  Minutes: 28  Timed Code Treatment Minutes: 28 Minutes                Date: 2019  Patient Name: Toya Pennington,  Gender:  female        CSN: [id number]   : 1940  (70 y.o.)       Referring Practitioner: PHIL Jane CNP      Diagnosis: R26.81 (ICD-10-CM) - Unsteady gait, R42 (ICD-10-CM) - Dizziness on standing  Treatment Diagnosis: Decreased balance, decreased strength. Additional Pertinent Hx: Pacemaker, 5 heart surgeries since 2018, Osteoporosis                  General:  PT Visit Information  PT Insurance Information: MEDICARE-Patient has unlimited visits based on medical necessity. Aquatics and modalities are covered except ionto and hot/cold packs. Total # of Visits to Date: 2  Plan of Care/Certification Expiration Date: 19  Progress Note Counter: 2/10 for PN               Subjective:  Chart Reviewed: Yes  Patient assessed for rehabilitation services?: Yes  Family / Caregiver Present: Yes  Comments: Follow up as needed with doctor. Subjective: Patient reports doing ok except for has bad balance with walking. States after has been walking for awhile, her right leg gets weak and she drags it. Pain:  Patient Currently in Pain: No         Objective    Exercises  Exercise 1: Nu-step Level 2 for 5 minutes. Seat 7/arms 10  Exercise 2: 3-way hip 10x bilat - cueing to stand straight and keep leg straight  Exercise 3: Balance -Rockerboard 2 directions 10x each with 30 second balance each way  Exercise 4: UE peach tband rows, shoulder flexoin/extension 10x each  Exercise 5: Balance - step stance 30 seconds each way. Feet side by side 30 seconds. Light CGA and use of bars for balance. Exercise 6: Balance: marching 10x bilat using the mirror to lift legs evenly. Toe taps at 4\" step 10x bilat.          Activity Tolerance:  Activity Jesus Santiago, [address] [place] [address]

## 2019-07-05 ENCOUNTER — HOSPITAL ENCOUNTER (OUTPATIENT)
Dept: PHYSICAL THERAPY | Age: 79
Setting detail: THERAPIES SERIES
Discharge: HOME | End: 2019-07-05
Payer: MEDICARE

## 2019-07-05 PROCEDURE — 97110 THERAPEUTIC EXERCISES: CPT

## 2019-07-05 NOTE — PROGRESS NOTES
and extension bilateral  Exercise 10: Hydrostick front to back and side to side x10 each with CGA         Activity Tolerance:  Activity Tolerance: Patient Tolerated treatment well    Assessment:  Assessment: Patient needed no rest breaks today. Able to add in a couple next balance and strengthening exercises today without rest.    Prognosis: Good  REQUIRES PT FOLLOW UP: Yes  Discharge Recommendations: Continue to assess pending progress    Patient Education:  Patient Education: Continue with POC                      Plan:  Times per week: 2 times per week  Plan weeks: Up to 12 weeks  Specific instructions for Next Treatment: Whole body stretches and strengthening, posture and body mechanics, balance, ambulation, modalities as needed  Current Treatment Recommendations: Strengthening, ROM, Balance Training, Endurance Training, Stair training, Positioning, Modalities, Safety Education & Training, Gait Training, Transfer Training, Functional Mobility Training, Home Exercise Program  Plan Comment: Continue with POC    Goals:  Patient goals : \"Improve balance\"    Short term goals  Time Frame for Short term goals: 4 weeks  Short term goal 1: Improve balance with Tinetti score of 23/28 to assist with safety walking around home. Short term goal 2: Increase leg strength to 4+/5 to assist with stability going up and down steps. Short term goal 3: Improve ambulation to allow patient to ambulate at improved pace and no path deviation to assist with safety at home. Short term goal 4: Patient to report have no falls or episodes of loss of balance into wall to assist with walking into therapy. Long term goals  Time Frame for Long term goals : Up to 12 weeks  Long term goal 1: Independent with HEP and with progression to assist with improving balance.       Jesus Macias

## 2019-07-08 ENCOUNTER — HOSPITAL ENCOUNTER (OUTPATIENT)
Dept: PHYSICAL THERAPY | Age: 79
Setting detail: THERAPIES SERIES
Discharge: HOME | End: 2019-07-08
Payer: MEDICARE

## 2019-07-08 PROCEDURE — 97110 THERAPEUTIC EXERCISES: CPT

## 2019-07-08 NOTE — PROGRESS NOTES
[place]     Time In: 7980  Time Out: 1229  Minutes: 45  Timed Code Treatment Minutes: 45 Minutes                Date: 2019  Patient Name: Carleen Cee,  Gender:  female        CSN: [id number]   : 1940  (70 y.o.)  Referral Date : 19    Referring Practitioner: PHIL Cody CNP      Diagnosis: R26.81 (ICD-10-CM) - Unsteady gait, R42 (ICD-10-CM) - Dizziness on standing  Treatment Diagnosis: Decreased balance, decreased strength. Additional Pertinent Hx: Pacemaker, 5 heart surgeries since 2018, Osteoporosis                  General:  PT Visit Information  Onset Date: 19  PT Insurance Information: MEDICARE-Patient has unlimited visits based on medical necessity. Aquatics and modalities are covered except ionto and hot/cold packs. Total # of Visits to Date: 4  Plan of Care/Certification Expiration Date: 19  Progress Note Counter: 4/10 for PN               Subjective:  Family / Caregiver Present: Yes  Comments: Follow up as needed with doctor. Subjective: Patient denies having any pain or any falls. No other complains reported at this time. Pain:  Patient Currently in Pain: No         Objective  Exercises  Exercise 1: Nu-step Level 2 for 5 minutes. Seat 7/arms 10  Exercise 2: 3-way hip 10x bilat - cueing to stand straight and keep leg straight  Exercise 3: Balance -Rockerboard 2 directions 15x each with 30 second balance each way  Exercise 4: UE peach tband rows, shoulder flexoin/extension 10x each  Exercise 5: Balance - step stance 30 seconds each way. Feet side by side and turning head x 10 each. Light CGA and use of bars for balance. Exercise 6: Balance: marching 10x bilat using the mirror to lift legs evenly. Toe taps at 4\" step 10x bilat.   Exercise 8: Hydro Hip Level 1 x 10 bilat and single  Exercise 9: NK table 2.5# x10 flexion and extension

## 2019-07-12 ENCOUNTER — HOSPITAL ENCOUNTER (OUTPATIENT)
Dept: PHYSICAL THERAPY | Age: 79
Setting detail: THERAPIES SERIES
Discharge: HOME | End: 2019-07-12
Payer: MEDICARE

## 2019-07-12 PROCEDURE — 97110 THERAPEUTIC EXERCISES: CPT

## 2019-07-12 NOTE — PROGRESS NOTES
[place]     Time In: 1430  Time Out: 2615  Minutes: 45  Timed Code Treatment Minutes: 45 Minutes                Date: 2019  Patient Name: Jerome Kam,  Gender:  female        CSN: [id number]   : 1940  (70 y.o.)  Referral Date : 19    Referring Practitioner: PHIL Ellis CNP      Diagnosis: R26.81 (ICD-10-CM) - Unsteady gait, R42 (ICD-10-CM) - Dizziness on standing  Treatment Diagnosis: Decreased balance, decreased strength. Additional Pertinent Hx: Pacemaker, 5 heart surgeries since 2018, Osteoporosis                  General:  PT Visit Information  Onset Date: 19  PT Insurance Information: MEDICARE-Patient has unlimited visits based on medical necessity. Aquatics and modalities are covered except ionto and hot/cold packs. Total # of Visits to Date: 5  Progress Note Counter: 5//10 for PN               Subjective:  Family / Caregiver Present: Yes  Comments: Follow up as needed with doctor. Subjective: Patient reports she has been having some headaches. Not right now. Pain:  Patient Currently in Pain: No         Objective  Exercises  Exercise 1: Nu-step Level 2 for 6 minutes. Seat 7/arms 10  Exercise 4: UE peach tband rows, shoulder flexoin/extension, horizontal abduction 15x each  Exercise 5: Balance - step stance 30 seconds each way. Feet side by side and turning head x 10 each. Light CGA and use of bars for balance. Exercise 8: Hydro Hip Level 2 x 10 bilat and single  Exercise 9: NK table 5# x10 flexion and extension bilateral  Exercise 10: Hydrostick front to back, side to side, clockwise, and counterclockwise x15 each with CGA  Exercise 11: side step and retro walking in // bars x 1 lap each         Activity Tolerance:  Activity Tolerance: Patient Tolerated treatment well    Assessment:  Assessment: Fairly constant cues to keep head up.   Is steadier on feet and with improved balance. Prognosis: Good  REQUIRES PT FOLLOW UP: Yes  Discharge Recommendations: Continue to assess pending progress    Patient Education:  Patient Education: Continue with HEP                      Plan:  Times per week: 2 times per week  Plan weeks: Up to 12 weeks  Specific instructions for Next Treatment: Whole body stretches and strengthening, posture and body mechanics, balance, ambulation, modalities as needed  Current Treatment Recommendations: Strengthening, ROM, Balance Training, Endurance Training, Stair training, Positioning, Modalities, Safety Education & Training, Gait Training, Transfer Training, Functional Mobility Training, Home Exercise Program  Plan Comment: Continue with POC    Goals:  Patient goals : \"Improve balance\"    Short term goals  Time Frame for Short term goals: 4 weeks  Short term goal 1: Improve balance with Tinetti score of 23/28 to assist with safety walking around home. Short term goal 2: Increase leg strength to 4+/5 to assist with stability going up and down steps. Short term goal 3: Improve ambulation to allow patient to ambulate at improved pace and no path deviation to assist with safety at home. Short term goal 4: Patient to report have no falls or episodes of loss of balance into wall to assist with walking into therapy. Long term goals  Time Frame for Long term goals : Up to 12 weeks  Long term goal 1: Independent with HEP and with progression to assist with improving balance.       Jesus Collado

## 2019-07-16 ENCOUNTER — HOSPITAL ENCOUNTER (OUTPATIENT)
Dept: PHYSICAL THERAPY | Age: 79
Setting detail: THERAPIES SERIES
Discharge: HOME | End: 2019-07-16
Payer: MEDICARE

## 2019-07-16 PROCEDURE — 97110 THERAPEUTIC EXERCISES: CPT

## 2019-07-19 ENCOUNTER — HOSPITAL ENCOUNTER (OUTPATIENT)
Dept: PHYSICAL THERAPY | Age: 79
Setting detail: THERAPIES SERIES
Discharge: HOME | End: 2019-07-19
Payer: MEDICARE

## 2019-07-19 PROCEDURE — 97112 NEUROMUSCULAR REEDUCATION: CPT

## 2019-07-19 PROCEDURE — 97110 THERAPEUTIC EXERCISES: CPT

## 2019-07-19 NOTE — PROGRESS NOTES
falls or episodes of loss of balance into wall to assist with walking into therapy. Long term goals  Time Frame for Long term goals : Up to 12 weeks  Long term goal 1: Independent with HEP and with progression to assist with improving balance.       [address], [address] [place] [address]

## 2019-07-23 ENCOUNTER — HOSPITAL ENCOUNTER (OUTPATIENT)
Dept: PHYSICAL THERAPY | Age: 79
Setting detail: THERAPIES SERIES
Discharge: HOME | End: 2019-07-23
Payer: MEDICARE

## 2019-07-23 PROCEDURE — 97110 THERAPEUTIC EXERCISES: CPT

## 2019-07-23 NOTE — PROGRESS NOTES
Level 2 x 15 bilat and single  Exercise 9: NK table 5# x15 flexion and extension bilateral  Exercise 10: Hydrostick front to back, side to side, clockwise, and counterclockwise x15 each with CGA  Exercise 11: side step and retro walking in vaz way SBA x1 x 1 lap each         Activity Tolerance:  Activity Tolerance: Patient Tolerated treatment well    Assessment:  Assessment: Progressed with reps as noted and added balance with one foot on step and one down with patient tolerating well. Patient having more difficulty balancing with left leg down. Patient denies having any complains of pain or soreness at end of session. Patient also admits that her legs feel a little tired but having no other complains. Prognosis: Good       Patient Education:  Patient Education: Continue with HEP and monitor response to progressions. Few cues to  feet during gait. Plan:  Times per week: 2 times per week  Plan weeks: Up to 12 weeks  Specific instructions for Next Treatment: Whole body stretches and strengthening, posture and body mechanics, balance, ambulation, modalities as needed  Current Treatment Recommendations: Strengthening, ROM, Balance Training, Endurance Training, Stair training, Positioning, Modalities, Safety Education & Training, Gait Training, Transfer Training, Functional Mobility Training, Home Exercise Program  Plan Comment: Continue with POC    Goals:  Patient goals : \"Improve balance\"    Short term goals  Time Frame for Short term goals: 4 weeks  Short term goal 1: Improve balance with Tinetti score of 23/28 to assist with safety walking around home. Short term goal 2: Increase leg strength to 4+/5 to assist with stability going up and down steps. Short term goal 3: Improve ambulation to allow patient to ambulate at improved pace and no path deviation to assist with safety at home.   Short term goal 4: Patient to report have no falls or episodes of loss of balance into wall to assist

## 2019-07-26 ENCOUNTER — HOSPITAL ENCOUNTER (OUTPATIENT)
Dept: PHYSICAL THERAPY | Age: 79
Setting detail: THERAPIES SERIES
Discharge: HOME | End: 2019-07-26
Payer: MEDICARE

## 2019-07-26 PROCEDURE — 97110 THERAPEUTIC EXERCISES: CPT

## 2019-07-26 NOTE — PROGRESS NOTES
[facility]     Time In: 4303  Time Out: [address]  Minutes: 40  Timed Code Treatment Minutes: 40 Minutes                Date: 2019  Patient Name: Bar Milligan,  Gender:  female        CSN: [id number]   : 1940  (70 y.o.)  Referral Date : 19    Referring Practitioner: PHIL Casanova CNP      Diagnosis: R26.81 (ICD-10-CM) - Unsteady gait, R42 (ICD-10-CM) - Dizziness on standing  Treatment Diagnosis: Decreased balance, decreased strength. Additional Pertinent Hx: Pacemaker, 5 heart surgeries since 2018, Osteoporosis                  General:  PT Visit Information  Onset Date: 19  PT Insurance Information: MEDICARE-Patient has unlimited visits based on medical necessity. Aquatics and modalities are covered except ionto and hot/cold packs. Total # of Visits to Date: 9  Progress Note Counter:  for PN               Subjective:  Family / Caregiver Present: Yes  Comments: Follow up as needed with doctor. Subjective: Patient reports does not feel likes she is any better since starting therapy. Patient's sister-in-law is present and reports she has noticed such a difference. Reports she does not hang onto her when walking, keeps head up more, walks straighter. Went golfing yesterday and played 5 holes. Pain:  Patient Currently in Pain: No         Objective    Other Activities  Other Activities: Re-evaluation performed         TINETTI BALANCE TEST    BALANCE Patient is seated in a hard, armless chair   1 SITTING BALANCE 1 - Steady, safe   2. RISES FROM CHAIR 1 - Able, uses arms to help   3. ATTEMPTS TO RISE 2 - Able to rise, 1 attempt   4. IMMEDIATE STANDING BALANCE (FIRST 5 SECONDS) 2 - Steady without walker or other support   5. STANDING BALANCE 2 - Narrow stance without support   6. NUDGED 2 - Steady   7. EYES CLOSED 1 - Steady   8.   TURNING 360 DEGREES 1 -

## 2019-07-30 ENCOUNTER — HOSPITAL ENCOUNTER (OUTPATIENT)
Dept: PHYSICAL THERAPY | Age: 79
Setting detail: THERAPIES SERIES
Discharge: HOME | End: 2019-07-30
Payer: MEDICARE

## 2019-07-30 PROCEDURE — 97110 THERAPEUTIC EXERCISES: CPT

## 2019-08-02 ENCOUNTER — HOSPITAL ENCOUNTER (OUTPATIENT)
Dept: PHYSICAL THERAPY | Age: 79
Setting detail: THERAPIES SERIES
Discharge: HOME | End: 2019-08-02
Payer: MEDICARE

## 2019-08-02 PROCEDURE — 97110 THERAPEUTIC EXERCISES: CPT

## 2019-08-02 NOTE — PROGRESS NOTES
[facility]     Time In: 7090  Time Out: 3361  Minutes: 43  Timed Code Treatment Minutes: 43 Minutes       Date: 2019  Patient Name: Lily Miranda,  Gender:  female        CSN: [id number]   : 1940  (70 y.o.)  Referral Date : 19    Referring Practitioner: PHIL Martinez CNP      Diagnosis: R26.81 (ICD-10-CM) - Unsteady gait, R42 (ICD-10-CM) - Dizziness on standing  Treatment Diagnosis: Decreased balance, decreased strength. Additional Pertinent Hx: Pacemaker, 5 heart surgeries since 2018, Osteoporosis       General:  PT Visit Information  Onset Date: 19  PT Insurance Information: MEDICARE-Patient has unlimited visits based on medical necessity. Aquatics and modalities are covered except ionto and hot/cold packs. Total # of Visits to Date: 11  Progress Note Counter:  for PN               Subjective:  Family / Caregiver Present: Yes  Comments: Follow up as needed with doctor. Subjective: Patient reports feeling ok today. Pain:  Patient Currently in Pain: No     Objective  Exercises  Exercise 1: Nu-step Level 3 for 5 minutes. Seat 7/arms 10  Exercise 2: 3-way hip 15x bilat - cueing to stand straight and keep leg straight  Exercise 3: Balance -Rockerboard 2 directions 20x each with 30 second balance each way one hand assist  Exercise 4: UE orange tband rows, shoulder flexoin/extension, horizontal abduction 10x each  Exercise 5: Balance on foam- step stance 30 seconds each way  Exercise 6: Balance on foam: heel/toe raises 10x marching 10x bilat using the mirror to lift legs evenly, squats. (NT-Toe taps at 4\" step 15x bilat.  Only one hand assist.. 1 foot up on step and 1 down balance 30 seconds each bilat)  Exercise 8: Hydro Hip Level 3 x15 bilat and single 10x  Exercise 9: NK table 5# x15 flexion and extension bilateral  Exercise 10: Hydrostick B step stance front to back, side to side, clockwise, and counterclockwise x10 each with CGA  Exercise 11: side step and retro walking in vaz way SBA x1 x 1 lap each         Activity Tolerance:  Activity Tolerance: Patient Tolerated treatment well    Assessment: Body structures, Functions, Activity limitations: Decreased functional mobility , Decreased strength, Decreased endurance, Decreased ADL status, Decreased sensation, Decreased ROM, Decreased balance, Increased Pain, Decreased safe awareness  Assessment: Added foam to standing balance activities and progressed to step stance at hydrostick with patient more challenged. Patient with no complaints at end of session. Prognosis: Good  REQUIRES PT FOLLOW UP: Yes  Discharge Recommendations: Continue to assess pending progress    Patient Education:  Patient Education: Continue with HEP. Plan:  Times per week: 2 times per week  Plan weeks: Up to 8 weeks  Specific instructions for Next Treatment: Whole body stretches and strengthening, posture and body mechanics, balance, ambulation, modalities as needed  Current Treatment Recommendations: Strengthening, ROM, Balance Training, Endurance Training, Stair training, Positioning, Modalities, Safety Education & Training, Gait Training, Transfer Training, Functional Mobility Training, Home Exercise Program  Plan Comment: Continue with POC    Goals:  Patient goals : \"Improve balance\"    Short term goals  Time Frame for Short term goals: 4 weeks  Short term goal 1: Improve balance with Tinetti score of 23/28 to assist with safety walking around home. MET: Tinetti score 24/28 (was 21/28). NEW GOAL: Improve balance with Tinetti score of 26/28 to assist with safety walking around home. Short term goal 2: Increase leg strength to 4+/5 to assist with stability going up and down steps. MET: Leg strength=4+/5. NEW GOAL: Increase leg strength to 5/5 to assist with stability going up and down steps.     Short term goal 3: Improve ambulation to allow

## 2019-08-06 ENCOUNTER — HOSPITAL ENCOUNTER (OUTPATIENT)
Dept: PHYSICAL THERAPY | Age: 79
Setting detail: THERAPIES SERIES
Discharge: HOME | End: 2019-08-06
Payer: MEDICARE

## 2019-08-06 PROCEDURE — 97110 THERAPEUTIC EXERCISES: CPT

## 2019-08-06 ASSESSMENT — PAIN DESCRIPTION - LOCATION: LOCATION: LEG

## 2019-08-06 ASSESSMENT — PAIN DESCRIPTION - ORIENTATION: ORIENTATION: RIGHT

## 2019-08-06 ASSESSMENT — PAIN SCALES - GENERAL: PAINLEVEL_OUTOF10: 5

## 2019-08-06 NOTE — PROGRESS NOTES
[place]     Time In: 5224  Time Out: 1426  Minutes: 41  Timed Code Treatment Minutes: 41 Minutes                Date: 2019  Patient Name: Sheldon Gage,  Gender:  female        CSN: [id number]   : 1940  (70 y.o.)  Referral Date : 19    Referring Practitioner: PHIL Hutchinson Mc, CNP      Diagnosis: R26.81 (ICD-10-CM) - Unsteady gait, R42 (ICD-10-CM) - Dizziness on standing  Treatment Diagnosis: Decreased balance, decreased strength. Additional Pertinent Hx: Pacemaker, 5 heart surgeries since 2018, Osteoporosis                  General:  PT Visit Information  Onset Date: 19  PT Insurance Information: MEDICARE-Patient has unlimited visits based on medical necessity. Aquatics and modalities are covered except ionto and hot/cold packs. Total # of Visits to Date: 15  Plan of Care/Certification Expiration Date: 19  Progress Note Counter: 38 for PN               Subjective:  Family / Caregiver Present: Yes  Comments: Follow up as needed with doctor. Subjective: Patient reporting right leg pain 5/10 and stating that it felt a little numb yesterday. Patient reporting having occasional problems with right leg. Pain:  Patient Currently in Pain: Yes  Pain Assessment: 0-10  Pain Level: 5  Pain Location: Leg  Pain Orientation: Right      Objective  Exercises  Exercise 1: Nu-step Level 3 for 5 minutes.  Seat 7/arms 10  Exercise 2: 3-way hip 15x bilat - cueing to stand straight and keep leg straight  Exercise 3: Balance -Rockerboard 2 directions 20x each with 30 second balance each way one hand assist  Exercise 4: UE orange tband rows, shoulder flexoin/extension, horizontal abduction 15x each  Exercise 5: Balance on foam- tandem stance 30 seconds each way - intermittent CGA and UE support  Exercise 6: Balance on foam: heel/toe raises 15x marching 15x bilat using the mirror to lift

## 2019-08-09 ENCOUNTER — HOSPITAL ENCOUNTER (OUTPATIENT)
Dept: PHYSICAL THERAPY | Age: 79
Setting detail: THERAPIES SERIES
Discharge: HOME | End: 2019-08-09
Payer: MEDICARE

## 2019-08-09 PROCEDURE — 97110 THERAPEUTIC EXERCISES: CPT

## 2019-08-09 NOTE — PROGRESS NOTES
assist with walking into therapy. NOT MET: Patient reporting [place]. Prior to that, she has not had a fall in quite awhile. Continue. Long term goals  Time Frame for Long term goals : Up to 12 weeks  Long term goal 1: Independent with HEP and with progression to assist with improving balance. Ongoing: Patient admits to not working routinely on exercises at home.       Jesus Bryant

## 2019-08-13 ENCOUNTER — HOSPITAL ENCOUNTER (OUTPATIENT)
Dept: PHYSICAL THERAPY | Age: 79
Setting detail: THERAPIES SERIES
Discharge: HOME | End: 2019-08-13
Payer: MEDICARE

## 2019-08-13 PROCEDURE — 97110 THERAPEUTIC EXERCISES: CPT

## 2019-08-13 ASSESSMENT — PAIN SCALES - GENERAL: PAINLEVEL_OUTOF10: 5

## 2019-08-13 NOTE — PROGRESS NOTES
deviation to assist with safety at home. PARTIALLY  MET: Patient is ambulating with no assistive device. Mild path deviation. Continue. Short term goal 4: Patient to report have no falls or episodes of loss of balance into wall to assist with walking into therapy. NOT MET: Patient reporting she [place]. Prior to that, she has not had a fall in quite awhile. Continue. Long term goals  Time Frame for Long term goals : Up to 12 weeks  Long term goal 1: Independent with HEP and with progression to assist with improving balance. Ongoing: Patient admits to not working routinely on exercises at home.       Vicky Schaefer, PTA

## 2019-08-16 ENCOUNTER — HOSPITAL ENCOUNTER (OUTPATIENT)
Dept: PHYSICAL THERAPY | Age: 79
Setting detail: THERAPIES SERIES
Discharge: HOME | End: 2019-08-16
Payer: MEDICARE

## 2019-08-16 PROCEDURE — 97110 THERAPEUTIC EXERCISES: CPT

## 2019-08-16 ASSESSMENT — PAIN DESCRIPTION - ORIENTATION: ORIENTATION: RIGHT

## 2019-08-16 ASSESSMENT — PAIN SCALES - GENERAL: PAINLEVEL_OUTOF10: 3

## 2019-08-16 ASSESSMENT — PAIN DESCRIPTION - LOCATION: LOCATION: LEG

## 2019-08-20 ENCOUNTER — TELEPHONE (OUTPATIENT)
Dept: FAMILY MEDICINE CLINIC | Age: 79
End: 2019-08-20

## 2019-08-20 ENCOUNTER — HOSPITAL ENCOUNTER (OUTPATIENT)
Dept: PHYSICAL THERAPY | Age: 79
Setting detail: THERAPIES SERIES
Discharge: HOME | End: 2019-08-20
Payer: MEDICARE

## 2019-08-20 DIAGNOSIS — F43.21 SITUATIONAL DEPRESSION: ICD-10-CM

## 2019-08-20 PROCEDURE — 97110 THERAPEUTIC EXERCISES: CPT

## 2019-08-20 NOTE — PROGRESS NOTES
awhile. Continue. Long term goals  Time Frame for Long term goals : Up to 12 weeks  Long term goal 1: Independent with HEP and with progression to assist with improving balance. Ongoing: Patient admits to not working routinely on exercises at home.       Ester Townsend, [id number]

## 2019-08-22 ENCOUNTER — TELEPHONE (OUTPATIENT)
Dept: FAMILY MEDICINE CLINIC | Age: 79
End: 2019-08-22

## 2019-08-22 NOTE — TELEPHONE ENCOUNTER
Attempted to notify patient, no answer and no vm set up.   Left message on vm for daughter Nichole Howell (on [employer])

## 2019-08-23 ENCOUNTER — HOSPITAL ENCOUNTER (OUTPATIENT)
Dept: PHYSICAL THERAPY | Age: 79
Setting detail: THERAPIES SERIES
Discharge: HOME | End: 2019-08-23
Payer: MEDICARE

## 2019-08-23 PROCEDURE — 97110 THERAPEUTIC EXERCISES: CPT

## 2019-08-23 NOTE — PROGRESS NOTES
[address]     Time In: 1300  Time Out: 9742  Minutes: 42  Timed Code Treatment Minutes: 42 Minutes                Date: 2019  Patient Name: Shruti Avendaño,  Gender:  female        CSN: [id number]   : 1940  (70 y.o.)  Referral Date : 19    Referring Practitioner: PHIL Carpenter CNP      Diagnosis: R26.81 (ICD-10-CM) - Unsteady gait, R42 (ICD-10-CM) - Dizziness on standing  Treatment Diagnosis: Decreased balance, decreased strength. Additional Pertinent Hx: Pacemaker, 5 heart surgeries since 2018, Osteoporosis                  General:  PT Visit Information  Onset Date: 19  PT Insurance Information: MEDICARE-Patient has unlimited visits based on medical necessity. Aquatics and modalities are covered except ionto and hot/cold packs. Total # of Visits to Date: 17  Progress Note Counter:  for PN               Subjective:  Family / Caregiver Present: Yes  Comments: Follow up as needed with doctor. Subjective: Patient reports [place] around [place] 4 hours. She did use her cane [place] the fair. Is no longer having headaches or neck pain. Has improved endurance. Pain:  Patient Currently in Pain: No         Objective    Other Activities  Other Activities: Re-evaluation performed             TINETTI BALANCE TEST    BALANCE Patient is seated in a hard, armless chair   1 SITTING BALANCE 1 - Steady, safe   2. RISES FROM CHAIR 2 - Able without use of arms   3. ATTEMPTS TO RISE 2 - Able to rise, 1 attempt   4. IMMEDIATE STANDING BALANCE (FIRST 5 SECONDS) 2 - Steady without walker or other support   5. STANDING BALANCE 2 - Narrow stance without support   6. NUDGED 0 - Begins to fall   7. EYES CLOSED 1 - Steady   8. TURNING 360 DEGREES 1 - Continuous and 1 - Steady   9.   SITTING DOWN 2 - Safe,smooth motion   BALANCE SCORE 14/16       GAIT SECTION Patient stands with therapist, walks across room (+/- aids), fist at usual pace, then at rapid pace. 1.  INDICATION OF GAIT (Immediately after told to \"go\" 1 - No hesitancy   2. STEP LENGTH AND HEIGHT 1 - Step through Right and 1 - Step through Left   3. FOOT CLEARANCE 1 - Left foot clears floor and 1 - Right foot clears floor   4. STEP SYMMETRY 1 - Right and left step length appear equal   5. STEP CONTINUITY 1 - Steps appear continuous   6. PATH 1 - Mild/moderate deviation or uses walking aid   7. TRUNK 2 - No sway, flexion, use of arms or walking aid   8. WALKING TIME 1 - Heels almost touching while walking   GAIT SCORE 11/12   TOTAL SCORE 25/28   Risk Indicators:  Less than/equal to 18 = high risk  19-23 Moderate risk  Greater than/equal to 24 = low risk                  Activity Tolerance:  Activity Tolerance: Patient Tolerated treatment well    Assessment:  Assessment: Reassessment completed today. Patient has made significant progress towards balance, increased leg strength, and improved ambulation. At this time she is to continue with HEP on her own at this time. She will return to therapy in 2 weeks for a recheck. Prognosis: Good  REQUIRES PT FOLLOW UP: Yes  Discharge Recommendations: Continue to assess pending progress    Patient Education:  Patient Education: Continue with HEP. Return for recheck in 2 weeks.                       Plan:  Times per week: 2 times per week  Plan weeks: Up to 8 weeks  Specific instructions for Next Treatment: Whole body stretches and strengthening, posture and body mechanics, balance, ambulation, modalities as needed  Current Treatment Recommendations: Strengthening, ROM, Balance Training, Endurance Training, Stair training, Positioning, Modalities, Safety Education & Training, Gait Training, Transfer Training, Functional Mobility Training, Home Exercise Program  Plan Comment: Continue with POC    Goals:  Patient goals : \"Improve balance\"    Short term goals  Time Frame for Short term goals: 4 weeks  Short term goal 1: Improve balance with Tinetti score of 26/28 to assist with safety walking around home. NOT MET: Tinetti 25/28. (was 24/28 at last progress note). Continue. Short term goal 2: Increase leg strength to 5/5 to assist with stability going up and down steps. MET: Leg strength=5/5. Short term goal 3: Improve ambulation to allow patient to ambulate at improved pace and no path deviation to assist with safety at home. PARTIALLY  MET: Patient is ambulating with no assistive device. Mild path deviation. Continue. Short term goal 4: Patient to report have no falls or episodes of loss of balance into wall to assist with walking into therapy. MET: Patient has not had any falls since falling on golf course a few weeks ago. Long term goals  Time Frame for Long term goals : Up to 12 weeks  Long term goal 1: Independent with HEP and with progression to assist with improving balance. Ongoing: Patient admits to not working routinely on exercises at home and is not sure how often she is actually doing her exercises.       Jesus Negron

## 2019-09-06 ENCOUNTER — HOSPITAL ENCOUNTER (OUTPATIENT)
Dept: PHYSICAL THERAPY | Age: 79
Setting detail: THERAPIES SERIES
Discharge: HOME | End: 2019-09-06
Payer: MEDICARE

## 2019-09-06 PROCEDURE — 97110 THERAPEUTIC EXERCISES: CPT

## 2019-09-06 NOTE — DISCHARGE SUMMARY
[address]     Time In: 1430  Time Out: 1500  Minutes: 30  Timed Code Treatment Minutes: 30 Minutes                Date: 2019  Patient Name: Saul Kelley,  Gender:  female        CSN: [id number]   : 1940  (70 y.o.)  Referral Date : 19    Referring Practitioner: PHIL Betancourt CNP      Diagnosis: R26.81 (ICD-10-CM) - Unsteady gait, R42 (ICD-10-CM) - Dizziness on standing  Treatment Diagnosis: Decreased balance, decreased strength. Additional Pertinent Hx: Pacemaker, 5 heart surgeries since 2018, Osteoporosis                  General:  PT Visit Information  Onset Date: 19  PT Insurance Information: MEDICARE-Patient has unlimited visits based on medical necessity. Aquatics and modalities are covered except ionto and hot/cold packs. Total # of Visits to Date: 25  Progress Note Counter:  for PN               Subjective:  Family / Caregiver Present: Yes  Comments: Follow up as needed with doctor. Subjective: Patient returns to therapy today for a recheck. Reports has been working on exercises around 2 times per day. Reports is about 50% improved since beginning therapy. Pain:  Patient Currently in Pain: No         Objective         Other Activities  Other Activities: Re-evaluation performed          TINETTI BALANCE TEST    BALANCE Patient is seated in a hard, armless chair   1 SITTING BALANCE 1 - Steady, safe   2. RISES FROM CHAIR 2 - Able without use of arms   3. ATTEMPTS TO RISE 2 - Able to rise, 1 attempt   4. IMMEDIATE STANDING BALANCE (FIRST 5 SECONDS) 2 - Steady without walker or other support   5. STANDING BALANCE 2 - Narrow stance without support   6. NUDGED 1 - Staggers, grabs, catches   7. EYES CLOSED 1 - Steady   8. TURNING 360 DEGREES 1 - Continuous and 1 - Steady   9.   SITTING DOWN 2 - Safe,smooth motion   BALANCE SCORE 15/16       GAIT SECTION and down steps. MET: Leg strength=5/5. Short term goal 3: Improve ambulation to allow patient to ambulate at improved pace and no path deviation to assist with safety at home. PARTIALLY  MET: Patient is ambulating with no assistive device. Mild path deviation. Short term goal 4: Patient to report have no falls or episodes of loss of balance into wall to assist with walking into therapy. MET: Patient has not had any falls since falling on golf course a month ago. Long term goals  Time Frame for Long term goals : Up to 12 weeks  Long term goal 1: Independent with HEP and with progression to assist with improving balance. Ongoing: Patient reporting she has been working on HEP 2 times per day.       Jesus Crawford

## 2019-10-14 ENCOUNTER — HOSPITAL ENCOUNTER (OUTPATIENT)
Age: 79
Discharge: HOME | End: 2019-10-14
Payer: MEDICARE

## 2019-10-14 DIAGNOSIS — I25.810 CORONARY ARTERY DISEASE INVOLVING CORONARY BYPASS GRAFT OF NATIVE HEART WITHOUT ANGINA PECTORIS: ICD-10-CM

## 2019-10-14 LAB
ALBUMIN SERPL-MCNC: 4.3 G/DL (ref 3.5–5.1)
ALP BLD-CCNC: 75 U/L (ref 38–126)
ALT SERPL-CCNC: 12 U/L (ref 11–66)
ANION GAP SERPL CALCULATED.3IONS-SCNC: 11 MEQ/L (ref 8–16)
AST SERPL-CCNC: 21 U/L (ref 5–40)
BILIRUB SERPL-MCNC: 0.4 MG/DL (ref 0.3–1.2)
BILIRUBIN DIRECT: < 0.2 MG/DL (ref 0–0.3)
BUN BLDV-MCNC: 17 MG/DL (ref 7–22)
CALCIUM SERPL-MCNC: 9.4 MG/DL (ref 8.5–10.5)
CHLORIDE BLD-SCNC: 101 MEQ/L (ref 98–111)
CHOLESTEROL, TOTAL: 242 MG/DL (ref 100–199)
CO2: 27 MEQ/L (ref 23–33)
CREAT SERPL-MCNC: 0.8 MG/DL (ref 0.4–1.2)
GFR SERPL CREATININE-BSD FRML MDRD: 69 ML/MIN/1.73M2
GLUCOSE BLD-MCNC: 102 MG/DL (ref 70–108)
HDLC SERPL-MCNC: 81 MG/DL
LDL CHOLESTEROL CALCULATED: 139 MG/DL
POTASSIUM SERPL-SCNC: 4.8 MEQ/L (ref 3.5–5.2)
SEDIMENTATION RATE, ERYTHROCYTE: 25 MM/HR (ref 0–20)
SODIUM BLD-SCNC: 139 MEQ/L (ref 135–145)
TOTAL PROTEIN: 8 G/DL (ref 6.1–8)
TRIGL SERPL-MCNC: 112 MG/DL (ref 0–199)

## 2019-10-14 PROCEDURE — 80061 LIPID PANEL: CPT

## 2019-10-14 PROCEDURE — 80053 COMPREHEN METABOLIC PANEL: CPT

## 2019-10-14 PROCEDURE — 82248 BILIRUBIN DIRECT: CPT

## 2019-10-14 PROCEDURE — 36415 COLL VENOUS BLD VENIPUNCTURE: CPT

## 2019-10-14 PROCEDURE — 85651 RBC SED RATE NONAUTOMATED: CPT

## 2019-10-14 RX ORDER — FUROSEMIDE 20 MG/1
TABLET ORAL
Qty: 30 TABLET | Refills: 4 | Status: ON HOLD | OUTPATIENT
Start: 2019-10-14 | End: 2021-02-19 | Stop reason: HOSPADM

## 2019-11-15 ENCOUNTER — OFFICE VISIT (OUTPATIENT)
Dept: FAMILY MEDICINE CLINIC | Age: 79
End: 2019-11-15
Payer: MEDICARE

## 2019-11-15 VITALS
BODY MASS INDEX: 20.51 KG/M2 | HEART RATE: 92 BPM | RESPIRATION RATE: 16 BRPM | SYSTOLIC BLOOD PRESSURE: 108 MMHG | DIASTOLIC BLOOD PRESSURE: 50 MMHG | WEIGHT: 123.1 LBS | HEIGHT: 65 IN

## 2019-11-15 DIAGNOSIS — F43.21 SITUATIONAL DEPRESSION: ICD-10-CM

## 2019-11-15 DIAGNOSIS — Z00.00 ROUTINE GENERAL MEDICAL EXAMINATION AT A HEALTH CARE FACILITY: Primary | ICD-10-CM

## 2019-11-15 DIAGNOSIS — R42 DIZZINESS ON STANDING: ICD-10-CM

## 2019-11-15 PROCEDURE — 1090F PRES/ABSN URINE INCON ASSESS: CPT | Performed by: FAMILY MEDICINE

## 2019-11-15 PROCEDURE — 1123F ACP DISCUSS/DSCN MKR DOCD: CPT | Performed by: FAMILY MEDICINE

## 2019-11-15 PROCEDURE — G8420 CALC BMI NORM PARAMETERS: HCPCS | Performed by: FAMILY MEDICINE

## 2019-11-15 PROCEDURE — G8399 PT W/DXA RESULTS DOCUMENT: HCPCS | Performed by: FAMILY MEDICINE

## 2019-11-15 PROCEDURE — G0438 PPPS, INITIAL VISIT: HCPCS | Performed by: FAMILY MEDICINE

## 2019-11-15 PROCEDURE — 99213 OFFICE O/P EST LOW 20 MIN: CPT | Performed by: FAMILY MEDICINE

## 2019-11-15 PROCEDURE — G8427 DOCREV CUR MEDS BY ELIG CLIN: HCPCS | Performed by: FAMILY MEDICINE

## 2019-11-15 PROCEDURE — G8598 ASA/ANTIPLAT THER USED: HCPCS | Performed by: FAMILY MEDICINE

## 2019-11-15 PROCEDURE — G8484 FLU IMMUNIZE NO ADMIN: HCPCS | Performed by: FAMILY MEDICINE

## 2019-11-15 PROCEDURE — 1036F TOBACCO NON-USER: CPT | Performed by: FAMILY MEDICINE

## 2019-11-15 PROCEDURE — 4040F PNEUMOC VAC/ADMIN/RCVD: CPT | Performed by: FAMILY MEDICINE

## 2019-11-15 RX ORDER — ROSUVASTATIN CALCIUM 10 MG/1
10 TABLET, COATED ORAL DAILY
Status: ON HOLD | COMMUNITY
End: 2021-03-13

## 2019-11-15 RX ORDER — MECLIZINE HCL 12.5 MG/1
12.5 TABLET ORAL 3 TIMES DAILY PRN
Qty: 30 TABLET | Refills: 0 | Status: SHIPPED | OUTPATIENT
Start: 2019-11-15 | End: 2021-05-26

## 2019-11-15 RX ORDER — UBIDECARENONE 75 MG
CAPSULE ORAL
COMMUNITY
End: 2021-08-31

## 2019-11-15 RX ORDER — SERTRALINE HYDROCHLORIDE 100 MG/1
100 TABLET, FILM COATED ORAL DAILY
Qty: 90 TABLET | Refills: 3 | Status: SHIPPED | OUTPATIENT
Start: 2019-11-15 | End: 2020-11-05

## 2019-11-15 ASSESSMENT — PATIENT HEALTH QUESTIONNAIRE - PHQ9: SUM OF ALL RESPONSES TO PHQ QUESTIONS 1-9: 13

## 2019-11-15 ASSESSMENT — LIFESTYLE VARIABLES: HOW OFTEN DO YOU HAVE A DRINK CONTAINING ALCOHOL: 0

## 2019-12-11 ENCOUNTER — OFFICE VISIT (OUTPATIENT)
Dept: RHEUMATOLOGY | Age: 79
End: 2019-12-11
Payer: MEDICARE

## 2019-12-11 ENCOUNTER — NURSE ONLY (OUTPATIENT)
Dept: LAB | Age: 79
End: 2019-12-11

## 2019-12-11 ENCOUNTER — HOSPITAL ENCOUNTER (OUTPATIENT)
Dept: GENERAL RADIOLOGY | Age: 79
Discharge: HOME | End: 2019-12-11
Payer: MEDICARE

## 2019-12-11 ENCOUNTER — HOSPITAL ENCOUNTER (OUTPATIENT)
Age: 79
Discharge: HOME | End: 2019-12-11
Payer: MEDICARE

## 2019-12-11 VITALS
HEART RATE: 80 BPM | BODY MASS INDEX: 20.2 KG/M2 | HEIGHT: 65 IN | WEIGHT: 121.25 LBS | DIASTOLIC BLOOD PRESSURE: 70 MMHG | SYSTOLIC BLOOD PRESSURE: 124 MMHG

## 2019-12-11 DIAGNOSIS — M81.0 AGE-RELATED OSTEOPOROSIS WITHOUT CURRENT PATHOLOGICAL FRACTURE: Primary | ICD-10-CM

## 2019-12-11 DIAGNOSIS — M81.0 AGE-RELATED OSTEOPOROSIS WITHOUT CURRENT PATHOLOGICAL FRACTURE: ICD-10-CM

## 2019-12-11 DIAGNOSIS — Z51.81 MEDICATION MONITORING ENCOUNTER: ICD-10-CM

## 2019-12-11 LAB — CALCIUM SERPL-MCNC: 9.8 MG/DL (ref 8.5–10.5)

## 2019-12-11 PROCEDURE — 99213 OFFICE O/P EST LOW 20 MIN: CPT | Performed by: NURSE PRACTITIONER

## 2019-12-11 PROCEDURE — 1036F TOBACCO NON-USER: CPT | Performed by: NURSE PRACTITIONER

## 2019-12-11 PROCEDURE — 72072 X-RAY EXAM THORAC SPINE 3VWS: CPT

## 2019-12-11 PROCEDURE — G8598 ASA/ANTIPLAT THER USED: HCPCS | Performed by: NURSE PRACTITIONER

## 2019-12-11 PROCEDURE — 72100 X-RAY EXAM L-S SPINE 2/3 VWS: CPT

## 2019-12-11 PROCEDURE — G8420 CALC BMI NORM PARAMETERS: HCPCS | Performed by: NURSE PRACTITIONER

## 2019-12-11 PROCEDURE — 4040F PNEUMOC VAC/ADMIN/RCVD: CPT | Performed by: NURSE PRACTITIONER

## 2019-12-11 PROCEDURE — G8427 DOCREV CUR MEDS BY ELIG CLIN: HCPCS | Performed by: NURSE PRACTITIONER

## 2019-12-11 PROCEDURE — G8399 PT W/DXA RESULTS DOCUMENT: HCPCS | Performed by: NURSE PRACTITIONER

## 2019-12-11 PROCEDURE — 1123F ACP DISCUSS/DSCN MKR DOCD: CPT | Performed by: NURSE PRACTITIONER

## 2019-12-11 PROCEDURE — G8484 FLU IMMUNIZE NO ADMIN: HCPCS | Performed by: NURSE PRACTITIONER

## 2019-12-11 PROCEDURE — 1090F PRES/ABSN URINE INCON ASSESS: CPT | Performed by: NURSE PRACTITIONER

## 2019-12-11 ASSESSMENT — ENCOUNTER SYMPTOMS
EYE PAIN: 0
NAUSEA: 0
COUGH: 0
SHORTNESS OF BREATH: 1
VOMITING: 0
DIARRHEA: 0
TROUBLE SWALLOWING: 0
EYE REDNESS: 0
BACK PAIN: 1

## 2019-12-13 ENCOUNTER — TELEPHONE (OUTPATIENT)
Dept: RHEUMATOLOGY | Age: 79
End: 2019-12-13

## 2019-12-16 ENCOUNTER — HOSPITAL ENCOUNTER (OUTPATIENT)
Dept: NURSING | Age: 79
Discharge: HOME | End: 2019-12-16
Payer: MEDICARE

## 2019-12-16 VITALS
DIASTOLIC BLOOD PRESSURE: 79 MMHG | HEART RATE: 99 BPM | BODY MASS INDEX: 20.97 KG/M2 | OXYGEN SATURATION: 100 % | TEMPERATURE: 97 F | WEIGHT: 126 LBS | SYSTOLIC BLOOD PRESSURE: 123 MMHG | RESPIRATION RATE: 16 BRPM

## 2019-12-16 DIAGNOSIS — M81.0 AGE-RELATED OSTEOPOROSIS WITHOUT CURRENT PATHOLOGICAL FRACTURE: Primary | ICD-10-CM

## 2019-12-16 PROCEDURE — 96372 THER/PROPH/DIAG INJ SC/IM: CPT

## 2019-12-16 PROCEDURE — 6360000002 HC RX W HCPCS: Performed by: NURSE PRACTITIONER

## 2019-12-16 RX ORDER — 0.9 % SODIUM CHLORIDE 0.9 %
10 VIAL (ML) INJECTION ONCE
Status: CANCELLED | OUTPATIENT
Start: 2020-06-08

## 2019-12-16 RX ORDER — METHYLPREDNISOLONE SODIUM SUCCINATE 125 MG/2ML
125 INJECTION, POWDER, LYOPHILIZED, FOR SOLUTION INTRAMUSCULAR; INTRAVENOUS ONCE
Status: CANCELLED | OUTPATIENT
Start: 2020-06-08

## 2019-12-16 RX ORDER — SODIUM CHLORIDE 9 MG/ML
INJECTION, SOLUTION INTRAVENOUS CONTINUOUS
Status: CANCELLED | OUTPATIENT
Start: 2020-06-08

## 2019-12-16 RX ORDER — DIPHENHYDRAMINE HYDROCHLORIDE 50 MG/ML
50 INJECTION INTRAMUSCULAR; INTRAVENOUS ONCE
Status: CANCELLED | OUTPATIENT
Start: 2020-06-08

## 2019-12-16 RX ADMIN — DENOSUMAB 60 MG: 60 INJECTION SUBCUTANEOUS at 13:22

## 2019-12-16 NOTE — PROGRESS NOTES
[facility] OPN FOR PROLIA INJECTION. ALL QUESTIONS AND CONCERNS ADDRESSED  1300 PATIENT RIGHTS AND RESPONSIBILITIES SHEET OFFERED TO PT TO READ.   1310 _M___ Safety:       (Environmental)   Stendal to environment   Ensure ID band is correct and in place/ allergy band as needed   Assess for fall risk   Initiate fall precautions as applicable (fall band, side rails, etc.)   Call light within reach   Bed in low position/ wheels locked    _M___ Pain:        Assess pain level and characteristics   Administer analgesics as ordered   Assess effectiveness of pain management and report to MD as needed    __M__ Knowledge Deficit:   Assess baseline knowledge   Provide teaching at level of understanding   Provide teaching via preferred learning method   Evaluate teaching effectiveness    __M__ Hemodynamic/Respiratory Status:       (Pre and Post Procedure Monitoring)   Assess/Monitor vital signs and LOC   Assess Baseline SpO2 prior to any sedation   Obtain weight/height   Assess vital signs/ LOC until patient meets discharge criteria   Monitor procedure site and notify MD of any issues           1325 WRITTEN DISCHARGE INSTRUCTIONS GIVEN TO PT-VERBALIZES UNDERSTANDING  1335 PT DISCHARGED AMBULATORY IN SATISFACTORY CONDITION

## 2020-03-25 PROBLEM — E43 SEVERE MALNUTRITION (HCC): Status: RESOLVED | Noted: 2018-11-12 | Resolved: 2020-03-24

## 2020-05-11 ENCOUNTER — OFFICE VISIT (OUTPATIENT)
Dept: FAMILY MEDICINE CLINIC | Age: 80
End: 2020-05-11
Payer: MEDICARE

## 2020-05-11 VITALS
SYSTOLIC BLOOD PRESSURE: 120 MMHG | TEMPERATURE: 97.6 F | OXYGEN SATURATION: 96 % | WEIGHT: 129.9 LBS | RESPIRATION RATE: 16 BRPM | HEART RATE: 84 BPM | BODY MASS INDEX: 21.62 KG/M2 | DIASTOLIC BLOOD PRESSURE: 68 MMHG

## 2020-05-11 PROCEDURE — 1123F ACP DISCUSS/DSCN MKR DOCD: CPT | Performed by: FAMILY MEDICINE

## 2020-05-11 PROCEDURE — G8420 CALC BMI NORM PARAMETERS: HCPCS | Performed by: FAMILY MEDICINE

## 2020-05-11 PROCEDURE — G8427 DOCREV CUR MEDS BY ELIG CLIN: HCPCS | Performed by: FAMILY MEDICINE

## 2020-05-11 PROCEDURE — 1090F PRES/ABSN URINE INCON ASSESS: CPT | Performed by: FAMILY MEDICINE

## 2020-05-11 PROCEDURE — 99214 OFFICE O/P EST MOD 30 MIN: CPT | Performed by: FAMILY MEDICINE

## 2020-05-11 PROCEDURE — 1036F TOBACCO NON-USER: CPT | Performed by: FAMILY MEDICINE

## 2020-05-11 PROCEDURE — G8399 PT W/DXA RESULTS DOCUMENT: HCPCS | Performed by: FAMILY MEDICINE

## 2020-05-11 PROCEDURE — 4040F PNEUMOC VAC/ADMIN/RCVD: CPT | Performed by: FAMILY MEDICINE

## 2020-05-11 ASSESSMENT — ENCOUNTER SYMPTOMS
BACK PAIN: 0
GASTROINTESTINAL NEGATIVE: 1
ALLERGIC/IMMUNOLOGIC NEGATIVE: 1
SHORTNESS OF BREATH: 0
RESPIRATORY NEGATIVE: 1

## 2020-05-11 NOTE — PROGRESS NOTES
Visit Information    Have you changed or started any medications since your last visit including any over-the-counter medicines, vitamins, or herbal medicines? no   Are you having any side effects from any of your medications? -  no  Have you stopped taking any of your medications? Is so, why? -  no    Have you seen any other physician or provider since your last visit? No  Have you had any other diagnostic tests since your last visit? No  Have you been seen in the emergency room and/or had an admission to a hospital since we last saw you? No  Have you had your routine dental cleaning in the past 6 months? no    Have you activated your [employer] account? If not, what are your barriers?  No: declined      Patient Care Team:  Jennifer Anna MD as PCP - General (Family Medicine)  Jennifer Anna MD as PCP - [facility]    Medical History Review  Past Medical, Family, and Social History reviewed and does contribute to the patient presenting condition    Health Maintenance   Topic Date Due    DTaP/Tdap/Td vaccine (1 - Tdap) 02/22/1961    Shingles Vaccine (1 of 2) 02/22/1992    Pneumococcal 65+ years Vaccine (1 of 1 - PPSV23) 02/22/2007    Flu vaccine (Season Ended) 09/01/2020    Lipid screen  10/14/2020    Potassium monitoring  10/14/2020    Creatinine monitoring  10/14/2020    Annual Wellness Visit (AWV)  11/15/2020    DEXA (modify frequency per FRAX score)  Completed    Hepatitis A vaccine  Aged Out    Hepatitis B vaccine  Aged Out    Hib vaccine  Aged Out    Meningococcal (ACWY) vaccine  Aged Out

## 2020-05-11 NOTE — PROGRESS NOTES
Subjective:      Patient ID: Charna Mohs Burden is a 68 y.o. female. HPI  Follow up of chronic conditions. Encounter Diagnoses   Name Primary?  Atrial fibrillation with RVR ([address] treated with ablation, April, 2019, pacemaker inserted 5 days prior. Yes    S/P mitral valve replacement with bioprosthetic valve     Situational depression     S/P coronary artery bypass graft x 1, 11/12/18.  Medication monitoring encounter     Physical deconditioning     Unsteady gait      Viktor Owen is here with her daughter for follow-up of her atrial fibrillation treated with ablation a year ago and pacemaker insertion. She states she has been doing well and has had no repeat episodes of palpitations. She still has some lightheadedness when standing which is been a longstanding issue. She has no chest pain with exertion and continues to do well status post coronary artery bypass graft times 1 November 2018. Her daughter states though that her strength seems to be decreasing as she has to rock multiple times to get out of a chair and then has a \"wobbly gait\" due to doing very little physical activity. She has been encouraging her to walk with her walker but apparently there are days when she is hesitant to do much physical activity. The association with physical deconditioning and unsteady gait leading to potential falling was discussed with her again and it is good to see that her daughter does encourage her to try and be more active. Fortunately she has had no recent falls. She has maintained her weight and has gained a little bit even though her daughter does not feel she eats enough yet. Wt Readings from Last 3 Encounters:   05/11/20 129 lb 14.4 oz (58.9 kg)   12/16/19 126 lb (57.2 kg)   12/11/19 121 lb 4.1 oz (55 kg)   Body mass index is 21.62 kg/m². The rest of this patient's conditions are stable. Past medical and surgical hx reviewed.   Past Medical History:   Diagnosis Date    Atrial fibrillation ([facility]     CAD (coronary artery disease)     Congestive heart failure (CHF) ([facility]     Hyperlipidemia     Hypertension      Past Surgical History:   Procedure Laterality Date    ATRIAL ABLATION SURGERY      CARDIAC CATHETERIZATION      CARDIAC VALVE REPLACEMENT      CORONARY ANGIOPLASTY WITH STENT PLACEMENT  02/28/2019    CORONARY ARTERY BYPASS GRAFT      x1    HYSTERECTOMY      MITRAL VALVE REPLACEMENT      PACEMAKER PLACEMENT  05/2019    NE OFFICE/OUTPT VISIT,PROCEDURE ONLY N/A 11/12/2018    CABG X3, MITRAL VALVE REPAIR, MAZE, AND TRICUSPID VALVE REPAIR, DEANNA performed by Keyla Simmons MD at [place] [address]       Portions of this note were completed with a voice recording program.  Efforts were made to edit the dictations but occasionally words are mis-transcribed. Review of Systems   Constitutional: Negative for activity change, appetite change and unexpected weight change. HENT: Negative. Respiratory: Negative. Negative for shortness of breath. Cardiovascular: Negative. Negative for chest pain, palpitations and leg swelling. Gastrointestinal: Negative. Musculoskeletal: Positive for arthralgias and gait problem. Negative for back pain. See HPI. Skin: Negative. Allergic/Immunologic: Negative. Neurological: Positive for light-headedness. Negative for dizziness, tremors and headaches. Hematological: Negative. Psychiatric/Behavioral: Positive for dysphoric mood and sleep disturbance. Patient has longstanding insomnia. All other systems reviewed and are negative. Objective:   Physical Exam  Vitals signs and nursing note reviewed. Exam conducted with a chaperone present. Constitutional:       Appearance: She is normal weight. HENT:      Head: Normocephalic.       Right Ear: Tympanic membrane, ear canal and external ear normal.      Left Ear: Tympanic membrane, ear canal and external ear normal.      Nose: Nose normal.      Mouth/Throat:      Mouth: Mucous membranes are moist.      Pharynx: Oropharynx is clear. Eyes:      Conjunctiva/sclera: Conjunctivae normal.      Pupils: Pupils are equal, round, and reactive to light. Cardiovascular:      Rate and Rhythm: Normal rate and regular rhythm. Pulses: Normal pulses. Heart sounds: S1 normal and S2 normal. Murmur present. Systolic murmur present with a grade of 1/6. Pulmonary:      Effort: Pulmonary effort is normal.      Breath sounds: Normal breath sounds. Abdominal:      General: Abdomen is flat. Bowel sounds are normal.   Musculoskeletal:         General: No swelling. Right lower leg: No edema. Left lower leg: No edema. Skin:     General: Skin is warm and dry. Capillary Refill: Capillary refill takes less than 2 seconds. Neurological:      General: No focal deficit present. Mental Status: She is alert and oriented to person, place, and time. Psychiatric:         Mood and Affect: Mood normal.         Assessment:       Diagnosis Orders   1. Atrial fibrillation with RVR ([address] treated with ablation, April, 2019, pacemaker inserted 5 days prior. 2. S/P mitral valve replacement with bioprosthetic valve     3. Situational depression     4. S/P coronary artery bypass graft x 1, 11/12/18. 5. Medication monitoring encounter     6. Physical deconditioning     7. Unsteady gait             Plan:      No orders of the defined types were placed in this encounter. There are no discontinued medications.   Current Outpatient Medications   Medication Sig Dispense Refill    Coenzyme Q10 (CO Q-10) 200 MG CAPS Take by mouth      rosuvastatin (CRESTOR) 10 MG tablet Take 10 mg by mouth daily      meclizine (ANTIVERT) 12.5 MG tablet Take 1 tablet by mouth 3 times daily as needed for Dizziness 30 tablet 0    sertraline (ZOLOFT) 100 MG tablet Take 1 tablet by mouth daily 90 tablet 3    furosemide (LASIX) 20 MG tablet TAKE 1 TABLET BY MOUTH

## 2020-11-23 RX ORDER — SERTRALINE HYDROCHLORIDE 100 MG/1
100 TABLET, FILM COATED ORAL DAILY
Qty: 90 TABLET | Refills: 1 | Status: ON HOLD | OUTPATIENT
Start: 2020-11-23 | End: 2021-03-17 | Stop reason: HOSPADM

## 2020-12-30 ENCOUNTER — HOSPITAL ENCOUNTER (OUTPATIENT)
Age: 80
Discharge: HOME | End: 2020-12-30
Payer: MEDICARE

## 2020-12-30 LAB
ALBUMIN SERPL-MCNC: 4.3 G/DL (ref 3.5–5.1)
ALP BLD-CCNC: 107 U/L (ref 38–126)
ALT SERPL-CCNC: 9 U/L (ref 11–66)
AST SERPL-CCNC: 18 U/L (ref 5–40)
BILIRUB SERPL-MCNC: 0.3 MG/DL (ref 0.3–1.2)
BILIRUBIN DIRECT: < 0.2 MG/DL (ref 0–0.3)
CHOLESTEROL, TOTAL: 174 MG/DL (ref 100–199)
HDLC SERPL-MCNC: 71 MG/DL
LDL CHOLESTEROL CALCULATED: 87 MG/DL
T4 FREE: 0.92 NG/DL (ref 0.93–1.76)
TOTAL PROTEIN: 7.9 G/DL (ref 6.1–8)
TRIGL SERPL-MCNC: 82 MG/DL (ref 0–199)
TSH SERPL DL<=0.05 MIU/L-ACNC: 1.68 UIU/ML (ref 0.4–4.2)

## 2020-12-30 PROCEDURE — 80076 HEPATIC FUNCTION PANEL: CPT

## 2020-12-30 PROCEDURE — 80061 LIPID PANEL: CPT

## 2020-12-30 PROCEDURE — 84443 ASSAY THYROID STIM HORMONE: CPT

## 2020-12-30 PROCEDURE — 36415 COLL VENOUS BLD VENIPUNCTURE: CPT

## 2020-12-30 PROCEDURE — 84439 ASSAY OF FREE THYROXINE: CPT

## 2021-01-20 ENCOUNTER — OFFICE VISIT (OUTPATIENT)
Dept: RHEUMATOLOGY | Age: 81
End: 2021-01-20
Payer: MEDICARE

## 2021-01-20 VITALS
SYSTOLIC BLOOD PRESSURE: 118 MMHG | HEART RATE: 89 BPM | DIASTOLIC BLOOD PRESSURE: 68 MMHG | OXYGEN SATURATION: 94 % | BODY MASS INDEX: 21.46 KG/M2 | HEIGHT: 65 IN | WEIGHT: 128.8 LBS | TEMPERATURE: 94.8 F

## 2021-01-20 DIAGNOSIS — M81.0 AGE-RELATED OSTEOPOROSIS WITHOUT CURRENT PATHOLOGICAL FRACTURE: Primary | ICD-10-CM

## 2021-01-20 PROCEDURE — G8399 PT W/DXA RESULTS DOCUMENT: HCPCS | Performed by: NURSE PRACTITIONER

## 2021-01-20 PROCEDURE — 1090F PRES/ABSN URINE INCON ASSESS: CPT | Performed by: NURSE PRACTITIONER

## 2021-01-20 PROCEDURE — 99213 OFFICE O/P EST LOW 20 MIN: CPT | Performed by: NURSE PRACTITIONER

## 2021-01-20 PROCEDURE — 1123F ACP DISCUSS/DSCN MKR DOCD: CPT | Performed by: NURSE PRACTITIONER

## 2021-01-20 PROCEDURE — 1036F TOBACCO NON-USER: CPT | Performed by: NURSE PRACTITIONER

## 2021-01-20 PROCEDURE — G8427 DOCREV CUR MEDS BY ELIG CLIN: HCPCS | Performed by: NURSE PRACTITIONER

## 2021-01-20 PROCEDURE — G8484 FLU IMMUNIZE NO ADMIN: HCPCS | Performed by: NURSE PRACTITIONER

## 2021-01-20 PROCEDURE — 4040F PNEUMOC VAC/ADMIN/RCVD: CPT | Performed by: NURSE PRACTITIONER

## 2021-01-20 PROCEDURE — G8420 CALC BMI NORM PARAMETERS: HCPCS | Performed by: NURSE PRACTITIONER

## 2021-01-20 RX ORDER — METHYLPREDNISOLONE SODIUM SUCCINATE 125 MG/2ML
125 INJECTION, POWDER, LYOPHILIZED, FOR SOLUTION INTRAMUSCULAR; INTRAVENOUS ONCE
Status: CANCELLED | OUTPATIENT
Start: 2021-01-20 | End: 2021-01-20

## 2021-01-20 RX ORDER — EPINEPHRINE 1 MG/ML
0.3 INJECTION, SOLUTION, CONCENTRATE INTRAVENOUS PRN
Status: CANCELLED | OUTPATIENT
Start: 2021-01-20

## 2021-01-20 RX ORDER — SODIUM CHLORIDE 9 MG/ML
INJECTION, SOLUTION INTRAVENOUS CONTINUOUS
Status: CANCELLED | OUTPATIENT
Start: 2021-01-20

## 2021-01-20 RX ORDER — DIPHENHYDRAMINE HYDROCHLORIDE 50 MG/ML
50 INJECTION INTRAMUSCULAR; INTRAVENOUS ONCE
Status: CANCELLED | OUTPATIENT
Start: 2021-01-20 | End: 2021-01-20

## 2021-01-20 ASSESSMENT — ENCOUNTER SYMPTOMS
EYE REDNESS: 0
SHORTNESS OF BREATH: 1
VOMITING: 0
DIARRHEA: 0
BACK PAIN: 1
NAUSEA: 0
TROUBLE SWALLOWING: 0
COUGH: 0
EYE PAIN: 0

## 2021-01-20 NOTE — PROGRESS NOTES
\[facility]\""  Bone Fragility Follow up     Visit Date: 1/20/2021  MRN: [id number]  Cc:   Chief Complaint   Patient presents with    Follow-up     1 year OSTEOPOROSIS          HPI:   Kalli Almazan  is a(n)80 y.o. female here today for follow up of Osteoporosis. Has not had prolia in a year, unsure of why. Discussed importance of staying on therapy due to potential to backslide if missing dose. Patient voices understanding. Taking calcium and vitamin D. Has had some falls, no fractures. Last fall was 3 months ago. Using cane for ambulation. ROS:  Review of Systems   Constitutional: Positive for fatigue. Negative for chills and fever. HENT: Negative for congestion and trouble swallowing. Eyes: Negative for pain and redness. Respiratory: Positive for shortness of breath (with exertion). Negative for cough. Cardiovascular: Negative for chest pain and leg swelling. Gastrointestinal: Negative for diarrhea, nausea and vomiting. Endocrine: Negative for cold intolerance and heat intolerance. Genitourinary: Negative for difficulty urinating and urgency. Musculoskeletal: Positive for back pain. Negative for arthralgias and joint swelling. Neurological: Negative for dizziness, weakness and numbness. Hematological: Does not bruise/bleed easily. Psychiatric/Behavioral: The patient is not nervous/anxious. PAST MEDICAL HISTORY  Past Medical History:   Diagnosis Date    Atrial fibrillation ([address]     CAD (coronary artery disease)     Congestive heart failure (CHF) (HCC)     Hyperlipidemia     Hypertension        SOCIAL HISTORY  Social History     Socioeconomic History    Marital status:       Spouse name: None    Number of children: 1    Years of education: None    Highest education level: None   Occupational History    None   Social Needs    Financial resource strain: None    Food insecurity     Worry: None     Inability: None    Transportation needs Medical: None     Non-medical: None   Tobacco Use    Smoking status: Never Smoker    Smokeless tobacco: Never Used   Substance and Sexual Activity    Alcohol use: No    Drug use: No    Sexual activity: Not Currently   Lifestyle    Physical activity     Days per week: None     Minutes per session: None    Stress: None   Relationships    Social connections     Talks on phone: None     Gets together: None     Attends Mandaen service: None     Active member of club or organization: None     Attends meetings of clubs or organizations: None     Relationship status: None    Intimate partner violence     Fear of current or ex partner: None     Emotionally abused: None     Physically abused: None     Forced sexual activity: None   Other Topics Concern    None   Social History Narrative    None       FAMILY HISTORY  Family History   Problem Relation Age of Onset    Other Mother         alzheimers    Heart Attack Father     Heart Attack Brother        SURGICAL HISTORY  Past Surgical History:   Procedure Laterality Date    ATRIAL ABLATION SURGERY      CARDIAC CATHETERIZATION      CARDIAC VALVE REPLACEMENT      CORONARY ANGIOPLASTY WITH STENT PLACEMENT  02/28/2019    CORONARY ARTERY BYPASS GRAFT      x1    HYSTERECTOMY      MITRAL VALVE REPLACEMENT      PACEMAKER PLACEMENT  05/2019    [facility] OFFICE[facility]PT VISIT,PROCEDURE ONLY N/A 11/12/2018    CABG X3, MITRAL VALVE REPAIR, MAZE, AND TRICUSPID VALVE REPAIR, DEANNA performed by Deepa Linares MD at [address] [place] [address]  No Known Allergies    CURRENTMEDICATIONS  Current Outpatient Medications   Medication Sig Dispense Refill    sertraline (ZOLOFT) 100 MG tablet Take 1 tablet by mouth daily 90 tablet 1    Coenzyme Q10 (CO Q-10) 200 MG CAPS Take by mouth      rosuvastatin (CRESTOR) 10 MG tablet Take 10 mg by mouth daily      meclizine (ANTIVERT) 12.5 MG tablet Take 1 tablet by mouth 3 times daily as needed for Dizziness 30 tablet 0    furosemide (LASIX) 20 MG tablet TAKE 1 TABLET BY MOUTH TWO TIMES A DAY 30 tablet 4    Calcium Carbonate-Vit D-Min (CALCIUM 1200 PO) Take 1 tablet by mouth daily      vitamin D (CHOLECALCIFEROL) 1000 UNIT TABS tablet Take 1,000 Units by mouth daily      atenolol (TENORMIN) 25 MG tablet Take 1 tablet by mouth daily 30 tablet 3    potassium chloride (KLOR-CON M) 20 MEQ extended release tablet Take 1 tablet by mouth daily 30 tablet 5    clopidogrel (PLAVIX) 75 MG tablet Take 1 tablet by mouth daily 30 tablet 3    aspirin 81 MG tablet Take 81 mg by mouth daily      acetaminophen (TYLENOL) 325 MG tablet Take 1 tablet by mouth every 4 hours as needed for Pain 120 tablet 3    Multiple Vitamins-Minerals (THERAPEUTIC MULTIVITAMIN-MINERALS) tablet Take 1 tablet by mouth daily (with breakfast) 30 tablet 1     No current facility-administered medications for this visit. Objective:  /68 (Site: Left Upper Arm, Position: Sitting, Cuff Size: Medium Adult)   Pulse 89   Temp 94.8 °F (34.9 °C)   Ht 5' 5\" (1.651 m)   Wt 128 lb 12.8 oz (58.4 kg)   SpO2 94%   BMI 21.43 kg/m²     General: No distress. Alert. Eyes: PERRL. No conjunctival injection. ENT: No discharge. Pharynx clear. Neck: Trachea midline. Normal thyroid. Resp: No accessory muscle use. Lung sounds clear. CV: Regular rate. Regularrhythm. No mumur or rub. No edema. GI: Non-tender. Non-distended. M/S:   Mild kyphosis  Upper extremities:  Muscle strength +4/5, FROM, No Synovitis     Lower Extremities:   Muscle strength 5/5, FROM, No Synovitis     Neuro: Oriented to person, place, time. DTR's 2/4 bilat and equal  Psych:  No anxiety or agitation. Skin: Warm and dry. No rash on exposed extremities. Lymph: No cervical LAD. No supraclavicular LAD.        Labs:  CBC  Lab Results   Component Value Date    WBC 9.2 04/15/2019    RBC 2.92 04/15/2019    HGB 8.3 04/16/2019    HCT 26.1 04/16/2019    MCV 96.9 04/15/2019    MCH 28.8 04/15/2019    MCHC 29.7 04/15/2019     04/15/2019       CMP  Lab Results   Component Value Date    CALCIUM 9.8 12/11/2019    LABALBU 4.3 12/30/2020    PROT 7.9 12/30/2020     10/14/2019    K 4.8 10/14/2019    K 4.6 04/11/2019    CO2 27 10/14/2019     10/14/2019    BUN 17 10/14/2019    CREATININE 0.8 10/14/2019    ALKPHOS 107 12/30/2020    ALT 9 12/30/2020    AST 18 12/30/2020       HgBA1c: No components found for: HGBA1C    Lab Results   Component Value Date    TSH 1.680 12/30/2020     Lab Results   Component Value Date    VITD25 36 06/05/2019       Lab Results   Component Value Date    SEDRATE 25 (H) 10/14/2019     Lab Results   Component Value Date    CRP 0.04 06/05/2019       Lab Results   Component Value Date    VITD25 36 06/05/2019    CALCIUM 9.8 12/11/2019    MG 2.5 (H) 06/05/2019     Lab Results   Component Value Date     10/14/2019    K 4.8 10/14/2019     10/14/2019    CO2 27 10/14/2019    BUN 17 10/14/2019    CREATININE 0.8 10/14/2019    GLUCOSE 102 10/14/2019    CALCIUM 9.8 12/11/2019    PROT 7.9 12/30/2020    LABALBU 4.3 12/30/2020    BILITOT 0.3 12/30/2020    ALKPHOS 107 12/30/2020    AST 18 12/30/2020    ALT 9 (L) 12/30/2020         RADIOLOGY:     DEXA 5/22/2019  Left hip: -3.80  Right hip: -3.60  Lumbar spine: -3.40      Assessment and plan:  Osteoporosis, postmenopausal  - A 70year old  female diagnosed with osteoporosis by recent DEXA on 5/22/2019 with T score -3.80 at left femoral neck. Patient with history of right clavicular fracture 15 years ago, otherwise no other fractures. No family history of osteoporosis or fracture. Has not been on any prior treatment for osteoporosis.    - Continue Prolia 60 mg subcu q 6 months- overdue for injection- needs scheduled ASAP  - Xray thoracic and lumbar spine with no compression fractures (12/2019)  - Continue calcium and vitamin D supplements  - Encouraged to participate in regular weight bearing

## 2021-01-20 NOTE — PATIENT INSTRUCTIONS
Call to schedule DEXA scan to be completed in May  You will receive a call to schedule Prolia injection- needs done ASAP. If you do not hear about scheduling in 1-2 weeks, please call our office.

## 2021-01-26 ENCOUNTER — HOSPITAL ENCOUNTER (OUTPATIENT)
Dept: NURSING | Age: 81
Discharge: HOME | End: 2021-01-26
Payer: MEDICARE

## 2021-01-26 VITALS
DIASTOLIC BLOOD PRESSURE: 85 MMHG | RESPIRATION RATE: 18 BRPM | OXYGEN SATURATION: 94 % | HEART RATE: 88 BPM | BODY MASS INDEX: 21.26 KG/M2 | WEIGHT: 127.6 LBS | TEMPERATURE: 98.8 F | HEIGHT: 65 IN | SYSTOLIC BLOOD PRESSURE: 145 MMHG

## 2021-01-26 DIAGNOSIS — M81.0 AGE-RELATED OSTEOPOROSIS WITHOUT CURRENT PATHOLOGICAL FRACTURE: Primary | ICD-10-CM

## 2021-01-26 PROCEDURE — 96372 THER/PROPH/DIAG INJ SC/IM: CPT

## 2021-01-26 PROCEDURE — 6360000002 HC RX W HCPCS: Performed by: NURSE PRACTITIONER

## 2021-01-26 RX ORDER — METHYLPREDNISOLONE SODIUM SUCCINATE 125 MG/2ML
125 INJECTION, POWDER, LYOPHILIZED, FOR SOLUTION INTRAMUSCULAR; INTRAVENOUS ONCE
Status: CANCELLED | OUTPATIENT
Start: 2021-07-27 | End: 2021-07-27

## 2021-01-26 RX ORDER — SODIUM CHLORIDE 9 MG/ML
INJECTION, SOLUTION INTRAVENOUS CONTINUOUS
Status: CANCELLED | OUTPATIENT
Start: 2021-07-27

## 2021-01-26 RX ORDER — DIPHENHYDRAMINE HYDROCHLORIDE 50 MG/ML
50 INJECTION INTRAMUSCULAR; INTRAVENOUS ONCE
Status: CANCELLED | OUTPATIENT
Start: 2021-07-27 | End: 2021-07-27

## 2021-01-26 RX ADMIN — DENOSUMAB 60 MG: 60 INJECTION SUBCUTANEOUS at 10:15

## 2021-01-26 ASSESSMENT — PAIN - FUNCTIONAL ASSESSMENT: PAIN_FUNCTIONAL_ASSESSMENT: 0-10

## 2021-01-26 NOTE — PROGRESS NOTES
[id number] pt wheeled into room via wheelchair for prolia injection. Patient rights and responsibilities offered to patient. Patient sister-in-law brought her today. Patient injection explained and questions answered. Injection given and pt tolerated well. D/c instructions explained and pt and sister-in-law verbalized understanding. D/c via wheelchair.       ___m_ Safety:       (Environmental)   [place] to environment   Ensure ID band is correct and in place/ allergy band as needed   Assess for fall risk   Initiate fall precautions as applicable (fall band, side rails, etc.)   Call light within reach   Bed in low position/ wheels locked    _m___ Pain:        Assess pain level and characteristics   Administer analgesics as ordered   Assess effectiveness of pain management and report to MD as needed    _m___ Knowledge Deficit:   Assess baseline knowledge   Provide teaching at level of understanding   Provide teaching via preferred learning method   Evaluate teaching effectiveness    _m___ Hemodynamic/Respiratory Status:       (Pre and Post Procedure Monitoring)   Assess/Monitor vital signs and LOC   Assess Baseline SpO2 prior to any sedation   Obtain weight/height   Assess vital signs/ LOC until patient meets discharge criteria   Monitor procedure site and notify MD of any issues

## 2021-02-15 ENCOUNTER — APPOINTMENT (OUTPATIENT)
Dept: CT IMAGING | Age: 81
DRG: 641 | End: 2021-02-15
Payer: MEDICARE

## 2021-02-15 ENCOUNTER — APPOINTMENT (OUTPATIENT)
Dept: GENERAL RADIOLOGY | Age: 81
DRG: 641 | End: 2021-02-15
Payer: MEDICARE

## 2021-02-15 ENCOUNTER — APPOINTMENT (OUTPATIENT)
Dept: INTERVENTIONAL RADIOLOGY/VASCULAR | Age: 81
DRG: 641 | End: 2021-02-15
Payer: MEDICARE

## 2021-02-15 ENCOUNTER — HOSPITAL ENCOUNTER (INPATIENT)
Age: 81
LOS: 4 days | Discharge: NURSING, REHAB OR LONG-TERM CARE | DRG: 641 | End: 2021-02-19
Attending: EMERGENCY MEDICINE | Admitting: INTERNAL MEDICINE
Payer: MEDICARE

## 2021-02-15 ENCOUNTER — ANCILLARY PROCEDURE (OUTPATIENT)
Dept: EMERGENCY DEPT | Age: 81
DRG: 641 | End: 2021-02-15
Payer: MEDICARE

## 2021-02-15 DIAGNOSIS — I95.1 ORTHOSTATIC SYNCOPE: ICD-10-CM
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
## 2021-02-15 DIAGNOSIS — D64.9 ANEMIA, UNSPECIFIED TYPE: ICD-10-CM

## 2021-02-15 DIAGNOSIS — S09.90XA INJURY OF HEAD, INITIAL ENCOUNTER: ICD-10-CM

## 2021-02-15 DIAGNOSIS — S52.502A CLOSED FRACTURE OF DISTAL END OF LEFT RADIUS, UNSPECIFIED FRACTURE MORPHOLOGY, INITIAL ENCOUNTER: ICD-10-CM
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
## 2021-02-15 LAB
ABO: NORMAL
ALBUMIN SERPL-MCNC: 4.2 G/DL (ref 3.5–5.1)
ALP BLD-CCNC: 95 U/L (ref 38–126)
ALT SERPL-CCNC: 7 U/L (ref 11–66)
ANION GAP SERPL CALCULATED.3IONS-SCNC: 10 MEQ/L (ref 8–16)
ANTIBODY SCREEN: NORMAL
AST SERPL-CCNC: 17 U/L (ref 5–40)
BACTERIA: ABNORMAL
BASOPHILS # BLD: 0.4 %
BASOPHILS ABSOLUTE: 0 THOU/MM3 (ref 0–0.1)
BILIRUB SERPL-MCNC: 0.3 MG/DL (ref 0.3–1.2)
BILIRUBIN URINE: NEGATIVE
BLOOD, URINE: ABNORMAL
BUN BLDV-MCNC: 16 MG/DL (ref 7–22)
CALCIUM SERPL-MCNC: 9 MG/DL (ref 8.5–10.5)
CASTS: ABNORMAL /LPF
CASTS: ABNORMAL /LPF
CHARACTER, URINE: CLEAR
CHLORIDE BLD-SCNC: 103 MEQ/L (ref 98–111)
CO2: 24 MEQ/L (ref 23–33)
COLOR: YELLOW
CREAT SERPL-MCNC: 0.8 MG/DL (ref 0.4–1.2)
CRYSTALS: ABNORMAL
EKG ATRIAL RATE: 81 BPM
EKG P AXIS: 110 DEGREES
EKG P-R INTERVAL: 256 MS
EKG Q-T INTERVAL: 446 MS
EKG QRS DURATION: 138 MS
EKG QTC CALCULATION (BAZETT): 518 MS
EKG R AXIS: 75 DEGREES
EKG T AXIS: 77 DEGREES
EKG VENTRICULAR RATE: 81 BPM
EOSINOPHIL # BLD: 1.3 %
EOSINOPHILS ABSOLUTE: 0.1 THOU/MM3 (ref 0–0.4)
EPITHELIAL CELLS, UA: ABNORMAL /HPF
ERYTHROCYTE [DISTWIDTH] IN BLOOD BY AUTOMATED COUNT: 13 % (ref 11.5–14.5)
ERYTHROCYTE [DISTWIDTH] IN BLOOD BY AUTOMATED COUNT: 41.3 FL (ref 35–45)
GFR SERPL CREATININE-BSD FRML MDRD: 69 ML/MIN/1.73M2
GLUCOSE BLD-MCNC: 133 MG/DL (ref 70–108)
GLUCOSE, URINE: 100 MG/DL
HCT VFR BLD CALC: 33.8 % (ref 37–47)
HEMOGLOBIN: 11 GM/DL (ref 12–16)
IMMATURE GRANS (ABS): 0.02 THOU/MM3 (ref 0–0.07)
IMMATURE GRANULOCYTES: 0.3 %
INR BLD: 1.05 (ref 0.85–1.13)
KETONES, URINE: 15
LACTIC ACID: 1.2 MMOL/L (ref 0.5–2.2)
LEUKOCYTE EST, POC: ABNORMAL
LYMPHOCYTES # BLD: 6.1 %
LYMPHOCYTES ABSOLUTE: 0.4 THOU/MM3 (ref 1–4.8)
MCH RBC QN AUTO: 28.5 PG (ref 26–33)
MCHC RBC AUTO-ENTMCNC: 32.5 GM/DL (ref 32.2–35.5)
MCV RBC AUTO: 87.6 FL (ref 81–99)
MISCELLANEOUS LAB TEST RESULT: ABNORMAL
MONOCYTES # BLD: 7.3 %
MONOCYTES ABSOLUTE: 0.5 THOU/MM3 (ref 0.4–1.3)
NITRITE, URINE: NEGATIVE
NUCLEATED RED BLOOD CELLS: 0 /100 WBC
OSMOLALITY CALCULATION: 276.9 MOSMOL/KG (ref 275–300)
PH UA: 7 (ref 5–9)
PLATELET # BLD: 187 THOU/MM3 (ref 130–400)
PMV BLD AUTO: 10.4 FL (ref 9.4–12.4)
POTASSIUM SERPL-SCNC: 4.6 MEQ/L (ref 3.5–5.2)
PROTEIN UA: NEGATIVE MG/DL
RBC # BLD: 3.86 MILL/MM3 (ref 4.2–5.4)
RBC URINE: ABNORMAL /HPF
RENAL EPITHELIAL, UA: ABNORMAL
RH FACTOR: NORMAL
SEG NEUTROPHILS: 84.6 %
SEGMENTED NEUTROPHILS ABSOLUTE COUNT: 5.9 THOU/MM3 (ref 1.8–7.7)
SODIUM BLD-SCNC: 137 MEQ/L (ref 135–145)
SPECIFIC GRAVITY UA: > 1.03 (ref 1–1.03)
TOTAL PROTEIN: 7.7 G/DL (ref 6.1–8)
TROPONIN T: < 0.01 NG/ML
TROPONIN T: < 0.01 NG/ML
TSH SERPL DL<=0.05 MIU/L-ACNC: 1.27 UIU/ML (ref 0.4–4.2)
UROBILINOGEN, URINE: 0.2 EU/DL (ref 0–1)
WBC # BLD: 7 THOU/MM3 (ref 4.8–10.8)
WBC UA: ABNORMAL /HPF
YEAST: ABNORMAL

## 2021-02-15 PROCEDURE — 71045 X-RAY EXAM CHEST 1 VIEW: CPT

## 2021-02-15 PROCEDURE — 96376 TX/PRO/DX INJ SAME DRUG ADON: CPT

## 2021-02-15 PROCEDURE — 83605 ASSAY OF LACTIC ACID: CPT

## 2021-02-15 PROCEDURE — 86901 BLOOD TYPING SEROLOGIC RH(D): CPT

## 2021-02-15 PROCEDURE — 72170 X-RAY EXAM OF PELVIS: CPT

## 2021-02-15 PROCEDURE — 84443 ASSAY THYROID STIM HORMONE: CPT

## 2021-02-15 PROCEDURE — 99284 EMERGENCY DEPT VISIT MOD MDM: CPT | Performed by: SURGERY

## 2021-02-15 PROCEDURE — 6370000000 HC RX 637 (ALT 250 FOR IP): Performed by: INTERNAL MEDICINE

## 2021-02-15 PROCEDURE — 6360000004 HC RX CONTRAST MEDICATION: Performed by: EMERGENCY MEDICINE

## 2021-02-15 PROCEDURE — 80053 COMPREHEN METABOLIC PANEL: CPT

## 2021-02-15 PROCEDURE — 6360000002 HC RX W HCPCS: Performed by: INTERNAL MEDICINE

## 2021-02-15 PROCEDURE — 6360000002 HC RX W HCPCS: Performed by: EMERGENCY MEDICINE

## 2021-02-15 PROCEDURE — 96374 THER/PROPH/DIAG INJ IV PUSH: CPT

## 2021-02-15 PROCEDURE — 36415 COLL VENOUS BLD VENIPUNCTURE: CPT

## 2021-02-15 PROCEDURE — 85610 PROTHROMBIN TIME: CPT

## 2021-02-15 PROCEDURE — 73100 X-RAY EXAM OF WRIST: CPT

## 2021-02-15 PROCEDURE — 76376 3D RENDER W/INTRP POSTPROCES: CPT

## 2021-02-15 PROCEDURE — 81001 URINALYSIS AUTO W/SCOPE: CPT

## 2021-02-15 PROCEDURE — 93005 ELECTROCARDIOGRAM TRACING: CPT | Performed by: EMERGENCY MEDICINE

## 2021-02-15 PROCEDURE — 85025 COMPLETE CBC W/AUTO DIFF WBC: CPT

## 2021-02-15 PROCEDURE — 86850 RBC ANTIBODY SCREEN: CPT

## 2021-02-15 PROCEDURE — 3209999900 POC US FAST ABDOMEN LIMITED

## 2021-02-15 PROCEDURE — 2580000003 HC RX 258: Performed by: INTERNAL MEDICINE

## 2021-02-15 PROCEDURE — 71260 CT THORAX DX C+: CPT

## 2021-02-15 PROCEDURE — 86900 BLOOD TYPING SEROLOGIC ABO: CPT

## 2021-02-15 PROCEDURE — 93880 EXTRACRANIAL BILAT STUDY: CPT

## 2021-02-15 PROCEDURE — 84484 ASSAY OF TROPONIN QUANT: CPT

## 2021-02-15 PROCEDURE — 6360000002 HC RX W HCPCS

## 2021-02-15 PROCEDURE — APPSS60 APP SPLIT SHARED TIME 46-60 MINUTES: Performed by: PHYSICIAN ASSISTANT

## 2021-02-15 PROCEDURE — 70450 CT HEAD/BRAIN W/O DYE: CPT

## 2021-02-15 PROCEDURE — 1200000000 HC SEMI PRIVATE

## 2021-02-15 PROCEDURE — 74177 CT ABD & PELVIS W/CONTRAST: CPT

## 2021-02-15 PROCEDURE — 70486 CT MAXILLOFACIAL W/O DYE: CPT

## 2021-02-15 PROCEDURE — 99223 1ST HOSP IP/OBS HIGH 75: CPT | Performed by: INTERNAL MEDICINE

## 2021-02-15 PROCEDURE — 72125 CT NECK SPINE W/O DYE: CPT

## 2021-02-15 PROCEDURE — 99284 EMERGENCY DEPT VISIT MOD MDM: CPT

## 2021-02-15 RX ORDER — PROMETHAZINE HYDROCHLORIDE 25 MG/1
12.5 TABLET ORAL EVERY 6 HOURS PRN
Status: DISCONTINUED | OUTPATIENT
Start: 2021-02-15 | End: 2021-02-19 | Stop reason: HOSPADM

## 2021-02-15 RX ORDER — ONDANSETRON 2 MG/ML
INJECTION INTRAMUSCULAR; INTRAVENOUS
Status: COMPLETED
Start: 2021-02-15 | End: 2021-02-15

## 2021-02-15 RX ORDER — ONDANSETRON 2 MG/ML
4 INJECTION INTRAMUSCULAR; INTRAVENOUS ONCE
Status: COMPLETED | OUTPATIENT
Start: 2021-02-15 | End: 2021-02-15

## 2021-02-15 RX ORDER — VITAMIN B COMPLEX
1000 TABLET ORAL DAILY
Status: DISCONTINUED | OUTPATIENT
Start: 2021-02-15 | End: 2021-02-19 | Stop reason: HOSPADM

## 2021-02-15 RX ORDER — POLYETHYLENE GLYCOL 3350 17 G/17G
17 POWDER, FOR SOLUTION ORAL DAILY PRN
Status: DISCONTINUED | OUTPATIENT
Start: 2021-02-15 | End: 2021-02-19 | Stop reason: HOSPADM

## 2021-02-15 RX ORDER — FENTANYL CITRATE 50 UG/ML
INJECTION, SOLUTION INTRAMUSCULAR; INTRAVENOUS
Status: COMPLETED
Start: 2021-02-15 | End: 2021-02-15

## 2021-02-15 RX ORDER — SODIUM CHLORIDE 0.9 % (FLUSH) 0.9 %
10 SYRINGE (ML) INJECTION PRN
Status: DISCONTINUED | OUTPATIENT
Start: 2021-02-15 | End: 2021-02-19 | Stop reason: HOSPADM

## 2021-02-15 RX ORDER — ONDANSETRON 2 MG/ML
4 INJECTION INTRAMUSCULAR; INTRAVENOUS EVERY 6 HOURS PRN
Status: DISCONTINUED | OUTPATIENT
Start: 2021-02-15 | End: 2021-02-19 | Stop reason: HOSPADM

## 2021-02-15 RX ORDER — ATENOLOL 25 MG/1
25 TABLET ORAL DAILY
Status: DISCONTINUED | OUTPATIENT
Start: 2021-02-15 | End: 2021-02-19 | Stop reason: HOSPADM

## 2021-02-15 RX ORDER — ACETAMINOPHEN 650 MG/1
650 SUPPOSITORY RECTAL EVERY 6 HOURS PRN
Status: DISCONTINUED | OUTPATIENT
Start: 2021-02-15 | End: 2021-02-19 | Stop reason: HOSPADM

## 2021-02-15 RX ORDER — SERTRALINE HYDROCHLORIDE 100 MG/1
100 TABLET, FILM COATED ORAL DAILY
Status: DISCONTINUED | OUTPATIENT
Start: 2021-02-15 | End: 2021-02-19 | Stop reason: HOSPADM

## 2021-02-15 RX ORDER — SODIUM CHLORIDE 0.9 % (FLUSH) 0.9 %
10 SYRINGE (ML) INJECTION EVERY 12 HOURS SCHEDULED
Status: DISCONTINUED | OUTPATIENT
Start: 2021-02-15 | End: 2021-02-19 | Stop reason: HOSPADM

## 2021-02-15 RX ORDER — ROSUVASTATIN CALCIUM 10 MG/1
10 TABLET, COATED ORAL DAILY
Status: DISCONTINUED | OUTPATIENT
Start: 2021-02-15 | End: 2021-02-19 | Stop reason: HOSPADM

## 2021-02-15 RX ORDER — ACETAMINOPHEN 325 MG/1
650 TABLET ORAL EVERY 6 HOURS PRN
Status: DISCONTINUED | OUTPATIENT
Start: 2021-02-15 | End: 2021-02-19 | Stop reason: HOSPADM

## 2021-02-15 RX ADMIN — SERTRALINE 100 MG: 100 TABLET, FILM COATED ORAL at 17:51

## 2021-02-15 RX ADMIN — IOPAMIDOL 80 ML: 755 INJECTION, SOLUTION INTRAVENOUS at 11:13

## 2021-02-15 RX ADMIN — ACETAMINOPHEN 650 MG: 325 TABLET ORAL at 21:27

## 2021-02-15 RX ADMIN — ROSUVASTATIN CALCIUM 10 MG: 10 TABLET, FILM COATED ORAL at 17:51

## 2021-02-15 RX ADMIN — FENTANYL CITRATE: 50 INJECTION, SOLUTION INTRAMUSCULAR; INTRAVENOUS at 10:30

## 2021-02-15 RX ADMIN — ONDANSETRON 4 MG: 2 INJECTION INTRAMUSCULAR; INTRAVENOUS at 10:30

## 2021-02-15 RX ADMIN — ACETAMINOPHEN 650 MG: 325 TABLET ORAL at 12:35

## 2021-02-15 RX ADMIN — ATENOLOL 25 MG: 25 TABLET ORAL at 17:52

## 2021-02-15 RX ADMIN — SODIUM CHLORIDE, PRESERVATIVE FREE 10 ML: 5 INJECTION INTRAVENOUS at 21:32

## 2021-02-15 RX ADMIN — Medication 1000 UNITS: at 17:51

## 2021-02-15 RX ADMIN — ONDANSETRON 4 MG: 2 INJECTION INTRAMUSCULAR; INTRAVENOUS at 11:00

## 2021-02-15 RX ADMIN — ENOXAPARIN SODIUM 40 MG: 40 INJECTION, SOLUTION INTRAVENOUS; SUBCUTANEOUS at 21:26

## 2021-02-15 ASSESSMENT — ENCOUNTER SYMPTOMS
NAUSEA: 0
EYE PAIN: 0
SINUS PAIN: 0
CHEST TIGHTNESS: 0
VOMITING: 1
CONSTIPATION: 0
EYE DISCHARGE: 0
ABDOMINAL PAIN: 0
BACK PAIN: 0
PHOTOPHOBIA: 0
FACIAL SWELLING: 1
EYE PAIN: 1
COUGH: 0
DIARRHEA: 0
SHORTNESS OF BREATH: 0
ABDOMINAL DISTENTION: 0
NAUSEA: 1
SINUS PRESSURE: 0

## 2021-02-15 ASSESSMENT — PAIN DESCRIPTION - LOCATION
LOCATION: HEAD
LOCATION: HEAD;WRIST

## 2021-02-15 ASSESSMENT — PAIN DESCRIPTION - PAIN TYPE
TYPE: ACUTE PAIN
TYPE: ACUTE PAIN

## 2021-02-15 ASSESSMENT — PAIN SCALES - GENERAL
PAINLEVEL_OUTOF10: 5
PAINLEVEL_OUTOF10: 7
PAINLEVEL_OUTOF10: 8
PAINLEVEL_OUTOF10: 0

## 2021-02-15 NOTE — PROGRESS NOTES
Will see tomorrow AM. No plan for OR.  Continue splint, ice and elevation to left wrist. D/W Dr. Sánchez Corey

## 2021-02-15 NOTE — ED PROVIDER NOTES
[place] ENCOUNTER          Pt Name: Jazmine Maher  MRN: [id number]  Loygfmoris 1940  Date of evaluation: 2/15/2021  Physician: Mariel Noble MD, Pablo Rea, [facility]       Chief Complaint   Patient presents with   Raymondo Jump Headache    Wrist Pain     left    Eye Injury     right     History obtained from chart review, the patient and patient's daughter. HISTORY OF PRESENT ILLNESS    HPI  Charline Almazan is a 68 y.o. female who presents to the emergency department for evaluation of fall. Patient arrived as a level 2 trauma notification for patient older than 79years old on anticoagulation or antiplatelet agents. Patient is unclear to what happened and states he may have possibly passed out, states he was trying to get out of bed this morning before breakfast and denies having felt lightheaded but does not know exactly why she fell or how she fell. Patient was found by her daughter awake needed the foot of the bed with some blood in the floor. Patient currently complains of pain to her left orbital area and pain to her right wrist.  The patient has no other acute complaints at this time. REVIEW OF SYSTEMS   Review of Systems   Constitutional: Negative for chills, fever and unexpected weight change. HENT: Negative for congestion, ear pain, nosebleeds, sinus pressure and sinus pain. Eyes: Negative for pain. Respiratory: Negative for cough, chest tightness and shortness of breath. Cardiovascular: Negative for chest pain. Gastrointestinal: Negative for abdominal pain, constipation, diarrhea and nausea. Endocrine: Negative for polyuria. Genitourinary: Negative for dysuria and flank pain. Musculoskeletal: Negative for arthralgias, back pain, myalgias and neck pain. Skin: Negative for rash. Neurological: Negative for weakness and headaches.    All other systems reviewed and are negative.         PAST MEDICAL AND SURGICAL HISTORY     Past Medical History:   Diagnosis Date    Atrial fibrillation ([facility] [address]     CAD (coronary artery disease)     Congestive heart failure (CHF) ([facility] [address]     Hyperlipidemia     Hypertension      Past Surgical History:   Procedure Laterality Date    ATRIAL ABLATION SURGERY      CARDIAC CATHETERIZATION      CARDIAC VALVE REPLACEMENT      CORONARY ANGIOPLASTY WITH STENT PLACEMENT  02/28/2019    CORONARY ARTERY BYPASS GRAFT      x1    HYSTERECTOMY      MITRAL VALVE REPLACEMENT      PACEMAKER PLACEMENT  05/2019    NC OFFICE/OUTPT VISIT,PROCEDURE ONLY N/A 11/12/2018    CABG X3, MITRAL VALVE REPAIR, MAZE, AND TRICUSPID VALVE REPAIR, DEANNA performed by Vianey Blanco MD at [facility] REPLACEMENT           MEDICATIONS     Current Facility-Administered Medications:     acetaminophen (TYLENOL) tablet 650 mg, 650 mg, Oral, Q6H PRN, 650 mg at 02/15/21 1235 **OR** acetaminophen (TYLENOL) suppository 650 mg, 650 mg, Rectal, Q6H PRN, Stefania Lester MD    Current Outpatient Medications:     sertraline (ZOLOFT) 100 MG tablet, Take 1 tablet by mouth daily, Disp: 90 tablet, Rfl: 1    Coenzyme Q10 (CO Q-10) 200 MG CAPS, Take by mouth, Disp: , Rfl:     rosuvastatin (CRESTOR) 10 MG tablet, Take 10 mg by mouth daily, Disp: , Rfl:     furosemide (LASIX) 20 MG tablet, TAKE 1 TABLET BY MOUTH TWO TIMES A DAY, Disp: 30 tablet, Rfl: 4    Calcium Carbonate-Vit D-Min (CALCIUM 1200 PO), Take 1 tablet by mouth daily, Disp: , Rfl:     vitamin D (CHOLECALCIFEROL) 1000 UNIT TABS tablet, Take 1,000 Units by mouth daily, Disp: , Rfl:     atenolol (TENORMIN) 25 MG tablet, Take 1 tablet by mouth daily, Disp: 30 tablet, Rfl: 3    potassium chloride (KLOR-CON M) 20 MEQ extended release tablet, Take 1 tablet by mouth daily, Disp: 30 tablet, Rfl: 5    clopidogrel (PLAVIX) 75 MG tablet, Take 1 tablet by mouth daily, Disp: 30 tablet, Rfl: 3    aspirin 81 MG tablet, Take 81 mg by mouth daily, Disp: , Rfl:     meclizine (ANTIVERT) 12.5 MG tablet, Take 1 tablet by mouth 3 times daily as needed for Dizziness, Disp: 30 tablet, Rfl: 0    acetaminophen (TYLENOL) 325 MG tablet, Take 1 tablet by mouth every 4 hours as needed for Pain, Disp: 120 tablet, Rfl: 3    Multiple Vitamins-Minerals (THERAPEUTIC MULTIVITAMIN-MINERALS) tablet, Take 1 tablet by mouth daily (with breakfast), Disp: 30 tablet, Rfl: 1      SOCIAL HISTORY     Social History     Social History Narrative    Not on file     Social History     Tobacco Use    Smoking status: Never Smoker    Smokeless tobacco: Never Used   Substance Use Topics    Alcohol use: No    Drug use: No         ALLERGIES   No Known Allergies      FAMILY HISTORY     Family History   Problem Relation Age of Onset    Other Mother         alzheimers    Heart Attack Father     Heart Attack Brother          PREVIOUS RECORDS   Previous records reviewed:   Patient recently admitted to the hospital on January 26, 2021 for pathological fracture from osteoporosis. PHYSICAL EXAM     ED Triage Vitals [02/15/21 0946]   BP Temp Temp Source Pulse Resp SpO2 Height Weight   (!) 154/93 -- Oral 80 16 99 % 5' 5\" (1.651 m) 127 lb (57.6 kg)     Initial vital signs and nursing assessment reviewed and abnormal from Mild hypertension. Body mass index is 21.13 kg/m². Pulsoximetry is normal per my interpretation. Additional Vital Signs:  Vitals:    02/15/21 1228   BP: (!) 153/83   Pulse: 80   Resp: 18   Temp:    SpO2: 98%       Physical Exam  Vitals signs and nursing note reviewed. Constitutional:       General: She is not in acute distress. Appearance: Normal appearance. She is well-developed. HENT:      Head: Normocephalic.       Comments: Periorbital ecchymosis on the right side     Right Ear: Tympanic membrane, ear canal and external ear normal.      Left Ear: Tympanic membrane, ear canal and external ear normal.      Nose: Nose normal. Mouth/Throat:      Mouth: Mucous membranes are moist.   Eyes:      Extraocular Movements: Extraocular movements intact. Conjunctiva/sclera: Conjunctivae normal.      Pupils: Pupils are equal, round, and reactive to light. Neck:      Musculoskeletal: Neck supple. Vascular: No JVD. Comments: C-collar in place  Cardiovascular:      Rate and Rhythm: Normal rate and regular rhythm. Heart sounds: Normal heart sounds. No murmur. No friction rub. No gallop. Pulmonary:      Effort: Pulmonary effort is normal.      Breath sounds: Normal breath sounds. No stridor. No wheezing or rales. Abdominal:      General: Abdomen is flat. Bowel sounds are normal.      Palpations: Abdomen is soft. Comments: Old midline laparotomy scar, well-healed. Musculoskeletal:         General: Tenderness present. Comments: Mild tenderness to the right wrist.  Old appearing healing small bruises on the dorsum of the left lower extremity   Skin:     General: Skin is warm and dry. Neurological:      Mental Status: She is alert and oriented to person, place, and time. Psychiatric:         Behavior: Behavior normal.             MEDICAL DECISION MAKING   Initial Assessment:   1. Fall, unclear if syncopal or non-syncopal  2. Underlying fall syndrome with recent admission for pathologic fractures from osteoporosis  3. Periorbital bruising, rule out fractures  4.  Rule out intracranial hemorrhage  Plan:    Trauma team activated prior to arrival and has evaluated the patient prior to my arrival to the room   I V line, labs   Fast scan   Imaging   Analgesia   Antiemetic        ED RESULTS   Laboratory results:  Labs Reviewed   CBC WITH AUTO DIFFERENTIAL - Abnormal; Notable for the following components:       Result Value    RBC 3.86 (*)     Hemoglobin 11.0 (*)     Hematocrit 33.8 (*)     Lymphocytes Absolute 0.4 (*)     All other components within normal limits   COMPREHENSIVE METABOLIC PANEL - Abnormal; Notable for the following components:    Glucose 133 (*)     ALT 7 (*)     All other components within normal limits   GLOMERULAR FILTRATION RATE, ESTIMATED - Abnormal; Notable for the following components:    Est, Glom Filt Rate 69 (*)     All other components within normal limits   MICROSCOPIC URINALYSIS - Abnormal; Notable for the following components:    Glucose, Urine 100 (*)     Ketones, Urine 15 (*)     Specific Gravity, UA >1.030 (*)     Blood, Urine TRACE (*)     Leukocytes, UA TRACE (*)     All other components within normal limits   PROTIME-INR   LACTIC ACID, PLASMA   ANION GAP   OSMOLALITY   TROPONIN   TROPONIN   TSH WITH REFLEX   TYPE AND SCREEN       Radiologic studies results:  XR PELVIS (1-2 VIEWS)   Final Result   Limited evaluation of the pelvis shows no acute abnormality. **This report has been created using voice recognition software. It may contain minor errors which are inherent in voice recognition technology. **      Final report electronically signed by Dr. Simba Moreno on 2/15/2021 11:45 AM      XR CHEST 1 VIEW   Final Result   No acute disease            **This report has been created using voice recognition software. It may contain minor errors which are inherent in voice recognition technology. **      Final report electronically signed by Dr. Simba Moreno on 2/15/2021 11:41 AM      XR WRIST RIGHT (2 VIEWS)   Final Result    No acute osseous abnormality the right wrist.               **This report has been created using voice recognition software. It may contain minor errors which are inherent in voice recognition technology. **      Final report electronically signed by Dr. Marcie Farooq on 2/15/2021 11:35 AM      XR WRIST LEFT (2 VIEWS)   Final Result    Fracture of the distal radial metaphysis with possible intra-articular extension. **This report has been created using voice recognition software.  It may contain minor errors which are inherent in voice recognition technology. **      Final report electronically signed by Dr. Karin Bar on 2/15/2021 11:34 AM      CT ABDOMEN PELVIS W IV CONTRAST Additional Contrast? Radiologist Recommendation   Final Result       1. No evidence of acute intra-abdominal or intrapelvic abnormality. 2. No evidence of acute osseous injury of the lumbar spine or pelvis. 3. Moderate retained stool. 4. Stable widening and remodeling of the sacral neural foramina as evidence for prominent Tarlov cysts or nerve sheath tumors. **This report has been created using voice recognition software. It may contain minor errors which are inherent in voice recognition technology. **      Final report electronically signed by Dr. Liseth Jorgensen MD on 2/15/2021 11:37 AM      CT CERVICAL SPINE WO CONTRAST   Final Result    No evidence of acute osseous injury of the cervical spine. **This report has been created using voice recognition software. It may contain minor errors which are inherent in voice recognition technology. **      Final report electronically signed by Dr. Liseth Jorgensen MD on 2/15/2021 11:23 AM      CT CHEST W CONTRAST   Final Result      1. No acute traumatic process is identified in the thorax. 2. Scarring/atelectasis is seen in the bilateral posterior lungs. 3. Cardiomegaly. **This report has been created using voice recognition software. It may contain minor errors which are inherent in voice recognition technology. **      Final report electronically signed by Dr Alex Wright on 2/15/2021 11:41 AM      CT HEAD WO CONTRAST   Final Result       1. No evidence of acute intracranial abnormality. 2. Focal defects at the lateral aspect of the body of the left lateral ventricle as evidence for old injury or infarct or schizencephaly. 3. Mild to moderate severity chronic microvascular angiopathy. **This report has been created using voice recognition software.  It may contain minor errors which are inherent in voice recognition technology. **      Final report electronically signed by Dr. August Delatorre MD on 2/15/2021 11:21 AM      CT LUMBAR RECONSTRUCTION WO POST PROCESS   Final Result   No acute process. **This report has been created using voice recognition software. It may contain minor errors which are inherent in voice recognition technology. **      Final report electronically signed by Dr. Fritz Render on 2/15/2021 11:29 AM      CT THORACIC RECONSTRUCTION WO POST PROCESS   Final Result   No acute process            **This report has been created using voice recognition software. It may contain minor errors which are inherent in voice recognition technology. **      Final report electronically signed by Dr. Fritz Render on 2/15/2021 11:39 AM      CT FACIAL BONES WO CONTRAST   Final Result       1. Soft tissue swelling near the right orbit without evidence of acute osseous injury of the face. 2. Degenerative changes of the temporomandibular joints. 3. Mild mucosal inflammation of the paranasal sinuses. **This report has been created using voice recognition software. It may contain minor errors which are inherent in voice recognition technology. **      Final report electronically signed by Dr. August Delatorre MD on 2/15/2021 11:29 AM      US Ed Fast Abdomen Limited   Final Result      VL DUP CAROTID BILATERAL    (Results Pending)             ED COURSE   ED Medications administered this visit:   Medications   acetaminophen (TYLENOL) tablet 650 mg (650 mg Oral Given 2/15/21 1235)     Or   acetaminophen (TYLENOL) suppository 650 mg ( Rectal See Alternative 2/15/21 1235)   fentaNYL (SUBLIMAZE) 100 MCG/2ML injection (  Given 2/15/21 1030)   ondansetron (ZOFRAN) injection 4 mg (4 mg Intravenous Given 2/15/21 1100)   iopamidol (ISOVUE-370) 76 % injection 80 mL (80 mLs Intravenous Given 2/15/21 1113)       ED Course as of Feb 15 1317   [facility] Feb 15, 2021   1215 Updated patient and her daughter on lab and radiology results. Once I informed them of the brain findings on the CT scans, the daughter expresses concern that she has had difficulty moving her right leg appropriately for the last year but the patient seems to have refused further work-up for this in the past.  This may have been the reason that made her fall as she seems to refuse using her cane as well. Patient has been cleared from the trauma perspective, will admit to medicine team for further physical therapy evaluation, possible neurology/cardiology evaluation, and evaluation for safe discharge home as patient lives by herself. [DT]   1217 Orthopedics team has been consulted by trauma team, will evaluate her tomorrow morning, recommend continuing with sugar tong splint on her left upper extremity as already ordered. [DT]      ED Course User Index  [DT] Gilmar Page MD         MEDICATION CHANGES     New Prescriptions    No medications on file         FINAL DISPOSITION     Final diagnoses:   Fall, initial encounter   Closed fracture of distal end of left radius, unspecified fracture morphology, initial encounter   Injury of head, initial encounter     Condition: condition: fair  Dispo: Admit to med/surg floor      This transcription was electronically signed. It was dictated by use of voice recognition software and electronically transcribed. The transcription may contain errors not detected in proofreading.        Gilmar Page MD  02/15/21 1426

## 2021-02-15 NOTE — ED NOTES
Trauma team at bedside to assess; MARKUS Workman. Warm blankets applied. Daughter at bedside. Pt able to recall what occurred after waking up and up the point of starting to make her bed and then finding herself on the floor. Pt rprts nose bleed that stopped on its own shortly after. Pt recalls calling daughter on the phone for assistance. Daughter rprts call at [phone]. Pt presents to ED alert and oriented x4. Breathing easy and unlabored on RA. Bruising, swelling and noted to R eye, lid and surrounding tissue. Bruising noted to left upper wrist and forearm. Bruising to R buttocks. Pt also rprts tenderness to R hip w/repositioning an tenderness to neck. C-collar placed.       Maribel Lopez RN  02/15/21 [id number]

## 2021-02-15 NOTE — ED NOTES
ED to inpatient nurses report    Chief Complaint   Patient presents with    Fall    Headache    Wrist Pain     left    Eye Injury     right      Present to ED from home  LOC: alert and orientated to name and place  Vital signs   Vitals:    02/15/21 1058 02/15/21 1115 02/15/21 1138 02/15/21 1228   BP: (!) 155/81 (!) 157/90  (!) 153/83   Pulse: 80 81  80   Resp:    18   Temp:       TempSrc:       SpO2:  90% 97% 98%   Weight:       Height:          Oxygen Baseline RA    Current needs required 2L via NC   Bipap/Cpap No  LDAs:   Peripheral IV 02/15/21 Right Forearm (Active)   Site Assessment Clean;Dry; Intact 02/15/21 1232   Line Status Blood return noted;Normal saline locked 02/15/21 1232   Dressing Status Clean;Dry; Intact 02/15/21 1232   Dressing Intervention New 02/15/21 1232     Mobility: Requires assistance * 1  Pending ED orders: none  Present condition: stable    Our promise was given to patient    Electronically signed by Jeremie Heard RN on 2/15/2021 at [address], RN  02/15/21 132

## 2021-02-15 NOTE — ED NOTES
Upon returning to ER from CT and x-ray. Decreased O2 noted of 88%. Pt resting and no obvious evidence of distress noted. Pt placed on 2 L of O2 via NC for added comfort. Pt tolerating well.       Diandra Morin RN  02/15/21 0177

## 2021-02-15 NOTE — CONSULTS
I have independently performed an evaluation on Shahzad Anthony . I have reviewed the above documentation completed by the [facility]. Please see my additional contributions to the HPI, physical exam, assessment/medical decision making. 67 yo female who had a unwitnessed fall, patient was down for at least 20 minutes before she was able to get up to a chair and call her family who brought her to the emergency department. Patient is on anticoagulation. She complains of neck pain and bilateral wrist pain. On exam she is alert and oriented. She has been having syncopal episodes and has seen cardiology to initiate work-up. Patient has a small knot and bruising over her face. Imaging demonstrates no cervical spine injury no other intra-abdominal intracranial pathology. She does have a distal radius fracture. Orthopedic surgery has been consulted. Her traumatic injuries do not necessitate admission however will consult medicine\cardiology to see if they like to admit her for her recurrent falling.     Electronically signed by Nir Huang MD on 2/15/2021 at 8:01 PM      Trauma Consult     Patient:  Ester Longo date: 2/15/2021   YOB: 1940 Date of Evaluation: 2/15/2021  MRN: [id number]  Acct: [de-identified]    Injury Date:2/15/21  Injury time:7:30  PCP: PHIL Bansal CNP   Referring physician: Dr. Janene Barraza    Time of Trauma Surgeon Notification:  9:54  Time of SIDNEY Arrival: 10:01  Time of Trauma Surgeon Arrival:  10:52    Assessment:      Fall from standing    Neck Pain    Left distal radial metaphysis fracture    Plan:    Medicine to admit for fall work-up      Fall from standing   - Medicine to admit for work-up   - Full body CT scan negative for acute traumatic injuries    Neck Pain   -C-collar at time of exam, removed once C-spine cleared by CT    Left distal radial metaphysis fracture   - Orthopedics consulted, will see tomorrow   - Splint    - pain control       Activation: []Level I Linwood Pichardo Alert) [x]Level II (Injury Call) []Level III (Trauma Consult)  Mode of Arrival: family  Referring Facility: NA  Loss of Consciousness []No []Yes[x]Unknown    Mechanism of Injury:  []Motor Vehicle crash   []Single Vehicle [] []Passenger []Scene Fatality []Front Seat  []Restrained   []Air Bag Deployed   []Ejected []Rollover []Pedestrian []Trapped   Type of vehicle:   Protective Devices:   []Motorcycle  Wearing Helmet []Yes []No  []Bicycle  Wearing Helmet []Yes []No  [x]Fall   Distance - from standing   []Assault    Abuse Reported []Yes []No  []Gunshot  []Stabbing  []Work Related  []Burn: []Flame []Scald []Electrical []Chemical []Contact []Inhalation []House Fire  []Other:   Patient Active Problem List   Diagnosis    Atrial fibrillation with RVR ([address]    S/P coronary artery bypass graft x 1, 11/12/18. 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  S/P mitral valve replacement with bioprosthetic valve    Situational depression    Mixed hyperlipidemia    Coronary artery disease involving coronary bypass graft of native heart without angina pectoris    Risk for falls    Physical deconditioning    CAD in native artery    Severe malnutrition (HCC)    Post PTCA    Pacemaker    Age-related osteoporosis without current pathological fracture    Fall at home, initial encounter     Subjective   Chief Complaint: \"my neck and left wrist hurts\"    History of Present Illness:  Patient is a 74-year-old white female who presented to the emergency room via family as an activation level 2 trauma due to a fall this morning on aspirin and Plavix. On presentation patient is alert and oriented x3 breathing on her own and moving all four extremities on her own cardiovascularly and neurovascularly intact. She is complaining of head pain, neck pain, right and left wrist pain. She also reports a nose bleed that resolved on its own.  Patient has a past medical history of atrial fibrillation with RVR status post pacemaker placement, coronary artery disease status post CABG x1 on 11/12/2018, status post tricuspid valve repair and mitral valve replacement, severe malnutrition, and depression. Patient notes she did not have her medications this morning. She states that she was making her bed and then found herself waking up on the floor. Patient is unsure how she fell, if she lost consciousness or how long she was down. She notes that she got out of bed around 7:15 and she placed a call to her daughter at 8:15 to tell her she had fallen. Patient notes that she was able to get up from the floor beside her bed and walk to her recliner where she called her daughter who then transported her to the ER. Patient reports some nausea and vomiting and a nosebleed that stopped on it's own. Patient denies feeling dizzy or lightheaded before falling, she does report periods of shortness of breath over the last several weeks to months with frequent falls. Patient has recently had a extensive cardiovascular work-up with her cardiologist, Dr. Nathaly Bennett and is scheduled to see him on Wednesday. On exam, patient has right periorbital swelling, bruised and swollen left wrist, and bruise to her right buttock. No other signs of external trauma. Review of Systems:   Review of Systems   Constitutional: Negative for activity change, appetite change and fever. HENT: Positive for facial swelling and nosebleeds. Negative for congestion, ear pain and sinus pain. Eyes: Positive for pain. Negative for photophobia, discharge and visual disturbance. Respiratory: Negative for chest tightness and shortness of breath. Cardiovascular: Negative for chest pain and palpitations. Gastrointestinal: Positive for nausea and vomiting. Negative for abdominal distention, abdominal pain, constipation and diarrhea. Genitourinary: Negative for dysuria, frequency and pelvic pain. Musculoskeletal: Positive for neck pain.  Negative for arthralgias, extended release tablet Take 1 tablet by mouth daily  Qty: 30 tablet, Refills: 5    Associated Diagnoses: Coronary artery disease involving coronary bypass graft of native heart without angina pectoris      clopidogrel (PLAVIX) 75 MG tablet Take 1 tablet by mouth daily  Qty: 30 tablet, Refills: 3      aspirin 81 MG tablet Take 81 mg by mouth daily      meclizine (ANTIVERT) 12.5 MG tablet Take 1 tablet by mouth 3 times daily as needed for Dizziness  Qty: 30 tablet, Refills: 0    Associated Diagnoses: Dizziness on standing      acetaminophen (TYLENOL) 325 MG tablet Take 1 tablet by mouth every 4 hours as needed for Pain  Qty: 120 tablet, Refills: 3             Hospital medications:  Scheduled Meds:   atenolol  25 mg Oral Daily    rosuvastatin  10 mg Oral Daily    sertraline  100 mg Oral Daily    Vitamin D  1,000 Units Oral Daily    sodium chloride flush  10 mL Intravenous 2 times per day    enoxaparin  40 mg Subcutaneous Nightly     Continuous Infusions:  PRN Meds:sodium chloride flush, promethazine **OR** ondansetron, polyethylene glycol, acetaminophen **OR** acetaminophen  Objective   ED TRIAGE VITALS  BP: (!) 143/70, Temp: 97.9 °F (36.6 °C), Pulse: 80, Resp: 18, SpO2: 96 %  Anh Coma Scale  Eye Opening: Spontaneous  Best Verbal Response: Oriented  Best Motor Response: Obeys commands  Anh Coma Scale Score: 15  Results for orders placed or performed during the hospital encounter of 02/15/21   CBC Auto Differential   Result Value Ref Range    WBC 7.0 4.8 - 10.8 thou/mm3    RBC 3.86 (L) 4.20 - 5.40 mill/mm3    Hemoglobin 11.0 (L) 12.0 - 16.0 gm/dl    Hematocrit 33.8 (L) 37.0 - 47.0 %    MCV 87.6 81.0 - 99.0 fL    MCH 28.5 26.0 - 33.0 pg    MCHC 32.5 32.2 - 35.5 gm/dl    RDW-CV 13.0 11.5 - 14.5 %    RDW-SD 41.3 35.0 - 45.0 fL    Platelets 008 984 - 015 thou/mm3    MPV 10.4 9.4 - 12.4 fL    Seg Neutrophils 84.6 %    Lymphocytes 6.1 %    Monocytes 7.3 %    Eosinophils 1.3 %    Basophils 0.4 %    Immature Granulocytes 0.3 %    Segs Absolute 5.9 1.8 - 7.7 thou/mm3    Lymphocytes Absolute 0.4 (L) 1.0 - 4.8 thou/mm3    Monocytes Absolute 0.5 0.4 - 1.3 thou/mm3    Eosinophils Absolute 0.1 0.0 - 0.4 thou/mm3    Basophils Absolute 0.0 0.0 - 0.1 thou/mm3    Immature Grans (Abs) 0.02 0.00 - 0.07 thou/mm3    nRBC 0 /100 wbc   Comprehensive Metabolic Panel   Result Value Ref Range    Glucose 133 (H) 70 - 108 mg/dL    CREATININE 0.8 0.4 - 1.2 mg/dL    BUN 16 7 - 22 mg/dL    Sodium 137 135 - 145 meq/L    Potassium 4.6 3.5 - 5.2 meq/L    Chloride 103 98 - 111 meq/L    CO2 24 23 - 33 meq/L    Calcium 9.0 8.5 - 10.5 mg/dL    AST 17 5 - 40 U/L    Alkaline Phosphatase 95 38 - 126 U/L    Total Protein 7.7 6.1 - 8.0 g/dL    Albumin 4.2 3.5 - 5.1 g/dL    Total Bilirubin 0.3 0.3 - 1.2 mg/dL    ALT 7 (L) 11 - 66 U/L   Protime-INR   Result Value Ref Range    INR 1.05 0.85 - 1.13   Lactic Acid, Plasma   Result Value Ref Range    Lactic Acid 1.2 0.5 - 2.2 mmol/L   Anion Gap   Result Value Ref Range    Anion Gap 10.0 8.0 - 16.0 meq/L   Glomerular Filtration Rate, Estimated   Result Value Ref Range    Est, Glom Filt Rate 69 (A) ml/min/1.73m2   Osmolality   Result Value Ref Range    Osmolality Calc 276.9 275.0 - 300.0 mOsmol/kg   Microscopic Urinalysis   Result Value Ref Range    Glucose, Urine 100 (A) NEGATIVE mg/dl    Bilirubin Urine NEGATIVE NEGATIVE    Ketones, Urine 15 (A) NEGATIVE    Specific Gravity, UA >1.030 (A) 1.002 - 1.030    Blood, Urine TRACE (A) NEGATIVE    pH, UA 7.0 5.0 - 9.0    Protein, UA NEGATIVE NEGATIVE mg/dl    Urobilinogen, Urine 0.2 0.0 - 1.0 eu/dl    Nitrite, Urine NEGATIVE NEGATIVE    Leukocytes, UA TRACE (A) NEGATIVE    Color, UA YELLOW YELLOW-STRAW    Character, Urine CLEAR CLR-SL.[id number]    RBC, UA 0-2 0-2/hpf /hpf    WBC, UA 5-9 0-4/hpf /hpf    Epithelial Cells, UA 0-2 3-5/hpf /hpf    Bacteria, UA NONE SEEN FEW/NONE SEEN    Casts NONE SEEN NONE SEEN /lpf    Crystals NONE SEEN NONE SEEN    Renal Epithelial, UA NONE SEEN NONE SEEN    Yeast, UA NONE SEEN NONE SEEN    Casts NONE SEEN /lpf    Miscellaneous Lab Test Result NONE SEEN    EKG 12 Lead   Result Value Ref Range    Ventricular Rate 81 BPM    Atrial Rate 81 BPM    P-R Interval 256 ms    QRS Duration 138 ms    Q-T Interval 446 ms    QTc Calculation (Bazett) 518 ms    P Axis 110 degrees    R Axis 75 degrees    T Axis 77 degrees   TYPE AND SCREEN   Result Value Ref Range    ABO A     Rh Factor POS     Antibody Screen NEG        Physical Exam:  Patient Vitals for the past 24 hrs:   BP Temp Temp src Pulse Resp SpO2 Height Weight   02/15/21 1356 (!) 143/70 97.9 °F (36.6 °C) Oral 80 18 96 % 5' 5\" (1.651 m) 128 lb 0.9 oz (58.1 kg)   02/15/21 1228 (!) 153/83 -- -- 80 18 98 % -- --   02/15/21 1138 -- -- -- -- -- 97 % -- --   02/15/21 1115 (!) 157/90 -- -- 81 -- 90 % -- --   02/15/21 1058 (!) 155/81 -- -- 80 -- -- -- --   02/15/21 1013 (!) 166/92 -- -- 80 19 -- -- --   02/15/21 1007 (!) 154/93 -- -- 80 19 94 % -- --   02/15/21 0946 (!) 154/93 98.3 °F (36.8 °C) Oral 80 16 99 % 5' 5\" (1.651 m) 127 lb (57.6 kg)   02/15/21 0943 (!) 154/93 -- -- -- -- (!) 85 % -- --     Primary Assessment:  Airway: Patent, trachea midline  Breathing: Breath sounds present and equal bilaterally, spontaneous, and unlabored  Circulation: Hemodynamically stable, 2+ cental and peripheral pulses. Disability: VALENTE x 4, following commands. GCS =15    Secondary Assessment:  General: Alert, NAD. Head: Normocephalic, mid face stable, Tympanic membranes intact, Nares patent bilaterally, no epistaxis. Mouth clear of foreign bodies, no lacerations or abrasions. Eyes: PERRLA, EOMI, Nontraumatic  Neurologic: A & O x3. Following commands. CN 2-12 intact  Neck: Immobilized in cervical collar, trachea midline. Cervical spines NTTP midline, without step-offs, crepitus or deformity. Back:TL spines are NTTP midline, without step-offs, crepitus or deformity. No abrasions, contusions, or ecchymosis noted.   Lungs: Clear to auscultation bilaterally. Chest Wall: Chest rise symmetrical.  Chest wall without tenderness to palpation. No crepitus, deformities, lacerations, or abrasions. Heart: RRR. Normal S1/S2. No obvious M/G/R. Abdomen:  Soft, NTTP. No guarding. Non-peritoneal.  Pelvis:  NTTP, stable to compression. Femoral pulses 2+. Bruise to right buttocks  GI/: No blood at the urinary meatus. No gross hematuria. Extremities: Swelling and bruising to left wrist.  PMS intact. Radial /DP/PT pulses 2+ bilaterally. Skin: Skin warm and dry. Normal for ethnicity. Radiology:     VL DUP CAROTID BILATERAL   Final Result       1. Small amount of plaque involving the right and left carotid bifurcations. 2. No significant hemodynamic stenosis in the right and left common and internal carotid arteries. 3. There is antegrade flow in the right  and left vertebral arteries. **This report has been created using voice recognition software. It may contain minor errors which are inherent in voice recognition technology. **      Final report electronically signed by DR Tiera Little on 2/15/2021 2:15 PM      XR PELVIS (1-2 VIEWS)   Final Result   Limited evaluation of the pelvis shows no acute abnormality. **This report has been created using voice recognition software. It may contain minor errors which are inherent in voice recognition technology. **      Final report electronically signed by Dr. Michell Welsh on 2/15/2021 11:45 AM      XR CHEST 1 VIEW   Final Result   No acute disease            **This report has been created using voice recognition software. It may contain minor errors which are inherent in voice recognition technology. **      Final report electronically signed by Dr. Michell Welsh on 2/15/2021 11:41 AM      XR WRIST RIGHT (2 VIEWS)   Final Result    No acute osseous abnormality the right wrist.               **This report has been created using voice recognition software.  It may contain minor errors which are inherent in voice recognition technology. **      Final report electronically signed by Dr. Angel Barron on 2/15/2021 11:35 AM      XR WRIST LEFT (2 VIEWS)   Final Result    Fracture of the distal radial metaphysis with possible intra-articular extension. **This report has been created using voice recognition software. It may contain minor errors which are inherent in voice recognition technology. **      Final report electronically signed by Dr. Angel Barron on 2/15/2021 11:34 AM      CT ABDOMEN PELVIS W IV CONTRAST Additional Contrast? Radiologist Recommendation   Final Result       1. No evidence of acute intra-abdominal or intrapelvic abnormality. 2. No evidence of acute osseous injury of the lumbar spine or pelvis. 3. Moderate retained stool. 4. Stable widening and remodeling of the sacral neural foramina as evidence for prominent Tarlov cysts or nerve sheath tumors. **This report has been created using voice recognition software. It may contain minor errors which are inherent in voice recognition technology. **      Final report electronically signed by Dr. Neisha Amador MD on 2/15/2021 11:37 AM      CT CERVICAL SPINE WO CONTRAST   Final Result    No evidence of acute osseous injury of the cervical spine. **This report has been created using voice recognition software. It may contain minor errors which are inherent in voice recognition technology. **      Final report electronically signed by Dr. Neisha Amador MD on 2/15/2021 11:23 AM      CT CHEST W CONTRAST   Final Result      1. No acute traumatic process is identified in the thorax. 2. Scarring/atelectasis is seen in the bilateral posterior lungs. 3. Cardiomegaly. **This report has been created using voice recognition software. It may contain minor errors which are inherent in voice recognition technology. **      Final report electronically signed by Dr Jonatan Barfield César on 2/15/2021 11:41 AM      CT HEAD WO CONTRAST   Final Result       1. No evidence of acute intracranial abnormality. 2. Focal defects at the lateral aspect of the body of the left lateral ventricle as evidence for old injury or infarct or schizencephaly. 3. Mild to moderate severity chronic microvascular angiopathy. **This report has been created using voice recognition software. It may contain minor errors which are inherent in voice recognition technology. **      Final report electronically signed by Dr. Argelia Ayala MD on 2/15/2021 11:21 AM      CT LUMBAR RECONSTRUCTION WO POST PROCESS   Final Result   No acute process. **This report has been created using voice recognition software. It may contain minor errors which are inherent in voice recognition technology. **      Final report electronically signed by Dr. Yelitza Obrien on 2/15/2021 11:29 AM      CT THORACIC RECONSTRUCTION WO POST PROCESS   Final Result   No acute process            **This report has been created using voice recognition software. It may contain minor errors which are inherent in voice recognition technology. **      Final report electronically signed by Dr. Yelitza Obrien on 2/15/2021 11:39 AM      CT FACIAL BONES WO CONTRAST   Final Result       1. Soft tissue swelling near the right orbit without evidence of acute osseous injury of the face. 2. Degenerative changes of the temporomandibular joints. 3. Mild mucosal inflammation of the paranasal sinuses. **This report has been created using voice recognition software. It may contain minor errors which are inherent in voice recognition technology. **      Final report electronically signed by Dr. Argelia Ayala MD on 2/15/2021 11:29 AM      US Ed Fast Abdomen Limited   Final Result        Fast Exam: Yes    Electronically signed by Jerilynn Spatz, PA-C on 2/15/2021 at 2:23 PM

## 2021-02-15 NOTE — ED NOTES
Patient presents to the ED with complaints of having head, eye and left wrist pain due to a fall this morning. She states that the last thing she remembers is going to the bathroom, coming back to her room and the next thing she remembers is waking up on her bedroom floor. Her right eye is black, blue and swollen. She states that her head hurst horribly.       Mortimer Lange, LPN  26/63/74 [id number]

## 2021-02-15 NOTE — Clinical Note
Patient Class: Inpatient [101]   REQUIRED: Diagnosis: Fall at home, initial encounter [308401]   Estimated Length of Stay: Estimated stay of more than 2 midnights   Admitting Provider: Suze Bello [[id number]]   Preferred Department: [facility]   Telemetry Bed Required?: No

## 2021-02-15 NOTE — ED NOTES
Dr. Raymond Romero at bedside to obtain FAST exam; results negative. Pt tolerated well.      Marycarmen Rust RN  02/15/21 [id number]

## 2021-02-15 NOTE — H&P
History & Physical        Patient:  Ramon Almazan  YOB: 1940    MRN: [id number]     Acct: [de-identified]    PCP: PHIL Morrison CNP    Date of Admission: 2/15/2021    Date of Service: Pt seen/examined on 2/15/2021   and Admitted to Inpatient with expected LOS greater than two midnights due to medical therapy. Chief Complaint:  Syncopal episode       History Of Present Illness:      68 y.o. female who presented to [facility] after having a syncopal episode this morning. Pt has a pmh of CAD s/p CABG and Stents, MVR, Pacemaker, Osteoporosis, Unsteady  Gait. Pt reports waking up this morning, walked to the bathroom and urinated. Then got up, washed her hands, walked back to her bed and the next thing she remembers is being on the ground. Pt denies having any prodromal symptoms prior to the fall of dizziness, lightheadedness, palpitations, chest pain, sob, or diaphoresis. Denies waking up confused, tongue biting, or urinary/stool incontinence. Pt sees Dr. Manuel Pulido, reports having had a stress test and 2D Echo done two weeks ago. Pt denies any recent fevers, chills, cough, chest pain, sob, diarrhea, nausea, vomiting, or focal neurological deficits. Pt does have a unsteady gait.      Past Medical History:          Diagnosis Date    Atrial fibrillation ([facility]     CAD (coronary artery disease)     Congestive heart failure (CHF) ([facility]     Hyperlipidemia     Hypertension        Past Surgical History:          Procedure Laterality Date    ATRIAL ABLATION SURGERY      CARDIAC CATHETERIZATION      CARDIAC VALVE REPLACEMENT      CORONARY ANGIOPLASTY WITH STENT PLACEMENT  02/28/2019    CORONARY ARTERY BYPASS GRAFT      x1    HYSTERECTOMY      MITRAL VALVE REPLACEMENT      PACEMAKER PLACEMENT  05/2019    IA OFFICE/OUTPT VISIT,PROCEDURE ONLY N/A 11/12/2018    CABG X3, MITRAL VALVE REPAIR, MAZE, AND TRICUSPID VALVE REPAIR, DEANNA performed by Lazaro Jackson MD at [address] [place] [address] THORACENTESIS      TRICUSPID VALVE REPLACEMENT         Medications Prior to Admission:      Prior to Admission medications    Medication Sig Start Date End Date Taking? Authorizing Provider   sertraline (ZOLOFT) 100 MG tablet Take 1 tablet by mouth daily 11/23/20 5/22/21 Yes Brody Gonzalez MD   Coenzyme Q10 (CO Q-10) 200 MG CAPS Take by mouth   Yes Historical Provider, MD   rosuvastatin (CRESTOR) 10 MG tablet Take 10 mg by mouth daily   Yes Historical Provider, MD   furosemide (LASIX) 20 MG tablet TAKE 1 TABLET BY MOUTH TWO TIMES A DAY 10/14/19  Yes Brody Gonzalez MD   Calcium Carbonate-Vit D-Min (CALCIUM 1200 PO) Take 1 tablet by mouth daily   Yes Historical Provider, MD   vitamin D (CHOLECALCIFEROL) 1000 UNIT TABS tablet Take 1,000 Units by mouth daily   Yes Historical Provider, MD   atenolol (TENORMIN) 25 MG tablet Take 1 tablet by mouth daily 4/12/19  Yes Tommy Luong MD   potassium chloride (KLOR-CON M) 20 MEQ extended release tablet Take 1 tablet by mouth daily 3/11/19  Yes Kenneth Yepez, APRN - CNP   clopidogrel (PLAVIX) 75 MG tablet Take 1 tablet by mouth daily 3/1/19  Yes Tommy Luong MD   aspirin 81 MG tablet Take 81 mg by mouth daily   Yes Historical Provider, MD   meclizine (ANTIVERT) 12.5 MG tablet Take 1 tablet by mouth 3 times daily as needed for Dizziness 11/15/19   Brody Gonzalez MD   acetaminophen (TYLENOL) 325 MG tablet Take 1 tablet by mouth every 4 hours as needed for Pain 11/20/18   Lamberto Ovalles PA-C   Multiple Vitamins-Minerals (THERAPEUTIC MULTIVITAMIN-MINERALS) tablet Take 1 tablet by mouth daily (with breakfast) 11/20/18   Lamberto Ovalles PA-C       Allergies:  Patient has no known allergies. Social History:     Social History     Socioeconomic History    Marital status:       Spouse name: None    Number of children: 1    Years of education: None    Highest education level: None   Occupational History    None   Social Needs    Financial resource strain: None   [place] insecurity     Worry: None     Inability: None    Transportation needs     Medical: None     Non-medical: None   Tobacco Use    Smoking status: Never Smoker    Smokeless tobacco: Never Used   Substance and Sexual Activity    Alcohol use: No    Drug use: No    Sexual activity: Not Currently   Lifestyle    Physical activity     Days per week: None     Minutes per session: None    Stress: None   Relationships    Social connections     Talks on phone: None     Gets together: None     Attends Anabaptism service: None     Active member of club or organization: None     Attends meetings of clubs or organizations: None     Relationship status: None    Intimate partner violence     Fear of current or ex partner: None     Emotionally abused: None     Physically abused: None     Forced sexual activity: None   Other Topics Concern    None   Social History Narrative    None       Family History:       Reviewed in detail and negative for DM, CAD, Cancer, CVA. Positive as follows:        Problem Relation Age of Onset    Other Mother         alzheimers    Heart Attack Father     Heart Attack Brother        Diet:  No diet orders on file    REVIEW OF SYSTEMS:   Pertinent positives as noted in the HPI. All other systems reviewed and negative. PHYSICAL EXAM:    BP (!) 153/83   Pulse 80   Temp 98.3 °F (36.8 °C) (Oral)   Resp 18   Ht 5' 5\" (1.651 m)   Wt 127 lb (57.6 kg)   SpO2 98%   BMI 21.13 kg/m²     General appearance:  No apparent distress, appears stated age and cooperative. HEENT:  Normal cephalic, atraumatic without obvious deformity. Pupils equal, round, and reactive to light. Periorbital ecchymosis on the right side. Neck: Supple, with full range of motion. No jugular venous distention. Trachea midline. Respiratory:  Normal respiratory effort. Clear to auscultation, bilaterally without Rales/Wheezes/Rhonchi.   Cardiovascular:  Regular rate and rhythm with normal S1/S2 without murmurs, rubs or inherent in voice recognition technology. **      Final report electronically signed by Dr Eren Barriga on 2/15/2021 11:41 AM      CT HEAD WO CONTRAST   Final Result       1. No evidence of acute intracranial abnormality. 2. Focal defects at the lateral aspect of the body of the left lateral ventricle as evidence for old injury or infarct or schizencephaly. 3. Mild to moderate severity chronic microvascular angiopathy. **This report has been created using voice recognition software. It may contain minor errors which are inherent in voice recognition technology. **      Final report electronically signed by Dr. Mariam Crouch MD on 2/15/2021 11:21 AM      CT LUMBAR RECONSTRUCTION WO POST PROCESS   Final Result   No acute process. **This report has been created using voice recognition software. It may contain minor errors which are inherent in voice recognition technology. **      Final report electronically signed by Dr. Javon Louis on 2/15/2021 11:29 AM      CT THORACIC RECONSTRUCTION WO POST PROCESS   Final Result   No acute process            **This report has been created using voice recognition software. It may contain minor errors which are inherent in voice recognition technology. **      Final report electronically signed by Dr. Javon Louis on 2/15/2021 11:39 AM      CT FACIAL BONES WO CONTRAST   Final Result       1. Soft tissue swelling near the right orbit without evidence of acute osseous injury of the face. 2. Degenerative changes of the temporomandibular joints. 3. Mild mucosal inflammation of the paranasal sinuses. **This report has been created using voice recognition software. It may contain minor errors which are inherent in voice recognition technology. **      Final report electronically signed by Dr. Mariam Crouch MD on 2/15/2021 11:29 AM      US Ed Fast Abdomen Limited   Final Result      VL DUP CAROTID BILATERAL    (Results Pending)

## 2021-02-15 NOTE — ED NOTES
Pt to CT w/this RN and Trauma team; Jayant Palma and De Wilson. Pt alert and oriented x4. Nauseated in CT. Zofran 4mg obtained and administered. Pt tolerated well.       Maribel Lopez RN  02/15/21 1126

## 2021-02-16 ENCOUNTER — APPOINTMENT (OUTPATIENT)
Dept: NON INVASIVE DIAGNOSTICS | Age: 81
DRG: 641 | End: 2021-02-16
Payer: MEDICARE

## 2021-02-16 PROBLEM — Z98.890 S/P MITRAL VALVE REPAIR: Status: ACTIVE | Noted: 2019-01-14

## 2021-02-16 LAB
ANION GAP SERPL CALCULATED.3IONS-SCNC: 7 MEQ/L (ref 8–16)
BASOPHILS # BLD: 0.4 %
BASOPHILS ABSOLUTE: 0 THOU/MM3 (ref 0–0.1)
BUN BLDV-MCNC: 20 MG/DL (ref 7–22)
CALCIUM SERPL-MCNC: 8.7 MG/DL (ref 8.5–10.5)
CHLORIDE BLD-SCNC: 101 MEQ/L (ref 98–111)
CO2: 26 MEQ/L (ref 23–33)
CREAT SERPL-MCNC: 1.1 MG/DL (ref 0.4–1.2)
EOSINOPHIL # BLD: 0.4 %
EOSINOPHILS ABSOLUTE: 0 THOU/MM3 (ref 0–0.4)
ERYTHROCYTE [DISTWIDTH] IN BLOOD BY AUTOMATED COUNT: 13.1 % (ref 11.5–14.5)
ERYTHROCYTE [DISTWIDTH] IN BLOOD BY AUTOMATED COUNT: 42.4 FL (ref 35–45)
GFR SERPL CREATININE-BSD FRML MDRD: 48 ML/MIN/1.73M2
GLUCOSE BLD-MCNC: 108 MG/DL (ref 70–108)
HCT VFR BLD CALC: 30.7 % (ref 37–47)
HEMOGLOBIN: 9.9 GM/DL (ref 12–16)
IMMATURE GRANS (ABS): 0.02 THOU/MM3 (ref 0–0.07)
IMMATURE GRANULOCYTES: 0.3 %
LYMPHOCYTES # BLD: 8.5 %
LYMPHOCYTES ABSOLUTE: 0.6 THOU/MM3 (ref 1–4.8)
MAGNESIUM: 2.6 MG/DL (ref 1.6–2.4)
MCH RBC QN AUTO: 28.4 PG (ref 26–33)
MCHC RBC AUTO-ENTMCNC: 32.2 GM/DL (ref 32.2–35.5)
MCV RBC AUTO: 88.2 FL (ref 81–99)
MONOCYTES # BLD: 10.6 %
MONOCYTES ABSOLUTE: 0.8 THOU/MM3 (ref 0.4–1.3)
NUCLEATED RED BLOOD CELLS: 0 /100 WBC
PLATELET # BLD: 187 THOU/MM3 (ref 130–400)
PMV BLD AUTO: 10.6 FL (ref 9.4–12.4)
POTASSIUM SERPL-SCNC: 4.4 MEQ/L (ref 3.5–5.2)
RBC # BLD: 3.48 MILL/MM3 (ref 4.2–5.4)
SEG NEUTROPHILS: 79.8 %
SEGMENTED NEUTROPHILS ABSOLUTE COUNT: 6.1 THOU/MM3 (ref 1.8–7.7)
SODIUM BLD-SCNC: 134 MEQ/L (ref 135–145)
WBC # BLD: 7.6 THOU/MM3 (ref 4.8–10.8)

## 2021-02-16 PROCEDURE — 80048 BASIC METABOLIC PNL TOTAL CA: CPT

## 2021-02-16 PROCEDURE — 97110 THERAPEUTIC EXERCISES: CPT

## 2021-02-16 PROCEDURE — 93660 TILT TABLE EVALUATION: CPT | Performed by: INTERNAL MEDICINE

## 2021-02-16 PROCEDURE — 6370000000 HC RX 637 (ALT 250 FOR IP): Performed by: INTERNAL MEDICINE

## 2021-02-16 PROCEDURE — 83735 ASSAY OF MAGNESIUM: CPT

## 2021-02-16 PROCEDURE — 6360000002 HC RX W HCPCS: Performed by: INTERNAL MEDICINE

## 2021-02-16 PROCEDURE — 97162 PT EVAL MOD COMPLEX 30 MIN: CPT

## 2021-02-16 PROCEDURE — 2580000003 HC RX 258: Performed by: INTERNAL MEDICINE

## 2021-02-16 PROCEDURE — 1200000000 HC SEMI PRIVATE

## 2021-02-16 PROCEDURE — 97166 OT EVAL MOD COMPLEX 45 MIN: CPT

## 2021-02-16 PROCEDURE — 36415 COLL VENOUS BLD VENIPUNCTURE: CPT

## 2021-02-16 PROCEDURE — 93660 TILT TABLE EVALUATION: CPT

## 2021-02-16 PROCEDURE — 1200000003 HC TELEMETRY R&B

## 2021-02-16 PROCEDURE — 85025 COMPLETE CBC W/AUTO DIFF WBC: CPT

## 2021-02-16 PROCEDURE — 97116 GAIT TRAINING THERAPY: CPT

## 2021-02-16 PROCEDURE — 97530 THERAPEUTIC ACTIVITIES: CPT

## 2021-02-16 PROCEDURE — 93010 ELECTROCARDIOGRAM REPORT: CPT | Performed by: INTERNAL MEDICINE

## 2021-02-16 PROCEDURE — 99233 SBSQ HOSP IP/OBS HIGH 50: CPT | Performed by: INTERNAL MEDICINE

## 2021-02-16 RX ORDER — SODIUM CHLORIDE 9 MG/ML
INJECTION, SOLUTION INTRAVENOUS CONTINUOUS
Status: DISCONTINUED | OUTPATIENT
Start: 2021-02-16 | End: 2021-02-18

## 2021-02-16 RX ADMIN — Medication 1000 UNITS: at 12:23

## 2021-02-16 RX ADMIN — SODIUM CHLORIDE, PRESERVATIVE FREE 10 ML: 5 INJECTION INTRAVENOUS at 12:24

## 2021-02-16 RX ADMIN — ENOXAPARIN SODIUM 40 MG: 40 INJECTION, SOLUTION INTRAVENOUS; SUBCUTANEOUS at 20:34

## 2021-02-16 RX ADMIN — ACETAMINOPHEN 650 MG: 325 TABLET ORAL at 16:15

## 2021-02-16 RX ADMIN — ROSUVASTATIN CALCIUM 10 MG: 10 TABLET, FILM COATED ORAL at 12:23

## 2021-02-16 RX ADMIN — SERTRALINE 100 MG: 100 TABLET, FILM COATED ORAL at 12:23

## 2021-02-16 RX ADMIN — ACETAMINOPHEN 650 MG: 325 TABLET ORAL at 05:20

## 2021-02-16 RX ADMIN — ACETAMINOPHEN 650 MG: 325 TABLET ORAL at 22:43

## 2021-02-16 RX ADMIN — SODIUM CHLORIDE: 9 INJECTION, SOLUTION INTRAVENOUS at 12:27

## 2021-02-16 RX ADMIN — ATENOLOL 25 MG: 25 TABLET ORAL at 12:23

## 2021-02-16 ASSESSMENT — PAIN SCALES - GENERAL
PAINLEVEL_OUTOF10: 6
PAINLEVEL_OUTOF10: 0
PAINLEVEL_OUTOF10: 6
PAINLEVEL_OUTOF10: 2
PAINLEVEL_OUTOF10: 5

## 2021-02-16 ASSESSMENT — PAIN DESCRIPTION - ONSET: ONSET: ON-GOING

## 2021-02-16 ASSESSMENT — PAIN - FUNCTIONAL ASSESSMENT: PAIN_FUNCTIONAL_ASSESSMENT: ACTIVITIES ARE NOT PREVENTED

## 2021-02-16 NOTE — CONSULTS
[address] [place] [address]                                  CONSULTATION    PATIENT NAME: Mane Nurse                    :        1940  MED REC NO:   [id number]                           ROOM:       0014  ACCOUNT NO:   [de-identified]                           ADMIT DATE: 02/15/2021  PROVIDER:     MICHELLE Amos CONSULT DATE:  2021    REASON FOR CONSULTATION:  Left distal radius fracture. REVIEW OF SYSTEMS:  Complains of left wrist pain. Denies any other  orthopedic injuries. PAST MEDICAL HISTORY:  Includes AFib, coronary artery disease,  congestive heart failure, hyperlipidemia, hypertension. PAST SURGICAL HISTORY:  Includes a hysterectomy, CABG, thoracentesis,  tricuspid valve replacement. CURRENT MEDICATIONS:  Please see the MAR for the full list, but includes  Plavix, aspirin, Antivert, Tylenol, multivitamin, Lasix, Crestor,  Zoloft, and Coenzyme Q10. ALLERGIES:  No known drug allergies. FAMILY HISTORY:  Mother with Alzheimer's and father with history of MI.    HISTORY OF PRESENT ILLNESS:  The patient is a pleasant 79-year-old  female who unfortunately sustained a syncopal episode and fell in her  bedroom, came to the emergency room, was admitted for further workup for  her syncope. She was also found to have a left distal radius fracture  that is what Orthopedics was consulted for. She denies any other  orthopedic injuries. PHYSICAL EXAMINATION:  GENERAL:  Gathered per chart records. She is a 79-year-old female in no  apparent distress. Alert and oriented. HEENT:  Normocephalic. Atraumatic. HEART:  Regular rate and rhythm. LUNGS:  Clear to auscultation bilaterally. ABDOMEN:  Soft, nontender. EXTREMITIES:  Left wrist is in a splint. NEUROVASCULAR:  Intact. ASSESSMENT:  Left distal radius fracture.     PLAN:  At this time, in collaboration with Dr. Jonas Cintron, she will follow up in 1 week. Continue no use of left wrist and arm. Continue  in a splint. Continue ice and elevation. If they have any questions or  any concerns, to give us a call. We will go ahead and sign off at this  time. If we can be of any further assistance, please do not hesitate to  let us know. MICHELLE Lopez     D: 02/16/2021 7:31:50       T: 02/16/2021 10:06:20     [email or web address]  Job#: [id number]     Doc#: [id number]    CC:

## 2021-02-16 NOTE — PROCEDURES
[address] [place] [address]                                TILT TABLE TEST    PATIENT NAME: Lili Severe                    :        1940  MED REC NO:   [id number]                           ROOM:       0014  ACCOUNT NO:   [de-identified]                           ADMIT DATE: 02/15/2021  PROVIDER:     Sheyla Mcintosh. Kianna Aguilera M.D.    Fiona Virk:  2021    This is a patient who is a 68years old lady who has chronic atrial fib,  history of pacemaker, history of coronary artery disease. The patient  admitted to the hospital with syncopal episode of undetermined etiology. The patient was in atrial fib, pacemaker rhythm, her heart rate around  80. The patient's blood pressure was 129/70. She was tilted for a  total of 35 minutes. Her blood pressure dropped from 148/85 to 92/66. She had mild dizziness with that, but she has no loss of consciousness. She was not seemed to be bothered too much with the symptoms. The  patient was a pacer dependent, atrial fibrillation. SUMMARY:  The patient was tilted for 35 minutes with the drop of her  blood pressure from 139/85 to 92/66 with mild dizziness, but no loss of  consciousness, no syncopal episode. Tilt table test is lower the line  of normal, suggests increased fluid intake, discontinuation of the  diuretics and compression socks. The patient was educated about  securing herself if she has dizziness or near syncopal episode. Yonis Munson M.D.    D: 2021 10:03:09       T: 2021 11:04:34     [email or web address]  Job#: [id number]     Doc#: [id number]    CC:  Percy Aguilera M.D.

## 2021-02-16 NOTE — PROGRESS NOTES
[facility]  INPATIENT OCCUPATIONAL THERAPY  [facility] ORTHOPEDICS 7K  EVALUATION    Time:   Time In: 1400  Time Out: 1425  Timed Code Treatment Minutes: 10 Minutes  Minutes: 25          Date: 2021  Patient Name: Deven Ryan,   Gender: female      MRN: [id number]  : 1940  (68 y.o.)  Referring Practitioner: Dr. Dewight Sandhoff, MD  Diagnosis: Fall at [facility]  Additional Pertinent Hx: Pt sustained a syncopal episode and fell in her bedroom, came to the emergency room, was admitted for further workup for her syncope. She was also found to have a left distal radius fracture that is what Orthopedics was consulted for. She denies any otherorthopedic injuries. Restrictions/Precautions:  Restrictions/Precautions: Fall Risk, Weight Bearing  Left Upper Extremity Weight Bearing: Non Weight Bearing  Required Braces or Orthoses  Left Upper Extremity Brace/Splint: wrist/arm temorary cast in place- plans for permanent cast in 1 week  Position Activity Restriction  Other position/activity restrictions: NWB L UE    Subjective  Chart Reviewed: Yes, Orders, History and Physical, Other (comment)(PT evaluation)  Patient assessed for rehabilitation services?: Yes    Subjective: Pleasant and cooperative  Comments: RN approved session. Pt asked to use the bathroom. She reports mild pain in her wrist.  An ice pack was placed beside her L wrist at the end of the session.     Pain:  Pain Assessment  Patient Currently in Pain: Yes  Pain Assessment: 0-10  Pain Level: 2  Pain Type: Acute pain  Pain Location: Wrist  Pain Orientation: Left  Pain Descriptors: Dull;Aching  Pain Frequency: Continuous  Clinical Progression: Not changed  Patient's Stated Pain Goal: 5  Response to Pain Intervention: Patient Satisfied    Social/Functional History:  Lives With: Alone  Type of Home: Apartment  Home Layout: One level  Home Access: Level entry  Home Equipment: Quad cane   Bathroom Shower/Tub: Walk-in shower  Bathroom Toilet: guard assistance(preparing to walk or finishing her self care in the bathroom)  Standing Balance  Time: 30 seconds and 3.5 minutes  Activity: finishing her self care in the bathroom    Transfers:  Sit to stand: Contact guard assistance(from the recliner chair)  Stand to sit: Contact guard assistance(to the edge of bed)  Toilet Transfers  Toilet - Technique: Ambulating  Equipment Used: Grab bars  Toilet Transfer: Contact guard assistance    Upper Extremity Assessment:Hand Dominance: Left  LUE General AROM: Shoulder WFL; elbow flexion limited extension secondary to stabilizing of her arm in temporary cast; limited forearm supination also noted secondary to cast  Left Hand General AROM: Pt has MCP jts immobilized- she can move only distal phalange of fingers and her thumb. RUE AROM : WFL  Right Hand AROM: WFL    LUE Strength  L Hand General: NT  LUE Strength Comment: 3+/5 deltoid and pectoral; biceps/triceps NT secondary to limited ROM with the temporary cast.  RUE Strength  R Hand General: 4-/5  RUE Strength Comment: 3+/5 deltoid; 3+/5 pectoral; 4-/5 biceps/triceps    Sensation  Overall Sensation Status: [facility]  Fine Motor Skills  Fine Motor Comment: Pt can use her R hand but it feels very awkward to her, per pt. She brushed her teeth with difficulty. She also wiped herself after having voided while she used her R hand. Pt did not use her L hand at this time. Activity Tolerance: Patient limited by fatigue  Pt returned to supine following session. She had her arm elevated on a pillow with an ice pack in place at the end of session. Assessment:  Assessment: Patient would benefit from continued skilled OT services to address above deficits. She presents with fall at home and sustained a distal radius Fx. She has her LUE in a temporary cast from her elbow to past her wrist.  She is NWB through her LUE. She is going to have a follow up in 1 week for the permanent cast placement.   She is independent with self care.  She does her own homemaking. She has difficulty with sleeping at night, per pt. She uses a quad cane for mobility and has had 2 falls at home secondary to poor balance, per pt. Pt demonstrated walking in her room with a hemiwalker in her R hand with CGA. She is L handed and reports that it felt awkward using her R hand for activities at this time. She did her toileting routine with CGA. She also brushed her teeth while standing with CGA needed. Performance deficits / Impairments: Decreased functional mobility , Decreased ADL status, Decreased ROM, Decreased endurance, Decreased balance  Prognosis: Good  REQUIRES OT FOLLOW UP: Yes  Decision Making: Medium Complexity    Treatment Initiated: Treatment and education initiated within context of evaluation. Evaluation time included review of current medical information, gathering information related to past medical, social and functional history, completion of standardized testing, formal and informal observation of tasks, assessment of data and development of plan of care and goals. Treatment time included skilled education and facilitation of tasks to increase safety and independence with ADL's for improved functional independence and quality of life. Pt demonstrated BUE ROM exercises with her shoulders as able and her R elbow/wrist and hand in all joints. Pt opened/closed her R fist and opposed her thumb and fingers without difficulty. She could hardly move her finger tips and thumb of her L hand. Exercises completed to increase pt's pain free movement for ease of doing self care and functional mobility. Discussed with pt her home set up and that she would benefit from having a grabbar installed in the shower and around the toilet seat. Pt verbalized agreement. She does rent the apartment and there are limits to what she can do to her apartment, she states.     Discharge Recommendations:  Continue to assess pending progress, Patient would benefit from continued therapy after discharge    Patient Education:  OT Education: OT Role, Plan of Care, Transfer Training, ADL Adaptive Strategies    Equipment Recommendations:  Equipment Needed: Yes  Other: Allen Cart in the shower; shower chair; elevated toilet seat with armrests    Plan:  Times per week: 6x  Current Treatment Recommendations: Functional Mobility Training, Endurance Training, Self-Care / ADL, Balance Training, ROM, Safety Education & Training  Plan Comment: Pt would benefit from continued skilled OT services when medically stable and discharged from Acute. HHOT recommended. Specific instructions for Next Treatment: Functional mobility; ADLs and adaptive strategies for 1 hand technique; UE ROM exercises    Goals:  Patient goals : \"I want to get stronger and get back to doing things on my own and feeling safe. \" pt states. Short term goals  Time Frame for Short term goals: By discharge  Short term goal 1: Pt will complete grooming  while using 1 handed adapted strategies with CGA to increase her independence with self care. Short term goal 2: Pt will complete toileting routine including clothing management while using 1 handed technique as able with SBA to increase her independence with self care. Short term goal 3: Pt will complete transfers to/from various surfaces while using her RUE pushing off of the armrest with SBA to increase her independence with her toileting. Short term goal 4: Pt will demonstrate functional mobility walking to/from the bathroom while using a hemiwalker with SBA and cues for safety if needed to prepare for doing sinkside ADLS. Short term goal 5: Pt will complete BUE active ROM exercises as able to tolerate to increase her pain free movement for ease of doing ADLs. See long-term goal time frame for expected duration of plan of care. If no long-term goals established, a short length of stay is anticipated.            Following session, patient left in safe position

## 2021-02-16 NOTE — PROCEDURES
[id number] tilt table test  Dictated    mildly positive    stop diuretics    compression socks   Increase fluid intake

## 2021-02-16 NOTE — PLAN OF CARE
Problem: Falls - Risk of:  Goal: Will remain free from falls  Description: Will remain free from falls  Note: Pt using call light appropriately to call for assistance with ambulation to the bathroom and to chair. Pt is also compliant with use of non-skid slippers. Pt reports understanding of fall prevention when discussed. Problem: Pain:  Goal: Pain level will decrease  Description: Pain level will decrease  Outcome: Ongoing  Note: Pt report pain at 3 on scale. Pt states oral medication helping to achieve pain goal of a 0 on scale.

## 2021-02-16 NOTE — PROGRESS NOTES
Assessment and Plan:        1. Fall- ?whether syncope- interrogate ICD. Does not sound vasovagal  2. S/p cabg, mv repair and tv repair; at risk for arrhythmias. Had MAZE, CECE tied off  3. fx wrist- Ortho to see    CC:  Fall? syncope  HPI:  Pt with cabg mv and tv repair fell to floor while making bed; no warning, nausea, sweat. Had other falls, no syncope. fx wrist, bruise to face. ROS (12 point review of systems completed. Pertinent positives noted. Otherwise ROS is negative) : gets sob with normal activities.   No chest pain  PMH:  Per HPI  SHX:  , nonsmoker  FHX: Noncontributory    Allergies: See above    Medications:     sodium chloride        atenolol  25 mg Oral Daily    rosuvastatin  10 mg Oral Daily    sertraline  100 mg Oral Daily    Vitamin D  1,000 Units Oral Daily    sodium chloride flush  10 mL Intravenous 2 times per day    enoxaparin  40 mg Subcutaneous Nightly       Vital Signs:   /65   Pulse 81   Temp 98.5 °F (36.9 °C) (Oral)   Resp 16   Ht 5' 5\" (1.651 m)   Wt 128 lb 0.9 oz (58.1 kg)   SpO2 93%   BMI 21.31 kg/m²    No intake or output data in the 24 hours ending 02/16/21 0615     Physical Examination: General appearance - chronically ill appearing and right periorbital bruising  Mental status - alert, oriented to person, place, and time  Neck - supple, no significant adenopathy, no JVD, or carotid bruits  Chest - clear to auscultation, no wheezes, rales or rhonchi, symmetric air entry  Heart - normal rate and regular rhythm, systolic murmur 2/6 at lower left sternal border  Abdomen - soft, nontender, nondistended, no masses or organomegaly  Neurological - alert, oriented, normal speech, no focal findings or movement disorder noted  Musculoskeletal - left arm in spling  Extremities - See above    Skin - normal coloration and turgor, no rashes, no suspicious skin lesions noted    Data: (All radiographs, tracings, PFTs, and imaging are personally viewed and interpreted unless otherwise noted).     Reviewed labs, multiple scans      Electronically signed by Parviz Minor MD on 2/16/2021 at 6:15 AM

## 2021-02-16 NOTE — CONSULTS
[address] [place] [address]                                  CONSULTATION    PATIENT NAME: Codie Morales                    :        1940  MED REC NO:   [id number]                           ROOM:       0014  ACCOUNT NO:   [de-identified]                           ADMIT DATE: 02/15/2021  PROVIDER:     Loyda Mares M.D.    Macielcorinne Emerylon:  2021    HISTORY OF PRESENT ILLNESS:  This is patient who is a 74-year-old lady  known to me from prior encounters. She is known to have a history of  coronary artery disease, history of valvular heart disease. She had a  history of CABG with a LIMA to the LAD, mitral valve and tricuspid valve  repair. The patient is admitted to the hospital after she had what  seemed to be like a syncopal episode. The patient indicates she was  standing and when she woke up she was in the ground. She is not aware  of chest pain. She is not aware of warning symptoms. The patient has a  history of chronic atrial fibrillation. She had a history of prior  intervention of the LAD and the RCA, has had a history of pacemaker  implantation, AV node ablation. The patient is a pacer dependent. She  denied cough. She denies fever, chills, or rigors. She had no volume  loss. PAST MEDICAL HISTORY:  She has no history of CVA. She has a history of  chronic atrial fib. She has a history of valvular heart disease and  repair surgically. She had history of CABG and bypass to the LAD. The  patient has a history of hypertension, history of shortness of breath,  tiredness. The patient has no volume loss, no fever, chills, or rigors. She denied claudication. No change in her weight. She has arthritis. She has a weak _____. SOCIAL HISTORY:  She denies smoking or alcohol abuse. ALLERGIES:  The patient has no known allergy.     PHYSICAL EXAMINATION:  VITAL SIGNS:  Shows her blood pressure is 114/65, she was in a pacer  rhythm, atrial fib. NEUROLOGIC:  She has no focal neurological deficits. NECK:  She has positive JVD. LUNGS:  There is scattered expiratory rhonchi. BACK:  Scoliosis of dorsal spine. HEART:  She has 2/6 systolic murmur. ABDOMEN:  Soft. GENITAL/RECTAL:  Deferred. EXTREMITIES:  Lower limbs, there is no edema. LABORATORY DATA:  Showed her hemoglobin is elevated, hematocrit is 33. Troponins are negative. ASSESSMENT:  In summary,  1. The patient with a possible syncopal episode. The patient will have  her pacemaker interrogated to rule out any arrhythmia. 2.  Chronic atrial fib, pacer dependent, AV node ablation treatment. 3.  History of coronary artery disease, history of prior intervention of  the RCA and the LAD, history of valvular heart disease, mitral valve and  tricuspid valve repair. 4.  History of anxiety. 5.  History of systolic dysfunction of the left ventricle and echo will  be obtained. Pending the results of the above tests, further recommendation will be  made.   The patient will have a tilt table test.        Marlena Barksdale M.D.    D: 02/16/2021 10:15:09       T: 02/16/2021 11:12:40     [email or web address]  Job#: [id number]     Doc#: [id number]    CC:

## 2021-02-16 NOTE — PROGRESS NOTES
[facility]  INPATIENT PHYSICAL THERAPY  EVALUATION  [facility] ORTHOPEDICS 7K - 7K-14/014-A    Time In: 1055  Time Out: 1133  Timed Code Treatment Minutes: 26 Minutes  Minutes: 38          Date: 2021  Patient Name: Trinidad Kwok,  Gender:  female        MRN: [id number]  : 1940  (68 y.o.)      Referring Practitioner: Blade Heredia MD  Diagnosis: Fall at home, initial encounter  Additional Pertinent Hx: Per H&P:80 y.o. female who presented to [facility] after having a syncopal episode this morning. Pt has a pmh of CAD s/p CABG and Stents, MVR, Pacemaker, Osteoporosis, Unsteady  Gait. Pt reports waking up this morning, walked to the bathroom and urinated. Then got up, washed her hands, walked back to her bed and the next thing she remembers is being on the ground. Pt denies having any prodromal symptoms prior to the fall of dizziness, lightheadedness, palpitations, chest pain, sob, or diaphoresis. Denies waking up confused, tongue biting, or urinary/stool incontinence. Pt sees Dr. Cheyenne William, reports having had a stress test and 2D Echo done two weeks ago. Pt denies any recent fevers, chills, cough, chest pain, sob, diarrhea, nausea, vomiting, or focal neurological deficits. Pt does have a unsteady gait. \"     Restrictions/Precautions:  Restrictions/Precautions: Fall Risk, Weight Bearing  Left Upper Extremity Weight Bearing: Non Weight Bearing  Required Braces or Orthoses  Left Upper Extremity Brace/Splint: wrist/arm temorary cast in place- plans for permanent cast in 1 week  Position Activity Restriction  Other position/activity restrictions: NWB L UE    Subjective:  Chart Reviewed: Yes  Patient assessed for rehabilitation services?: Yes  Family / Caregiver Present: No  Subjective: RN approved session. Pt in bed at start of session, agreeable to therapy. Pt in recliner at end of session with all needs in reach. Chair alarm on.     General:  Overall Orientation Status: Within Functional Limits  Follows Commands: Within Functional Limits    Vision: Impaired  Vision Exceptions: Wears glasses at all times    Hearing: Within functional limits         Pain: 0/10    Social/Functional History:    Lives With: Alone  Type of Home: Apartment  Home Layout: One level  Home Access: Level entry  Home Equipment: Quad cane     Bathroom Shower/Tub: Walk-in shower  Bathroom Toilet: Standard  Bathroom Accessibility: Accessible    Receives Help From: Family  ADL Assistance: Independent  Homemaking Assistance: Needs assistance  Homemaking Responsibilities: Yes  Ambulation Assistance: Independent  Transfer Assistance: Independent    Active : No  Occupation: Retired  Leisure & Hobbies: Taking care of her cat  Additional Comments: Pt reports 2 falls in the past month due to \"tripping over her feet and poor balance. \"  She used a quad cane in her L hand. Pt was independent with self care and most of the homemaking. OBJECTIVE:  Range of Motion:  Bilateral Lower Extremity: WFL    Strength:  Bilateral Lower Extremity: Impaired - B LE MMT grossly at least 3/5    Balance:  Static Sitting Balance:  Stand By Assistance, Contact Guard Assistance  Dynamic Standing Balance: Contact Guard Assistance, with cues for safety, with increased time for completion, CGA for ambulation  Completed dynamic standing and seated task in bathroom for ~10 minutes. Assist to lower/stand from toilet. Pt required assist to wash/dry R hand. Bed Mobility:  Supine to Sit: Minimal Assistance, with head of bed raised, with verbal cues , with increased time for completion, Min A with L UE support; cues for no WB through L UE. Cues to kick feet off bed    Transfers:  Sit<>Stand: Minimal Assistance, Moderate Assistance, with increased time for completion, cues for hand placement, cues to push from seated surface with R UE. Min A to stand from bed. Mod A to sit and stand from low toilet.  Cues to use R UE on kamaljit-walker to help lower herself and stand from toilet due to being unable to use grab bar on pt's L side. Ambulation:  Contact Guard Assistance, with cues for safety, with verbal cues , with increased time for completion  Distance: 60 feet, 15 feet  Surface: Level Tile  Device:Cyrus-Walker  Gait Deviations: Forward Flexed Posture, Slow Estrella, Decreased Step Length Bilaterally, Decreased Heel Strike Bilaterally, Narrow Base of Support, Mild Path Deviations, Unsteady Gait and Scissoring  Pt with multiple cues to keep cyrus-walker to the side of her, not in front. Pt running into cyrus-walker with R foot. Pt required assist to keep walker to the side. Pt required cues to increase MACARIO but despite cues continued to walk with scissor gait. Pt unsteady when turning. Cues to bring cyrus-walker with her when leaving bathroom. Attempted ambulation with single-point cane but pt with increased unsteadiness. Exercise:  Patient was guided in 1 set(s) 15 reps of exercise to both lower extremities. Ankle pumps, Glut sets, Quad sets and Hip abduction/adduction. Exercises were completed for increased independence with functional mobility. Therapist provided exercise instruction and encouragement to go through full range. Pt educated on importance of strengthening. Functional Outcome Measures: Completed  -PAC Inpatient Mobility without Stair Climbing Raw Score : 15  -PAC Inpatient without Stair Climbing T-Scale Score : 43.03    ASSESSMENT:  Activity Tolerance:  Patient tolerance of  treatment: good. Treatment Initiated: Treatment and education initiated within context of evaluation. Evaluation time included review of current medical information, gathering information related to past medical, social and functional history, completion of standardized testing, formal and informal observation of tasks, assessment of data and development of plan of care and goals.   Treatment time included skilled education and facilitation of tasks to increase safety and independence with functional mobility for improved independence and quality of life. Pt able to ambulate with CGA with kamaljit-walker but required several cues for safety. Pt with increased time and assist in bathroom, requiring increased assist for sit<>stand from low toilet. Assessment: Body structures, Functions, Activity limitations: Decreased functional mobility , Decreased posture, Decreased balance, Decreased strength, Decreased safe awareness, Decreased endurance  Assessment: Pt demonstrates decrease in basline function with decreased strength, balance, endurance and NWB L UE limiting pt from independence with bed mobility, transfers, and ambulation. Pt would benefit from skilled PT intervention during admission and at discharge to improve functional I and safety with mobility. Prognosis: Good    REQUIRES PT FOLLOW UP: Yes    Discharge Recommendations:  Discharge Recommendations: Continue to assess pending progress, IP Rehab    Patient Education:  PT Education: General Safety, Gait Training, PT Role, Home Exercise Program, Functional Mobility Training, Weight-bearing Education, Transfer Training, Equipment. See above. Equipment Recommendations: Other: Continue to assess. Plan:  Times per week: 6xO  Current Treatment Recommendations: Strengthening, Home Exercise Program, Safety Education & Training, Balance Training, Endurance Training, Patient/Caregiver Education & Training, Functional Mobility Training, Equipment Evaluation, Education, & procurement, Transfer Training, Gait Training    Goals:  Patient goals : To go home  Short term goals  Time Frame for Short term goals: by discharge  Short term goal 1: transfer supine<>sit with supervision in order to improve independence with bed mobility. Short term goal 2: transfer sit<>stand with supervision in order to safely get in/out of bed/chair.   Short term goal 3: ambulate 150 feet with LRAD with CGA in order to safely ambulate throughout home.  Short term goal 4: tolerate 10 minutes of dynamic standing activity with CGA with no LOB to promote safety and reduce fall risk. Long term goals  Time Frame for Long term goals : N/A due to short ELOS. Following session, patient left in safe position with all fall risk precautions in place.

## 2021-02-16 NOTE — CARE COORDINATION
2/16/21, 12:26 PM EST  DISCHARGE PLANNING EVALUATION:    Obed Litten Burden       Admitted: 2/15/2021/ Dianefabiolanatanaelvaughn 23 day: 1   [facility] Reason for admit: Fall at home, initial encounter [W19. Almaz Lehman, Y80.0]   PMH:  has a past medical history of Atrial fibrillation ([facility] [address] [address] CAD (coronary artery disease), Congestive heart failure (CHF) ([facility] [address] Hyperlipidemia, and Hypertension. Procedure: non op management left radius fx. Continue splint, ice and elevation to left wrist  Barriers to Discharge:Fell - concern for serious injury due to being on anticoagulants. Left arm in splint/ ace wrap. Unable to use left arm. Cardiology consult for syncopal episode. Telemetry monitoring. Tilt table test: mildly positive.  stop diuretics. compression socks. Increase fluid intake. Dietitian consulted:  Poor Intake/Appetite 5 or more days  PCP: PHIL Diaz CNP  Readmission Risk Score: 10%    Patient Goals/Plan/Treatment Preferences: I spoke with Kamran Kaplan. Dr Luis Carlos Sanders consulted  for [facility]. She said that she is thinking about that. I told her that a social worked will be in to discuss her preferences. She is  from [facility]. She lives alone and daughter stops in to see her at lunchtime. She has no one to stay with her. OP F/U with Dr Alley Steve in 1 week. Transportation/Food Security/Housekeeping Addressed:  No issues identified.

## 2021-02-17 PROBLEM — E44.0 MODERATE MALNUTRITION (HCC): Status: ACTIVE | Noted: 2021-02-17

## 2021-02-17 PROBLEM — I95.1 ORTHOSTATIC SYNCOPE: Status: ACTIVE | Noted: 2021-02-17

## 2021-02-17 LAB
ANION GAP SERPL CALCULATED.3IONS-SCNC: 8 MEQ/L (ref 8–16)
BUN BLDV-MCNC: 19 MG/DL (ref 7–22)
CALCIUM SERPL-MCNC: 8.3 MG/DL (ref 8.5–10.5)
CHLORIDE BLD-SCNC: 106 MEQ/L (ref 98–111)
CO2: 24 MEQ/L (ref 23–33)
CREAT SERPL-MCNC: 0.8 MG/DL (ref 0.4–1.2)
ERYTHROCYTE [DISTWIDTH] IN BLOOD BY AUTOMATED COUNT: 13.2 % (ref 11.5–14.5)
ERYTHROCYTE [DISTWIDTH] IN BLOOD BY AUTOMATED COUNT: 43.6 FL (ref 35–45)
GFR SERPL CREATININE-BSD FRML MDRD: 69 ML/MIN/1.73M2
GLUCOSE BLD-MCNC: 102 MG/DL (ref 70–108)
HCT VFR BLD CALC: 30.7 % (ref 37–47)
HEMOGLOBIN: 9.6 GM/DL (ref 12–16)
IRON: 38 UG/DL (ref 50–170)
MCH RBC QN AUTO: 28.4 PG (ref 26–33)
MCHC RBC AUTO-ENTMCNC: 31.3 GM/DL (ref 32.2–35.5)
MCV RBC AUTO: 90.8 FL (ref 81–99)
PLATELET # BLD: 173 THOU/MM3 (ref 130–400)
PMV BLD AUTO: 10.6 FL (ref 9.4–12.4)
POTASSIUM SERPL-SCNC: 4.3 MEQ/L (ref 3.5–5.2)
RBC # BLD: 3.38 MILL/MM3 (ref 4.2–5.4)
SODIUM BLD-SCNC: 138 MEQ/L (ref 135–145)
VITAMIN B-12: 706 PG/ML (ref 211–911)
WBC # BLD: 5.8 THOU/MM3 (ref 4.8–10.8)

## 2021-02-17 PROCEDURE — 80048 BASIC METABOLIC PNL TOTAL CA: CPT

## 2021-02-17 PROCEDURE — 36415 COLL VENOUS BLD VENIPUNCTURE: CPT

## 2021-02-17 PROCEDURE — 82607 VITAMIN B-12: CPT

## 2021-02-17 PROCEDURE — 85027 COMPLETE CBC AUTOMATED: CPT

## 2021-02-17 PROCEDURE — 97110 THERAPEUTIC EXERCISES: CPT

## 2021-02-17 PROCEDURE — 2580000003 HC RX 258: Performed by: INTERNAL MEDICINE

## 2021-02-17 PROCEDURE — 6370000000 HC RX 637 (ALT 250 FOR IP): Performed by: INTERNAL MEDICINE

## 2021-02-17 PROCEDURE — 1200000003 HC TELEMETRY R&B

## 2021-02-17 PROCEDURE — 86334 IMMUNOFIX E-PHORESIS SERUM: CPT

## 2021-02-17 PROCEDURE — 97116 GAIT TRAINING THERAPY: CPT

## 2021-02-17 PROCEDURE — 1200000000 HC SEMI PRIVATE

## 2021-02-17 PROCEDURE — 99231 SBSQ HOSP IP/OBS SF/LOW 25: CPT | Performed by: INTERNAL MEDICINE

## 2021-02-17 PROCEDURE — 6360000002 HC RX W HCPCS: Performed by: INTERNAL MEDICINE

## 2021-02-17 PROCEDURE — 94760 N-INVAS EAR/PLS OXIMETRY 1: CPT

## 2021-02-17 PROCEDURE — 83540 ASSAY OF IRON: CPT

## 2021-02-17 PROCEDURE — 97530 THERAPEUTIC ACTIVITIES: CPT

## 2021-02-17 RX ADMIN — ATENOLOL 25 MG: 25 TABLET ORAL at 09:00

## 2021-02-17 RX ADMIN — ROSUVASTATIN CALCIUM 10 MG: 10 TABLET, FILM COATED ORAL at 09:06

## 2021-02-17 RX ADMIN — SERTRALINE 100 MG: 100 TABLET, FILM COATED ORAL at 09:06

## 2021-02-17 RX ADMIN — ACETAMINOPHEN 650 MG: 325 TABLET ORAL at 20:40

## 2021-02-17 RX ADMIN — ENOXAPARIN SODIUM 40 MG: 40 INJECTION, SOLUTION INTRAVENOUS; SUBCUTANEOUS at 20:40

## 2021-02-17 RX ADMIN — ACETAMINOPHEN 650 MG: 325 TABLET ORAL at 14:49

## 2021-02-17 RX ADMIN — ACETAMINOPHEN 650 MG: 325 TABLET ORAL at 05:07

## 2021-02-17 RX ADMIN — Medication 1000 UNITS: at 09:05

## 2021-02-17 RX ADMIN — SODIUM CHLORIDE: 9 INJECTION, SOLUTION INTRAVENOUS at 14:51

## 2021-02-17 ASSESSMENT — PAIN DESCRIPTION - FREQUENCY: FREQUENCY: CONTINUOUS

## 2021-02-17 ASSESSMENT — PAIN DESCRIPTION - DESCRIPTORS
DESCRIPTORS: ACHING
DESCRIPTORS: ACHING;DULL

## 2021-02-17 ASSESSMENT — PATIENT HEALTH QUESTIONNAIRE - PHQ9: SUM OF ALL RESPONSES TO PHQ QUESTIONS 1-9: 7

## 2021-02-17 ASSESSMENT — PAIN DESCRIPTION - PAIN TYPE: TYPE: ACUTE PAIN

## 2021-02-17 ASSESSMENT — PAIN SCALES - GENERAL: PAINLEVEL_OUTOF10: 0

## 2021-02-17 ASSESSMENT — PAIN DESCRIPTION - ONSET
ONSET: ON-GOING
ONSET: ON-GOING

## 2021-02-17 ASSESSMENT — PAIN - FUNCTIONAL ASSESSMENT: PAIN_FUNCTIONAL_ASSESSMENT: ACTIVITIES ARE NOT PREVENTED

## 2021-02-17 ASSESSMENT — PAIN DESCRIPTION - PROGRESSION: CLINICAL_PROGRESSION: NOT CHANGED

## 2021-02-17 ASSESSMENT — PAIN DESCRIPTION - ORIENTATION: ORIENTATION: LEFT

## 2021-02-17 NOTE — PLAN OF CARE
Problem: Nutrition  Goal: Optimal nutrition therapy  Outcome: Ongoing   Nutrition Problem #1: Moderate malnutrition  Intervention: Food and/or Nutrient Delivery: Continue Current Diet, Start Oral Nutrition Supplement  Nutritional Goals: Patient will consume 75% or more of meals during LOS.

## 2021-02-17 NOTE — CARE COORDINATION
2/17/21, 8:01 AM EST    DISCHARGE PLANNING EVALUATION      Spoke with Maura Hoffman. She has agreed to [facility], either the [address] [place] [address] or [facility]. She requested sw call her daughter to clarify. Spoke with daughter, Temitope Marie, who shares that they are requesting The [address] of [facility]. Referral made to The [address] of [facility]. Facility is reviewing for possible admission and will have a decision later today.

## 2021-02-17 NOTE — PROGRESS NOTES
Admit Date: 2/15/2021  Hospital day 3    Subjective:     Patient has mild dizziness . Medication side effects: none    Scheduled Meds:   atenolol  25 mg Oral Daily    rosuvastatin  10 mg Oral Daily    sertraline  100 mg Oral Daily    Vitamin D  1,000 Units Oral Daily    sodium chloride flush  10 mL Intravenous 2 times per day    enoxaparin  40 mg Subcutaneous Nightly     Continuous Infusions:   sodium chloride 40 mL/hr at 02/16/21 1227     PRN Meds:sodium chloride flush, promethazine **OR** ondansetron, polyethylene glycol, acetaminophen **OR** acetaminophen    Review of Systems  Pertinent items are noted in HPI. Objective:     Patient Vitals for the past 8 hrs:   BP Temp Temp src Pulse Resp SpO2   02/17/21 0608 -- -- -- -- -- 96 %   02/17/21 0445 137/84 98 °F (36.7 °C) Oral 82 16 94 %     I/O last 3 completed shifts: In: 476 [I.V.:476]  Out: -     No change     ECG: normal sinus rhythm, no blocks or conduction defects, no ischemic changes. Data Review:   CBC:  Lab Results   Component Value Date    WBC 5.8 02/17/2021    RBC 3.38 02/17/2021    HGB 9.6 02/17/2021    HCT 30.7 02/17/2021    MCV 90.8 02/17/2021    MCH 28.4 02/17/2021    MCHC 31.3 02/17/2021     02/17/2021    MPV 10.6 02/17/2021     BMP  Lab Results   Component Value Date     02/17/2021    K 4.3 02/17/2021    K 4.6 04/11/2019     02/17/2021    CO2 24 02/17/2021    BUN 19 02/17/2021    CREATININE 0.8 02/17/2021    CALCIUM 8.3 02/17/2021      COAG PROFILE:  Lab Results   Component Value Date    APTT 27.6 11/12/2018    INR 1.05 02/15/2021       Assessment:     Principal Problem:    Fall  Active Problems:    S/P coronary artery bypass graft x 1, 11/12/18. 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 Risk for falls    Hypertension    Closed fracture of left distal radius    Orthostatic syncope    CAD in native artery  Resolved Problems:    * No resolved hospital problems.  *      Plan:     *mild dizziness when stand up placeted hose    cad stable    weakness    increase fluid intake    f/u office    discharge planning per admitting service

## 2021-02-17 NOTE — PROGRESS NOTES
Reviewed ICD interrogation; no arrhythmias. Tilt table mildly positive. Discussed not resuming diuretics.  Family wants her to go to [facility]; will discuss in am conference

## 2021-02-17 NOTE — PROGRESS NOTES
Brief Intervention and Referral to Treatment Summary    Patient was provided PHQ-9, AUDIT and DAST Screening:      PHQ-9 Score: 7  AUDIT Score:  0  DAST Score:  0    Patients substance use is considered     Low Risk/Healthy      Patients depression is considered:       Mild       Brief Education Was Provided    Patient was receptive        Brief Intervention Is Provided (Only for AUDIT or DAST)     Denied     Recommendations/Referrals for Brief and/or Specialized Treatment Provided to Patient     Patient reports that she will follow up with PCP. Patient states that her  has been gone for two years. She states that she fells down 'sometimes', but not all the time. Patient states that her depression 'is not bad'.

## 2021-02-17 NOTE — PROGRESS NOTES
[address] [facility]  Occupational Therapy  Daily Note  Time:   Time In: 45  Time Out: 1010  Timed Code Treatment Minutes: 25 Minutes  Minutes: 25          Date: 2021  Patient Name: Marco A Curtis,   Gender: female      Room: [facility]14/014-A  MRN: [id number]  : 1940  (68 y.o.)  Referring Practitioner: Dr. Kerri Mireles MD  Diagnosis: Fall at [facility]  Additional Pertinent Hx: Pt sustained a syncopal episode and fell in her bedroom, came to the emergency room, was admitted for further workup for her syncope. She was also found to have a left distal radius fracture that is what Orthopedics was consulted for. She denies any otherorthopedic injuries. Restrictions/Precautions:  Restrictions/Precautions: Fall Risk, Weight Bearing  Left Upper Extremity Weight Bearing: Non Weight Bearing  Required Braces or Orthoses  Left Upper Extremity Brace/Splint: wrist/arm temorary cast in place- plans for permanent cast in 1 week  Position Activity Restriction  Other position/activity restrictions: NWB L UE     SUBJECTIVE: Patient in bed upon arrival agreeable to OT treatment very talkative increased time to complete tasks mod redirection required    PAIN: did not rate but stated she has a slight headache     COGNITION: Slow Processing and Decreased Insight    ADL:   Lower Extremity Dressing: Minimal Assistance. adjusting slipper socks . BALANCE:  Sitting Balance:  Supervision. edge of bed   Standing Balance: Contact Guard Assistance, with cues for safety. BED MOBILITY:  Sit to Supine: Moderate Assistance      TRANSFERS:  Sit to Stand:  Minimal Assistance. from EOB   Stand to Sit: Contact Guard Assistance. to recliner chair vcs for safe technique     FUNCTIONAL MOBILITY:  Assistive Device: Cyrus-walker  Assist Level:  Contact Guard Assistance and with verbal cues . Distance: to recliner chair        ASSESSMENT:     Activity Tolerance:  Patient tolerance of  treatment: fair. Discharge Recommendations: Continue to assess pending progress, Patient would benefit from continued therapy after discharge   Equipment Recommendations: Equipment Needed: Yes  Other: Missy Enamorado in the shower; shower chair; elevated toilet seat with armrests  Plan: Times per week: 6x  Specific instructions for Next Treatment: Functional mobility; ADLs and adaptive strategies for 1 hand technique; UE ROM exercises  Current Treatment Recommendations: Functional Mobility Training, Endurance Training, Self-Care / ADL, Balance Training, ROM, Safety Education & Training  Plan Comment: Pt would benefit from continued skilled OT services when medically stable and discharged from Acute. HHOT recommended. Patient Education  Patient Education: Role of OT    Goals  Short term goals  Time Frame for Short term goals: By discharge  Short term goal 1: Pt will complete grooming  while using 1 handed adapted strategies with CGA to increase her independence with self care. Short term goal 2: Pt will complete toileting routine including clothing management while using 1 handed technique as able with SBA to increase her independence with self care. Short term goal 3: Pt will complete transfers to/from various surfaces while using her RUE pushing off of the armrest with SBA to increase her independence with her toileting. Short term goal 4: Pt will demonstrate functional mobility walking to/from the bathroom while using a hemiwalker with SBA and cues for safety if needed to prepare for doing sinkside ADLS. Short term goal 5: Pt will complete BUE active ROM exercises as able to tolerate to increase her pain free movement for ease of doing ADLs. Following session, patient left in safe position with all fall risk precautions in place.

## 2021-02-17 NOTE — PROGRESS NOTES
Physician Progress Note      Dorothea Santoyo  CSN #:                  [id number]  :                       1940  ADMIT DATE:       2/15/2021 9:38 AM  DISCH DATE:  RESPONDING  PROVIDER #:        Yulia Wang MD          QUERY TEXT:    Attending,    Patient admitted with syncope. Pt noted to have a mildly positive TTT and   pacemaker interrogation showed no arrhythmias. Diuretics have been   discontinued. If possible, please respond below and document in the progress   notes and discharge summary if you are evaluating and/or treating any of the   following: The medical record reflects the following:  Risk Factors: diuretic use, advanced age, chronic afib, CAD s/p CABG & stents  Clinical Indicators: admitted for syncope; mildly positive TTT; pacemaker   interrogation showed no arrhythmias; Pt denies having any prodromal symptoms   prior to the fall of dizziness, lightheadedness, palpitations, chest pain,   sob, or diaphoresis. Denies waking up confused, tongue biting, or   urinary/stool incontinence. Treatment: diuretics discontinued, TTT, pacemaker interrogation, Cardiology   consult, labs, imaging, YESSICA hose, PT/OT, IVF    Thank you! Ting Thomas, RN, BSN, RHIT, CCDS, [facility]  RN Clinical   [phone]  Options provided:  -- Syncope due to dehydration  -- Syncope due to hypovolemia  -- Syncope due to orthostatic hypotension secondary to autonomic neuropathy  -- Other - I will add my own diagnosis  -- Disagree - Not applicable / Not valid  -- Disagree - Clinically unable to determine / Unknown  -- Refer to Clinical Documentation Reviewer    PROVIDER RESPONSE TEXT:    The syncope is due to hypovolemia.     Query created by: Maximino Kemp on 2021 10:07 AM      Electronically signed by:  Yulia Wang MD 2021 10:22 AM

## 2021-02-17 NOTE — PROGRESS NOTES
Comprehensive Nutrition Assessment    Type and Reason for Visit:  Initial, Positive Nutrition Screen, Consult(poor appetite/ intake)    Nutrition Recommendations/Plan:   ONS initiated: Ensure Enlive BID and Magic Cup once daily. Continue current diet. Recommend MVI. Nutrition Assessment:    Pt. moderately malnourished AEB criteria as listed below. At risk for further nutrition compromise r/t admit with fall at home, non op management left radius fx and underlying medical condition (hx CAD, CHF, atrial fib). Nutrition recommendations/interventions as per above. Malnutrition Assessment:  Malnutrition Status: Moderate malnutrition    Context:  Chronic Illness     Findings of the 6 clinical characteristics of malnutrition:  Energy Intake:  7 - 75% or less estimated energy requirements for 1 month or longer  Weight Loss:  No significant weight loss     Body Fat Loss:  Unable to assess(due to right eye edema)     Muscle Mass Loss:  1 - Mild muscle mass loss Temples (temporalis)  Fluid Accumulation:  Unable to assess     Strength:  Not Performed    Estimated Daily Nutrient Needs:  Energy (kcal):  8283-6206 kcals (30-33); Weight Used for Energy Requirements:  (58kgm on 2/15)     Protein (g):  70-87 grams (1.2-1.5); Weight Used for Protein Requirements:  (58kgm)          Nutrition Related Findings:   Thin patient, reports poor appetite/ intake for years, intake of mostly snacks and small intake of meals, likes ice cream, had drank ONS in the past, occasional nausea, had BM today, jaw pain and thus does best with soft foods, intake 51-75% of one meal recorded (today's breakfast); glucose 102, BUN 19, Creatinine 0.8; vitamin D      Wounds:  (right eye and right hip bruising)       Current Nutrition Therapies:    DIET GENERAL;  Dietary Nutrition Supplements: Standard High Calorie Oral Supplement  Dietary Nutrition Supplements: Frozen Oral Supplement    Anthropometric Measures:  · Height: 5' 5\" (165.1 cm)  · Current Body Weight: 128 lb 0.9 oz (58.1 kg)   · Admission Body Weight: 128 lb 0.9 oz (58.1 kg)(2/15; +1 right eye edema)    · Usual Body Weight: 127 lb (57.6 kg)(per patient; per EMR: 5/11/20 129# 14.4oz, 1/20/21 128# 12.8oz)     · Ideal Body Weight: 125 lbs;      · BMI: 21.3  · BMI Categories: Underweight (BMI less than 22) age over 72       Nutrition Diagnosis:   · Moderate malnutrition related to inadequate protein-energy intake as evidenced by poor intake prior to admission, mild muscle loss      Nutrition Interventions:   Food and/or Nutrient Delivery:  Continue Current Diet, Start Oral Nutrition Supplement  Nutrition Education/Counseling:  Education initiated(encouraged intake at best effort and use of ONS)   Coordination of Nutrition Care:  Continue to monitor while inpatient    Goals:  Patient will consume 75% or more of meals during LOS. Nutrition Monitoring and Evaluation:   Behavioral-Environmental Outcomes:  None Identified   Food/Nutrient Intake Outcomes:  Food and Nutrient Intake, Supplement Intake  Physical Signs/Symptoms Outcomes:  Biochemical Data, Nutrition Focused Physical Findings, Skin, Weight, Chewing or Swallowing, Fluid Status or Edema     Discharge Planning:     Too soon to determine     Electronically signed by Adama Ruiz RD, LD on 2/17/21 at 3:31 PM EST    Contact: [phone]

## 2021-02-17 NOTE — PROGRESS NOTES
Assessment and Plan:        1. ? syncope- tilt mildly positive; advised stop diuretics;  ?whether related to syncope. Pacer interrogation neg for arrhythmias      CC:  Fall, ?syncope  HPI:  Pt with ca  ROS (12 point review of systems completed. Pertinent positives noted. Otherwise ROS is negative) :   PMH:  Per HPI  SHX:  Lives aloe  FHX: Noncontributory    Allergies: See above    Medications:     sodium chloride 40 mL/hr at 02/16/21 1227      atenolol  25 mg Oral Daily    rosuvastatin  10 mg Oral Daily    sertraline  100 mg Oral Daily    Vitamin D  1,000 Units Oral Daily    sodium chloride flush  10 mL Intravenous 2 times per day    enoxaparin  40 mg Subcutaneous Nightly       Vital Signs:   /67   Pulse 80   Temp 98 °F (36.7 °C) (Oral)   Resp 16   Ht 5' 5\" (1.651 m)   Wt 128 lb 0.9 oz (58.1 kg)   SpO2 96%   BMI 21.31 kg/m²      Intake/Output Summary (Last 24 hours) at 2/17/2021 1025  Last data filed at 2/17/2021 0455  Gross per 24 hour   Intake 476 ml   Output --   Net 476 ml        Physical Examination: General appearance - alert, well appearing, and in no distress  Mental status - alert, oriented to person, place, and time  Neck - supple, no significant adenopathy, no JVD, or carotid bruits  Chest - clear to auscultation, no wheezes, rales or rhonchi, symmetric air entry  Heart - normal rate, regular rhythm, normal S1, S2, no murmurs, rubs, clicks or gallops  Abdomen - soft, nontender, nondistended, no masses or organomegaly  Neurological - alert, oriented, normal speech, no focal findings or movement disorder noted  Musculoskeletal - no joint tenderness, deformity or swelling  Extremities - peripheral pulses normal, no pedal edema, no clubbing or cyanosis  Skin - bruising face    Data: (All radiographs, tracings, PFTs, and imaging are personally viewed and interpreted unless otherwise noted).           Electronically signed by Akua Montana MD on 2/17/2021 at 10:25 AM

## 2021-02-17 NOTE — PLAN OF CARE
Problem: Falls - Risk of:  Goal: Will remain free from falls  Description: Will remain free from falls  Outcome: Ongoing  Goal: Absence of physical injury  Description: Absence of physical injury  Outcome: Ongoing     Problem: Pain:  Goal: Pain level will decrease  Description: Pain level will decrease  Outcome: Ongoing  Goal: Control of acute pain  Description: Control of acute pain  Outcome: Ongoing  Goal: Control of chronic pain  Description: Control of chronic pain  Outcome: Ongoing     Problem: DISCHARGE BARRIERS  Goal: Patient's continuum of care needs are met  Outcome: Ongoing

## 2021-02-17 NOTE — PROGRESS NOTES
[address] [place] [address]  INPATIENT PHYSICAL THERAPY  DAILY NOTE  [facility] ORTHOPEDICS 7K - 7K-14/014-A    Time In: 6517  Time Out: 1344  Timed Code Treatment Minutes: 28 Minutes  Minutes: 28          Date: 2021  Patient Name: Renan Soto,  Gender:  female        MRN: [id number]  : 1940  (68 y.o.)     Referring Practitioner: Brando Lou. Kike Coleman MD  Diagnosis: Fall at home, initial encounter  Additional Pertinent Hx: Per H&P:80 y.o. female who presented to [address] [place] [address] after having a syncopal episode this morning. Pt has a pmh of CAD s/p CABG and Stents, MVR, Pacemaker, Osteoporosis, Unsteady  Gait. Pt reports waking up this morning, walked to the bathroom and urinated. Then got up, washed her hands, walked back to her bed and the next thing she remembers is being on the ground. Pt denies having any prodromal symptoms prior to the fall of dizziness, lightheadedness, palpitations, chest pain, sob, or diaphoresis. Denies waking up confused, tongue biting, or urinary/stool incontinence. Pt sees Dr. Alek Schmitz, reports having had a stress test and 2D Echo done two weeks ago. Pt denies any recent fevers, chills, cough, chest pain, sob, diarrhea, nausea, vomiting, or focal neurological deficits. Pt does have a unsteady gait. \"     Prior Level of Function:  Lives With: Alone  Type of Home: Apartment  Home Layout: One level  Home Access: Level entry  Home Equipment: Quad cane   Bathroom Shower/Tub: Walk-in shower  Bathroom Toilet: Standard  Bathroom Accessibility: Accessible    Receives Help From: Family  ADL Assistance: Independent  Homemaking Assistance: Needs assistance  Homemaking Responsibilities: Yes  Ambulation Assistance: Independent  Transfer Assistance: Independent  Active : No  Additional Comments: Pt reports 2 falls in the past month due to \"tripping over her feet and poor balance. \"  She used a quad cane in her L hand.   Pt was independent with self care and most of the homemaking. Restrictions/Precautions:  Restrictions/Precautions: Fall Risk, Weight Bearing  Left Upper Extremity Weight Bearing: Non Weight Bearing  Required Braces or Orthoses  Left Upper Extremity Brace/Splint: wrist/arm temorary cast in place- plans for permanent cast in 1 week  Position Activity Restriction  Other position/activity restrictions: NWB L UE     SUBJECTIVE: RN approved session. Pt up in chair to begin session and complains of headache throughout session. Pt is agreeable to therapy and cooperative. PAIN: Pt denies pain, complains of headache in session. OBJECTIVE:  Bed Mobility:  Not Tested    Transfers:  Sit to Stand: Stand By Assistance, X 1, with increased time for completion, cues for hand placement  Stand to Sit:Stand By Assistance, X 1   *Multiple trials performed from bedside chair. Cues for hand placement and device use. Trial performed of sit to stand without UE assist was unsuccessful. Pt would clear bottom and fall back into chair. Pt demonstrates improved performance with UE assist on arm of chair without unsteadiness or LOB. Education provided on pausing upon initially standing and sitting up to reduce fall risk. Ambulation:  Stand By Assistance  Distance: 50 ft in room   Surface: Level Tile  Device:Cyrus-Walker  Gait Deviations: Forward Flexed Posture, Slow Estrella, Decreased Step Length Bilaterally, Decreased Trunk Rotation, Decreased Gait Speed and Narrow Base of Support  *Pt demonstrates mild unsteadiness with in room ambulation. Cues and demonstration provided on 3 point gait pattern with cyrus-walker. Continual encouragement for upright posture and gaze, pt tends to keep head down and look at feet with ambulation. Balance:  Static Standing Balance: Stand By Assistance  Dynamic Standing Balance: Contact Guard Assistance   *CGA for standing exercises with cyrus-walker.  Pt demonstrates 1 episode of dizziness without LOB, symptoms resolved with standing rest breaks and cues for deep breathing. Exercise:  Patient was guided in 1 set(s) 15 reps of exercise to both lower extremities. Seated marches, Long arc quads, Standing marches, Standing hip abduction/adduction and Standing hip flexion. Exercises were completed for increased independence with functional mobility. Functional Outcome Measures: Completed  AM-PAC Inpatient Mobility Raw Score : 17  AM-PAC Inpatient T-Scale Score : 42.13    ASSESSMENT:  Assessment: Patient progressing toward established goals. Activity Tolerance:  Patient tolerance of  treatment: good. Equipment Recommendations:Equipment Needed: (defer equipment recommendation to next step of care)  Other: Continue to assess. Discharge Recommendations:  Subacute/Skilled Nursing Facility (pt not safe to return home alone at this time.)    Plan: Times per week: 6xO  Current Treatment Recommendations: Strengthening, Home Exercise Program, Safety Education & Training, Balance Training, Endurance Training, Patient/Caregiver Education & Training, Functional Mobility Training, Equipment Evaluation, Education, & procurement, Transfer Training, Gait Training    Patient Education  Patient Education: Plan of Care, Home Exercise Program, Precautions/Restrictions, Transfers, Gait, Verbal Exercise Instruction    Goals:  Patient goals : To go home  Short term goals  Time Frame for Short term goals: by discharge  Short term goal 1: transfer supine<>sit with supervision in order to improve independence with bed mobility. Short term goal 2: transfer sit<>stand with supervision in order to safely get in/out of bed/chair. Short term goal 3: ambulate 150 feet with LRAD with CGA in order to safely ambulate throughout home. Short term goal 4: tolerate 10 minutes of dynamic standing activity with CGA with no LOB to promote safety and reduce fall risk. Long term goals  Time Frame for Long term goals : N/A due to short ELOS.     Following session, patient left in safe position with all fall risk precautions in place.

## 2021-02-17 NOTE — CARE COORDINATION
2/17/21, 3:27 PM EST    DISCHARGE ON GOING EVALUATION    April Spanish day: 2  Location: [facility]14/014-A Reason for admit: Fall at home, initial encounter [W19. Shin Warren, [id number]]   Procedure: na  Barriers to Discharge: cardiology, YESSICA hose for dizziness when up, PT and OT follows, on room air, Na 138, hgb 9.6, iron 38, IVF 40/hr, orthostatic vitals. PCP: PHIL Jiménez CNP  Readmission Risk Score: 12%  Patient Goals/Plan/Treatment Preferences: from home alone; SW follows for ECF placement.

## 2021-02-18 LAB — SARS-COV-2, NAAT: NOT DETECTED

## 2021-02-18 PROCEDURE — 6360000002 HC RX W HCPCS: Performed by: INTERNAL MEDICINE

## 2021-02-18 PROCEDURE — 1200000003 HC TELEMETRY R&B

## 2021-02-18 PROCEDURE — 87635 SARS-COV-2 COVID-19 AMP PRB: CPT

## 2021-02-18 PROCEDURE — 97116 GAIT TRAINING THERAPY: CPT

## 2021-02-18 PROCEDURE — 6370000000 HC RX 637 (ALT 250 FOR IP): Performed by: INTERNAL MEDICINE

## 2021-02-18 PROCEDURE — 84238 ASSAY NONENDOCRINE RECEPTOR: CPT

## 2021-02-18 PROCEDURE — 99232 SBSQ HOSP IP/OBS MODERATE 35: CPT | Performed by: INTERNAL MEDICINE

## 2021-02-18 PROCEDURE — 97110 THERAPEUTIC EXERCISES: CPT

## 2021-02-18 PROCEDURE — 94760 N-INVAS EAR/PLS OXIMETRY 1: CPT

## 2021-02-18 PROCEDURE — 2580000003 HC RX 258: Performed by: INTERNAL MEDICINE

## 2021-02-18 PROCEDURE — 36415 COLL VENOUS BLD VENIPUNCTURE: CPT

## 2021-02-18 RX ORDER — FUROSEMIDE 10 MG/ML
20 INJECTION INTRAMUSCULAR; INTRAVENOUS ONCE
Status: COMPLETED | OUTPATIENT
Start: 2021-02-18 | End: 2021-02-18

## 2021-02-18 RX ORDER — AMLODIPINE BESYLATE 5 MG/1
5 TABLET ORAL DAILY
Status: DISCONTINUED | OUTPATIENT
Start: 2021-02-18 | End: 2021-02-19

## 2021-02-18 RX ADMIN — SODIUM CHLORIDE, PRESERVATIVE FREE 10 ML: 5 INJECTION INTRAVENOUS at 08:59

## 2021-02-18 RX ADMIN — FUROSEMIDE 20 MG: 10 INJECTION, SOLUTION INTRAMUSCULAR; INTRAVENOUS at 08:58

## 2021-02-18 RX ADMIN — ACETAMINOPHEN 650 MG: 325 TABLET ORAL at 10:46

## 2021-02-18 RX ADMIN — SODIUM CHLORIDE, PRESERVATIVE FREE 10 ML: 5 INJECTION INTRAVENOUS at 19:36

## 2021-02-18 RX ADMIN — ROSUVASTATIN CALCIUM 10 MG: 10 TABLET, FILM COATED ORAL at 08:57

## 2021-02-18 RX ADMIN — ACETAMINOPHEN 650 MG: 325 TABLET ORAL at 19:32

## 2021-02-18 RX ADMIN — SERTRALINE 100 MG: 100 TABLET, FILM COATED ORAL at 08:58

## 2021-02-18 RX ADMIN — ATENOLOL 25 MG: 25 TABLET ORAL at 05:45

## 2021-02-18 RX ADMIN — ACETAMINOPHEN 650 MG: 325 TABLET ORAL at 03:01

## 2021-02-18 RX ADMIN — ENOXAPARIN SODIUM 40 MG: 40 INJECTION, SOLUTION INTRAVENOUS; SUBCUTANEOUS at 19:32

## 2021-02-18 RX ADMIN — Medication 1000 UNITS: at 08:58

## 2021-02-18 RX ADMIN — AMLODIPINE BESYLATE 5 MG: 5 TABLET ORAL at 17:56

## 2021-02-18 ASSESSMENT — PAIN SCALES - GENERAL
PAINLEVEL_OUTOF10: 1
PAINLEVEL_OUTOF10: 7
PAINLEVEL_OUTOF10: 0
PAINLEVEL_OUTOF10: 7

## 2021-02-18 ASSESSMENT — PAIN DESCRIPTION - PAIN TYPE
TYPE: ACUTE PAIN

## 2021-02-18 ASSESSMENT — PAIN DESCRIPTION - LOCATION
LOCATION: ARM;HEAD
LOCATION: HEAD
LOCATION: ARM;HEAD
LOCATION: ARM;HEAD
LOCATION: HEAD

## 2021-02-18 NOTE — PLAN OF CARE
Problem: Falls - Risk of:  Goal: Will remain free from falls  Description: Will remain free from falls  Outcome: Ongoing  Note: Patient up with staff assistance. Able to use call light. Patient has remained free of falls during this shift. Appropriate fall prevention measures in place. Pt/OT working with pt with kamaljit-walker as patient is nonweightbearing on the left arm         Problem: Pain:  Goal: Pain level will decrease  Description: Pain level will decrease  Outcome: Ongoing  Note: Patient taking pain medication on MAR as needed to control pain. Use of non-pharmacologic pain management including ice/repositioning. Patient pain goal is no pain. Problem: DISCHARGE BARRIERS  Goal: Patient's continuum of care needs are met  Outcome: Ongoing  Note: Pt planning for ecf at discharge and then to eventually return home     Problem: Nutrition  Goal: Optimal nutrition therapy  Outcome: Ongoing  Note: Pt tolerating diet     Care plan reviewed with patient. Patient verbalizes understanding of the plan of care and contribute to goal setting.

## 2021-02-18 NOTE — CARE COORDINATION
2/18/21, 11:24 AM EST    DISCHARGE PLANNING EVALUATION      Spoke with daughter, Jae Rico, about possible discharge today and transport options. Discussed that medicare may not cover ambulance transport. Discussed ambulette transport. Family may be able to transport, and daughter is considering options. Spoke with RN, who will update sw when decision is made about discharge.

## 2021-02-18 NOTE — PROGRESS NOTES
Assessment and Plan:        1. ? syncope- tilt mildly positive; advised to  stop diuretics by consultant;  ?whether tilt findings related to her syncope. Pacer interrogation neg for arrhythmias  2. hbp- bp up this am; will give bp med early. Stopped IV fluids      CC:  Fall, ?syncope  HPI:  Pt with cad, presented with sudden syncope without warning. Alert immed after  ROS (12 point review of systems completed. Pertinent positives noted.  Otherwise ROS is negative) :   PMH:  Per HPI  SHX:  Lives alone  FHX: Noncontributory    Allergies: See above    Medications:       atenolol  25 mg Oral Daily    rosuvastatin  10 mg Oral Daily    sertraline  100 mg Oral Daily    Vitamin D  1,000 Units Oral Daily    sodium chloride flush  10 mL Intravenous 2 times per day    enoxaparin  40 mg Subcutaneous Nightly       Vital Signs:   BP (!) 180/94   Pulse 80   Temp 98.4 °F (36.9 °C) (Oral)   Resp 16   Ht 5' 5\" (1.651 m)   Wt 128 lb 0.9 oz (58.1 kg)   SpO2 94%   BMI 21.31 kg/m²      Intake/Output Summary (Last 24 hours) at 2/18/2021 0646  Last data filed at 2/18/2021 0301  Gross per 24 hour   Intake 1387.6 ml   Output --   Net 1387.6 ml        Physical Examination: General appearance - alert, well appearing, and in no distress  Mental status - alert, oriented to person, place, and time  Neck - supple, no significant adenopathy, no JVD, or carotid bruits  Chest - clear to auscultation, no wheezes, rales or rhonchi, symmetric air entry  Heart - normal rate, regular rhythm, normal S1, S2, no murmurs, rubs, clicks or gallops  Abdomen - soft, nontender, nondistended, no masses or organomegaly  Neurological - alert, oriented, normal speech, no focal findings or movement disorder noted  Musculoskeletal - no joint tenderness, deformity or swelling  Extremities - peripheral pulses normal, no pedal edema, no clubbing or cyanosis  Skin - bruising face    Data: (All radiographs, tracings, PFTs, and imaging are personally viewed and interpreted unless otherwise noted).           Electronically signed by Joey Beverly MD on 2/18/2021 at 6:46 AM

## 2021-02-18 NOTE — PROGRESS NOTES
[address] [place] [address]  INPATIENT PHYSICAL THERAPY  DAILY NOTE  [facility] ORTHOPEDICS 7K - 7K-14/014-A    Time In: 9861  Time Out:   Timed Code Treatment Minutes: 35 Minutes  Minutes: 33          Date: 2021  Patient Name: Michelle Houser,  Gender:  female        MRN: [id number]  : 1940  (68 y.o.)     Referring Practitioner: Kimmie Miller. Shaye Kaplan MD  Diagnosis: Fall at home, initial encounter  Additional Pertinent Hx: Per H&P:80 y.o. female who presented to [address] [place] [address] after having a syncopal episode this morning. Pt has a pmh of CAD s/p CABG and Stents, MVR, Pacemaker, Osteoporosis, Unsteady  Gait. Pt reports waking up this morning, walked to the bathroom and urinated. Then got up, washed her hands, walked back to her bed and the next thing she remembers is being on the ground. Pt denies having any prodromal symptoms prior to the fall of dizziness, lightheadedness, palpitations, chest pain, sob, or diaphoresis. Denies waking up confused, tongue biting, or urinary/stool incontinence. Pt sees Dr. Prieto Quinonez, reports having had a stress test and 2D Echo done two weeks ago. Pt denies any recent fevers, chills, cough, chest pain, sob, diarrhea, nausea, vomiting, or focal neurological deficits. Pt does have a unsteady gait. \"     Prior Level of Function:  Lives With: Alone  Type of Home: Apartment  Home Layout: One level  Home Access: Level entry  Home Equipment: Quad cane   Bathroom Shower/Tub: Walk-in shower  Bathroom Toilet: Standard  Bathroom Accessibility: Accessible    Receives Help From: Family  ADL Assistance: Independent  Homemaking Assistance: Needs assistance  Homemaking Responsibilities: Yes  Ambulation Assistance: Independent  Transfer Assistance: Independent  Active : No  Additional Comments: Pt reports 2 falls in the past month due to \"tripping over her feet and poor balance. \"  She used a quad cane in her L hand.   Pt was independent with self care and most of the homemaking. Restrictions/Precautions:  Restrictions/Precautions: Fall Risk, Weight Bearing  Left Upper Extremity Weight Bearing: Non Weight Bearing  Required Braces or Orthoses  Left Upper Extremity Brace/Splint: wrist/arm temorary cast in place- plans for permanent cast in 1 week  Position Activity Restriction  Other position/activity restrictions: NWB L UE     SUBJECTIVE: RN approved session. Pt in recliner upon arrival, pleasant and cooperative for therapy. Pt very talkative throughout session. PAIN: L wrist and R wrist. 4/10  Good carryover with NWB status    OBJECTIVE:  Bed Mobility:  Not Tested    Transfers:  Sit to Stand: Contact Guard Assistance, Minimal Assistance, cues for hand placement, with verbal cues  Stand to Sit:Contact Guard Assistance, Minimal Assistance, cues for hand placement, with verbal cues    Ambulation:  Contact Guard Assistance, with cues for safety, with verbal cues , with increased time for completion  Distance: 6ftx1 50ftx1  Surface: Level Tile  Device:Cyrus-Walker  Gait Deviations: Forward Flexed Posture, Slow Estrella, Decreased Step Length Bilaterally, Decreased Gait Speed, Decreased Heel Strike Bilaterally, Mild Path Deviations, Unsteady Gait, Decreased Terminal Knee Extension and cues for safety with AD, hand placement and to avoid furniture walking. Balance:  Static Standing Balance: Contact Guard Assistance  Pt stood in scale per RN request to weight measurement. Pt stood without Ue support ~30\", steady no LOB    Exercise:  Patient was guided in 1 set(s) 10-15 reps of exercise to both lower extremities. Ankle pumps, Glut sets, Quad sets, Heelslides, Hip abduction/adduction and Straight leg raises. Exercises were completed for increased independence with functional mobility.     Functional Outcome Measures: Completed  -PAC Inpatient Mobility Raw Score : 17  AM-PAC Inpatient T-Scale Score : 42.13    ASSESSMENT:  Assessment: Patient progressing toward established goals. Activity Tolerance:  Patient tolerance of  treatment: good. Pt tolerated session well. Pt demos decreased endurance, strength and stability on feet. Pt will require cont PT at this time to ensure safety with mobility prior to returning home alone. Equipment Recommendations:Equipment Needed: (defer equipment recommendation to next step of care)  Other: Continue to assess. Discharge Recommendations:  Subacute/Skilled Nursing Facility    Plan: Times per week: 6xO  Current Treatment Recommendations: Strengthening, Home Exercise Program, Safety Education & Training, Balance Training, Endurance Training, Patient/Caregiver Education & Training, Functional Mobility Training, Equipment Evaluation, Education, & procurement, Transfer Training, Gait Training    Patient Education  Patient Education: Plan of Care, Precautions/Restrictions, Equipment Education, Transfers, Gait, Verbal Exercise Instruction    Goals:  Patient goals : To go home  Short term goals  Time Frame for Short term goals: by discharge  Short term goal 1: transfer supine<>sit with supervision in order to improve independence with bed mobility. Short term goal 2: transfer sit<>stand with supervision in order to safely get in/out of bed/chair. Short term goal 3: ambulate 150 feet with LRAD with CGA in order to safely ambulate throughout home. Short term goal 4: tolerate 10 minutes of dynamic standing activity with CGA with no LOB to promote safety and reduce fall risk. Long term goals  Time Frame for Long term goals : N/A due to short ELOS. Following session, patient left in safe position with all fall risk precautions in place.

## 2021-02-18 NOTE — DISCHARGE INSTR - COC
Continuity of Care Form    Patient Name: Abby Mccabe   :  1940  MRN:  [id number]    Admit date:  2/15/2021  Discharge date:  21    Code Status Order: Full Code   Advance Directives:   Advance Care Flowsheet Documentation       Date/Time Healthcare Directive Type of Healthcare Directive Copy in [address] Agent's Name Healthcare Agent's Phone Number    02/15/21 4497  Yes, patient has an advance directive for healthcare treatment  Durable power of  for health care; Health care treatment directive  No, copy requested from family  --  --  --            Admitting Physician:  Chip Whitmore MD  PCP: Delmy Poon, APRN - CNP    Discharging Nurse: [address] [place] [address] Unit/Room#: 7K-14/014-A  Discharging Unit Phone Number: [phone]    Emergency Contact:   Extended Emergency Contact Information  Primary Emergency Contact: [address] [place] [address] Phone: [phone]  Relation: Child  Secondary Emergency Contact: [address] Phone: [phone]  Relation: Brother/Sister    Past Surgical History:  Past Surgical History:   Procedure Laterality Date    ATRIAL ABLATION [address] WITH STENT PLACEMENT  2019    CORONARY ARTERY BYPASS GRAFT      x1    HYSTERECTOMY      MITRAL VALVE REPLACEMENT      PACEMAKER PLACEMENT  2019    OR OFFICE/OUTPT VISIT,PROCEDURE ONLY N/A 2018    CABG X3, MITRAL VALVE REPAIR, MAZE, AND TRICUSPID VALVE REPAIR, DEANNA performed by Marlin Boateng MD at [place] [address]         Immunization History: There is no immunization history on file for this patient. Active Problems:  Patient Active Problem List   Diagnosis Code    Atrial fibrillation with RVR ([facility]) I48.91    S/P coronary artery bypass graft x 1, 18.  Z95.1    Status post tricuspid valve repair, 11/12/18. Z98.890    S/P mitral valve repair Z98.890    Situational depression F43.21    Mixed hyperlipidemia E78.2    Coronary artery disease involving coronary bypass graft of native heart without angina pectoris I25.810    Risk for falls Z91.81    Physical deconditioning R53.81    CAD in native artery I25.10    Severe malnutrition (HCC) E43    Post PTCA Z98.61    Pacemaker Z95.0    Age-related osteoporosis without current pathological fracture M81.0    Fall W19. Almaz Plenty    Hypertension I10    Closed fracture of left distal radius S52.502A    Orthostatic syncope I95.1    Moderate malnutrition (HCC) E44.0       Isolation/Infection:   Isolation            No Isolation          Patient Infection Status       Infection Onset Added Last Indicated Last Indicated By Review Planned Expiration Resolved Resolved By    None active    Resolved    COVID-19 Rule Out 02/18/21 02/18/21 02/18/21 COVID-19, Rapid (Ordered)   02/18/21 Rule-Out Test Resulted            Nurse Assessment:  Last Vital Signs: BP (!) 179/99   Pulse 83   Temp 97.5 °F (36.4 °C) (Oral)   Resp 16   Ht 5' 5\" (1.651 m)   Wt 128 lb 6.4 oz (58.2 kg)   SpO2 97%   BMI 21.37 kg/m²     Last documented pain score (0-10 scale): Pain Level: 0  Last Weight:   Wt Readings from Last 1 Encounters:   02/18/21 128 lb 6.4 oz (58.2 kg)     Mental Status:  oriented and alert    IV Access:  - None    Nursing Mobility/ADLs:  Walking   Assisted  Transfer  Assisted  Bathing  Assisted  Dressing  Assisted  Toileting  Assisted  Feeding  Independent  Med Admin  Assisted  Med Delivery   whole    Wound Care Documentation and Therapy:        Elimination:  Continence:   · Bowel:  Yes  · Bladder: Yes  Urinary Catheter: None   Colostomy/Ileostomy/Ileal Conduit: No       Date of Last BM: 2-19-21    Intake/Output Summary (Last 24 hours) at 2/18/2021 1031  Last data filed at 2/18/2021 0857  Gross per 24 hour   Intake 1387.6 ml   Output -- Net 1387.6 ml     I/O last 3 completed shifts: In: 1387.6 [P.O.:625; I.V.:762.6]  Out: -     Safety Concerns: At Risk for Falls    Impairments/Disabilities:      None    Nutrition Therapy:  Current Nutrition Therapy:   - Oral Diet:  General    Routes of Feeding: Oral  Liquids: Thin Liquids  Daily Fluid Restriction: no  Last Modified Barium Swallow with Video (Video Swallowing Test): not done    Treatments at the Time of Hospital Discharge:   Respiratory Treatments:   Oxygen Therapy:  is not on home oxygen therapy. Ventilator:    - No ventilator support    Rehab Therapies: Physical Therapy and Occupational Therapy  Weight Bearing Status/Restrictions: no weight bearing left upper arm  Other Medical Equipment (for information only, NOT a DME order):  walker  Other Treatments: Continue no use of left wrist and arm. Continue  in a splint. Continue ice and elevation    Patient's personal belongings (please select all that are sent with patient):      RN SIGNATURE:  Electronically signed by Curly Valdez RN on 2/19/21 at 1:23 PM EST    CASE MANAGEMENT/SOCIAL WORK SECTION    Inpatient Status Date: 02/15/21    Readmission Risk Assessment Score:  Readmission Risk              Risk of Unplanned Readmission:        12           Discharging to Facility/ Agency   · Name:  [facility]   · Address:  [address] [place] [address] Road  [facility]  · Phone: [phone]   · Fax:  [phone]    Dialysis Facility (if applicable)   · Name:  · Address:  · Dialysis Schedule:  · Phone:  · Fax:    / signature: Electronically signed by ARTURO Caraballo on 2/18/2021 at 11:07 AM     PHYSICIAN SECTION    Prognosis: Fair    Condition at Discharge: Stable    Rehab Potential (if transferring to Rehab): Good    Recommended Labs or Other Treatments After Discharge:   Stopping lasix and potassium, BMP ordered for next Wednesday to monitor  Monitor for evidence of fluid overload.    Resumed plavix, aspirin on DC (check CBC to monitor anemia next wednesday). Soluble transferrin receptor pending  F/up with Ortho 1 week - left wrist fracture with splint. Conservative management  Recommend consideration for Bisphosphonate for fragility fracture  F/up with Dr. Louie Lombardi as scheduled. Patient is to wear compression socks, stand slowly given risk of orthostasis    Physician Certification: I certify the above information and transfer of Antonio Almazan  is necessary for the continuing treatment of the diagnosis listed and that she requires [facility] for less 30 days.      Update Admission H&P: No change in H&P    PHYSICIAN SIGNATURE:  Electronically signed by Dharmesh Don DO on 2/19/21 at 12:14 PM EST

## 2021-02-18 NOTE — FLOWSHEET NOTE
Prayer and encouragement     02/18/21 1514   Encounter Summary   Services provided to: Patient   Referral/Consult From: Rounding   Continue Visiting Yes  (2/18 )   Complexity of Encounter Low   Length of Encounter 15 minutes   Routine   Type Follow up   Assessment Calm; Approachable   Intervention Nurtured hope; Active listening;Ritual   Outcome Acceptance;Expressed gratitude;Encouraged; Hopeful

## 2021-02-18 NOTE — PROGRESS NOTES
[facility]  Occupational Therapy  Daily Note  Time:   Time In: 1012  Time Out: 1035  Timed Code Treatment Minutes: 23 Minutes  Minutes: 23          Date: 2021  Patient Name: Jareth Lopez,   Gender: female      Room: [facility]14/014-A  MRN: [id number]  : 1940  (68 y.o.)  Referring Practitioner: Dr. Amira Hanks MD  Diagnosis: Fall at [facility]  Additional Pertinent Hx: Pt sustained a syncopal episode and fell in her bedroom, came to the emergency room, was admitted for further workup for her syncope. She was also found to have a left distal radius fracture that is what Orthopedics was consulted for. She denies any otherorthopedic injuries. Restrictions/Precautions:  Restrictions/Precautions: Fall Risk, Weight Bearing  Left Upper Extremity Weight Bearing: Non Weight Bearing  Required Braces or Orthoses  Left Upper Extremity Brace/Splint: wrist/arm temorary cast in place- plans for permanent cast in 1 week  Position Activity Restriction  Other position/activity restrictions: NWB L UE     SUBJECTIVE: Patient lying in bed upon arrival- just completed ADL routine with nurse tech. Agreeable to OT session    PAIN: 2/10: complains of pain in LUE     COGNITION: Decreased Insight and Decreased Safety Awareness    ADL:   No ADL's completed this session. .- completed prior to arrival    BALANCE:  Sitting Balance:  Supervision. Standing Balance: Contact Guard Assistance. - SBA   *Static standing for approx 1 minute during skin check from nurse     BED MOBILITY:  Supine to Sit: Minimal Assistance - increased time, cueing provided for safe/proper technique  Sit to Supine: Minimal Assistance      TRANSFERS:  Sit to Stand:  Contact Guard Assistance. From EOB  Stand to Sit: Contact Guard Assistance. To EOB    FUNCTIONAL MOBILITY:  Assistive Device: Cyrus-walker  Assist Level:  Contact Guard Assistance. Distance: ~45 ft x2 trials  Slow pace, no LOB noted.  Cueing provided for placement of while using a hemiwalker with SBA and cues for safety if needed to prepare for doing sinkside ADLS. Short term goal 5: Pt will complete BUE active ROM exercises as able to tolerate to increase her pain free movement for ease of doing ADLs. Following session, patient left in safe position with all fall risk precautions in place.

## 2021-02-19 VITALS
OXYGEN SATURATION: 96 % | RESPIRATION RATE: 16 BRPM | HEART RATE: 76 BPM | HEIGHT: 65 IN | SYSTOLIC BLOOD PRESSURE: 140 MMHG | BODY MASS INDEX: 21.13 KG/M2 | TEMPERATURE: 97.8 F | DIASTOLIC BLOOD PRESSURE: 74 MMHG | WEIGHT: 126.8 LBS

## 2021-02-19 PROCEDURE — 6370000000 HC RX 637 (ALT 250 FOR IP): Performed by: INTERNAL MEDICINE

## 2021-02-19 PROCEDURE — 2580000003 HC RX 258: Performed by: INTERNAL MEDICINE

## 2021-02-19 PROCEDURE — 99239 HOSP IP/OBS DSCHRG MGMT >30: CPT | Performed by: INTERNAL MEDICINE

## 2021-02-19 PROCEDURE — 97116 GAIT TRAINING THERAPY: CPT

## 2021-02-19 PROCEDURE — 97110 THERAPEUTIC EXERCISES: CPT

## 2021-02-19 PROCEDURE — 97535 SELF CARE MNGMENT TRAINING: CPT

## 2021-02-19 RX ORDER — ASPIRIN 81 MG/1
81 TABLET, CHEWABLE ORAL DAILY
Status: DISCONTINUED | OUTPATIENT
Start: 2021-02-19 | End: 2021-02-19 | Stop reason: HOSPADM

## 2021-02-19 RX ORDER — AMLODIPINE BESYLATE 2.5 MG/1
2.5 TABLET ORAL 2 TIMES DAILY
Qty: 30 TABLET | Refills: 3 | Status: ON HOLD | DISCHARGE
Start: 2021-02-19 | End: 2021-03-15 | Stop reason: ALTCHOICE

## 2021-02-19 RX ORDER — AMLODIPINE BESYLATE 2.5 MG/1
2.5 TABLET ORAL 2 TIMES DAILY
Status: DISCONTINUED | OUTPATIENT
Start: 2021-02-19 | End: 2021-02-19 | Stop reason: HOSPADM

## 2021-02-19 RX ORDER — ACETAMINOPHEN 325 MG/1
650 TABLET ORAL EVERY 4 HOURS PRN
Qty: 120 TABLET | Refills: 3 | Status: ON HOLD | DISCHARGE
Start: 2021-02-19 | End: 2021-03-17 | Stop reason: HOSPADM

## 2021-02-19 RX ORDER — CLOPIDOGREL BISULFATE 75 MG/1
75 TABLET ORAL DAILY
Status: DISCONTINUED | OUTPATIENT
Start: 2021-02-19 | End: 2021-02-19 | Stop reason: HOSPADM

## 2021-02-19 RX ADMIN — ASPIRIN 81 MG: 81 TABLET, CHEWABLE ORAL at 12:35

## 2021-02-19 RX ADMIN — SODIUM CHLORIDE, PRESERVATIVE FREE 10 ML: 5 INJECTION INTRAVENOUS at 09:18

## 2021-02-19 RX ADMIN — ROSUVASTATIN CALCIUM 10 MG: 10 TABLET, FILM COATED ORAL at 09:18

## 2021-02-19 RX ADMIN — ACETAMINOPHEN 650 MG: 325 TABLET ORAL at 03:56

## 2021-02-19 RX ADMIN — AMLODIPINE BESYLATE 2.5 MG: 5 TABLET ORAL at 12:35

## 2021-02-19 RX ADMIN — Medication 1000 UNITS: at 09:18

## 2021-02-19 RX ADMIN — CLOPIDOGREL BISULFATE 75 MG: 75 TABLET ORAL at 12:35

## 2021-02-19 RX ADMIN — ATENOLOL 25 MG: 25 TABLET ORAL at 03:54

## 2021-02-19 RX ADMIN — SERTRALINE 100 MG: 100 TABLET, FILM COATED ORAL at 09:18

## 2021-02-19 ASSESSMENT — PAIN SCALES - GENERAL: PAINLEVEL_OUTOF10: 2

## 2021-02-19 ASSESSMENT — PAIN DESCRIPTION - ONSET: ONSET: ON-GOING

## 2021-02-19 ASSESSMENT — PAIN DESCRIPTION - DESCRIPTORS: DESCRIPTORS: ACHING

## 2021-02-19 NOTE — PROGRESS NOTES
Problem: Falls - Risk of:  Goal: Will remain free from falls  Description: Will remain free from falls  2/17/2021 2238 by Dania Cedillo RN  Outcome: Ongoing  Note: Pt using call light appropriately to call for assistance with ambulation to the bathroom and to chair. Pt is also compliant with use of non-skid slippers. Pt reports understanding of fall prevention when discussed.    2/17/2021 1127 by Chandler Ch RN  Outcome: Ongoing  Goal: Absence of physical injury  Description: Absence of physical injury  2/17/2021 2238 by Dania Cedillo RN  Outcome: Ongoing  Note: Hourly rounding complete, no signs on physical injury at this time  2/17/2021 1127 by Chandler Ch RN  Outcome: Ongoing     Problem: Pain:  Goal: Pain level will decrease  Description: Pain level will decrease  2/17/2021 2238 by Dania Cedillo RN  Outcome: Ongoing  2/17/2021 1127 by Chandler Ch RN  Outcome: Ongoing  Goal: Control of acute pain  Description: Control of acute pain  2/17/2021 2238 by Dania Cedillo RN  Outcome: Ongoing  2/17/2021 1127 by Chandler Ch RN  Outcome: Ongoing     Problem: DISCHARGE BARRIERS  Goal: Patient's continuum of care needs are met  2/17/2021 2238 by Dania Cedillo RN  Outcome: Ongoing  2/17/2021 1127 by Chandler Ch RN  Outcome: Ongoing     Problem: Nutrition  Goal: Optimal nutrition therapy  2/17/2021 2238 by Dania Cedillo RN  Outcome: Ongoing  2/17/2021 1530 by Rubi Reyes RD, LD  Outcome: Ongoing

## 2021-02-19 NOTE — PROGRESS NOTES
[facility]  Occupational Therapy  Daily Note  Time:   Time In: 1004  Time Out: 1057  Timed Code Treatment Minutes: 48 Minutes  Minutes: 53          Date: 2021  Patient Name: Deven Ryan,   Gender: female      Room: [facility]14/014-A  MRN: [id number]  : 1940  (68 y.o.)  Referring Practitioner: Dr. Dewight Sandhoff, MD  Diagnosis: Fall at [facility]  Additional Pertinent Hx: Pt sustained a syncopal episode and fell in her bedroom, came to the emergency room, was admitted for further workup for her syncope. She was also found to have a left distal radius fracture that is what Orthopedics was consulted for. She denies any otherorthopedic injuries. Restrictions/Precautions:  Restrictions/Precautions: Fall Risk, Weight Bearing  Left Upper Extremity Weight Bearing: Non Weight Bearing  Required Braces or Orthoses  Left Upper Extremity Brace/Splint: wrist/arm temorary cast in place- plans for permanent cast in 1 week  Position Activity Restriction  Other position/activity restrictions: NWB L UE     SUBJECTIVE: Pt supine in bed upon arrival with [facility] elevated; agreeable to therapy this date. Pt pleasant and talkative throughout session. Nursing ok'd session this date    PAIN: 0/8:     COGNITION: Decreased Insight, Decreased Problem Solving, Decreased Safety Awareness and Impulsive    ADL:   Grooming: Stand By Assistance. at sink in order to wash face/brush teeth requiring set up with brushing teeth; verbal cues for 1 handed technique; verbalizing understanding. Bathing: Minimal Assistance. to wash R foot; requiring increased time to complete with mod verbal/visual cues for 1 handed technique in order to wash RUE; demonstrating understanding   Upper Extremity Dressing: Minimal Assistance. with verbal/visual cues for 1 hand dressing technique to doff/deon gown; assistance to tie gown; requiring increased time to complete  Lower Extremity Dressing: Minimal Assistance.   in order to doff/deon ricky hose; patient able to doff/deon B socks with CGA for safety seated in chair with no arms requiring verbal cues for safety; increased time to complete  Toileting: Stand By Assistance. increased time for gown management with verbal cues with encouragment to complete front periarea cleanliness; demonstrating understnading for 1 hand technique  Toilet Transfer: Minimal Assistance. descend/ascend without BSC however patient able to complete toilet trf with BSC placed with CGA with verbal cues for hand/body placement for safety/demonstrating understanding. Pt required increased time with all ADLs demonstrating min difficulty as patient is L hand dominant    BALANCE:  Sitting Balance:  Stand By Assistance. seated in chair with no arms  Standing Balance: Contact Guard Assistance. with cyrus walker; no LOB    BED MOBILITY:  Supine to Sit: Minimal Assistance trunk control; increased time with verbal cues for technique with HOB slight elevated  Scooting: Stand By Assistance increased time to EOB    TRANSFERS:  Sit to Stand:  Contact Guard Assistance. verbal cues for hand placement; demonstrating understanding verbalizing she never did it that way before; patient educated with safety to prevent falls verbalizing understanding  Stand to Sit: [employer]. verbal cues for hand placement and body positioning; demonstrating understanding    FUNCTIONAL MOBILITY:  Assistive Device: Cyrus-walker  Assist Level:  Contact Guard Assistance. Distance: To and from bathroom   Slow pace; verbal/tactile cues for walker management; no LOB       ASSESSMENT:     Activity Tolerance:  Patient tolerance of  treatment: good.        Discharge Recommendations: Continue to assess pending progress, Patient would benefit from continued therapy after discharge   Equipment Recommendations: Equipment Needed: Yes  Other: Myriam Lopez in the shower; shower chair; elevated toilet seat with armrests  Plan: Times per week: 6x  Specific instructions for Next Treatment: Functional mobility; ADLs and adaptive strategies for 1 hand technique; UE ROM exercises  Current Treatment Recommendations: Functional Mobility Training, Endurance Training, Self-Care / ADL, Balance Training, ROM, Safety Education & Training  Plan Comment: Pt would benefit from continued skilled OT services when medically stable and discharged from Acute. HHOT recommended. Patient Education  Patient Education: Role of OT, Plan of Care, ADL's, IADL's, Precautions, Equipment Education, Home Safety, Importance of Increasing Activity and Assistive Device Safety    Goals  Short term goals  Time Frame for Short term goals: By discharge  Short term goal 1: Pt will complete grooming  while using 1 handed adapted strategies with CGA to increase her independence with self care. Short term goal 2: Pt will complete toileting routine including clothing management while using 1 handed technique as able with SBA to increase her independence with self care. Short term goal 3: Pt will complete transfers to/from various surfaces while using her RUE pushing off of the armrest with SBA to increase her independence with her toileting. Short term goal 4: Pt will demonstrate functional mobility walking to/from the bathroom while using a hemiwalker with SBA and cues for safety if needed to prepare for doing sinkside ADLS. Short term goal 5: Pt will complete BUE active ROM exercises as able to tolerate to increase her pain free movement for ease of doing ADLs. Following session, patient left in safe position with all fall risk precautions in place.

## 2021-02-19 NOTE — DISCHARGE SUMMARY
Hospital Medicine Discharge Summary      Patient Identification:   Juan Carlos Almazan   : 1940  MRN: [id number]   Account: [de-identified]      Patient's PCP: PHIL Viveros CNP    Admit Date: 2/15/2021     Discharge Date: 2021      Admitting Physician: Smiley Rice MD     Discharge Physician: Rachael Cooper DO       Hospital Course:   Izabela Estrada is a 68 y.o. female with PMHx of CAD s/p CABG, mitral and aortic valve repair, Afib (hx of ablation with pacemaker insertion) no longer on anticoagulation, chronic systolic HF ef 39-67% 4223, HTN, unsteady gait who presents to [address] [place] [address] on 2/15/2021 for syncopal event. Per HPI, \"Pt reports waking up this morning, walked to the bathroom and urinated. Then got up, washed her hands, walked back to her bed and the next thing she remembers is being on the ground. Pt denies having any prodromal symptoms prior to the fall of dizziness, lightheadedness, palpitations, chest pain, sob, or diaphoresis. Denies waking up confused, tongue biting, or urinary/stool incontinence. Pt sees Dr. Bonnie Silver, reports having had a stress test and 2D Echo done two weeks ago. Pt denies any recent fevers, chills, cough, chest pain, sob, diarrhea, nausea, vomiting, or focal neurological deficits. \"    I confirmed this story with the patient. Cardiology was consulted, and patient worked up for her syncopal event. CT of the cervical, thoracic, lumbar spines, chest abdomen and pelvis, facial bones, and head were completed, showing no acute fracture or intracranial bleeding. There was soft tissue swelling near the right orbit. She had a fracture of the distal radial metaphysis on the left wrist.   Her PPM was interrogated, revealed no arrhythmias or other events to explain her LOC. Cardiology ordered for tilt table test, which revealed an orthostatic response -> but no LOC (bp' dropped from 139/85 -> 92/66).   She was given fluids, and recommendations included stopping lasix, starting compression stockings, and increasing fluid intake. She also was evaluated by Orthopedic surgery in regards to her left wrist fracture. She was placed in a splint with instruction to follow up in 1 week. Pain was adequately controlled. She worked with PT/OT and will be discharged to a SNF for ongoing therapy. On DC, her lasix will be dc'ed (in addition to her KCL), and will be started on low dose of Norvasc for accelerated htn (2.5mg BID). Tylenol will be used prn for pain. She should be considered for bisphosphonates for her fragility fracture, at the discretion of her PCP. The patient was stable for discharge - all consultants were contacted and in agreement with plan for discharge. Appropriate follow up appointment was arranged prior to discharge. Please see below or view chart for more details from hospital course. Discharge Diagnoses:    · Syncopal event: suspected orthostatic. Hypovolemic based on elevated spec gravity on UA. · Left distal radial fracture/Fragility fracture  · Right periorbital ecchymosis  · Mild hyponatremia resolved  · Labile BP, predominantly HTN  · Normocytic anemia  · Hx of AF s/p ablation and pacing  · CAD s/p 3v CABG on DAPT  · Chronic Systolic HF: hypovolemic on arrival. euvolemic on dc. EF 40-45% on 4/2019 echo  · HLD      The patient was seen and examined on day of discharge and this discharge summary is in conjunction with any daily progress note from day of discharge.         Exam:     Vitals:  Vitals:    02/19/21 0303 02/19/21 0406 02/19/21 0900 02/19/21 1230   BP: (!) 162/81  122/73 (!) 140/74   Pulse: 76  76    Resp: 16  16    Temp: 97.9 °F (36.6 °C)  97.8 °F (36.6 °C)    TempSrc: Oral  Oral    SpO2: 94%  96%    Weight:  126 lb 12.8 oz (57.5 kg)     Height:         Weight: Weight: 126 lb 12.8 oz (57.5 kg)     24 hour intake/output:    Intake/Output Summary (Last 24 hours) at 2/19/2021 7020  Last data filed at common and internal carotid arteries. 3. There is antegrade flow in the right  and left vertebral arteries. **This report has been created using voice recognition software. It may contain minor errors which are inherent in voice recognition technology. **      Final report electronically signed by DR Albert Graham on 2/15/2021 2:15 PM      XR PELVIS (1-2 VIEWS)   Final Result   Limited evaluation of the pelvis shows no acute abnormality. **This report has been created using voice recognition software. It may contain minor errors which are inherent in voice recognition technology. **      Final report electronically signed by Dr. Nelly Terrell on 2/15/2021 11:45 AM      XR CHEST 1 VIEW   Final Result   No acute disease            **This report has been created using voice recognition software. It may contain minor errors which are inherent in voice recognition technology. **      Final report electronically signed by Dr. Nelly Terrell on 2/15/2021 11:41 AM      XR WRIST RIGHT (2 VIEWS)   Final Result    No acute osseous abnormality the right wrist.               **This report has been created using voice recognition software. It may contain minor errors which are inherent in voice recognition technology. **      Final report electronically signed by Dr. Ita Johnston on 2/15/2021 11:35 AM      XR WRIST LEFT (2 VIEWS)   Final Result    Fracture of the distal radial metaphysis with possible intra-articular extension. **This report has been created using voice recognition software. It may contain minor errors which are inherent in voice recognition technology. **      Final report electronically signed by Dr. Ita Johnston on 2/15/2021 11:34 AM      CT ABDOMEN PELVIS W IV CONTRAST Additional Contrast? Radiologist Recommendation   Final Result       1. No evidence of acute intra-abdominal or intrapelvic abnormality. 2. No evidence of acute osseous injury of the lumbar spine or pelvis. 3. Moderate retained stool. 4. Stable widening and remodeling of the sacral neural foramina as evidence for prominent Tarlov cysts or nerve sheath tumors. **This report has been created using voice recognition software. It may contain minor errors which are inherent in voice recognition technology. **      Final report electronically signed by Dr. Sanford Ojeda MD on 2/15/2021 11:37 AM      CT CERVICAL SPINE WO CONTRAST   Final Result    No evidence of acute osseous injury of the cervical spine. **This report has been created using voice recognition software. It may contain minor errors which are inherent in voice recognition technology. **      Final report electronically signed by Dr. Sanford Ojeda MD on 2/15/2021 11:23 AM      CT CHEST W CONTRAST   Final Result      1. No acute traumatic process is identified in the thorax. 2. Scarring/atelectasis is seen in the bilateral posterior lungs. 3. Cardiomegaly. **This report has been created using voice recognition software. It may contain minor errors which are inherent in voice recognition technology. **      Final report electronically signed by Dr Sapphire Quintana on 2/15/2021 11:41 AM      CT HEAD WO CONTRAST   Final Result       1. No evidence of acute intracranial abnormality. 2. Focal defects at the lateral aspect of the body of the left lateral ventricle as evidence for old injury or infarct or schizencephaly. 3. Mild to moderate severity chronic microvascular angiopathy. **This report has been created using voice recognition software. It may contain minor errors which are inherent in voice recognition technology. **      Final report electronically signed by Dr. Sanford Ojeda MD on 2/15/2021 11:21 AM      CT LUMBAR RECONSTRUCTION WO POST PROCESS   Final Result   No acute process. **This report has been created using voice recognition software.   It may contain minor errors which are inherent in voice recognition technology. **      Final report electronically signed by Dr. Lam Srivastava on 2/15/2021 11:29 AM      CT THORACIC RECONSTRUCTION WO POST PROCESS   Final Result   No acute process            **This report has been created using voice recognition software. It may contain minor errors which are inherent in voice recognition technology. **      Final report electronically signed by Dr. Lam Srivastava on 2/15/2021 11:39 AM      CT FACIAL BONES WO CONTRAST   Final Result       1. Soft tissue swelling near the right orbit without evidence of acute osseous injury of the face. 2. Degenerative changes of the temporomandibular joints. 3. Mild mucosal inflammation of the paranasal sinuses. **This report has been created using voice recognition software. It may contain minor errors which are inherent in voice recognition technology. **      Final report electronically signed by Dr. August Delatorre MD on 2/15/2021 11:29 AM       Ed Fast Abdomen Limited   Final Result             Consults:     IP CONSULT TO CARDIOLOGY  IP CONSULT TO DIETITIAN  IP CONSULT TO ORTHOPEDIC SURGERY  IP CONSULT TO SOCIAL WORK  IP CONSULT TO ORTHOPEDIC SURGERY    Disposition:    [] Home        [] TCU       [] Rehab       [] Psych       [x] SNF       [] [place]       [] Other-    Condition at Discharge: Stable    Code Status:  Full Code     Patient Instructions:    Discharge lab work: CBC and BMP one week  Activity: per PT/OT  Diet: DIET GENERAL;  Dietary Nutrition Supplements: Standard High Calorie Oral Supplement  Dietary Nutrition Supplements: Frozen Oral Supplement      Follow-up visits:   PHIL Castano - CNP  [address], [address] [place] [address]  [facility]    Schedule an appointment as soon as possible for a visit      Skinny Whittington MD  [address]  [phone]    Schedule an appointment as soon as possible for a visit in  week  to follow up with left arm fracture    [facility] - [facility] [address] [facility] 2801 [facility], [address]  [place]VAN [place] [address].[facility] [address]  [phone]    On 3/10/2021  2:00pm          Discharge Medications:        Medication List      START taking these medications    amLODIPine 2.5 MG tablet  Commonly known as: NORVASC  Take 1 tablet by mouth 2 times daily        CHANGE how you take these medications    * acetaminophen 325 MG tablet  Commonly known as: TYLENOL  Take 1 tablet by mouth every 4 hours as needed for Pain  What changed: how much to take     * acetaminophen 325 MG tablet  Commonly known as: Aminofen  Take 2 tablets by mouth every 4 hours as needed for Pain  What changed: You were already taking a medication with the same name, and this prescription was added. Make sure you understand how and when to take each. * This list has 2 medication(s) that are the same as other medications prescribed for you. Read the directions carefully, and ask your doctor or other care provider to review them with you.             CONTINUE taking these medications    aspirin 81 MG tablet     atenolol 25 MG tablet  Commonly known as: Tenormin  Take 1 tablet by mouth daily     CALCIUM 1200 PO     clopidogrel 75 MG tablet  Commonly known as: Plavix  Take 1 tablet by mouth daily     Co Q-10 200 MG Caps     Crestor 10 MG tablet  Generic drug: rosuvastatin     meclizine 12.5 MG tablet  Commonly known as: ANTIVERT  Take 1 tablet by mouth 3 times daily as needed for Dizziness     sertraline 100 MG tablet  Commonly known as: ZOLOFT  Take 1 tablet by mouth daily     vitamin D 1000 UNIT Tabs tablet  Commonly known as: CHOLECALCIFEROL        STOP taking these medications    furosemide 20 MG tablet  Commonly known as: LASIX     potassium chloride 20 MEQ extended release tablet  Commonly known as: KLOR-CON M           Where to Get Your Medications      Information about where to get these medications is not yet available    Ask your nurse or doctor about these medications  · acetaminophen 325 MG tablet  · amLODIPine 2.5 MG tablet                 Time Spent on discharge is roughly 35 minutes in the examination, evaluation, counseling and review of medications and discharge plan. Thank you PHIL Lawrence CNP for the opportunity to be involved in this patient's care.     Signed:    Electronically signed by Pacheco Knowles DO on 2/19/2021 at 4:57 PM

## 2021-02-19 NOTE — PROGRESS NOTES
Patient and her sister were [facility]. Belongings gathered. Pt has no further questions at this time. Pt leaving with sister for home. Report called to [facility].

## 2021-02-19 NOTE — PROGRESS NOTES
[facility]  INPATIENT PHYSICAL THERAPY  DAILY NOTE  [facility] 7K - 7K-14/014-A    Time In: 6459  Time Out: 8730  Timed Code Treatment Minutes: 35 Minutes  Minutes: 33          Date: 2021  Patient Name: Marco A Curtis,  Gender:  female        MRN: [id number]  : 1940  (68 y.o.)     Referring Practitioner: Farhan Daley. Taniya Dean MD  Diagnosis: Fall at home, initial encounter  Additional Pertinent Hx: Per H&P:80 y.o. female who presented to [facility] after having a syncopal episode this morning. Pt has a pmh of CAD s/p CABG and Stents, MVR, Pacemaker, Osteoporosis, Unsteady  Gait. Pt reports waking up this morning, walked to the bathroom and urinated. Then got up, washed her hands, walked back to her bed and the next thing she remembers is being on the ground. Pt denies having any prodromal symptoms prior to the fall of dizziness, lightheadedness, palpitations, chest pain, sob, or diaphoresis. Denies waking up confused, tongue biting, or urinary/stool incontinence. Pt sees Dr. Sera Mares, reports having had a stress test and 2D Echo done two weeks ago. Pt denies any recent fevers, chills, cough, chest pain, sob, diarrhea, nausea, vomiting, or focal neurological deficits. Pt does have a unsteady gait. \"     Prior Level of Function:  Lives With: Alone  Type of Home: Apartment  Home Layout: One level  Home Access: Level entry  Home Equipment: Quad cane   Bathroom Shower/Tub: Walk-in shower  Bathroom Toilet: Standard  Bathroom Accessibility: Accessible    Receives Help From: Family  ADL Assistance: Independent  Homemaking Assistance: Needs assistance  Homemaking Responsibilities: Yes  Ambulation Assistance: Independent  Transfer Assistance: Independent  Active : No  Additional Comments: Pt reports 2 falls in the past month due to \"tripping over her feet and poor balance. \"  She used a quad cane in her L hand.   Pt was independent with self care and most of the homemaking. Restrictions/Precautions:  Restrictions/Precautions: Fall Risk, Weight Bearing  Left Upper Extremity Weight Bearing: Non Weight Bearing  Required Braces or Orthoses  Left Upper Extremity Brace/Splint: wrist/arm temorary cast in place- plans for permanent cast in 1 week  Position Activity Restriction  Other position/activity restrictions: NWB L UE     SUBJECTIVE: RN approved session. Pt in recliner upon arrival and agrees to therapy. Pt very talkative throughout session. Pt reporting her BP is high this am, pt reporting she took her BP meds. Checked /74    PAIN: L wrist. Did not quantify    OBJECTIVE:  Bed Mobility:  Not Tested    Transfers:  Sit to Stand: Contact Guard Assistance, Minimal Assistance, with increased time for completion, cues for hand placement, extra effort needed to rise  Stand to Sit:Contact Guard Assistance, Minimal Assistance, cues for hand placement, with verbal cues    Ambulation:  Contact Guard Assistance, with cues for safety, with verbal cues , with increased time for completion  Distance: 50ft  Surface: Level Tile  Device:Cyrus-Walker  Gait Deviations: Forward Flexed Posture, Slow Estrella, Decreased Step Length Bilaterally, Decreased Gait Speed, Decreased Heel Strike Bilaterally, Unsteady Gait, Decreased Terminal Knee Extension and forward flexed, leans R, unsteady, cues for RW placement    Exercise:  Patient was guided in 1 set(s) 10-15 reps of exercise to both lower extremities. Ankle pumps, Glut sets, Quad sets, Heelslides, Hip abduction/adduction, Straight leg raises and extra time to complete, very talkatvie, did need cues for redirection. Exercises were completed for increased independence with functional mobility. Functional Outcome Measures: Completed  -PAC Inpatient Mobility Raw Score : 17  -PAC Inpatient T-Scale Score : 42.13    ASSESSMENT:  Assessment: Patient progressing toward established goals.   Activity Tolerance:  Patient tolerance of treatment: good. Pt demos decreased stability on feet and safety with HW. Pt will benefit rom cont PT at this time to improve independence and safety to decrease the risk for falls. Equipment Recommendations:Equipment Needed: (defer equipment recommendation to next step of care)  Other: Continue to assess. Discharge Recommendations:  Subacute/Skilled Nursing Facility    Plan: Times per week: 6xO  Current Treatment Recommendations: Strengthening, Home Exercise Program, Safety Education & Training, Balance Training, Endurance Training, Patient/Caregiver Education & Training, Functional Mobility Training, Equipment Evaluation, Education, & procurement, Transfer Training, Gait Training    Patient Education  Patient Education: Plan of Care, Precautions/Restrictions, [employer] Mobility, Equipment Education, Transfers, Gait, Verbal Exercise Instruction    Goals:  Patient goals : To go home  Short term goals  Time Frame for Short term goals: by discharge  Short term goal 1: transfer supine<>sit with supervision in order to improve independence with bed mobility. Short term goal 2: transfer sit<>stand with supervision in order to safely get in/out of bed/chair. Short term goal 3: ambulate 150 feet with LRAD with CGA in order to safely ambulate throughout home. Short term goal 4: tolerate 10 minutes of dynamic standing activity with CGA with no LOB to promote safety and reduce fall risk. Long term goals  Time Frame for Long term goals : N/A due to short ELOS. Following session, patient left in safe position with all fall risk precautions in place.

## 2021-02-19 NOTE — CARE COORDINATION
2/19/21, 12:42 PM EST    DISCHARGE PLANNING EVALUATION      Notified The [address] [place] [address] of discharge today. Facility is able to accept. Patient's sister will transport. Veronica Bullock faxed. 2/19/21, 12:42 PM EST    Patient goals/plan/ treatment preferences discussed by  and . Patient goals/plan/ treatment preferences reviewed with patient/ family. Patient/ family verbalize understanding of discharge plan and are in agreement with goal/plan/treatment preferences. Understanding was demonstrated using the teach back method. AVS provided by RN at time of discharge, which includes all necessary medical information pertaining to the patients current course of illness, treatment, post-discharge goals of care, and treatment preferences.     Services After Discharge  Services At/After Discharge: Nursing Services, Skilled Therapy, Aide Services(The [address] [place] [address])   [place] Letter  IMM Letter given to Patient/Family/Significant other/Guardian/POA/by[de-identified] staff  IMM Letter date given[de-identified] 02/18/21  IMM Letter time given[de-identified] 21

## 2021-02-20 ENCOUNTER — CARE COORDINATION (OUTPATIENT)
Dept: CASE MANAGEMENT | Age: 81
End: 2021-02-20

## 2021-02-20 LAB
IMMUNOFIXATION RESULT, SERUM: NORMAL
SOLUBLE TRANSFERRIN RECEPT: 3.4 MG/L (ref 1.9–4.4)

## 2021-03-12 ENCOUNTER — APPOINTMENT (OUTPATIENT)
Dept: CT IMAGING | Age: 81
DRG: 522 | End: 2021-03-12
Payer: MEDICARE

## 2021-03-12 ENCOUNTER — APPOINTMENT (OUTPATIENT)
Dept: GENERAL RADIOLOGY | Age: 81
DRG: 522 | End: 2021-03-12
Payer: MEDICARE

## 2021-03-12 ENCOUNTER — HOSPITAL ENCOUNTER (INPATIENT)
Age: 81
LOS: 5 days | Discharge: NURSING, REHAB OR LONG-TERM CARE | DRG: 522 | End: 2021-03-17
Attending: EMERGENCY MEDICINE | Admitting: INTERNAL MEDICINE
Payer: MEDICARE

## 2021-03-12 DIAGNOSIS — R55 SYNCOPE, UNSPECIFIED SYNCOPE TYPE: Primary | ICD-10-CM

## 2021-03-12 DIAGNOSIS — S72.001A CLOSED FRACTURE OF RIGHT HIP, INITIAL ENCOUNTER (HCC): ICD-10-CM

## 2021-03-12 DIAGNOSIS — S72.011A CLOSED SUBCAPITAL FRACTURE OF FEMUR, RIGHT, INITIAL ENCOUNTER (HCC): ICD-10-CM

## 2021-03-12 LAB
ALBUMIN SERPL-MCNC: 4 G/DL (ref 3.5–5.1)
ALP BLD-CCNC: 95 U/L (ref 38–126)
ALT SERPL-CCNC: 20 U/L (ref 11–66)
ANION GAP SERPL CALCULATED.3IONS-SCNC: 13 MEQ/L (ref 8–16)
AST SERPL-CCNC: 30 U/L (ref 5–40)
BASOPHILS # BLD: 0.6 %
BASOPHILS ABSOLUTE: 0 THOU/MM3 (ref 0–0.1)
BILIRUB SERPL-MCNC: < 0.2 MG/DL (ref 0.3–1.2)
BUN BLDV-MCNC: 21 MG/DL (ref 7–22)
CALCIUM SERPL-MCNC: 9.3 MG/DL (ref 8.5–10.5)
CHLORIDE BLD-SCNC: 99 MEQ/L (ref 98–111)
CO2: 21 MEQ/L (ref 23–33)
CREAT SERPL-MCNC: 1.1 MG/DL (ref 0.4–1.2)
EKG ATRIAL RATE: 80 BPM
EKG Q-T INTERVAL: 418 MS
EKG QRS DURATION: 136 MS
EKG QTC CALCULATION (BAZETT): 485 MS
EKG R AXIS: 84 DEGREES
EKG T AXIS: 64 DEGREES
EKG VENTRICULAR RATE: 81 BPM
EOSINOPHIL # BLD: 3.6 %
EOSINOPHILS ABSOLUTE: 0.2 THOU/MM3 (ref 0–0.4)
ERYTHROCYTE [DISTWIDTH] IN BLOOD BY AUTOMATED COUNT: 15.1 % (ref 11.5–14.5)
ERYTHROCYTE [DISTWIDTH] IN BLOOD BY AUTOMATED COUNT: 49 FL (ref 35–45)
GFR SERPL CREATININE-BSD FRML MDRD: 48 ML/MIN/1.73M2
GLUCOSE BLD-MCNC: 114 MG/DL (ref 70–108)
HCT VFR BLD CALC: 33.3 % (ref 37–47)
HEMOGLOBIN: 10.7 GM/DL (ref 12–16)
IMMATURE GRANS (ABS): 0.01 THOU/MM3 (ref 0–0.07)
IMMATURE GRANULOCYTES: 0.2 %
LYMPHOCYTES # BLD: 10.1 %
LYMPHOCYTES ABSOLUTE: 0.5 THOU/MM3 (ref 1–4.8)
MCH RBC QN AUTO: 28.5 PG (ref 26–33)
MCHC RBC AUTO-ENTMCNC: 32.1 GM/DL (ref 32.2–35.5)
MCV RBC AUTO: 88.8 FL (ref 81–99)
MONOCYTES # BLD: 14.2 %
MONOCYTES ABSOLUTE: 0.7 THOU/MM3 (ref 0.4–1.3)
NUCLEATED RED BLOOD CELLS: 0 /100 WBC
OSMOLALITY CALCULATION: 270.2 MOSMOL/KG (ref 275–300)
PLATELET # BLD: 187 THOU/MM3 (ref 130–400)
PMV BLD AUTO: 11 FL (ref 9.4–12.4)
POTASSIUM REFLEX MAGNESIUM: 4.7 MEQ/L (ref 3.5–5.2)
PRO-BNP: 962.9 PG/ML (ref 0–1800)
RBC # BLD: 3.75 MILL/MM3 (ref 4.2–5.4)
SEG NEUTROPHILS: 71.3 %
SEGMENTED NEUTROPHILS ABSOLUTE COUNT: 3.6 THOU/MM3 (ref 1.8–7.7)
SODIUM BLD-SCNC: 133 MEQ/L (ref 135–145)
TOTAL PROTEIN: 7.5 G/DL (ref 6.1–8)
TROPONIN T: < 0.01 NG/ML
WBC # BLD: 5.1 THOU/MM3 (ref 4.8–10.8)

## 2021-03-12 PROCEDURE — 36415 COLL VENOUS BLD VENIPUNCTURE: CPT

## 2021-03-12 PROCEDURE — 93005 ELECTROCARDIOGRAM TRACING: CPT | Performed by: EMERGENCY MEDICINE

## 2021-03-12 PROCEDURE — P9016 RBC LEUKOCYTES REDUCED: HCPCS

## 2021-03-12 PROCEDURE — 96374 THER/PROPH/DIAG INJ IV PUSH: CPT

## 2021-03-12 PROCEDURE — 96375 TX/PRO/DX INJ NEW DRUG ADDON: CPT

## 2021-03-12 PROCEDURE — 1200000003 HC TELEMETRY R&B

## 2021-03-12 PROCEDURE — 85025 COMPLETE CBC W/AUTO DIFF WBC: CPT

## 2021-03-12 PROCEDURE — 84484 ASSAY OF TROPONIN QUANT: CPT

## 2021-03-12 PROCEDURE — 72125 CT NECK SPINE W/O DYE: CPT

## 2021-03-12 PROCEDURE — 99285 EMERGENCY DEPT VISIT HI MDM: CPT

## 2021-03-12 PROCEDURE — 99223 1ST HOSP IP/OBS HIGH 75: CPT | Performed by: PHYSICIAN ASSISTANT

## 2021-03-12 PROCEDURE — 83880 ASSAY OF NATRIURETIC PEPTIDE: CPT

## 2021-03-12 PROCEDURE — 87635 SARS-COV-2 COVID-19 AMP PRB: CPT

## 2021-03-12 PROCEDURE — 71045 X-RAY EXAM CHEST 1 VIEW: CPT

## 2021-03-12 PROCEDURE — 80053 COMPREHEN METABOLIC PANEL: CPT

## 2021-03-12 PROCEDURE — 6360000002 HC RX W HCPCS: Performed by: EMERGENCY MEDICINE

## 2021-03-12 PROCEDURE — 70450 CT HEAD/BRAIN W/O DYE: CPT

## 2021-03-12 PROCEDURE — 73502 X-RAY EXAM HIP UNI 2-3 VIEWS: CPT

## 2021-03-12 RX ORDER — ASPIRIN 81 MG/1
81 TABLET, CHEWABLE ORAL DAILY
Status: DISCONTINUED | OUTPATIENT
Start: 2021-03-13 | End: 2021-03-14

## 2021-03-12 RX ORDER — ROSUVASTATIN CALCIUM 10 MG/1
10 TABLET, COATED ORAL DAILY
Status: DISCONTINUED | OUTPATIENT
Start: 2021-03-13 | End: 2021-03-17 | Stop reason: HOSPADM

## 2021-03-12 RX ORDER — MORPHINE SULFATE 4 MG/ML
4 INJECTION, SOLUTION INTRAMUSCULAR; INTRAVENOUS ONCE
Status: COMPLETED | OUTPATIENT
Start: 2021-03-12 | End: 2021-03-12

## 2021-03-12 RX ORDER — VITAMIN B COMPLEX
1000 TABLET ORAL DAILY
Status: DISCONTINUED | OUTPATIENT
Start: 2021-03-13 | End: 2021-03-17 | Stop reason: HOSPADM

## 2021-03-12 RX ORDER — ONDANSETRON 2 MG/ML
4 INJECTION INTRAMUSCULAR; INTRAVENOUS ONCE
Status: COMPLETED | OUTPATIENT
Start: 2021-03-12 | End: 2021-03-12

## 2021-03-12 RX ORDER — MECLIZINE HYDROCHLORIDE CHEWABLE TABLETS 25 MG/1
12.5 TABLET, CHEWABLE ORAL 3 TIMES DAILY PRN
Status: DISCONTINUED | OUTPATIENT
Start: 2021-03-12 | End: 2021-03-17 | Stop reason: HOSPADM

## 2021-03-12 RX ORDER — AMLODIPINE BESYLATE 5 MG/1
2.5 TABLET ORAL 2 TIMES DAILY
Status: DISCONTINUED | OUTPATIENT
Start: 2021-03-13 | End: 2021-03-17

## 2021-03-12 RX ORDER — CLOPIDOGREL BISULFATE 75 MG/1
75 TABLET ORAL DAILY
Status: DISCONTINUED | OUTPATIENT
Start: 2021-03-13 | End: 2021-03-14

## 2021-03-12 RX ORDER — ATENOLOL 25 MG/1
25 TABLET ORAL DAILY
Status: DISCONTINUED | OUTPATIENT
Start: 2021-03-13 | End: 2021-03-17 | Stop reason: HOSPADM

## 2021-03-12 RX ORDER — SERTRALINE HYDROCHLORIDE 100 MG/1
100 TABLET, FILM COATED ORAL DAILY
Status: DISCONTINUED | OUTPATIENT
Start: 2021-03-13 | End: 2021-03-17

## 2021-03-12 RX ADMIN — MORPHINE SULFATE 4 MG: 4 INJECTION, SOLUTION INTRAMUSCULAR; INTRAVENOUS at 20:49

## 2021-03-12 RX ADMIN — ONDANSETRON 4 MG: 2 INJECTION INTRAMUSCULAR; INTRAVENOUS at 20:49

## 2021-03-12 ASSESSMENT — PAIN SCALES - GENERAL
PAINLEVEL_OUTOF10: 5
PAINLEVEL_OUTOF10: 10
PAINLEVEL_OUTOF10: 10
PAINLEVEL_OUTOF10: 6

## 2021-03-12 ASSESSMENT — PAIN DESCRIPTION - PAIN TYPE
TYPE: ACUTE PAIN

## 2021-03-12 ASSESSMENT — PAIN DESCRIPTION - ORIENTATION
ORIENTATION: RIGHT
ORIENTATION: RIGHT

## 2021-03-12 ASSESSMENT — PAIN DESCRIPTION - LOCATION: LOCATION: HIP;LEG

## 2021-03-13 PROBLEM — E78.5 HYPERLIPIDEMIA: Status: ACTIVE | Noted: 2019-01-14

## 2021-03-13 PROBLEM — S72.011A CLOSED SUBCAPITAL FRACTURE OF FEMUR, RIGHT, INITIAL ENCOUNTER (HCC): Status: ACTIVE | Noted: 2021-03-13

## 2021-03-13 LAB
ABO: NORMAL
ANION GAP SERPL CALCULATED.3IONS-SCNC: 9 MEQ/L (ref 8–16)
ANTIBODY SCREEN: NORMAL
BASOPHILS # BLD: 0.2 %
BASOPHILS ABSOLUTE: 0 THOU/MM3 (ref 0–0.1)
BUN BLDV-MCNC: 18 MG/DL (ref 7–22)
CALCIUM SERPL-MCNC: 8.3 MG/DL (ref 8.5–10.5)
CHLORIDE BLD-SCNC: 101 MEQ/L (ref 98–111)
CO2: 22 MEQ/L (ref 23–33)
CREAT SERPL-MCNC: 0.7 MG/DL (ref 0.4–1.2)
EOSINOPHIL # BLD: 0.3 %
EOSINOPHILS ABSOLUTE: 0 THOU/MM3 (ref 0–0.4)
ERYTHROCYTE [DISTWIDTH] IN BLOOD BY AUTOMATED COUNT: 15.5 % (ref 11.5–14.5)
ERYTHROCYTE [DISTWIDTH] IN BLOOD BY AUTOMATED COUNT: 51 FL (ref 35–45)
GFR SERPL CREATININE-BSD FRML MDRD: 81 ML/MIN/1.73M2
GLUCOSE BLD-MCNC: 118 MG/DL (ref 70–108)
HCT VFR BLD CALC: 30 % (ref 37–47)
HEMOGLOBIN: 9.4 GM/DL (ref 12–16)
IMMATURE GRANS (ABS): 0.02 THOU/MM3 (ref 0–0.07)
IMMATURE GRANULOCYTES: 0.3 %
LV EF: 43 %
LVEF MODALITY: NORMAL
LYMPHOCYTES # BLD: 6.5 %
LYMPHOCYTES ABSOLUTE: 0.4 THOU/MM3 (ref 1–4.8)
MCH RBC QN AUTO: 28.7 PG (ref 26–33)
MCHC RBC AUTO-ENTMCNC: 31.3 GM/DL (ref 32.2–35.5)
MCV RBC AUTO: 91.5 FL (ref 81–99)
MONOCYTES # BLD: 13 %
MONOCYTES ABSOLUTE: 0.9 THOU/MM3 (ref 0.4–1.3)
NUCLEATED RED BLOOD CELLS: 0 /100 WBC
PLATELET # BLD: 170 THOU/MM3 (ref 130–400)
PMV BLD AUTO: 10.5 FL (ref 9.4–12.4)
POTASSIUM SERPL-SCNC: 4.9 MEQ/L (ref 3.5–5.2)
RBC # BLD: 3.28 MILL/MM3 (ref 4.2–5.4)
RH FACTOR: NORMAL
SARS-COV-2, NAAT: NOT DETECTED
SEG NEUTROPHILS: 79.7 %
SEGMENTED NEUTROPHILS ABSOLUTE COUNT: 5.3 THOU/MM3 (ref 1.8–7.7)
SODIUM BLD-SCNC: 132 MEQ/L (ref 135–145)
WBC # BLD: 6.6 THOU/MM3 (ref 4.8–10.8)

## 2021-03-13 PROCEDURE — 86923 COMPATIBILITY TEST ELECTRIC: CPT

## 2021-03-13 PROCEDURE — 86901 BLOOD TYPING SEROLOGIC RH(D): CPT

## 2021-03-13 PROCEDURE — 86850 RBC ANTIBODY SCREEN: CPT

## 2021-03-13 PROCEDURE — 6370000000 HC RX 637 (ALT 250 FOR IP): Performed by: PHYSICIAN ASSISTANT

## 2021-03-13 PROCEDURE — 2580000003 HC RX 258: Performed by: PHYSICIAN ASSISTANT

## 2021-03-13 PROCEDURE — 99232 SBSQ HOSP IP/OBS MODERATE 35: CPT | Performed by: FAMILY MEDICINE

## 2021-03-13 PROCEDURE — 36415 COLL VENOUS BLD VENIPUNCTURE: CPT

## 2021-03-13 PROCEDURE — 93306 TTE W/DOPPLER COMPLETE: CPT

## 2021-03-13 PROCEDURE — 85025 COMPLETE CBC W/AUTO DIFF WBC: CPT

## 2021-03-13 PROCEDURE — 80048 BASIC METABOLIC PNL TOTAL CA: CPT

## 2021-03-13 PROCEDURE — 1200000003 HC TELEMETRY R&B

## 2021-03-13 PROCEDURE — 86900 BLOOD TYPING SEROLOGIC ABO: CPT

## 2021-03-13 RX ORDER — ACETAMINOPHEN 325 MG/1
650 TABLET ORAL 4 TIMES DAILY
Status: DISCONTINUED | OUTPATIENT
Start: 2021-03-13 | End: 2021-03-17 | Stop reason: HOSPADM

## 2021-03-13 RX ORDER — GABAPENTIN 300 MG/1
300 CAPSULE ORAL ONCE
Status: COMPLETED | OUTPATIENT
Start: 2021-03-14 | End: 2021-03-14

## 2021-03-13 RX ORDER — ACETAMINOPHEN 500 MG
1000 TABLET ORAL ONCE
Status: DISCONTINUED | OUTPATIENT
Start: 2021-03-14 | End: 2021-03-14 | Stop reason: HOSPADM

## 2021-03-13 RX ORDER — ACETAMINOPHEN 500 MG
1000 TABLET ORAL ONCE
Status: DISCONTINUED | OUTPATIENT
Start: 2021-03-13 | End: 2021-03-13

## 2021-03-13 RX ORDER — PROMETHAZINE HYDROCHLORIDE 25 MG/1
12.5 TABLET ORAL EVERY 6 HOURS PRN
Status: DISCONTINUED | OUTPATIENT
Start: 2021-03-13 | End: 2021-03-17 | Stop reason: HOSPADM

## 2021-03-13 RX ORDER — OXYCODONE HYDROCHLORIDE 5 MG/1
5 TABLET ORAL EVERY 4 HOURS PRN
Status: DISCONTINUED | OUTPATIENT
Start: 2021-03-13 | End: 2021-03-17 | Stop reason: HOSPADM

## 2021-03-13 RX ORDER — SODIUM CHLORIDE 9 MG/ML
INJECTION, SOLUTION INTRAVENOUS CONTINUOUS
Status: DISCONTINUED | OUTPATIENT
Start: 2021-03-13 | End: 2021-03-17 | Stop reason: HOSPADM

## 2021-03-13 RX ORDER — POLYETHYLENE GLYCOL 3350 17 G/17G
17 POWDER, FOR SOLUTION ORAL DAILY PRN
Status: DISCONTINUED | OUTPATIENT
Start: 2021-03-13 | End: 2021-03-14

## 2021-03-13 RX ORDER — TIZANIDINE 2 MG/1
2 TABLET ORAL EVERY 8 HOURS PRN
Status: ON HOLD | COMMUNITY
End: 2021-03-15 | Stop reason: ALTCHOICE

## 2021-03-13 RX ORDER — SODIUM CHLORIDE 0.9 % (FLUSH) 0.9 %
10 SYRINGE (ML) INJECTION PRN
Status: DISCONTINUED | OUTPATIENT
Start: 2021-03-13 | End: 2021-03-14 | Stop reason: HOSPADM

## 2021-03-13 RX ORDER — ACETAMINOPHEN 325 MG/1
650 TABLET ORAL EVERY 6 HOURS PRN
Status: DISCONTINUED | OUTPATIENT
Start: 2021-03-13 | End: 2021-03-14

## 2021-03-13 RX ORDER — CELECOXIB 100 MG/1
100 CAPSULE ORAL ONCE
Status: DISCONTINUED | OUTPATIENT
Start: 2021-03-13 | End: 2021-03-13 | Stop reason: SDUPTHER

## 2021-03-13 RX ORDER — SODIUM CHLORIDE 0.9 % (FLUSH) 0.9 %
10 SYRINGE (ML) INJECTION PRN
Status: DISCONTINUED | OUTPATIENT
Start: 2021-03-13 | End: 2021-03-17 | Stop reason: HOSPADM

## 2021-03-13 RX ORDER — GABAPENTIN 300 MG/1
300 CAPSULE ORAL ONCE
Status: DISCONTINUED | OUTPATIENT
Start: 2021-03-13 | End: 2021-03-13 | Stop reason: SDUPTHER

## 2021-03-13 RX ORDER — SODIUM CHLORIDE 0.9 % (FLUSH) 0.9 %
10 SYRINGE (ML) INJECTION EVERY 12 HOURS SCHEDULED
Status: DISCONTINUED | OUTPATIENT
Start: 2021-03-13 | End: 2021-03-14 | Stop reason: HOSPADM

## 2021-03-13 RX ORDER — ATORVASTATIN CALCIUM 40 MG/1
40 TABLET, FILM COATED ORAL DAILY
Status: ON HOLD | COMMUNITY
End: 2021-03-15 | Stop reason: ALTCHOICE

## 2021-03-13 RX ORDER — ACETAMINOPHEN 650 MG/1
650 SUPPOSITORY RECTAL EVERY 6 HOURS PRN
Status: DISCONTINUED | OUTPATIENT
Start: 2021-03-13 | End: 2021-03-13

## 2021-03-13 RX ORDER — SULFAMETHOXAZOLE AND TRIMETHOPRIM 800; 160 MG/1; MG/1
1 TABLET ORAL 2 TIMES DAILY
Status: ON HOLD | COMMUNITY
End: 2021-03-15 | Stop reason: ALTCHOICE

## 2021-03-13 RX ORDER — ONDANSETRON 2 MG/ML
4 INJECTION INTRAMUSCULAR; INTRAVENOUS EVERY 6 HOURS PRN
Status: DISCONTINUED | OUTPATIENT
Start: 2021-03-13 | End: 2021-03-17 | Stop reason: HOSPADM

## 2021-03-13 RX ORDER — CELECOXIB 100 MG/1
100 CAPSULE ORAL ONCE
Status: COMPLETED | OUTPATIENT
Start: 2021-03-14 | End: 2021-03-14

## 2021-03-13 RX ORDER — SODIUM CHLORIDE 0.9 % (FLUSH) 0.9 %
10 SYRINGE (ML) INJECTION EVERY 12 HOURS SCHEDULED
Status: DISCONTINUED | OUTPATIENT
Start: 2021-03-13 | End: 2021-03-17 | Stop reason: HOSPADM

## 2021-03-13 RX ORDER — OXYCODONE HYDROCHLORIDE 5 MG/1
10 TABLET ORAL EVERY 4 HOURS PRN
Status: DISCONTINUED | OUTPATIENT
Start: 2021-03-13 | End: 2021-03-17 | Stop reason: HOSPADM

## 2021-03-13 RX ADMIN — ATENOLOL 25 MG: 25 TABLET ORAL at 09:22

## 2021-03-13 RX ADMIN — SODIUM CHLORIDE, PRESERVATIVE FREE 10 ML: 5 INJECTION INTRAVENOUS at 20:34

## 2021-03-13 RX ADMIN — SODIUM CHLORIDE: 9 INJECTION, SOLUTION INTRAVENOUS at 01:18

## 2021-03-13 RX ADMIN — Medication 1000 UNITS: at 09:22

## 2021-03-13 RX ADMIN — SODIUM CHLORIDE: 9 INJECTION, SOLUTION INTRAVENOUS at 14:49

## 2021-03-13 RX ADMIN — AMLODIPINE BESYLATE 2.5 MG: 5 TABLET ORAL at 20:30

## 2021-03-13 RX ADMIN — AMLODIPINE BESYLATE 2.5 MG: 5 TABLET ORAL at 09:23

## 2021-03-13 RX ADMIN — ROSUVASTATIN CALCIUM 10 MG: 10 TABLET, FILM COATED ORAL at 20:29

## 2021-03-13 RX ADMIN — ACETAMINOPHEN 650 MG: 325 TABLET ORAL at 13:12

## 2021-03-13 RX ADMIN — OXYCODONE 10 MG: 5 TABLET ORAL at 20:31

## 2021-03-13 RX ADMIN — ACETAMINOPHEN 650 MG: 325 TABLET ORAL at 18:14

## 2021-03-13 RX ADMIN — ACETAMINOPHEN 650 MG: 325 TABLET ORAL at 00:59

## 2021-03-13 RX ADMIN — OXYCODONE 10 MG: 5 TABLET ORAL at 15:19

## 2021-03-13 RX ADMIN — SERTRALINE 100 MG: 100 TABLET, FILM COATED ORAL at 09:22

## 2021-03-13 RX ADMIN — OXYCODONE 10 MG: 5 TABLET ORAL at 09:41

## 2021-03-13 RX ADMIN — PROMETHAZINE HYDROCHLORIDE 12.5 MG: 25 TABLET ORAL at 00:59

## 2021-03-13 ASSESSMENT — PAIN SCALES - GENERAL
PAINLEVEL_OUTOF10: 10
PAINLEVEL_OUTOF10: 8
PAINLEVEL_OUTOF10: 7
PAINLEVEL_OUTOF10: 9

## 2021-03-13 ASSESSMENT — PAIN DESCRIPTION - PAIN TYPE: TYPE: ACUTE PAIN

## 2021-03-13 ASSESSMENT — PAIN DESCRIPTION - LOCATION
LOCATION: HIP
LOCATION: HIP

## 2021-03-13 ASSESSMENT — PAIN DESCRIPTION - ORIENTATION: ORIENTATION: RIGHT

## 2021-03-13 NOTE — ED NOTES
ED to inpatient nurses report    Chief Complaint   Patient presents with    Leg Pain     Right    Fall    Loss of Consciousness      Present to ED from nursing home  LOC: alert and orientated to name, place, date  Vital signs   Vitals:    03/12/21 2010 03/12/21 2050 03/12/21 2144 03/12/21 2223   BP: 129/80 110/71 123/74 121/75   Pulse: 80 82 80 82   Resp: 16 16 16 16   Temp:       SpO2: 95% 97% 95% 95%   Weight:       Height:          Oxygen Baseline room air    Current needs required room air Bipap/Cpap No  LDAs:   Peripheral IV 03/12/21 Right Forearm (Active)   Site Assessment Clean;Dry; Intact 03/12/21 2052   Line Status Flushed 03/12/21 2144   Dressing Status Clean;Dry; Intact 03/12/21 2144   Dressing Intervention New;Dressing reinforced 03/12/21 2010     Mobility: Fully dependent - due to fracture  Pending ED orders: none  Present condition: stable.        Electronically signed by Marlena Andino RN on 3/12/2021 at 11:34 PM       Marlena Andino RN  03/12/21 2861

## 2021-03-13 NOTE — ED PROVIDER NOTES
[address] [address]       Chief Complaint   Patient presents with    Leg Pain     Right    Fall    Loss of Consciousness       Nurses Notes reviewed and I agree except as noted in the HPI. HISTORY OF PRESENT ILLNESS    Paula Almazan is a 80 y.o. female. This patient apparently has been having syncopal episodes at the nursing home. On her last visit to the ER she had a syncopal episode resulting in a fracture of her forearm. Tonight had another such episode. This time presents with a hip deformity on the right side. It also looks like she hit her head. Noted to be on Plavix    REVIEW OF SYSTEMS         No fever, no visual changes, no chest pain or abdominal pain    Remainder of review of systems is otherwise reviewed as negative. PAST MEDICAL HISTORY    has a past medical history of Atrial fibrillation ([address] CAD (coronary artery disease), Congestive heart failure (CHF) ([address] Hyperlipidemia, and Hypertension. SURGICAL HISTORY      has a past surgical history that includes Hysterectomy; pr office/outpt visit,procedure only (N/A, 11/12/2018); Cardiac valve replacement; Coronary artery bypass graft; Mitral valve replacement; Tricuspid valve replacement; thoracentesis; Cardiac catheterization; Coronary angioplasty with stent (02/28/2019); Atrial ablation surgery; and pacemaker placement (05/2019).     CURRENT MEDICATIONS       Previous Medications    ACETAMINOPHEN (AMINOFEN) 325 MG TABLET    Take 2 tablets by mouth every 4 hours as needed for Pain    ACETAMINOPHEN (TYLENOL) 325 MG TABLET    Take 1 tablet by mouth every 4 hours as needed for Pain    AMLODIPINE (NORVASC) 2.5 MG TABLET    Take 1 tablet by mouth 2 times daily    ASPIRIN 81 MG TABLET    Take 81 mg by mouth daily    ATENOLOL (TENORMIN) 25 MG TABLET    Take 1 tablet by mouth daily    CALCIUM CARBONATE-VIT D-MIN (CALCIUM 1200 PO)    Take 1 tablet by mouth daily showing paced rhythm at a rate of 81, QRS of 136, QTc 40 at 85, axis of 84    RADIOLOGY: non-plain film images(s) such as CT, Ultrasound and MRI are read by the radiologist.  X-rays of the right hip interpreted by the radiologist showing acute right subcapital femoral neck fracture. Chest x-ray interpreted by the radiologist without acute findings. CT of the head interpreted by the radiologist no acute findings. CT of the cervical spine interpreted by the radiologist as negative. LABS:   Labs Reviewed   CBC WITH AUTO DIFFERENTIAL - Abnormal; Notable for the following components:       Result Value    RBC 3.75 (*)     Hemoglobin 10.7 (*)     Hematocrit 33.3 (*)     MCHC 32.1 (*)     RDW-CV 15.1 (*)     RDW-SD 49.0 (*)     Lymphocytes Absolute 0.5 (*)     All other components within normal limits   COMPREHENSIVE METABOLIC PANEL W/ REFLEX TO MG FOR LOW K - Abnormal; Notable for the following components:    Glucose 114 (*)     Sodium 133 (*)     CO2 21 (*)     Total Bilirubin <0.2 (*)     All other components within normal limits   GLOMERULAR FILTRATION RATE, ESTIMATED - Abnormal; Notable for the following components:    Est, Glom Filt Rate 48 (*)     All other components within normal limits   OSMOLALITY - Abnormal; Notable for the following components:    Osmolality Calc 270.2 (*)     All other components within normal limits   TROPONIN   BRAIN NATRIURETIC PEPTIDE   ANION GAP       EMERGENCY DEPARTMENT COURSE:   Vitals:    Vitals:    03/12/21 1954 03/12/21 2010 03/12/21 2050 03/12/21 2144   BP: 137/81 129/80 110/71 123/74   Pulse: 80 80 82 80   Resp: 16 16 16 16   Temp: 98.3 °F (36.8 °C)      SpO2: 95% 95% 97% 95%   Weight: 126 lb (57.2 kg)      Height: 5' 5\" (1.651 m)        She appears to have 1 significant injury which is a right-sided subcapital hip fracture.   Since she has been having unexplained syncopal episodes we are admitting her to the hospitalist.  I have a page out to orthopedics we are awaiting

## 2021-03-13 NOTE — ED NOTES
Pt resting on cot. Rates pain 6/10 at this time. Voices no new concerns. Lights dimmed for comfort.  Call light in reach     Zack Walker RN  03/12/21 6840

## 2021-03-13 NOTE — ED NOTES
Pt medicated per MAR. Pt states, \"I feel like I can't breath\". Pt coached on breathing. Pt now resting on cot in stable condition.  ROSALINA Hyde RN  03/12/21 2051

## 2021-03-13 NOTE — ED NOTES
ED to inpatient nurses report    Chief Complaint   Patient presents with    Leg Pain     Right    Fall    Loss of Consciousness      Present to ED from nursing home  LOC: alert and orientated to name, place, date  Vital signs   Vitals:    03/12/21 2010 03/12/21 2050 03/12/21 2144 03/12/21 2223   BP: 129/80 110/71 123/74 121/75   Pulse: 80 82 80 82   Resp: 16 16 16 16   Temp:       SpO2: 95% 97% 95% 95%   Weight:       Height:          Oxygen Baseline room air    Current needs required non   LDAs:   Peripheral IV 03/12/21 Right Forearm (Active)   Site Assessment Clean;Dry; Intact 03/12/21 2052   Line Status Flushed 03/12/21 2144   Dressing Status Clean;Dry; Intact 03/12/21 2144   Dressing Intervention New;Dressing reinforced 03/12/21 2010     Mobility: Fully dependent  Pending ED orders: none  Present condition: stable      Electronically signed by Kyle Baker RN on 3/12/2021 at 10:24 PM     Kyle Baker RN  03/12/21 2224

## 2021-03-13 NOTE — ED NOTES
ED nurse-to-nurse bedside report    Chief Complaint   Patient presents with    Leg Pain     Right    Fall    Loss of Consciousness      LOC: alert and orientated to name, place, date  Vital signs   Vitals:    03/12/21 2010 03/12/21 2050 03/12/21 2144 03/12/21 2223   BP: 129/80 110/71 123/74 121/75   Pulse: 80 82 80 82   Resp: 16 16 16 16   Temp:       SpO2: 95% 97% 95% 95%   Weight:       Height:          Pain:    Pain Interventions: medications  Pain Goal: 5  Oxygen: No    Current needs required none   Telemetry: Yes  LDAs:   Peripheral IV 03/12/21 Right Forearm (Active)   Site Assessment Clean;Dry; Intact 03/12/21 2052   Line Status Flushed 03/12/21 2144   Dressing Status Clean;Dry; Intact 03/12/21 2144   Dressing Intervention New;Dressing reinforced 03/12/21 2010     Continuous Infusions:   Mobility: Fully dependent  Moore Fall Risk Score: Fall Risk 11/15/2019 1/14/2019   2 or more falls in past year? no no   Fall with injury in past year? no no     Fall Interventions: side rails x2.  Call light in reach  Report given to: [facility] RN     Red Roman RN  03/12/21 0872

## 2021-03-13 NOTE — ED TRIAGE NOTES
Pt presents to the ED by EMS with c/c right leg pain following a fall at the nursing home. Pt is a resident at the [facility]. States she was walking over to the sink to wash her hands \"and that's the last thing I remember, I could hear people talking around me and I woke up on the ground\". Pt has hx syncopal episodes. Pt currently has cast on left wrist d/t syncopal fall 2 weeks ago. Pt rates pain in right leg/hip a 10/10 at this time. Pillow propped under knee for comfort. EKG completed. Tele applied.

## 2021-03-13 NOTE — ED NOTES
Bed: 011A  Expected date: 3/12/21  Expected time: 7:41 PM  Means of arrival: [facility]  Comments:     Varun Caballero RN  03/12/21 1945

## 2021-03-13 NOTE — PROGRESS NOTES
Hospitalist Progress Note    Patient:  Burrell Closs Burden      Unit/Bed:7K-12/012-A    YOB: 1940    MRN: [id number]       Acct: [de-identified]     PCP: PHIL Mckeon CNP    Date of Admission: 3/12/2021    Chief Complaint: Follow-up for CAD and right hip pain    Hospital Course:     Per H&P note:    \"Mrs. Almazan is a 81 Y/O female with past medical history of CAD, HFrEF, HTN, HLD, Afib, presents to ED for Syncope and hip pain. Per patient report was walking through her kitchen when she suddenly lost consciousness. Patient states this happened in mid February as well resulting in a left wrist fracture. Patient follows with Dr. Candice Mercedes cardiologist, recent appointment but did not discuss syncopal episodes with him. After most recent fall patient complaining of right hip pain. Patient denies chest pain, shortness of breath, cough, headache, dizziness, lightheadedness, numbness, paraesthesias, weakness, chills, fevers, abdominal pain, nausea, vomiting, neck pain, or back pain. \"    3/13: Pt reports that yesterday morning, upon awakening, she went to bathroom, used her walker, then while in the sink, she said she passed out and that's the last that she remember. She denies any symptoms prior and after LOC. She denies N/V, diarrhea. Subjective:     Patient seen and examined. Pt reports constant right hip pain, 10/10, otherwise she denies chest pain, sob, palpitations, cough, fever, chills. At baseline, she said that she's very independent and she lives alone.        Medications:  Reviewed    Infusion Medications    sodium chloride 75 mL/hr at 03/13/21 1449     Scheduled Medications    sodium chloride flush  10 mL Intravenous 2 times per day    gabapentin  300 mg Oral Once    celecoxib  100 mg Oral Once    sodium chloride flush  10 mL Intravenous 2 times per day    acetaminophen  650 mg Oral 4x daily    [START ON 3/14/2021] acetaminophen  1,000 mg Oral Once    [START ON 3/14/2021] ceFAZolin (ANCEF) IVPB  2,000 mg Intravenous On Call to [address] [place] [address] [START ON 3/14/2021] tranexamic acid (CYCLOKAPRON) IVPB  1,000 mg Intravenous On Call to OR    Followed by   Yessenia Vasquez ON 3/14/2021] tranexamic acid (CYCLOKAPRON) IVPB  1,000 mg Intravenous On Call to OR    amLODIPine  2.5 mg Oral BID    [Held by provider] aspirin  81 mg Oral Daily    atenolol  25 mg Oral Daily    [Held by provider] clopidogrel  75 mg Oral Daily    rosuvastatin  10 mg Oral Daily    sertraline  100 mg Oral Daily    Vitamin D  1,000 Units Oral Daily     PRN Meds: sodium chloride flush, promethazine **OR** ondansetron, polyethylene glycol, acetaminophen **OR** [DISCONTINUED] acetaminophen, sodium chloride flush, oxyCODONE, HYDROmorphone, oxyCODONE, meclizine      Intake/Output Summary (Last 24 hours) at 3/13/2021 173  Last data filed at 3/13/2021 1328  Gross per 24 hour   Intake 1393 ml   Output --   Net 1393 ml       Diet:  Diet NPO Effective Now Exceptions are: Sips with Meds    Exam:  /67   Pulse 80   Temp 97.6 °F (36.4 °C) (Oral)   Resp 14   Ht 5' 5\" (1.651 m)   Wt 126 lb (57.2 kg)   SpO2 97%   BMI 20.97 kg/m²     General appearance: alert, not in acute distress  HEENT: Pupils equal, round, and reactive to light. Conjunctivae clear. Clear oral mucosa. Neck: Supple, with full range of motion. No jugular venous distention. Trachea midline. Respiratory:  Normal respiratory effort. Clear to auscultation, bilaterally without Rales/Wheezes/Rhonchi on anterior lung fields ( cannot assess posterior lung fields due to pt cannot turn due to right hip fracture)  Cardiovascular: normal rate, regular rhythm with normal S1/S2 without murmurs, rubs or gallops. Abdomen: Soft, non-tender, non-distended with normal bowel sounds. Musculoskeletal: cannot assess right hip due to fracture. (+) cast on left UE  Skin: Skin color, texture, turgor normal.  No rashes or lesions.   Neurological exam reveals alert, oriented, normal speech, no focal findings or movement disorder noted, cranial nerves II through XII intact, motor and sensory grossly normal bilaterally except right LE ( cannot assess due to right hip fracture)  Exam of extremities: peripheral pulses normal, no pedal edema, no clubbing or cyanosis        Labs:   Recent Labs     03/12/21 2015   WBC 5.1   HGB 10.7*   HCT 33.3*        Recent Labs     03/12/21 2015   *   K 4.7   CL 99   CO2 21*   BUN 21   CREATININE 1.1   CALCIUM 9.3     Recent Labs     03/12/21 2015   AST 30   ALT 20   BILITOT <0.2*   ALKPHOS 95     No results for input(s): INR in the last 72 hours. No results for input(s): Nura Cristobal in the last 72 hours. Urinalysis:      Lab Results   Component Value Date    NITRU NEGATIVE 02/15/2021    WBCUA 5-9 02/15/2021    BACTERIA NONE SEEN 02/15/2021    RBCUA 0-2 02/15/2021    BLOODU TRACE 02/15/2021    SPECGRAV >1.030 02/15/2021    GLUCOSEU NEGATIVE 11/04/2018       Radiology:  CT Head WO Contrast   Final Result   1. No acute intracranial findings. 2. Mild right lateral scalp hematoma. This document has been electronically signed by: Milena Horne MD on    03/12/2021 09:50 PM      All CTs at this facility use dose modulation techniques and iterative    reconstructions, and/or weight-based dosing   when appropriate to reduce radiation to a low as reasonably achievable. CT CERVICAL SPINE WO CONTRAST   Final Result   No acute findings. This document has been electronically signed by: Milena Horne MD on    03/12/2021 09:51 PM      All CTs at this facility use dose modulation techniques and iterative    reconstructions, and/or weight-based dosing   when appropriate to reduce radiation to a low as reasonably achievable. XR HIP 2-3 VW W PELVIS RIGHT   Final Result   Acute right subcapital femoral neck fracture with superior displacement of    the distal fracture fragment.       This document has been electronically signed by: Milena Horne MD on 03/12/2021 09:43 PM         XR CHEST 1 VIEW   Final Result   Stable emphysematous lungs without acute findings. This document has been electronically signed by: Anna Blanc MD on    03/12/2021 09:50 PM      VL DUP CAROTID BILATERAL    (Results Pending)         Assessment/Plan:      Syncope and collapse    -no prodrome   -echo completed pending report,   -carotid Doppler pending,   -check orthostatic VS  -CT head no acute findings  -pt has pacemaker, cardiology consulted for pacemaker interrogation   -cont tele     Right Hip fracture    -Acute right subcapital femoral neck fracture with superior displacement of the distal fracture fragment noted on xray hip.   -Orthopedic surgery consulted from ED, ortho plan for Right hip kamaljit-arthroplasty vs total hip arthroplasty DAA tomorrow. I spoke with Alyssa APARICIO ( ortho PA) over the phone, discussed given cardiac history ( CAD, HFrEF, with pacemaker, hx afib and now pt presented with syncope w/o prodrome), recommend cardiology clearance prior surgery, Alyssa APARICIO agreed with plan    Mild hyponatremia    -BMP in a.m. Hyperglycemia    -check A1c    Anion gap metabolic acidosis, mild     -BMP in a.m. CAD: Plavix and ASA on hold for upcoming procedure. Cont statin. Tele     HFrEF: Echo completed, pending report  (last in 4/11/19 showing EF 40-45%, No aortic valve disease). No signs of volume overload    HTN: cont home amlodipine    HLD: cont home rosuvastatin    Hx of Afib: Ventricular paced rhythm.      COPD noted on CXR        Anticipated Discharge in : pending       Diet: Diet NPO Effective Now Exceptions are: Sips with Meds    DVT prophylaxis: [] Lovenox                                 [] SCDs                                 [] SQ Heparin                                 [] Encourage ambulation           [] Already on Anticoagulation     Disposition:    [] Home       [] TCU       [] Rehab       [] Psych       [] SNF       [] [facility] [x] Other-Plan as above       Code Status: Full Code        Electronically signed by Mitchell Mills MD on 3/13/2021 at 5:35 PM

## 2021-03-13 NOTE — ED NOTES
Upon first contact with patient this RN receives bedside shift report Binh Louis RN.       Anthony Lorenzo RN  03/12/21 4340

## 2021-03-13 NOTE — CONSULTS
03/13/21 0059    Or    ondansetron (ZOFRAN) injection 4 mg  4 mg Intravenous Q6H PRN MARKUS Mercado        polyethylene glycol (GLYCOLAX) packet 17 g  17 g Oral Daily PRN MARKUS Lord        acetaminophen (TYLENOL) tablet 650 mg  650 mg Oral Q6H PRN MARKUS Mercado   650 mg at 03/13/21 0059    0.9 % sodium chloride infusion   Intravenous Continuous MARKUS Mercado 75 mL/hr at 03/13/21 0118 New Bag at 03/13/21 0118    oxyCODONE (ROXICODONE) immediate release tablet 5 mg  5 mg Oral Q4H PRN MARKUS Owusu        acetaminophen (TYLENOL) tablet 650 mg  650 mg Oral 4x daily Jayant Owusu        HYDROmorphone (DILAUDID) injection 0.5 mg  0.5 mg Intravenous Q4H PRN MARKUS Owusu        oxyCODONE (ROXICODONE) immediate release tablet 10 mg  10 mg Oral Q4H PRN MARKUS Owusu        amLODIPine (NORVASC) tablet 2.5 mg  2.5 mg Oral BID MARKUS Mercado   2.5 mg at 03/13/21 6327    [Held by provider] aspirin chewable tablet 81 mg  81 mg Oral Daily MARKUS Mercado        atenolol (TENORMIN) tablet 25 mg  25 mg Oral Daily MARKUS Mercado   25 mg at 03/13/21 9303    [Held by provider] clopidogrel (PLAVIX) tablet 75 mg  75 mg Oral Daily MARKUS Lord        meclizine (ANTIVERT) chewable tablet 12.5 mg  12.5 mg Oral TID PRN MARKUS Lord        rosuvastatin (CRESTOR) tablet 10 mg  10 mg Oral Daily MARKUS Mercado        sertraline (ZOLOFT) tablet 100 mg  100 mg Oral Daily MARKUS Mercado   100 mg at 03/13/21 8141    vitamin D (CHOLECALCIFEROL) tablet 1,000 Units  1,000 Units Oral Daily MARKUS Mercado   1,000 Units at 03/13/21 7539    tranexamic acid (CYKLOKAPRON) 1,000 mg in dextrose 5 % 100 mL IVPB  1,000 mg Intravenous On Call to [address] [place] [address]        Followed by   [facility] tranexamic acid (CYKLOKAPRON) 1,000 mg in dextrose 5 % 100 mL IVPB  1,000 mg Intravenous On Call to [address] [place] [address]             Allergies:  Patient has no known allergies.     Social History: Negative for tobacco use, alcohol abuse and illicit drug abuse    Family History:  Family History   Problem Relation Age of Onset    Other Mother         alzheimers    Heart Attack Father     Heart Attack Brother          REVIEW OF SYSTEMS:  Gen: Negative for nausea, vomiting, diarrhea, fever, chills, night sweats  Heart: Positive for HTN. Negative for palpitations, chest pain  Lungs: Negative for wheezes, asthma or SOB  GI: Negative for nausea, vomiting  : Negative for dysuria, hematuria  Endo: Negative for diabetes, thyroid disorders  Heme: Negative for DVT or bleeding disorders  Psych: Negative for Depression or anxiety  Ortho: Positive for right hip pain, left arm pain    PHYSICAL EXAM:  Vitals:    03/13/21 0906   BP: 131/72   Pulse: 77   Resp: 16   Temp: 98.4 °F (36.9 °C)   SpO2: 97%     Gen: alert and oriented  Head: normocephalic atraumatic   Neck: supple  Chest: no audible wheezes  Heart: RRR   Abdomen: soft  Pelvis: stable  Extremity:Hip skin intact no erythema or ecchymosis. Leg shortened and externally rotated. Mild edema. Moderate tenderness palpation groin, lateral hip, and buttocks. Unable to perform hip range of motion due to pain. Positive log roll. Severe hip or groin pain with range of motion. Motor intact quad, hamstring, TA, GSC, EHL, FHL. Sensation intact light touch SPN, DPN, sural, saphenous, tibial nerve distribution. 2+ DP pulse. Toes warm well perfused. No calf pain. Negative Homans.          DATA:  CBC:   Lab Results   Component Value Date    WBC 5.1 03/12/2021    RBC 3.75 03/12/2021    HGB 10.7 03/12/2021    HCT 33.3 03/12/2021     03/12/2021     BMP:   Lab Results   Component Value Date     03/12/2021    K 4.7 03/12/2021    CL 99 03/12/2021    CO2 21 03/12/2021    BUN 21 03/12/2021    CREATININE 1.1 03/12/2021    CALCIUM 9.3 03/12/2021    GLUCOSE 114 03/12/2021     PT/INR:   Lab Results   Component Value Date    INR 1.05 02/15/2021         Radiology: See electronic record to view reports. Reports reviewed. ASSESSMENT:Active Problems:    Syncope and collapse    Closed subcapital fracture of femur, right, initial encounter ([address]  Resolved Problems:    * No resolved hospital problems. *       PLAN:  1)  Right hip kamaljit-arthroplasty vs total hip arthroplasty DAA today at 2pm  2)  NWB  3)  NPO  4)  Continue pain control  5)  Consult hospitalist for medical management and pre-op clearance  6)  2 units blood type and cross, please have blood consent signed  7)  YESSICA's, SCD's for DVT ppx, will resume Plavix and aspirin post op    Discussed surgical procedure and risk of surgery. All patients question and concerns were addressed. Also attempted to call daughter, however was not able to speak with her about treatment plan. Electronically signed by MARKUS Hamm on 3/13/2021 at 9:30 AM      Patient seen and evaluated. Agree with assessment and plan per Claudia Lieberman PA-C. Patient has a displaced right femoral neck fracture. She does have some degenerative changes of her hip on radiographs. Patient sustained a syncopal fall for which cardiology is evaluating. Discussed with patient right hip hemiarthroplasty vs total hip arthroplasty. Discussed procedure, risks, benefits, and alternatives including but not limited to bleeding, infection, neurovascular injury, fracture, dislocation, leg length discrepancy, continued pain, and need for additional surgery. Patient understands the risks and elects to proceed with surgery. Will plan for surgery pending cardiology evaluation.     Lily Davis MD  Orthopaedic Surgeon  [facility]

## 2021-03-13 NOTE — H&P
Hospitalist - History & Physical      Patient: Charline Almazan    Unit/Bed:11/011A  YOB: 1940  MRN: [id number]   Acct: [de-identified]   PCP: PHIL Bey CNP      Assessment and Plan:        1. Syncope and collaps: Presyncope orderd echo, carotid Doppler, orthostatic blood pressure. 2. Right Hip fracture: Acute right subcapital femoral neck fracture with superior displacement of the distal fracture fragment noted on CT. Orthopedic surgery consulted from ED  3. CAD: Restart Plavix and ASA  4. HFrEF: Echo ordered  (last in 4/11/19 showing EF 40-45%, No aortic valve disease). No signs of volume overload  5. HTN: Restart home amlodipine  6. HLD: Restart home rosuvastatin  7. Afib: Ventricular paced rhythm. CC:  Syncope and hip pain  HPI: Mrs. Frankie Walker is a 81 Y/O female with past medical history of CAD, HFrEF, HTN, HLD, Afib, presents to ED for Syncope and hip pain. Per patient report was walking through her kitchen when she suddenly lost consciousness. Patient states this happened in mid February as well resulting in a left wrist fracture. Patient follows with Dr. Elidia Thomason cardiologist, recent appointment but did not discuss syncopal episodes with him. After most recent fall patient complaining of right hip pain. Patient denies chest pain, shortness of breath, cough, headache, dizziness, lightheadedness, numbness, paraesthesias, weakness, chills, fevers, abdominal pain, nausea, vomiting, neck pain, or back pain. ROS: Review of Systems  PMH:    Past Medical History:   Diagnosis Date    Atrial fibrillation ([address]     CAD (coronary artery disease)     Congestive heart failure (CHF) (HCC)     Hyperlipidemia     Hypertension      SHX:    Social History     Socioeconomic History    Marital status:       Spouse name: Not on file    Number of children: 1    Years of education: Not on file    Highest education level: Not on file   Occupational History    Not on file   Social Needs    Financial resource strain: Not on file    Food insecurity     Worry: Not on file     Inability: Not on file    Transportation needs     Medical: Not on file     Non-medical: Not on file   Tobacco Use    Smoking status: Never Smoker    Smokeless tobacco: Never Used   Substance and Sexual Activity    Alcohol use: No    Drug use: No    Sexual activity: Not Currently   Lifestyle    Physical activity     Days per week: Not on file     Minutes per session: Not on file    Stress: Not on file   Relationships    Social connections     Talks on phone: Not on file     Gets together: Not on file     Attends Restorationist service: Not on file     Active member of club or organization: Not on file     Attends meetings of clubs or organizations: Not on file     Relationship status: Not on file    Intimate partner violence     Fear of current or ex partner: Not on file     Emotionally abused: Not on file     Physically abused: Not on file     Forced sexual activity: Not on file   Other Topics Concern    Not on file   Social History Narrative    Not on file     FHX:   Family History   Problem Relation Age of Onset    Other Mother         alzheimers    Heart Attack Father     Heart Attack Brother      Allergies: No Known Allergies  Medications:       amLODIPine  2.5 mg Oral BID    [START ON 3/13/2021] aspirin  81 mg Oral Daily    [START ON 3/13/2021] atenolol  25 mg Oral Daily    [START ON 3/13/2021] clopidogrel  75 mg Oral Daily    [START ON 3/13/2021] rosuvastatin  10 mg Oral Daily    [START ON 3/13/2021] sertraline  100 mg Oral Daily    [START ON 3/13/2021] Vitamin D  1,000 Units Oral Daily     meclizine, 12.5 mg, TID PRN        Labs:   Recent Results (from the past 24 hour(s))   EKG 12 Lead    Collection Time: 03/12/21  7:50 PM   Result Value Ref Range    Ventricular Rate 81 BPM    Atrial Rate 80 BPM    QRS Duration 136 ms    Q-T Interval 418 ms    QTc Calculation (Bazett) 485 ms    R Axis 84 degrees    T Axis 64 degrees   CBC Auto Differential    Collection Time: 03/12/21  8:15 PM   Result Value Ref Range    WBC 5.1 4.8 - 10.8 thou/mm3    RBC 3.75 (L) 4.20 - 5.40 mill/mm3    Hemoglobin 10.7 (L) 12.0 - 16.0 gm/dl    Hematocrit 33.3 (L) 37.0 - 47.0 %    MCV 88.8 81.0 - 99.0 fL    MCH 28.5 26.0 - 33.0 pg    MCHC 32.1 (L) 32.2 - 35.5 gm/dl    RDW-CV 15.1 (H) 11.5 - 14.5 %    RDW-SD 49.0 (H) 35.0 - 45.0 fL    Platelets 833 486 - 064 thou/mm3    MPV 11.0 9.4 - 12.4 fL    Seg Neutrophils 71.3 %    Lymphocytes 10.1 %    Monocytes 14.2 %    Eosinophils 3.6 %    Basophils 0.6 %    Immature Granulocytes 0.2 %    Segs Absolute 3.6 1 - 7 thou/mm3    Lymphocytes Absolute 0.5 (L) 1.0 - 4.8 thou/mm3    Monocytes Absolute 0.7 0.4 - 1.3 thou/mm3    Eosinophils Absolute 0.2 0.0 - 0.4 thou/mm3    Basophils Absolute 0.0 0.0 - 0.1 thou/mm3    Immature Grans (Abs) 0.01 0.00 - 0.07 thou/mm3    nRBC 0 /100 wbc   Comprehensive Metabolic Panel w/ Reflex to MG    Collection Time: 03/12/21  8:15 PM   Result Value Ref Range    Glucose 114 (H) 70 - 108 mg/dL    CREATININE 1.1 0.4 - 1.2 mg/dL    BUN 21 7 - 22 mg/dL    Sodium 133 (L) 135 - 145 meq/L    Potassium reflex Magnesium 4.7 3.5 - 5.2 meq/L    Chloride 99 98 - 111 meq/L    CO2 21 (L) 23 - 33 meq/L    Calcium 9.3 8.5 - 10.5 mg/dL    AST 30 5 - 40 U/L    Alkaline Phosphatase 95 38 - 126 U/L    Total Protein 7.5 6.1 - 8.0 g/dL    Albumin 4.0 3.5 - 5.1 g/dL    Total Bilirubin <0.2 (L) 0.3 - 1.2 mg/dL    ALT 20 11 - 66 U/L   Troponin    Collection Time: 03/12/21  8:15 PM   Result Value Ref Range    Troponin T < 0.010 ng/ml   Brain Natriuretic Peptide    Collection Time: 03/12/21  8:15 PM   Result Value Ref Range    Pro-.9 0.0 - 1800.0 pg/mL   Anion Gap    Collection Time: 03/12/21  8:15 PM   Result Value Ref Range    Anion Gap 13.0 8.0 - 16.0 meq/L   Glomerular Filtration Rate, Estimated    Collection Time: 03/12/21  8:15 PM   Result Value Ref Range    Est, Glom Filt Rate 48 (A) ml/min/1.73m2 Osmolality    Collection Time: 03/12/21  8:15 PM   Result Value Ref Range    Osmolality Calc 270.2 (L) 275.0 - 300.0 mOsmol/kg         Vital Signs: BP: 121/75, Temp: 98.3 °F (36.8 °C), Pulse: 82, Resp: 16, SpO2: 95 %    GENERAL: Presents sitting upright in bed unassisted, Awake and alert; In no acute distress and well nourished. SKIN: Appropriate for ethnicity, warm and dry. EYES: No apparent trauma, discharge, or hematoma bilaterally. PERRL at 3mm  CARDIO: No visible chest wall deformity. No heaves or lifts. Strong/regular S1/S2 rate and rhythm. No rubs murmurs, or gallops. 2+ radial and dorsalis pedal pulses. Capillary refill <2 sec. No extremity edema noted. PULMONARY:  Trachea midline, no audible wheezing, increased respiratory effort, accessory muscle use, or asymmetrical chest wall movement. Lung sounds are clear to ascultation in superior and inferior fields. No wheezing, crackles, or rhonchi. ABDOMEN: Abdomen is non distended. Bowel sounds present in all four quadrants. Soft and non tender to palpation in all quadrants. NEURO: Follows commands. PMS intact, moves limbs freely. No focal neurological deficits  MSK: Extremities intact and present. No new bruising, swelling, deformity, discoloration or bleeding. Tenderness over right hip, right leg externally rotated.  strength 5/5 and equal bilaterally. Cast in place over distal left arm. Data: (All radiographs, tracings, PFTs, and imaging are personally viewed and interpreted unless otherwise noted).  XR hip/pelvis shows Acute right subcapital femoral neck fracture with superior displacement of the distal fracture fragment.      CT head and neck shows no acute pathology   EKG: no change since previous ECG dated 2/15/2021                                    Electronically signed by Aamir Hunter PA-C on 3/12/2021 at 11:29 PM

## 2021-03-14 ENCOUNTER — APPOINTMENT (OUTPATIENT)
Dept: GENERAL RADIOLOGY | Age: 81
DRG: 522 | End: 2021-03-14
Payer: MEDICARE

## 2021-03-14 ENCOUNTER — ANESTHESIA EVENT (OUTPATIENT)
Dept: OPERATING ROOM | Age: 81
DRG: 522 | End: 2021-03-14
Payer: MEDICARE

## 2021-03-14 ENCOUNTER — ANESTHESIA (OUTPATIENT)
Dept: OPERATING ROOM | Age: 81
DRG: 522 | End: 2021-03-14
Payer: MEDICARE

## 2021-03-14 ENCOUNTER — APPOINTMENT (OUTPATIENT)
Dept: INTERVENTIONAL RADIOLOGY/VASCULAR | Age: 81
DRG: 522 | End: 2021-03-14
Payer: MEDICARE

## 2021-03-14 VITALS — DIASTOLIC BLOOD PRESSURE: 60 MMHG | OXYGEN SATURATION: 100 % | SYSTOLIC BLOOD PRESSURE: 116 MMHG | TEMPERATURE: 98.2 F

## 2021-03-14 LAB
ANION GAP SERPL CALCULATED.3IONS-SCNC: 10 MEQ/L (ref 8–16)
AVERAGE GLUCOSE: 96 MG/DL (ref 70–126)
BASOPHILS # BLD: 0.3 %
BASOPHILS ABSOLUTE: 0 THOU/MM3 (ref 0–0.1)
BUN BLDV-MCNC: 14 MG/DL (ref 7–22)
CALCIUM IONIZED: 0.98 MMOL/L (ref 1.12–1.32)
CALCIUM SERPL-MCNC: 7.9 MG/DL (ref 8.5–10.5)
CHLORIDE BLD-SCNC: 102 MEQ/L (ref 98–111)
CO2: 18 MEQ/L (ref 23–33)
CREAT SERPL-MCNC: 0.5 MG/DL (ref 0.4–1.2)
EOSINOPHIL # BLD: 1.5 %
EOSINOPHILS ABSOLUTE: 0.1 THOU/MM3 (ref 0–0.4)
ERYTHROCYTE [DISTWIDTH] IN BLOOD BY AUTOMATED COUNT: 15.6 % (ref 11.5–14.5)
ERYTHROCYTE [DISTWIDTH] IN BLOOD BY AUTOMATED COUNT: 53.1 FL (ref 35–45)
GFR SERPL CREATININE-BSD FRML MDRD: > 90 ML/MIN/1.73M2
GLUCOSE BLD-MCNC: 108 MG/DL (ref 70–108)
HBA1C MFR BLD: 5.2 % (ref 4.4–6.4)
HCT VFR BLD CALC: 27.5 % (ref 37–47)
HCT VFR BLD CALC: 27.5 % (ref 37–47)
HCT VFR BLD CALC: 29.5 % (ref 37–47)
HEMOGLOBIN: 8.1 GM/DL (ref 12–16)
HEMOGLOBIN: 8.4 GM/DL (ref 12–16)
HEMOGLOBIN: 8.9 GM/DL (ref 12–16)
IMMATURE GRANS (ABS): 0.02 THOU/MM3 (ref 0–0.07)
IMMATURE GRANULOCYTES: 0.3 %
LYMPHOCYTES # BLD: 7.9 %
LYMPHOCYTES ABSOLUTE: 0.5 THOU/MM3 (ref 1–4.8)
MAGNESIUM: 2.2 MG/DL (ref 1.6–2.4)
MCH RBC QN AUTO: 28.3 PG (ref 26–33)
MCHC RBC AUTO-ENTMCNC: 30.2 GM/DL (ref 32.2–35.5)
MCV RBC AUTO: 93.9 FL (ref 81–99)
MONOCYTES # BLD: 11.6 %
MONOCYTES ABSOLUTE: 0.7 THOU/MM3 (ref 0.4–1.3)
NUCLEATED RED BLOOD CELLS: 0 /100 WBC
OSMOLALITY: 285 MOSMOL/KG (ref 275–295)
PH VENOUS: 7.23 (ref 7.31–7.41)
PLATELET # BLD: 148 THOU/MM3 (ref 130–400)
PMV BLD AUTO: 10.6 FL (ref 9.4–12.4)
POTASSIUM SERPL-SCNC: 4.7 MEQ/L (ref 3.5–5.2)
PTH INTACT: 263.3 PG/ML (ref 15–65)
RBC # BLD: 3.14 MILL/MM3 (ref 4.2–5.4)
SEG NEUTROPHILS: 78.4 %
SEGMENTED NEUTROPHILS ABSOLUTE COUNT: 4.6 THOU/MM3 (ref 1.8–7.7)
SODIUM BLD-SCNC: 130 MEQ/L (ref 135–145)
VITAMIN D 25-HYDROXY: 36 NG/ML (ref 30–100)
WBC # BLD: 5.9 THOU/MM3 (ref 4.8–10.8)

## 2021-03-14 PROCEDURE — 3700000000 HC ANESTHESIA ATTENDED CARE: Performed by: ORTHOPAEDIC SURGERY

## 2021-03-14 PROCEDURE — 2580000003 HC RX 258: Performed by: PHYSICIAN ASSISTANT

## 2021-03-14 PROCEDURE — 6370000000 HC RX 637 (ALT 250 FOR IP): Performed by: FAMILY MEDICINE

## 2021-03-14 PROCEDURE — 3600000015 HC SURGERY LEVEL 5 ADDTL 15MIN: Performed by: ORTHOPAEDIC SURGERY

## 2021-03-14 PROCEDURE — 6360000002 HC RX W HCPCS: Performed by: ORTHOPAEDIC SURGERY

## 2021-03-14 PROCEDURE — 2500000003 HC RX 250 WO HCPCS: Performed by: ANESTHESIOLOGY

## 2021-03-14 PROCEDURE — 2580000003 HC RX 258: Performed by: ORTHOPAEDIC SURGERY

## 2021-03-14 PROCEDURE — 2709999900 HC NON-CHARGEABLE SUPPLY: Performed by: ORTHOPAEDIC SURGERY

## 2021-03-14 PROCEDURE — 3700000001 HC ADD 15 MINUTES (ANESTHESIA): Performed by: ORTHOPAEDIC SURGERY

## 2021-03-14 PROCEDURE — 73502 X-RAY EXAM HIP UNI 2-3 VIEWS: CPT

## 2021-03-14 PROCEDURE — 85014 HEMATOCRIT: CPT

## 2021-03-14 PROCEDURE — 83930 ASSAY OF BLOOD OSMOLALITY: CPT

## 2021-03-14 PROCEDURE — 83036 HEMOGLOBIN GLYCOSYLATED A1C: CPT

## 2021-03-14 PROCEDURE — 2140000000 HC CCU INTERMEDIATE R&B

## 2021-03-14 PROCEDURE — 0SR903Z REPLACEMENT OF RIGHT HIP JOINT WITH CERAMIC SYNTHETIC SUBSTITUTE, OPEN APPROACH: ICD-10-PCS | Performed by: ORTHOPAEDIC SURGERY

## 2021-03-14 PROCEDURE — 6360000002 HC RX W HCPCS: Performed by: PHYSICIAN ASSISTANT

## 2021-03-14 PROCEDURE — 82306 VITAMIN D 25 HYDROXY: CPT

## 2021-03-14 PROCEDURE — 6370000000 HC RX 637 (ALT 250 FOR IP): Performed by: PHYSICIAN ASSISTANT

## 2021-03-14 PROCEDURE — 80048 BASIC METABOLIC PNL TOTAL CA: CPT

## 2021-03-14 PROCEDURE — 7100000001 HC PACU RECOVERY - ADDTL 15 MIN: Performed by: ORTHOPAEDIC SURGERY

## 2021-03-14 PROCEDURE — 99232 SBSQ HOSP IP/OBS MODERATE 35: CPT | Performed by: FAMILY MEDICINE

## 2021-03-14 PROCEDURE — 2580000003 HC RX 258: Performed by: ANESTHESIOLOGY

## 2021-03-14 PROCEDURE — 36430 TRANSFUSION BLD/BLD COMPNT: CPT

## 2021-03-14 PROCEDURE — 2720000010 HC SURG SUPPLY STERILE: Performed by: ORTHOPAEDIC SURGERY

## 2021-03-14 PROCEDURE — 84295 ASSAY OF SERUM SODIUM: CPT

## 2021-03-14 PROCEDURE — P9016 RBC LEUKOCYTES REDUCED: HCPCS

## 2021-03-14 PROCEDURE — 82330 ASSAY OF CALCIUM: CPT

## 2021-03-14 PROCEDURE — 72170 X-RAY EXAM OF PELVIS: CPT

## 2021-03-14 PROCEDURE — 85025 COMPLETE CBC W/AUTO DIFF WBC: CPT

## 2021-03-14 PROCEDURE — 82800 BLOOD PH: CPT

## 2021-03-14 PROCEDURE — 36415 COLL VENOUS BLD VENIPUNCTURE: CPT

## 2021-03-14 PROCEDURE — 6360000002 HC RX W HCPCS: Performed by: ANESTHESIOLOGY

## 2021-03-14 PROCEDURE — 83970 ASSAY OF PARATHORMONE: CPT

## 2021-03-14 PROCEDURE — 3209999900 FLUORO FOR SURGICAL PROCEDURES

## 2021-03-14 PROCEDURE — 7100000000 HC PACU RECOVERY - FIRST 15 MIN: Performed by: ORTHOPAEDIC SURGERY

## 2021-03-14 PROCEDURE — 83735 ASSAY OF MAGNESIUM: CPT

## 2021-03-14 PROCEDURE — 3600000005 HC SURGERY LEVEL 5 BASE: Performed by: ORTHOPAEDIC SURGERY

## 2021-03-14 PROCEDURE — 85018 HEMOGLOBIN: CPT

## 2021-03-14 PROCEDURE — 2500000003 HC RX 250 WO HCPCS: Performed by: ORTHOPAEDIC SURGERY

## 2021-03-14 PROCEDURE — C1776 JOINT DEVICE (IMPLANTABLE): HCPCS | Performed by: ORTHOPAEDIC SURGERY

## 2021-03-14 DEVICE — HIP H2 ADV OTHER HD IMPL CAPPED H2: Type: IMPLANTABLE DEVICE | Status: FUNCTIONAL

## 2021-03-14 DEVICE — IMPLANTABLE DEVICE: Type: IMPLANTABLE DEVICE | Status: FUNCTIONAL

## 2021-03-14 DEVICE — ECHO B-MTRC MP FP SO 12: Type: IMPLANTABLE DEVICE | Status: FUNCTIONAL

## 2021-03-14 DEVICE — G7 PPS LTD ACET SHELL 48C: Type: IMPLANTABLE DEVICE | Status: FUNCTIONAL

## 2021-03-14 RX ORDER — SODIUM CHLORIDE 0.9 % (FLUSH) 0.9 %
10 SYRINGE (ML) INJECTION PRN
Status: DISCONTINUED | OUTPATIENT
Start: 2021-03-14 | End: 2021-03-17 | Stop reason: HOSPADM

## 2021-03-14 RX ORDER — SODIUM CHLORIDE 9 MG/ML
INJECTION, SOLUTION INTRAVENOUS PRN
Status: DISCONTINUED | OUTPATIENT
Start: 2021-03-14 | End: 2021-03-17 | Stop reason: HOSPADM

## 2021-03-14 RX ORDER — SODIUM CHLORIDE 9 MG/ML
INJECTION, SOLUTION INTRAVENOUS CONTINUOUS
Status: DISCONTINUED | OUTPATIENT
Start: 2021-03-14 | End: 2021-03-15

## 2021-03-14 RX ORDER — DEXAMETHASONE SODIUM PHOSPHATE 10 MG/ML
INJECTION, EMULSION INTRAMUSCULAR; INTRAVENOUS PRN
Status: DISCONTINUED | OUTPATIENT
Start: 2021-03-14 | End: 2021-03-14 | Stop reason: SDUPTHER

## 2021-03-14 RX ORDER — ASPIRIN 81 MG/1
81 TABLET, CHEWABLE ORAL DAILY
Status: DISCONTINUED | OUTPATIENT
Start: 2021-03-15 | End: 2021-03-17 | Stop reason: HOSPADM

## 2021-03-14 RX ORDER — SODIUM BICARBONATE 650 MG/1
1300 TABLET ORAL 2 TIMES DAILY
Status: DISCONTINUED | OUTPATIENT
Start: 2021-03-14 | End: 2021-03-15

## 2021-03-14 RX ORDER — NEOSTIGMINE METHYLSULFATE 5 MG/5 ML
SYRINGE (ML) INTRAVENOUS PRN
Status: DISCONTINUED | OUTPATIENT
Start: 2021-03-14 | End: 2021-03-14 | Stop reason: SDUPTHER

## 2021-03-14 RX ORDER — SODIUM CHLORIDE 9 MG/ML
INJECTION, SOLUTION INTRAVENOUS CONTINUOUS PRN
Status: DISCONTINUED | OUTPATIENT
Start: 2021-03-14 | End: 2021-03-14 | Stop reason: SDUPTHER

## 2021-03-14 RX ORDER — FENTANYL CITRATE 50 UG/ML
25 INJECTION, SOLUTION INTRAMUSCULAR; INTRAVENOUS EVERY 5 MIN PRN
Status: DISCONTINUED | OUTPATIENT
Start: 2021-03-14 | End: 2021-03-14 | Stop reason: HOSPADM

## 2021-03-14 RX ORDER — GLYCOPYRROLATE 1 MG/5 ML
SYRINGE (ML) INTRAVENOUS PRN
Status: DISCONTINUED | OUTPATIENT
Start: 2021-03-14 | End: 2021-03-14 | Stop reason: SDUPTHER

## 2021-03-14 RX ORDER — POLYETHYLENE GLYCOL 3350 17 G/17G
17 POWDER, FOR SOLUTION ORAL DAILY
Status: DISCONTINUED | OUTPATIENT
Start: 2021-03-14 | End: 2021-03-17 | Stop reason: HOSPADM

## 2021-03-14 RX ORDER — CLOPIDOGREL BISULFATE 75 MG/1
75 TABLET ORAL DAILY
Status: DISCONTINUED | OUTPATIENT
Start: 2021-03-15 | End: 2021-03-17 | Stop reason: HOSPADM

## 2021-03-14 RX ORDER — ONDANSETRON 2 MG/ML
INJECTION INTRAMUSCULAR; INTRAVENOUS PRN
Status: DISCONTINUED | OUTPATIENT
Start: 2021-03-14 | End: 2021-03-14 | Stop reason: SDUPTHER

## 2021-03-14 RX ORDER — ONDANSETRON 2 MG/ML
4 INJECTION INTRAMUSCULAR; INTRAVENOUS
Status: DISCONTINUED | OUTPATIENT
Start: 2021-03-14 | End: 2021-03-14 | Stop reason: HOSPADM

## 2021-03-14 RX ORDER — DOCUSATE SODIUM 100 MG/1
100 CAPSULE, LIQUID FILLED ORAL DAILY
Status: DISCONTINUED | OUTPATIENT
Start: 2021-03-14 | End: 2021-03-17 | Stop reason: HOSPADM

## 2021-03-14 RX ORDER — ASPIRIN 325 MG
325 TABLET, DELAYED RELEASE (ENTERIC COATED) ORAL DAILY
Qty: 30 TABLET | Refills: 0 | OUTPATIENT
Start: 2021-03-14 | End: 2022-03-14

## 2021-03-14 RX ORDER — PROPOFOL 10 MG/ML
INJECTION, EMULSION INTRAVENOUS PRN
Status: DISCONTINUED | OUTPATIENT
Start: 2021-03-14 | End: 2021-03-14 | Stop reason: SDUPTHER

## 2021-03-14 RX ORDER — FENTANYL CITRATE 50 UG/ML
50 INJECTION, SOLUTION INTRAMUSCULAR; INTRAVENOUS EVERY 5 MIN PRN
Status: DISCONTINUED | OUTPATIENT
Start: 2021-03-14 | End: 2021-03-14 | Stop reason: HOSPADM

## 2021-03-14 RX ORDER — TRANEXAMIC ACID 100 MG/ML
INJECTION, SOLUTION INTRAVENOUS PRN
Status: DISCONTINUED | OUTPATIENT
Start: 2021-03-14 | End: 2021-03-14 | Stop reason: SDUPTHER

## 2021-03-14 RX ORDER — ROCURONIUM BROMIDE 10 MG/ML
INJECTION, SOLUTION INTRAVENOUS PRN
Status: DISCONTINUED | OUTPATIENT
Start: 2021-03-14 | End: 2021-03-14 | Stop reason: SDUPTHER

## 2021-03-14 RX ORDER — LABETALOL 20 MG/4 ML (5 MG/ML) INTRAVENOUS SYRINGE
5 EVERY 10 MIN PRN
Status: DISCONTINUED | OUTPATIENT
Start: 2021-03-14 | End: 2021-03-14 | Stop reason: HOSPADM

## 2021-03-14 RX ORDER — FENTANYL CITRATE 50 UG/ML
INJECTION, SOLUTION INTRAMUSCULAR; INTRAVENOUS PRN
Status: DISCONTINUED | OUTPATIENT
Start: 2021-03-14 | End: 2021-03-14 | Stop reason: SDUPTHER

## 2021-03-14 RX ORDER — SODIUM CHLORIDE 0.9 % (FLUSH) 0.9 %
10 SYRINGE (ML) INJECTION EVERY 12 HOURS SCHEDULED
Status: DISCONTINUED | OUTPATIENT
Start: 2021-03-14 | End: 2021-03-17 | Stop reason: HOSPADM

## 2021-03-14 RX ADMIN — GABAPENTIN 300 MG: 300 CAPSULE ORAL at 06:46

## 2021-03-14 RX ADMIN — FENTANYL CITRATE 50 MCG: 50 INJECTION, SOLUTION INTRAMUSCULAR; INTRAVENOUS at 10:49

## 2021-03-14 RX ADMIN — DOCUSATE SODIUM 100 MG: 100 CAPSULE, LIQUID FILLED ORAL at 16:37

## 2021-03-14 RX ADMIN — ROCURONIUM BROMIDE 50 MG: 10 INJECTION INTRAVENOUS at 10:49

## 2021-03-14 RX ADMIN — ACETAMINOPHEN 650 MG: 325 TABLET ORAL at 21:18

## 2021-03-14 RX ADMIN — POLYETHYLENE GLYCOL 3350 17 G: 17 POWDER, FOR SOLUTION ORAL at 16:37

## 2021-03-14 RX ADMIN — PHENYLEPHRINE HYDROCHLORIDE 100 MCG: 10 INJECTION INTRAVENOUS at 10:53

## 2021-03-14 RX ADMIN — Medication 10 ML: at 21:19

## 2021-03-14 RX ADMIN — FENTANYL CITRATE 50 MCG: 50 INJECTION, SOLUTION INTRAMUSCULAR; INTRAVENOUS at 11:42

## 2021-03-14 RX ADMIN — TRANEXAMIC ACID 1000 MG: 1 INJECTION, SOLUTION INTRAVENOUS at 13:07

## 2021-03-14 RX ADMIN — PHENYLEPHRINE HYDROCHLORIDE 100 MCG: 10 INJECTION INTRAVENOUS at 11:00

## 2021-03-14 RX ADMIN — FENTANYL CITRATE 50 MCG: 50 INJECTION, SOLUTION INTRAMUSCULAR; INTRAVENOUS at 12:27

## 2021-03-14 RX ADMIN — ROCURONIUM BROMIDE 10 MG: 10 INJECTION INTRAVENOUS at 12:27

## 2021-03-14 RX ADMIN — TRANEXAMIC ACID 1000 MG: 1 INJECTION, SOLUTION INTRAVENOUS at 11:21

## 2021-03-14 RX ADMIN — SODIUM CHLORIDE: 9 INJECTION, SOLUTION INTRAVENOUS at 04:32

## 2021-03-14 RX ADMIN — PHENYLEPHRINE HYDROCHLORIDE 200 MCG: 10 INJECTION INTRAVENOUS at 12:39

## 2021-03-14 RX ADMIN — PHENYLEPHRINE HYDROCHLORIDE 100 MCG: 10 INJECTION INTRAVENOUS at 11:11

## 2021-03-14 RX ADMIN — SODIUM BICARBONATE 1300 MG: 650 TABLET ORAL at 21:19

## 2021-03-14 RX ADMIN — CEFAZOLIN SODIUM 2 G: 10 INJECTION, POWDER, FOR SOLUTION INTRAVENOUS at 10:55

## 2021-03-14 RX ADMIN — PHENYLEPHRINE HYDROCHLORIDE 100 MCG: 10 INJECTION INTRAVENOUS at 10:57

## 2021-03-14 RX ADMIN — PROPOFOL 70 MG: 10 INJECTION, EMULSION INTRAVENOUS at 10:49

## 2021-03-14 RX ADMIN — DEXAMETHASONE SODIUM PHOSPHATE 4 MG: 10 INJECTION, EMULSION INTRAMUSCULAR; INTRAVENOUS at 10:59

## 2021-03-14 RX ADMIN — SODIUM CHLORIDE 1 ML: 9 INJECTION, SOLUTION INTRAVENOUS at 21:56

## 2021-03-14 RX ADMIN — OXYCODONE 10 MG: 5 TABLET ORAL at 01:43

## 2021-03-14 RX ADMIN — SODIUM CHLORIDE: 9 INJECTION, SOLUTION INTRAVENOUS at 10:44

## 2021-03-14 RX ADMIN — SODIUM CHLORIDE: 9 INJECTION, SOLUTION INTRAVENOUS at 11:36

## 2021-03-14 RX ADMIN — ACETAMINOPHEN 650 MG: 325 TABLET ORAL at 16:37

## 2021-03-14 RX ADMIN — CEFAZOLIN 2000 MG: 10 INJECTION, POWDER, FOR SOLUTION INTRAVENOUS at 18:12

## 2021-03-14 RX ADMIN — Medication 0.6 MG: at 13:15

## 2021-03-14 RX ADMIN — Medication 5 MG: at 13:15

## 2021-03-14 RX ADMIN — PHENYLEPHRINE HYDROCHLORIDE 100 MCG: 10 INJECTION INTRAVENOUS at 12:11

## 2021-03-14 RX ADMIN — SODIUM CHLORIDE: 9 INJECTION, SOLUTION INTRAVENOUS at 13:30

## 2021-03-14 RX ADMIN — ROSUVASTATIN CALCIUM 10 MG: 10 TABLET, FILM COATED ORAL at 21:23

## 2021-03-14 RX ADMIN — CELECOXIB 100 MG: 100 CAPSULE ORAL at 06:46

## 2021-03-14 RX ADMIN — ONDANSETRON HYDROCHLORIDE 4 MG: 4 INJECTION, SOLUTION INTRAMUSCULAR; INTRAVENOUS at 10:59

## 2021-03-14 RX ADMIN — FENTANYL CITRATE 50 MCG: 50 INJECTION, SOLUTION INTRAMUSCULAR; INTRAVENOUS at 11:35

## 2021-03-14 RX ADMIN — SODIUM CHLORIDE: 9 INJECTION, SOLUTION INTRAVENOUS at 16:30

## 2021-03-14 ASSESSMENT — PULMONARY FUNCTION TESTS
PIF_VALUE: 17
PIF_VALUE: 14
PIF_VALUE: 17
PIF_VALUE: 19
PIF_VALUE: 1
PIF_VALUE: 21
PIF_VALUE: 17
PIF_VALUE: 18
PIF_VALUE: 17
PIF_VALUE: 16
PIF_VALUE: 12
PIF_VALUE: 16
PIF_VALUE: 17
PIF_VALUE: 16
PIF_VALUE: 18
PIF_VALUE: 18
PIF_VALUE: 1
PIF_VALUE: 18
PIF_VALUE: 17
PIF_VALUE: 17
PIF_VALUE: 18
PIF_VALUE: 16
PIF_VALUE: 8
PIF_VALUE: 0
PIF_VALUE: 17
PIF_VALUE: 16
PIF_VALUE: 16
PIF_VALUE: 17
PIF_VALUE: 17
PIF_VALUE: 16
PIF_VALUE: 17
PIF_VALUE: 18
PIF_VALUE: 16
PIF_VALUE: 17
PIF_VALUE: 16
PIF_VALUE: 17
PIF_VALUE: 16
PIF_VALUE: 17
PIF_VALUE: 0
PIF_VALUE: 18
PIF_VALUE: 18
PIF_VALUE: 19
PIF_VALUE: 19
PIF_VALUE: 16
PIF_VALUE: 20
PIF_VALUE: 17
PIF_VALUE: 17
PIF_VALUE: 1
PIF_VALUE: 17
PIF_VALUE: 16
PIF_VALUE: 17
PIF_VALUE: 17
PIF_VALUE: 16
PIF_VALUE: 1
PIF_VALUE: 21
PIF_VALUE: 16
PIF_VALUE: 16
PIF_VALUE: 17
PIF_VALUE: 16
PIF_VALUE: 17
PIF_VALUE: 16
PIF_VALUE: 18
PIF_VALUE: 17
PIF_VALUE: 17
PIF_VALUE: 16
PIF_VALUE: 16
PIF_VALUE: 18
PIF_VALUE: 17
PIF_VALUE: 18
PIF_VALUE: 17
PIF_VALUE: 18
PIF_VALUE: 17
PIF_VALUE: 18
PIF_VALUE: 16
PIF_VALUE: 2
PIF_VALUE: 17
PIF_VALUE: 18
PIF_VALUE: 2
PIF_VALUE: 16
PIF_VALUE: 16
PIF_VALUE: 18
PIF_VALUE: 16
PIF_VALUE: 21
PIF_VALUE: 16
PIF_VALUE: 17

## 2021-03-14 ASSESSMENT — PAIN DESCRIPTION - LOCATION
LOCATION: HIP
LOCATION: HIP

## 2021-03-14 ASSESSMENT — PAIN DESCRIPTION - ORIENTATION: ORIENTATION: RIGHT

## 2021-03-14 ASSESSMENT — PAIN SCALES - GENERAL
PAINLEVEL_OUTOF10: 8
PAINLEVEL_OUTOF10: 10
PAINLEVEL_OUTOF10: 0
PAINLEVEL_OUTOF10: 5
PAINLEVEL_OUTOF10: 0

## 2021-03-14 ASSESSMENT — PAIN DESCRIPTION - FREQUENCY: FREQUENCY: CONTINUOUS

## 2021-03-14 ASSESSMENT — PAIN DESCRIPTION - PAIN TYPE: TYPE: ACUTE PAIN

## 2021-03-14 NOTE — CONSULTS
[address] [place] [address]                                  CONSULTATION    PATIENT NAME: Junior Ventura                    :        1940  MED REC NO:   [id number]                           ROOM:  ACCOUNT NO:   [de-identified]                           ADMIT DATE: 2021  PROVIDER:     Carol Roahc. Aarti Maldonado M.D.    Jamia Jeffreysture:  2021    REASON FOR EVALUATION:  Preop evaluation. HISTORY OF PRESENT ILLNESS:  The patient is a 30-year-old lady with  extensive coronary artery disease and cardiac history. She is known to  have history of coronary artery disease. She had a history of CABG. She had a history of intervention of the ostium and proximal portion of  the RCA. She had a history of intervention of the LAD that supplied  diagonal and septal branches. She had a severe stenosis of the  circumflex artery. She did have an echo yesterday afternoon and this  showed distal anteroapical wall myocardial infarction and systolic  dysfunction of the left ventricle. Her ejection fraction was about 40%. She is known to have history of  atrial fib and she had a history of AV antonia ablation and a pacemaker  placement. She does have a history of mitral valve repair and tricuspid  repair with ring placement. She had a history of Watchman device. The  patient came to the hospital with the syncopal episode. She indicates  that she went to the bathroom, tried to wash up and she moved her head  from one side to another, she woke up on the ground indicating that she  passed out. She denied any chest pain prior. She denied palpitation  prior. She has no symptoms prior to that happened. The patient is  known to have a history of dizziness and she has been on meclizine. The patient has poor p.o. intake. The patient came in and she had a fractured right femur. She is to have  surgery for that. She is in a lot of pain. REVIEW OF SYSTEMS:  She had a history of atrial fibrillation in the  past.  She had history of dyslipidemia, history of systolic dysfunction  of the left ventricle, history of pacemaker. She is malnourished and  she has history of hysterectomy. The patient has a trauma to the left  breast before. SOCIAL HISTORY:  She denies smoking or alcohol abuse. ALLERGIES:  She has no known allergies. CODE STATUS:  She is a full code patient. PHYSICAL EXAMINATION:  VITAL SIGNS:  Today showed her blood pressure is 128/78. She is in a  pacer rhythm. She was afebrile. She has no shortness of breath. No  chest pain. NEUROLOGIC:  No focal neurological deficit. NECK:  There is no JVD. HEART:  She had 3/6 systolic murmur over the aortic area radiating to  the base of the neck. LUNGS:  Showed poor air exchange. Decreased air exchange. ABDOMEN:  Soft, tenderness in the right hip area. EXTREMITIES:  Lower limbs, there is no edema, no swelling. LABORATORY DATA:  The patient's lab work showed her sodium is 130, her  hemoglobin is 8.1 and hematocrit 29.5. Her initial troponin was  negative. Her blood sugar is 118. IMPRESSION:  1. Possible positional postural hypotension. 2.  Fracture of right hip, cleared to have surgery. 3.  History of extensive atherosclerotic coronary artery disease with  history of CABG and history of prior intervention. 4.  Systolic dysfunction of the left ventricle with wall motion  abnormalities affecting the distal anterior apical wall of the left  ventricle. 5.  Chronic atrial fib with history of AV node ablation, pacemaker. 6.  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 7.  History of Watchman device. 8.  Anemia, probably exacerbated by blood loss secondary to her trauma. This is a high risk for surgery. I had a long discussion with the  patient with her daughter with the different options of treatment.   At this point, I believe we have no choice but to go ahead with the  intended surgery. The patient needs to have been monitored closely  maybe in the ICU after her surgery. The attempt should be made to keep  her as hemodynamically as stable as possible and to keep the hemoglobin  above 10 to avoid any exacerbation of her cardiac events. Should the  patient have an acute cardiac event, please do not hesitate to call me  in my cell phone number, [phone], that she might need an immediate  attention. She need to have blood available for her at least four units  on hold. I will try to discuss the case with Dr. Taapn Turcios , her orthopedic  surgeon. Again, she understands the risks and benefits and potential  mortality with the procedure and she wants to go ahead with the  procedure. Thank you very much for allowing us to share in the management of this  patient. Please do not hesitate to call us if you have any questions or  concerns. Melvin Price M.D.    D: 03/14/2021 7:54:54       T: 03/14/2021 10:57:05     [email or web address]  Job#: [id number]     Doc#: [id number]    CC:  Carol Roach.  Aarti Maldonado M.D.

## 2021-03-14 NOTE — PROGRESS NOTES
1332  Pt. Resting soundly with eyes closed on adm. To pacu. Pt. Responds slightly to name. Right anterior hip dressing intact and dry. Ice applied to hip. 1336  Pt. Awake and oriented. o2 per nasal cannula applied at 2 liters. Pt. Denies pain. 1350  X-rays done. Dr. Castillo Click reviewed. Pt. Continues to deny pain. 1410  Waiting for new bed assignment. 1425  Report called to 3B.  1516  pacu criteria met. Transfer to 3B30.

## 2021-03-14 NOTE — PROGRESS NOTES
This RN received a call concerning this patient from Dr. Samm Downs. Dr. Samm Downs wanted to know about patient's status with hospitalist and cardiology clearance. This RN stated that she did not know the patient as this RN is not primary nurse. Dr. Samm Downs stated that he had spoken with primary nurse and she did not have any insight. This RN then stated to allow time for this RN to reach out to providers and can update him. Upon looking into chart patient had outstanding consults to both hospitalist and cardiology that appeared to not been completed. No communications noted in flow-sheets. 2000: Call placed to Dr. Vish Veronica with cardiology concerning consult. Dr. Vish Veronica stated that no one had consulted him on this patient. He stated to \"add to my list.\" This RN did as instructed and added Dr. Carlo Holstein to treatment team.   Dr. Samm Downs made aware of situation. 2007: Message sent to Sheryle Glee, [place] with hospitalist group concerning pre-operative clearance. Sheryle Glee, [place] stated: \"I just talked to MARKUS Tellez from ortho and she was told by Grace Oviedo that hospitalist would not clear for surgery and wanted cardio to do it. she is my supervising physician so I dont overstep her. \"     Primary RN- Estephania notified of situation.

## 2021-03-14 NOTE — PROGRESS NOTES
Pt signed consent for OR and is on the chart with blood consent. Pt's Daughter at bedside. Ancef sent with chart to OR. Purewick catheter removed. Pt jewelry given to Daughter. Transport here and took pt to surgery at 1030.

## 2021-03-14 NOTE — OP NOTE
TRANSITIONAL CARE MANAGEMENT - HOSPITAL DISCHARGE FOLLOW-UP    Contacted Mr. Zamarripa about after hospital discharge Transitional Care Follow-up pertaining to these conditions: CHF. He was discharged from the hospital on 5/27/17. Review of the After Visit Summary from the recent hospitalization indicates that Mr. Zamarripa needs to Cardiology, Dr. Juan, Endocrinology and Nephrology.    He feels that he is doing fairly well at home.   His diet concern is none. Overall, the patient is eating well.  Patient is on a heart healthy diet. Patient is on a fluid restriction of 48 ounces and sodium restriction of max of 2000 mg.   Ambulation: stayed the same  Fever: is not present  Pain: he is experiencing pain in his back. The pain is perceived as mild(1-3  pain scale) Patient has been taking Tylenol  Activities of Daily Living (global): Partial assistance   Patient states that he does have sufficient family support. He feels that he is able to ask for assistance when needed.    Additional patient/family concerns: Wife will be contacting Dr. Anderson.    Discharge medications were verified with the patient. He is fully compliant with the medication regimen prescribed at the time of discharge.    he reports that he is not experiencing any medication side effects.    Upon discharge, the patient was to receive home healthcare services . These services have been initiated. These services have been scheduled and no further arrangements are needed at this time.     Patient has an appointment on 6/5/17 with Christoph Juan MD. Mr. Zamarripa was reminded about the importance of keeping this appointment.     correct surgical site was identified and marked. Surgical consent was obtained and placed on the chart. Patient was taken to the surgical suite and transferred to the operating room table. Patient received general per anesthetic. Patient received preoperative antibiotics and tranexamic acid. Surgical timeout was performed confirming correct surgical site, patient, procedure and preoperative antibiotics. Bilateral lower extremities were prepped and draped in a sterile fashion. An incision was made centered over the tensor muscle belly 2 cm distal and lateral to the ASIS. Incision was carried down through skin and subcutaneous tissue. The deep fascia was identified and split. Blunt dissection was used to retract the tensor laterally. The deep fascia of the rectus was identified and released elevating the rectus muscle belly medially. The deep fascia of the rectus was opened and the lateral femoral circumflex vessels identified and coagulated. The pericapsular fat pad was excised. Retractors were placed around the femoral neck and the iliocapsularis elevated exposing the capsule. The capsule was split and T'd at the intertrochanteric line. The remaining femoral neck was cut 1 cm from the lesser and exiting at the saddle.  The fracture fragments were removed. A corkscrew was used to remove the femoral head. The acetabulum was exposed and osteophytes and labrum removed. The acetabulum was sequentially reamed and medialized until there was a good rim fit with a 48 mm shell. The cup was impacted. Two screws were drilled and placed in the posterosuperior quadrant. Fluoroscopy was used to confirm hardware placement. A neutral poly liner was impacted in place. The femur was then released around the calcar and superolateral neck allowing elevation and mobilization of the femur.  The leg was adducted, externally rotated and the leg of the bed extended allowing visualization and access to the canal. A box osteotome followed by a canal finder were used to access the canal. The canal was sequentially broached to a size 12. The hip was reduced with a -3 head and noted to be stable with approximately equal leg lengths. Fluoroscopic images were obtained confirming hardware placement and approximate leg lengths. The hip was reduced and all trials removed. The final stem with a standard offset was inserted. The head trial with -3 head was inserted and reduced. The hip was taken through ROM and found to be stable and leg lengths were noted to be approximately equal. The head was dislocated and the final ceramic head impacted. The hip was reduced. Final fluoroscopic images were obtained. The hip was irrigated with iricept. The hip capsule was closed with #5 ethibond. The tensor fascia was closed with #2 stratafix. A deep pericapsular injection was given in the deep tissue. The deep subcutaneous tissue was closed with 0 Vicryl. The skin and subcutaneous tissue were then closed with 2-0 monocryl and 3-0 stratafix. Steri-Strips, Mepitel, 4 x 4, and tegaderm dressing were then applied. Patient was awakened and transferred to the patient bed. Patient was taken to PACU in stable condition.     Disposition:  Patient will be transferred to the ICU postoperatively for medical evaluation per cardiologist request.  24 hrs antibiotics. WBAT. PT/OT. Pain control. Post op medical management of chronic medical conditions.  Lovenox daily, SCDs, and TEDs for DVT ppx. Anticipate d/c home when medically stable.     Jareth Race, [address]  3/14/2021  1:32 PM

## 2021-03-14 NOTE — CONSULTS
Patient seen and examined    high risk for surgery    patient wants to proceed with surgery     Consult dictated   thanks

## 2021-03-14 NOTE — PROGRESS NOTES
Hospitalist Progress Note    Patient:  Dharmesh Almazan      Unit/Bed:3B-30/030-A    YOB: 1940    MRN: [id number]       Acct: [de-identified]     PCP: PHIL Montgomery CNP    Date of Admission: 3/12/2021    Chief Complaint: Follow-up for CAD and right hip pain    Hospital Course:     Per H&P note:    \"Mrs. Almazan is a 79 Y/O female with past medical history of CAD, HFrEF, HTN, HLD, Afib, presents to ED for Syncope and hip pain. Per patient report was walking through her kitchen when she suddenly lost consciousness. Patient states this happened in mid February as well resulting in a left wrist fracture. Patient follows with Dr. Crystal Dumont cardiologist, recent appointment but did not discuss syncopal episodes with him. After most recent fall patient complaining of right hip pain. Patient denies chest pain, shortness of breath, cough, headache, dizziness, lightheadedness, numbness, paraesthesias, weakness, chills, fevers, abdominal pain, nausea, vomiting, neck pain, or back pain. \"    3/13: Pt reports that yesterday morning, upon awakening, she went to bathroom, used her walker, then while in the sink, she said she passed out and that's the last that she remember. She denies any symptoms prior and after LOC. She denies N/V, diarrhea. 3/14: Patient had right hip total arthroplasty today    Subjective:     Patient seen and examined. Patient just came back from surgery, she denies any pain right now. She denies chest pain, sob, palpitations, cough, fever, chills. Patient's daughter at bedside. Patient's daughter states that patient lives in [facility] [address] [facility]. Patient's daughter states that about 2 to 3 weeks ago, patient also fell and had left forearm fracture, was admitted and was discharged to [facility] [place].       Medications:  Reviewed    Infusion Medications    sodium chloride      sodium chloride 75 mL/hr at 03/14/21 0432     Scheduled Medications    [START ON 3/15/2021] aspirin  81 mg Oral Daily    [START ON 3/15/2021] clopidogrel  75 mg Oral Daily    sodium chloride flush  10 mL Intravenous 2 times per day    ceFAZolin (ANCEF) IVPB  2,000 mg Intravenous Q8H    [START ON 3/15/2021] enoxaparin  40 mg Subcutaneous Daily    sodium chloride flush  10 mL Intravenous 2 times per day    acetaminophen  650 mg Oral 4x daily    amLODIPine  2.5 mg Oral BID    atenolol  25 mg Oral Daily    rosuvastatin  10 mg Oral Daily    sertraline  100 mg Oral Daily    Vitamin D  1,000 Units Oral Daily     PRN Meds: sodium chloride flush, sodium chloride flush, promethazine **OR** ondansetron, polyethylene glycol, oxyCODONE, HYDROmorphone, oxyCODONE, meclizine      Intake/Output Summary (Last 24 hours) at 3/14/2021 1605  Last data filed at 3/14/2021 1330  Gross per 24 hour   Intake 2207.26 ml   Output 400 ml   Net 1807.26 ml       Diet:  DIET CARDIAC; Exam:  BP (!) 106/56   Pulse 82   Temp 98.1 °F (36.7 °C) (Oral)   Resp 20   Ht 5' 5\" (1.651 m)   Wt 128 lb 8 oz (58.3 kg)   SpO2 97%   BMI 21.38 kg/m²     General appearance: alert, not in acute distress  HEENT: Pupils equal, round, and reactive to light. Conjunctivae clear. Clear oral mucosa. Neck: Supple, with full range of motion. No jugular venous distention. Trachea midline. Respiratory:  Normal respiratory effort. Clear to auscultation, bilaterally without Rales/Wheezes/Rhonchi on anterior lung fields ( cannot assess posterior lung fields due to pt cannot turn due to right hip fracture recent surgery)  Cardiovascular: normal rate, regular rhythm, grade 2/6 systolic murmur on aortic area  Abdomen: Soft, non-tender, non-distended with normal bowel sounds. Musculoskeletal: cannot assess right hip due to fracture. (+) cast on left UE  Skin: Skin color, texture, turgor normal.  No rashes or lesions.   Neurological exam reveals alert, oriented, normal speech, no focal findings or movement disorder noted, cranial nerves II through XII intact, motor and sensory grossly normal bilaterally except right LE ( cannot assess due to right hip fracture and recent surgery)  Exam of extremities: peripheral pulses normal, no pedal edema, no clubbing or cyanosis        Labs:   Recent Labs     03/12/21 2015 03/13/21 1918 03/14/21  0644   WBC 5.1 6.6 5.9   HGB 10.7* 9.4* 8.9*   HCT 33.3* 30.0* 29.5*    170 148     Recent Labs     03/12/21 2015 03/13/21 1918 03/14/21  0644   * 132* 130*   K 4.7 4.9 4.7   CL 99 101 102   CO2 21* 22* 18*   BUN 21 18 14   CREATININE 1.1 0.7 0.5   CALCIUM 9.3 8.3* 7.9*     Recent Labs     03/12/21 2015   AST 30   ALT 20   BILITOT <0.2*   ALKPHOS 95     No results for input(s): INR in the last 72 hours. No results for input(s): Sherry Journey in the last 72 hours. Urinalysis:      Lab Results   Component Value Date    NITRU NEGATIVE 02/15/2021    WBCUA 5-9 02/15/2021    BACTERIA NONE SEEN 02/15/2021    RBCUA 0-2 02/15/2021    BLOODU TRACE 02/15/2021    SPECGRAV >1.030 02/15/2021    GLUCOSEU NEGATIVE 11/04/2018       Radiology:  XR PELVIS (1-2 VIEWS)   Final Result       1. Patient has undergone interval right hip replacement which is in satisfactory position. 2. Postoperative changes lumbar spine. 3. Vascular calcification. .               **This report has been created using voice recognition software. It may contain minor errors which are inherent in voice recognition technology. **      Final report electronically signed by DR Chago Almazan on 3/14/2021 2:00 PM      FLUORO FOR SURGICAL PROCEDURES   Final Result      XR HIP RIGHT (2-3 VIEWS)   Final Result       1. Status post right hip replacement which is in satisfactory position. .               **This report has been created using voice recognition software. It may contain minor errors which are inherent in voice recognition technology. **      Final report electronically signed by DR Chago Almazan on 3/14/2021 2:02 PM      CT Head WO Contrast   Final Result   1. No acute intracranial findings. 2. Mild right lateral scalp hematoma. This document has been electronically signed by: Collette Rising, MD on    03/12/2021 09:50 PM      All CTs at this facility use dose modulation techniques and iterative    reconstructions, and/or weight-based dosing   when appropriate to reduce radiation to a low as reasonably achievable. CT CERVICAL SPINE WO CONTRAST   Final Result   No acute findings. This document has been electronically signed by: Collette Rising, MD on    03/12/2021 09:51 PM      All CTs at this facility use dose modulation techniques and iterative    reconstructions, and/or weight-based dosing   when appropriate to reduce radiation to a low as reasonably achievable. XR HIP 2-3 VW W PELVIS RIGHT   Final Result   Acute right subcapital femoral neck fracture with superior displacement of    the distal fracture fragment. This document has been electronically signed by: Collette Rising, MD on    03/12/2021 09:43 PM         XR CHEST 1 VIEW   Final Result   Stable emphysematous lungs without acute findings. This document has been electronically signed by: Collette Rising, MD on    03/12/2021 09:50 PM      VL DUP CAROTID BILATERAL    (Results Pending)         Assessment/Plan:      Syncope and collapse    -no prodrome   -Cardiology on board. Appreciate cardiology input. Per cardiology note, possible positional postural hypotension. I spoke with Dr. Rebekah Lin (cardiology) over the phone, he states that could be positional postural hypotension, however, he would like to further evaluate patient once she gets better with her surgery. Possibly due to anemia as well. Dr. Rebekah Lin recommend to maintain hemoglobin at least 10.   Appreciate Dr. Monalisa Guthrie input.  -echo EF of 40 to 45%, mild aortic stenosis, small to moderate myocardial infarction of the anteroseptal LV (per Dr. Rebekah Lin this is an old infarct)  -carotid Doppler pending  -Cannot obtain orthostatic [] Encourage ambulation           [] Already on Anticoagulation     Disposition:    [] Home       [] TCU       [] Rehab       [] Psych       [] SNF       [] [place]       [x] Other-Plan as above       Code Status: Full Code        Electronically signed by Mary Alexander MD on 3/14/2021 at 4:05 PM

## 2021-03-14 NOTE — ANESTHESIA POSTPROCEDURE EVALUATION
Department of Anesthesiology  Postprocedure Note    Patient: Nichelle French  MRN: [id number]  YOB: 1940  Date of evaluation: 3/14/2021  Time:  3:30 PM     Procedure Summary     Date: 03/14/21 Room / Location: [address] [place]    Anesthesia Start: 5118 Anesthesia Stop: 8424    Procedure: HIP TOTAL ARTHROPLASTY (Right ) Diagnosis: (right subcapital fracture)    Surgeons: Kemal Garcia MD Responsible Provider: Monae Cummings MD    Anesthesia Type: general ASA Status: 4          Anesthesia Type: general    Caio Phase I: Caio Score: 5    Caio Phase II:      Last vitals: Reviewed and per EMR flowsheets.        Anesthesia Post Evaluation    Patient location during evaluation: PACU  Patient participation: complete - patient participated  Level of consciousness: awake and alert  Airway patency: patent  Nausea & Vomiting: no nausea  Complications: no  Cardiovascular status: blood pressure returned to baseline and hemodynamically stable  Respiratory status: acceptable and spontaneous ventilation  Hydration status: euvolemic

## 2021-03-14 NOTE — PLAN OF CARE
Problem: Falls - Risk of:  Goal: Will remain free from falls  Description: Will remain free from falls  Outcome: Ongoing  Note: Patient free of falls, non-skid footwear in use, 2/4 bedrails raised, hourly rounding continued, and bed alarm active. Problem: Falls - Risk of:  Goal: Absence of physical injury  Description: Absence of physical injury  Outcome: Ongoing  Note: Patient free of new injury will continue to assess. Problem: Pain:  Goal: Pain level will decrease  Description: Pain level will decrease  Outcome: Ongoing  Note: Ongoing assessment & interventions provided throughout shift. Reminded patient to report any pain, pressure, or shortness of breath to the nurse. Pain medications provided per physician's orders. Problem: Skin Integrity:  Goal: Will show no infection signs and symptoms  Description: Will show no infection signs and symptoms  Outcome: Ongoing  Note: Ongoing assessment & interventions provided throughout shift. Skin assessments provided. Encouraging/assisting patient to turn as needed.

## 2021-03-15 ENCOUNTER — APPOINTMENT (OUTPATIENT)
Dept: INTERVENTIONAL RADIOLOGY/VASCULAR | Age: 81
DRG: 522 | End: 2021-03-15
Payer: MEDICARE

## 2021-03-15 PROBLEM — E44.0 MODERATE MALNUTRITION (HCC): Chronic | Status: ACTIVE | Noted: 2021-02-17

## 2021-03-15 LAB
ANION GAP SERPL CALCULATED.3IONS-SCNC: 10 MEQ/L (ref 8–16)
BUN BLDV-MCNC: 15 MG/DL (ref 7–22)
CALCIUM IONIZED: 0.79 MMOL/L (ref 1.12–1.32)
CALCIUM SERPL-MCNC: 7.2 MG/DL (ref 8.5–10.5)
CHLORIDE BLD-SCNC: 105 MEQ/L (ref 98–111)
CO2: 18 MEQ/L (ref 23–33)
CREAT SERPL-MCNC: 0.7 MG/DL (ref 0.4–1.2)
ERYTHROCYTE [DISTWIDTH] IN BLOOD BY AUTOMATED COUNT: 16.9 % (ref 11.5–14.5)
ERYTHROCYTE [DISTWIDTH] IN BLOOD BY AUTOMATED COUNT: 57.4 FL (ref 35–45)
FERRITIN: 121 NG/ML (ref 10–291)
FOLATE: 5 NG/ML (ref 4.8–24.2)
GFR SERPL CREATININE-BSD FRML MDRD: 81 ML/MIN/1.73M2
GLUCOSE BLD-MCNC: 120 MG/DL (ref 70–108)
HCT VFR BLD CALC: 36.9 % (ref 37–47)
HEMOGLOBIN: 11 GM/DL (ref 12–16)
IRON: 37 UG/DL (ref 50–170)
MAGNESIUM: 2 MG/DL (ref 1.6–2.4)
MCH RBC QN AUTO: 27.7 PG (ref 26–33)
MCHC RBC AUTO-ENTMCNC: 29.8 GM/DL (ref 32.2–35.5)
MCV RBC AUTO: 92.9 FL (ref 81–99)
OSMOLALITY URINE: 693 MOSMOL/KG (ref 250–750)
PLATELET # BLD: 129 THOU/MM3 (ref 130–400)
PMV BLD AUTO: 11 FL (ref 9.4–12.4)
POTASSIUM SERPL-SCNC: 4.3 MEQ/L (ref 3.5–5.2)
RBC # BLD: 3.97 MILL/MM3 (ref 4.2–5.4)
SODIUM BLD-SCNC: 131 MEQ/L (ref 135–145)
SODIUM BLD-SCNC: 133 MEQ/L (ref 135–145)
SODIUM BLD-SCNC: 135 MEQ/L (ref 135–145)
SODIUM URINE: 123 MEQ/L
TOTAL IRON BINDING CAPACITY: 250 UG/DL (ref 171–450)
VITAMIN B-12: 575 PG/ML (ref 211–911)
WBC # BLD: 8.1 THOU/MM3 (ref 4.8–10.8)

## 2021-03-15 PROCEDURE — 36430 TRANSFUSION BLD/BLD COMPNT: CPT

## 2021-03-15 PROCEDURE — 93880 EXTRACRANIAL BILAT STUDY: CPT

## 2021-03-15 PROCEDURE — 97116 GAIT TRAINING THERAPY: CPT

## 2021-03-15 PROCEDURE — 2140000000 HC CCU INTERMEDIATE R&B

## 2021-03-15 PROCEDURE — 99233 SBSQ HOSP IP/OBS HIGH 50: CPT | Performed by: HOSPITALIST

## 2021-03-15 PROCEDURE — P9016 RBC LEUKOCYTES REDUCED: HCPCS

## 2021-03-15 PROCEDURE — 6370000000 HC RX 637 (ALT 250 FOR IP): Performed by: PHYSICIAN ASSISTANT

## 2021-03-15 PROCEDURE — 83540 ASSAY OF IRON: CPT

## 2021-03-15 PROCEDURE — 36415 COLL VENOUS BLD VENIPUNCTURE: CPT

## 2021-03-15 PROCEDURE — 82728 ASSAY OF FERRITIN: CPT

## 2021-03-15 PROCEDURE — 85027 COMPLETE CBC AUTOMATED: CPT

## 2021-03-15 PROCEDURE — 84300 ASSAY OF URINE SODIUM: CPT

## 2021-03-15 PROCEDURE — 97110 THERAPEUTIC EXERCISES: CPT

## 2021-03-15 PROCEDURE — 82746 ASSAY OF FOLIC ACID SERUM: CPT

## 2021-03-15 PROCEDURE — 6370000000 HC RX 637 (ALT 250 FOR IP): Performed by: FAMILY MEDICINE

## 2021-03-15 PROCEDURE — 6360000002 HC RX W HCPCS: Performed by: PHYSICIAN ASSISTANT

## 2021-03-15 PROCEDURE — 80048 BASIC METABOLIC PNL TOTAL CA: CPT

## 2021-03-15 PROCEDURE — 97163 PT EVAL HIGH COMPLEX 45 MIN: CPT

## 2021-03-15 PROCEDURE — 82607 VITAMIN B-12: CPT

## 2021-03-15 PROCEDURE — 83550 IRON BINDING TEST: CPT

## 2021-03-15 PROCEDURE — 97166 OT EVAL MOD COMPLEX 45 MIN: CPT

## 2021-03-15 PROCEDURE — 84295 ASSAY OF SERUM SODIUM: CPT

## 2021-03-15 PROCEDURE — 82330 ASSAY OF CALCIUM: CPT

## 2021-03-15 PROCEDURE — 83735 ASSAY OF MAGNESIUM: CPT

## 2021-03-15 PROCEDURE — 83935 ASSAY OF URINE OSMOLALITY: CPT

## 2021-03-15 PROCEDURE — 2580000003 HC RX 258: Performed by: PHYSICIAN ASSISTANT

## 2021-03-15 RX ORDER — FUROSEMIDE 20 MG/1
20 TABLET ORAL DAILY
COMMUNITY
End: 2021-05-26 | Stop reason: SDUPTHER

## 2021-03-15 RX ORDER — ROSUVASTATIN CALCIUM 10 MG/1
10 TABLET, COATED ORAL NIGHTLY
Status: ON HOLD | COMMUNITY
End: 2021-04-14 | Stop reason: HOSPADM

## 2021-03-15 RX ORDER — POTASSIUM CHLORIDE 750 MG/1
10 TABLET, EXTENDED RELEASE ORAL DAILY
COMMUNITY
End: 2021-05-26

## 2021-03-15 RX ADMIN — ENOXAPARIN SODIUM 40 MG: 40 INJECTION SUBCUTANEOUS at 09:24

## 2021-03-15 RX ADMIN — OXYCODONE HYDROCHLORIDE 5 MG: 5 TABLET ORAL at 20:12

## 2021-03-15 RX ADMIN — SODIUM CHLORIDE: 9 INJECTION, SOLUTION INTRAVENOUS at 11:01

## 2021-03-15 RX ADMIN — ACETAMINOPHEN 650 MG: 325 TABLET ORAL at 16:08

## 2021-03-15 RX ADMIN — AMLODIPINE BESYLATE 2.5 MG: 5 TABLET ORAL at 09:25

## 2021-03-15 RX ADMIN — SODIUM BICARBONATE 1300 MG: 650 TABLET ORAL at 09:27

## 2021-03-15 RX ADMIN — SERTRALINE 100 MG: 100 TABLET, FILM COATED ORAL at 09:27

## 2021-03-15 RX ADMIN — ROSUVASTATIN CALCIUM 10 MG: 10 TABLET, FILM COATED ORAL at 20:06

## 2021-03-15 RX ADMIN — POLYETHYLENE GLYCOL 3350 17 G: 17 POWDER, FOR SOLUTION ORAL at 09:24

## 2021-03-15 RX ADMIN — DOCUSATE SODIUM 100 MG: 100 CAPSULE, LIQUID FILLED ORAL at 09:24

## 2021-03-15 RX ADMIN — CLOPIDOGREL BISULFATE 75 MG: 75 TABLET ORAL at 09:24

## 2021-03-15 RX ADMIN — Medication 1000 UNITS: at 09:27

## 2021-03-15 RX ADMIN — ACETAMINOPHEN 650 MG: 325 TABLET ORAL at 09:24

## 2021-03-15 RX ADMIN — CEFAZOLIN 2000 MG: 10 INJECTION, POWDER, FOR SOLUTION INTRAVENOUS at 03:46

## 2021-03-15 RX ADMIN — OXYCODONE HYDROCHLORIDE 5 MG: 5 TABLET ORAL at 02:29

## 2021-03-15 RX ADMIN — ASPIRIN 81 MG: 81 TABLET, CHEWABLE ORAL at 09:24

## 2021-03-15 RX ADMIN — ATENOLOL 25 MG: 25 TABLET ORAL at 09:25

## 2021-03-15 ASSESSMENT — PAIN SCALES - GENERAL
PAINLEVEL_OUTOF10: 6
PAINLEVEL_OUTOF10: 4
PAINLEVEL_OUTOF10: 0
PAINLEVEL_OUTOF10: 0

## 2021-03-15 NOTE — PROGRESS NOTES
Orthopedic Progress Note    Kimberly Almazan  3/15/2021    Subjective:     Post-Operative Day # 1 s/p right total hip arthroplasty. Patient resting comfortably in chair with pain well controlled. Worked with PT/OT this morning. Patient states it was difficulty working with therapy. Hgb:11    Systemic or Specific Complaints: No Complaints    Objective:     /64   Pulse 86   Temp 97.7 °F (36.5 °C) (Oral)   Resp 18   Ht 5' 5\" (1.651 m)   Wt 128 lb 8 oz (58.3 kg)   SpO2 93%   BMI 21.38 kg/m²     Intake/Output Summary (Last 24 hours) at 3/15/2021 0901  Last data filed at 3/15/2021 0341  Gross per 24 hour   Intake 2820 ml   Output 700 ml   Net 2120 ml     DRAIN/TUBE OUTPUT:       General: alert, appears stated age and cooperative   Wound: Dressing clean, dry, intact with no evidence of infection. Extremity: Mild TTP along incision. Motor intact quad, hamstring, TA, GSC, EHL, FHL. Sensation intact light touch SPN, DPN, sural, saphenous, tibial nerve distribution. 2+ DP pulse. Toes warm well perfused. DVT Exam: Negative Yumiko's sign. No cords or calf tenderness. No evidence of DVT by physical exam     Data Review  CBC:   Lab Results   Component Value Date    WBC 5.9 03/14/2021    RBC 3.14 03/14/2021    HGB 8.4 03/14/2021    HCT 27.5 03/14/2021     03/14/2021     BMP:    Lab Results   Component Value Date     03/14/2021    K 4.7 03/14/2021    K 4.7 03/12/2021     03/14/2021    CO2 18 03/14/2021    BUN 14 03/14/2021    CREATININE 0.5 03/14/2021    CALCIUM 7.9 03/14/2021    GLUCOSE 108 03/14/2021     PT/INR:    Lab Results   Component Value Date    INR 1.05 02/15/2021       Radiology: See electronic record to view reports. Reports reviewed. Assessment:     Status Post Right total hip arthroplasty surgery, doing well, stable.       Plan:     1: 24 hrs antibiotics  2: WBAT  3: PT/OT  4: Pain control  5: Post op medical management of chronic medical conditions  6: Lovenox daily, SCDs, and TEDs for DVT ppx. Resume home Plavix.   7: Anticipate d/c home when medically stable. Electronically signed by MARKUS Dewitt on 3/15/2021 at 9:01 AM      Patient care discussed with Gino Dent PA-C. Agree with assessment and plan. P.o. pain control. Weightbearing as tolerated. PT/OT. DVT prophylaxis Lovenox, SCDs, teds. Dissipate discharge home when medically stable. Continue evaluation for syncope per medicine.     Francois Parra MD  Orthopaedic Surgeon  [facility]opa[facility]  3/15/2021  8:43 PM

## 2021-03-15 NOTE — PROGRESS NOTES
Pharmacy Medication History Note      List of current medications patient is taking is complete. Source of information: [address] made to medication list:  Medications removed (include reason, ex. therapy complete or physician discontinued): Bactrim DS - therapy complete  Tizanidine 2 mg - therapy complete  Atorvastatin 40 mg - replaced by rosuvastatin    Medications added/doses adjusted:  Furosemide 20 mg QD  Potassium Cl ER 10 mEq QD  Rosuvastatin 10 mg QHS    Other notes (ex. Recent course of antibiotics, Coumadin dosing):  Patient unsure of all home medications, called her pharmacy to confirm  Denies use of other OTC or herbal medications.       Allergies reviewed      Electronically signed by Merline Trent PharmD Candidate on 3/15/2021 at 10:34 AM

## 2021-03-15 NOTE — CARE COORDINATION
3/15/21, 9:20 AM EDT  DISCHARGE PLANNING EVALUATION:    Colten Dust Arbon       Admitted: 3/12/2021/ [address]  day: 3   Location: -30/030-A Reason for admit: Syncope and collapse [R55]   PMH:  has a past medical history of Atrial fibrillation ([facility] [address] CAD (coronary artery disease), Congestive heart failure (CHF) ([facility] [address] Hyperlipidemia, and Hypertension. Procedure: 3/14 Right total hip arthroplasty by Dr. Amilcar Gregg.  3/15 [facility]. Barriers to Discharge:  Admitted through ED after having syncopal episode and fracturing hip. POD #1 total hip. Transferred to 3B post op for monitoring per Cardiology request. PT/OT. IVF. Baby ASA, Norvasc, Tenormin, Plavix, Lovenox, Pain control. O2 on at 2L/nc, sats 93%. PCP: PHIL Guajardo CNP  Readmission Risk Score: 20%    Patient Goals/Plan/Treatment Preferences: [facility] is [facility] consulted. Transportation/Food Security/Housekeeping Addressed:  No issues identified.

## 2021-03-15 NOTE — PROGRESS NOTES
Admit Date: 3/12/2021  Hospital day 3    Subjective:     Patient has no chest pain . Medication side effects: none    Scheduled Meds:   aspirin  81 mg Oral Daily    clopidogrel  75 mg Oral Daily    sodium chloride flush  10 mL Intravenous 2 times per day    enoxaparin  40 mg Subcutaneous Daily    polyethylene glycol  17 g Oral Daily    docusate sodium  100 mg Oral Daily    calcium replacement protocol   Other RX Placeholder    sodium chloride flush  10 mL Intravenous 2 times per day    acetaminophen  650 mg Oral 4x daily    amLODIPine  2.5 mg Oral BID    atenolol  25 mg Oral Daily    rosuvastatin  10 mg Oral Daily    sertraline  100 mg Oral Daily    Vitamin D  1,000 Units Oral Daily     Continuous Infusions:   sodium chloride      sodium chloride      sodium chloride 75 mL/hr at 03/15/21 1101     PRN Meds:sodium chloride flush, sodium chloride, sodium chloride, sodium chloride flush, promethazine **OR** ondansetron, oxyCODONE, HYDROmorphone, oxyCODONE, meclizine    Review of Systems  Pertinent items are noted in HPI. Objective:     Patient Vitals for the past 8 hrs:   BP Temp Temp src Pulse Resp SpO2   03/15/21 1529 (!) 96/54 97.4 °F (36.3 °C) Oral 81 18 94 %   03/15/21 1132 (!) 89/54 98.9 °F (37.2 °C) Oral 82 16 96 %     I/O last 3 completed shifts: In: 57173 [P.O.:820;  I.V.:55873; Blood:620]  Out: 500 [Urine:500]    No change     ECG: sr.   Data Review:   CBC:  Lab Results   Component Value Date    WBC 8.1 03/15/2021    RBC 3.97 03/15/2021    HGB 11.0 03/15/2021    HCT 36.9 03/15/2021    MCV 92.9 03/15/2021    MCH 27.7 03/15/2021    MCHC 29.8 03/15/2021     03/15/2021    MPV 11.0 03/15/2021     BMP  Lab Results   Component Value Date     03/15/2021    K 4.3 03/15/2021    K 4.7 03/12/2021     03/15/2021    CO2 18 03/15/2021    BUN 15 03/15/2021    CREATININE 0.7 03/15/2021    CALCIUM 7.2 03/15/2021      COAG PROFILE:  Lab Results   Component Value Date    APTT 27.6 11/12/2018    INR 1.05 02/15/2021       Assessment:     Active Problems:    Hyperlipidemia    Moderate malnutrition (HCC)    Syncope and collapse    Closed subcapital fracture of femur, right, initial encounter ([facility] [address]    Closed fracture of right hip (HCC)    Hyponatremia    Hyperglycemia    High anion gap metabolic acidosis    Hx of coronary artery disease    HFrEF (heart failure with reduced ejection fraction) (HCC)    History of atrial fibrillation    Acute on chronic anemia  Resolved Problems:    * No resolved hospital problems.  *      Plan:   Patient s/p rt hip replacement    no chest pain    h/h stable    stable cardiac status    ok to transfer to ortho floor    continue home meds

## 2021-03-15 NOTE — CARE COORDINATION
La3/15/21, 11:12 AM EDT  Discharge Planning Evaluation  Social work consult received, patient from [facility]. [facility] The patient's current payor source at the facility is Medicare and [employer]. Medicare skilled days available: yes  Insurance precert:  no  Spoke with Ray at the facility. Patient bed hold: they will hold the bed as long as she is discharged in the next couple of days. Anticipated transport plan: unsure  Do they require COVID 19 test to return to [facility]  Is there a required time frame which which COVID test needs done:no time frame unless she has symptoms. If she has symptoms they need it done with in 48 hours of discharge.

## 2021-03-15 NOTE — PROGRESS NOTES
[address] [address]  INPATIENT PHYSICAL THERAPY  EVALUATION  [facility]    Time In: 5126  Time Out: 7066  Timed Code Treatment Minutes: 10 Minutes  Minutes: 28          Date: 3/15/2021  Patient Name: Forrest Vallecillo,  Gender:  female        MRN: [id number]  : 1940  (80 y.o.)      Referring Practitioner: MARKUS Lindo  Diagnosis: syncope and collapse  Additional Pertinent Hx: Per ER note on 3/12/2021: This patient apparently has been having syncopal episodes at the nursing home. On her last visit to the ER she had a syncopal episode resulting in a fracture of her forearm. Tonight had another such episode. This time presents with a hip deformity on the right side. It also looks like she hit her head. Noted to be on Plavix. s/p R THR anterior approach on 3/14     Restrictions/Precautions:  Restrictions/Precautions: Fall Risk, Weight Bearing  Right Lower Extremity Weight Bearing: Weight Bearing As Tolerated  Left Upper Extremity Weight Bearing: Non Weight Bearing(R forearm (hx of R distal radius fx))  Position Activity Restriction  Other position/activity restrictions: s/p R anterior approach R THR on 3/14, no S.L.R. Subjective:  Chart Reviewed: Yes  Patient assessed for rehabilitation services?: Yes  Subjective: pleasant and cooperative, pt up in chair for breakfast and ready to get back to bed    General:            Hearing: Within functional limits         Pain: not rated, some at Right hip per pt    Vitals: Vitals not assessed per clinical judgement, see nursing flowsheet    Social/Functional History:    Type of Home: Facility             ADL Assistance: Needs assistance     Ambulation Assistance: Needs assistance  Transfer Assistance: Needs assistance          Additional Comments: Pt has been at the [facility] since hospital admission 2021 with L radius fx. Pt was using hemiwalker. Pt reports being at home alone prior to the first hospitalization.     OBJECTIVE:  Range of Motion:  Right Lower Extremity: AAROM WFL  Left Lower Extremity: [facility]    Strength:  Right Lower Extremity: Impaired - hip grossly 3-/5, knee 3/5, ankle WFL  Left Lower Extremity: WFL, generalized weakness    Balance:  Static Sitting Balance:  Stand By Assistance  Static Standing Balance: Moderate Assistance, to Ab with hemiwalker in [address], pt difficulty with wt shift/WB on RLE, pregait wt shifts on RLE with modA to Ab for balance    Bed Mobility:  Rolling to Right: Maximum Assistance, X 1   Sit to Supine: Maximum Assistance, X 1   Scooting: Moderate Assistance, X 1, fwd in sitting  HOB flat  Transfers:  Sit to Stand: Moderate Assistance, X 1  Stand to Sit:Minimal Assistance, X 1  Cues for hand placement,   Ambulation:  Moderate Assistance, to minAx1  Distance: 3'x1  Surface: Level Tile  Device:Cyrus-Walker  Gait Deviations: Forward Flexed Posture, Slow Estrella, Decreased Step Length on Right, Decreased Weight Shift Right, Decreased Heel Strike on Right, Narrow Base of Support and Unsteady Gait    Exercise:  Patient was guided in 1 set(s) 5-10 reps of exercise to both lower extremities. Ankle pumps, Glut sets and Quad sets. Exercises were completed for increased independence with functional mobility. Functional Outcome Measures: Completed  -PAC Inpatient Mobility Raw Score : 10  AM-PAC Inpatient T-Scale Score : 32.29    ASSESSMENT:  Activity Tolerance:  Patient tolerance of  treatment: fair. Treatment Initiated: Treatment and education initiated within context of evaluation. Evaluation time included review of current medical information, gathering information related to past medical, social and functional history, completion of standardized testing, formal and informal observation of tasks, assessment of data and development of plan of care and goals.   Treatment time included skilled education and facilitation of tasks to increase safety and independence with functional mobility for improved independence and quality of life. Assessment: Body structures, Functions, Activity limitations: Decreased functional mobility , Decreased balance, Decreased ROM, Decreased strength, Decreased endurance, Increased pain  Assessment: pt with pain in RLE, s/p fall with right ANIL, generalized weakness, dec balance, use of hemiwalker and inc assist for safe mobility, recommend cont PT to inc pt functional mobility  Prognosis: Good    REQUIRES PT FOLLOW UP: Yes    Discharge Recommendations:  Discharge Recommendations: Continue to assess pending progress, Patient would benefit from continued therapy after discharge, Subacute/Skilled Nursing Facility    Patient Education:  PT Education: Goals, PT Role, Plan of Care, Home Exercise Program, Functional Mobility Training    Equipment Recommendations:  Equipment Needed: No    Plan:  Times per week: 6X BID, 1x daily  Times per day: Twice a day  Specific instructions for Next Treatment: therex(no S.L.R.) and mobility    Goals:  Patient goals : plans return to SNF for therapy  Short term goals  Time Frame for Short term goals: by discharge  Short term goal 1: bed mobility with Ab to get in/out of bed  Short term goal 2: transfer with CGA to get in/out of chairs  Short term goal 3: amb >10'x1 with hemiwalker and CGA to walk safely to bathroom  Long term goals  Time Frame for Long term goals : no LTGs set secondary to short ELOS    Following session, patient left in safe position with all fall risk precautions in place.

## 2021-03-15 NOTE — PLAN OF CARE
Problem: Nutrition  Goal: Optimal nutrition therapy  Outcome: Ongoing  Nutrition Problem #1: Moderate malnutrition  Intervention: Food and/or Nutrient Delivery: Continue Current Diet, Start Oral Nutrition Supplement  Nutritional Goals: Pt. will tolerate and consume 75% or more of meals during LOS.

## 2021-03-15 NOTE — PROGRESS NOTES
[facility]  INPATIENT PHYSICAL THERAPY  DAILY NOTE  [facility] CCU-STEPDOWN 3B - 3B-30/030-A    Time In: 4945  Time Out: 1422  Timed Code Treatment Minutes: 22 Minutes  Minutes: 24          Date: 3/15/2021  Patient Name: Lorenzo Curran,  Gender:  female        MRN: [id number]  : 1940  (80 y.o.)     Referring Practitioner: MARKUS Black  Diagnosis: syncope and collapse  Additional Pertinent Hx: Per ER note on 3/12/2021: This patient apparently has been having syncopal episodes at the nursing home. On her last visit to the ER she had a syncopal episode resulting in a fracture of her forearm. Tonight had another such episode. This time presents with a hip deformity on the right side. It also looks like she hit her head. Noted to be on Plavix. s/p R THR anterior approach on 3/14     Prior Level of Function:  Type of Home: Facility        ADL Assistance: Needs assistance  Ambulation Assistance: Needs assistance  Transfer Assistance: Needs assistance  Additional Comments: Pt has been at the [facility] since hospital admission 2021 with L radius fx. Pt was using hemiwalker. Pt reports being at home alone prior to the first hospitalization. Restrictions/Precautions:  Restrictions/Precautions: Fall Risk, Weight Bearing  Right Lower Extremity Weight Bearing: Weight Bearing As Tolerated  Left Upper Extremity Weight Bearing: Non Weight Bearing(R forearm (hx of R distal radius fx))  Position Activity Restriction  Other position/activity restrictions: s/p R anterior approach R THR on 3/14, no S.L.R.     SUBJECTIVE: pt required min encouragement for OOB to chair, dtr supportive, pt c/o fatigue    PAIN: not rated at RLE    Vitals: Vitals not assessed per clinical judgement, see nursing flowsheet    OBJECTIVE:  Bed Mobility:  Rolling to Left: Moderate Assistance, X 1   Supine to Sit: Moderate Assistance, X 1  Scooting:  Moderate Assistance, X 1, fwd and bwd in sitting  HOB up 20 degrees, cues for sequencing  Transfers:  Sit to Stand: Moderate Assistance, X 1  Stand to Sit:Moderate Assistance, X 1, cues to back up fully to chair and for hand placement for safety before sitting down    Ambulation:  Moderate Assistance, to minAx1  Distance: 3'x1  Surface: Level Tile  Device:Cyrus-Walker  Gait Deviations: Forward Flexed Posture, Slow Estrella, Decreased Step Length on Right, Decreased Weight Shift Right, Decreased Heel Strike on Right and Unsteady Gait    Balance:  Static Sitting Balance:  Stand By Assistance  Static Standing Balance: Moderate Assistance, to Ab with use of hemiwalker    Exercise:  Patient was guided in 1 set(s) 10 reps of exercise to both lower extremities. Ankle pumps, Glut sets, Quad sets, Heelslides and Short arc quads. Ab initially for RLE then progressed to SBA. Exercises were completed for increased independence with functional mobility. Functional Outcome Measures: Completed  AM-PAC Inpatient Mobility Raw Score : 10  AM-PAC Inpatient T-Scale Score : 32.29    ASSESSMENT:  Assessment: Patient progressing toward established goals. Activity Tolerance:  Patient tolerance of  treatment: good.       Equipment Recommendations:Equipment Needed: No  Discharge Recommendations:  Continue to assess pending progress, Patient would benefit from continued therapy after discharge, Subacute/Skilled Nursing Facility    Plan: Times per week: 6X BID, 1x daily  Times per day: Twice a day  Specific instructions for Next Treatment: therex(no S.L.R.) and mobility    Patient Education  Patient Education: Home Exercise Program, Bed Mobility, Transfers, Gait    Goals:  Patient goals : plans return to SNF for therapy  Short term goals  Time Frame for Short term goals: by discharge  Short term goal 1: bed mobility with Ab to get in/out of bed  Short term goal 2: transfer with CGA to get in/out of chairs  Short term goal 3: amb >10'x1 with hemiwalker and CGA to walk safely to bathroom  Long term goals  Time Frame for Long term goals : no LTGs set secondary to short ELOS    Following session, patient left in safe position with all fall risk precautions in place.

## 2021-03-15 NOTE — PLAN OF CARE
Problem: Falls - Risk of:  Goal: Will remain free from falls  Description: Will remain free from falls  Outcome: Ongoing  Note: Pt free from falls, safety maintained   Goal: Absence of physical injury  Description: Absence of physical injury  Outcome: Ongoing  Note: Pt free from injury, safety maintained      Problem: Pain:  Goal: Pain level will decrease  Description: Pain level will decrease  Outcome: Ongoing  Note: Pt currently denies any pain, prn meds given per order  Goal: Control of acute pain  Description: Control of acute pain  Outcome: Ongoing  Note: Pt currently denies any pain  Goal: Control of chronic pain  Description: Control of chronic pain  Outcome: Ongoing  Note: Pt currently denies any pain     Problem: Skin Integrity:  Goal: Will show no infection signs and symptoms  Description: Will show no infection signs and symptoms  Outcome: Ongoing  Note: Afebrile, no s/s of infection, vss  Goal: Absence of new skin breakdown  Description: Absence of new skin breakdown  Outcome: Ongoing  Note: No new skin breakdown noted      Problem: Infection:  Goal: Will remain free from infection  Description: Will remain free from infection  Outcome: Ongoing  Note: Afebrile, no s/s of infection, vss     Problem: Discharge Planning:  Goal: Patients continuum of care needs are met  Description: Patients continuum of care needs are met  Outcome: Ongoing  Note: Discharge planning cont     Problem:  Activity:  Goal: Ability to ambulate will improve  Description: Ability to ambulate will improve  Outcome: Ongoing  Note: Cont BR, await PT/OT to eval  Goal: Ability to perform activities at highest level will improve  Description: Ability to perform activities at highest level will improve  Outcome: Ongoing  Note: Cont proper treatment, await PT/OT eval     Problem: Respiratory:  Goal: Ability to maintain adequate ventilation will improve  Description: Ability to maintain adequate ventilation will improve  Outcome: Ongoing  Note: Pt maintains adequate ventilation      Problem: Safety:  Goal: Ability to remain free from injury will improve  Description: Ability to remain free from injury will improve  Outcome: Ongoing  Note: Pt free from injury, safety maintained

## 2021-03-15 NOTE — PROGRESS NOTES
Comprehensive Nutrition Assessment    Type and Reason for Visit:  Initial, Positive Nutrition Screen(Poor appetite)    Nutrition Recommendations/Plan:   Will send Ensure Enlive BID. Recommend MVI. Question if pt. Would benefit from appetite stimulant. Nutrition Assessment:    Pt. moderately malnourished AEB criteria as listed below. At risk for further nutrition compromise r/t recent hip surgery/increased nutrient needs for healing and underlying medical condition (hx CAD, CHF, HLD, HTN). Nutrition recommendations/interventions as per above. Malnutrition Assessment:  Malnutrition Status: Moderate malnutrition    Context:  Chronic Illness     Findings of the 6 clinical characteristics of malnutrition:  Energy Intake:  7 - 75% or less estimated energy requirements for 1 month or longer  Weight Loss:  No significant weight loss     Body Fat Loss:  1 - Mild body fat loss Orbital   Muscle Mass Loss:  No significant muscle mass loss    Fluid Accumulation:  Unable to assess     Strength:  Not Performed    Estimated Daily Nutrient Needs:  Energy (kcal):  8426-7938 kcals (25-30); Weight Used for Energy Requirements:  (58 kgm 3/14)     Protein (g):  70-87gm (1.2-1.5);  Weight Used for Protein Requirements:  (58 kgm)                Nutrition Related Findings:  Pt. seen - reports poor appetite \"forever\"; states doesn't eat meals - tends to snack and \"pick\" throughout the day; admits to trying to drink 1 Ensure at times; states likes cookies, brownies, etc; does eat cheese and peanut butter with crackers; denies any trouble with chewing/swallowing; 3/14: Glucose 120, Sodium 131; Rx includes Glycolax, ZOfran      Wounds:  (incision - hip (OR 3/16))       Current Nutrition Therapies:    DIET CARDIAC; Daily Fluid Restriction: 1500 ml  Dietary Nutrition Supplements: Standard High Calorie Oral Supplement    Anthropometric Measures:  · Height: 5' 5\" (165.1 cm)  · Current Body Weight: 128 lb 8 oz (58.3 kg)(3/14 +1 edema)   · Admission Body Weight: 128 lb 8 oz (58.3 kg)(3/14 +1 edema)    · Usual Body Weight: (per pt 126#; per EMR: 5/11/20: 129# 14.4 oz, 2/15/21: 126# 12.8 oz)     · Ideal Body Weight: 125 lbs;   · BMI: 21.4  · BMI Categories: Normal Weight (BMI 22.0 to 24.9) age over 72       Nutrition Diagnosis:   · Moderate malnutrition related to inadequate protein-energy intake as evidenced by poor intake prior to admission, mild loss of subcutaneous fat      Nutrition Interventions:   Food and/or Nutrient Delivery:  Continue Current Diet, Start Oral Nutrition Supplement  Nutrition Education/Counseling:  Education initiated(3/15 Encouraged po, good nutrition at best efforts. Discussed appropriate use of ONS and healthy options to snack on.)   Coordination of Nutrition Care:  Continue to monitor while inpatient    Goals:  Pt. will tolerate and consume 75% or more of meals during LOS. Nutrition Monitoring and Evaluation:   Behavioral-Environmental Outcomes:  Knowledge or Skill   Food/Nutrient Intake Outcomes:  Diet Advancement/Tolerance, Food and Nutrient Intake, Supplement Intake  Physical Signs/Symptoms Outcomes:  Biochemical Data, Chewing or Swallowing, GI Status, Fluid Status or Edema, Nutrition Focused Physical Findings, Skin, Weight     Discharge Planning:     Too soon to determine     Electronically signed by Anthony Jean RD, LD on 3/15/21 at 11:41 AM EDT    Contact: [phone]

## 2021-03-15 NOTE — PROGRESS NOTES
[facility]  INPATIENT OCCUPATIONAL THERAPY  [facility] CCU-STEPDOWN 3B  EVALUATION    Time:    Time In: 6516  Time Out: 6609  Timed Code Treatment Minutes: 11 Minutes  Minutes: 26          Date: 3/15/2021  Patient Name: Forrest Vallecillo,   Gender: female      MRN: [id number]  : 1940  (80 y.o.)  Referring Practitioner: UTE APARICIO  Diagnosis: syncope & collapse  Additional Pertinent Hx: Per ER note on 3/12/2021: This patient apparently has been having syncopal episodes at the nursing home. On her last visit to the ER she had a syncopal episode resulting in a fracture of her forearm. Tonight had another such episode. This time presents with a hip deformity on the right side. It also looks like she hit her head. Noted to be on Plavix. s/p R THR anterior approach on 3/14    Restrictions/Precautions:  Restrictions/Precautions: Fall Risk, Weight Bearing  Right Lower Extremity Weight Bearing: Weight Bearing As Tolerated  Left Upper Extremity Weight Bearing: Non Weight Bearing(R forearm (hx of R distal radius fx))  Position Activity Restriction  Other position/activity restrictions: s/p R anterior approach R THR on 3/14    Subjective  Chart Reviewed: Yes, Orders, Progress Notes, History and Physical  Patient assessed for rehabilitation services?: Yes  Family / Caregiver Present: No    Subjective: cooperative    Pain: no c/o pain during session       Vitals: Blood Pressure: 133/73, HR 83 2 91% (c/o some light headness once in recliner)    Social/Functional History:  Type of Home: Facility           ADL Assistance: Needs assistance  Ambulation Assistance: Needs assistance  Transfer Assistance: Needs assistance          Additional Comments: Pt has been at the [facility] since hospital admission 2021 with L radius fx. Pt was using hemiwalker. Pt reports being at home alone prior to the first hospitalization.     VISION:Corrected    HEARING:  WFL    COGNITION: WFL    RANGE OF MOTION:  WFL expect L wrist in cast-limitations with fingers    STRENGTH:  RUE 4/5, LUE NT d/t NWB status    SENSATION:   WFL    ADL:   Lower Extremity Dressing: Dependent. for donning slipper socks. BALANCE:  Sitting Balance:  Contact Guard Assistance. Standing Balance: Minimal Assistance. static    BED MOBILITY:  Supine to Sit: Maximum Assistance       TRANSFERS:  Sit to Stand: Moderate Assistance. from EOB    FUNCTIONAL MOBILITY:  Assistive Device: Cyrus-walker  Assist Level:  Minimal Assistance.-mod A  Distance: 3 steps to recliner   **max vcs for technique with cyrus walker    Activity Tolerance:  Patient tolerance of  treatment: fair. Fatigued quickly  Assessment:  Assessment: Pt demo decreased ADL & functional mobility indep over PLOF s/p syncopal episode with fall & R femur fx (hx of R radius fx). Continued OT recommended to educate Pt on safety & adaptative strategies for eventual returning home. Performance deficits / Impairments: Decreased functional mobility , Decreased ADL status, Decreased safe awareness, Decreased balance, Decreased endurance  Prognosis: Fair  REQUIRES OT FOLLOW UP: Yes  Decision Making: Medium Complexity  Safety Devices in place: Yes  Type of devices: All fall risk precautions in place, Gait belt, Call light within reach    Treatment Initiated: Treatment and education initiated within context of evaluation. Evaluation time included review of current medical information, gathering information related to past medical, social and functional history, completion of standardized testing, formal and informal observation of tasks, assessment of data and development of plan of care and goals. Treatment time included skilled education and facilitation of tasks to increase safety and independence with ADL's for improved functional independence and quality of life.     Discharge Recommendations:  Subacute/Skilled Nursing Facility    Patient Education:  OT Education: OT Role, Plan of Care, Precautions, ADL Adaptive Strategies  Barriers to Learning: none    Equipment Recommendations: Other: Monitor pending progress    Plan:  Times per week: 6x  Current Treatment Recommendations: Balance Training, Functional Mobility Training, Safety Education & Training, Self-Care / ADL, Endurance Training. See long-term goal time frame for expected duration of plan of care. If no long-term goals established, a short length of stay is anticipated. Goals:  Patient goals : be able to get around  Short term goals  Time Frame for Short term goals: until discharge  Short term goal 1: Complete mobility to/from [facility] or bedside chair with kamaljit walker, min A, & min vcs for safety  Short term goal 2: Tolerate standing 2 min with CGA for increased ease of toileting routine  Short term goal 3: Complete LE dressing with mod A & LH AE prn  Short term goal 4: Complete various t/fs including BSC with min A & min vcs for safety/technique  Long term goals  Time Frame for Long term goals : No LTG set d/t short ELOS         Following session, patient left in safe position with all fall risk precautions in place.

## 2021-03-15 NOTE — PROGRESS NOTES
Hospitalist Progress Note      Patient:  Karyn Almeida Watson    Unit/Bed:3B-30/030-A  YOB: 1940  MRN: [id number]   Acct: [de-identified]   PCP: PHIL Herron CNP  Date of Admission: 3/12/2021    Assessment/Plan:    Syncope and collapse     -no prodrome   -echo EF of 40 to 45%, mild aortic stenosis, small to moderate myocardial infarction of the anteroseptal LV (per Dr. Kianna Aguilera this is an old infarct)  -carotid Doppler unremarkable  -will aim to do orthostatic VS   -CT head no acute findings  -pt has pacemaker, cardiology consulted for pacemaker interrogation   No arrhythmias on Tele. Mointor. discussed w/ pt syncope could be a frustrating Dx w/ up to 30% of pts remained w/o diagnosis despite extensive testing.       Right Hip fracture status post total hip arthroplasty 3/14/2021     -Acute right subcapital femoral neck fracture s/p CMN; PT/OT to see; Ortho following     Mild hyponatremia, worsening     -likely d/t hypovolemia and diuretic therapy; Trial of fluid challenge and reassess w/ serial Na check    Acute on chronic normocytic anemia     -Hemoglobin of 10.7 on arrival, down to 8.9 this morning, hemoglobin of 8.1 after surgery. Baseline hemoglobin around 8-10  -likely 2/2 fracture hematoma/intra-op blood loss; Hb stable at 8. 4. will trend. Anemia W/U pending     Hx of CAD:  Noted an old infarct (Small-to-moderate myocardial infarction of the anteroseptal LV per echocardiogram 3/13/2021). Stable. Continue atenolol, aspirin, Plavix, statin. Continue tele      Chronic HFrEF: EF 40 to 45% per echocardiogram 3 /13/2021 (last in 4/11/19 showing EF 40-45%, No aortic valve disease). No signs of volume overload     HTN: controlled on home meds, BP marginally low w/ HR in 70-80s. Hold parameters in place. On IVF rate of which reduced to 75 cc from 125 hr.     HLD: on home rosuvastatin     Hx of Afib: Ventricular paced rhythm. on ASA/plavix.  No AC                Chief Complaint: Fall/ R PPF    Initial H and P:-    Admitted for management of Hip# and evaluation of Syncope. Ortho and cardiology services already following. Pt was transferred to  post-op. I took over care on 3/15/2021      Subjective (past 24 hours):   Feeling well; pain well-managed. Denies CP/SOB/presyncope/fever/chills/palpitation      Past medical history, family history, social history and allergies reviewed again and is unchanged since admission. ROS (12 point review of systems completed. Pertinent positives noted.  Otherwise ROS is negative)     Medications:  Reviewed    Infusion Medications    sodium chloride      sodium chloride      sodium chloride 75 mL/hr at 03/14/21 0432     Scheduled Medications    aspirin  81 mg Oral Daily    clopidogrel  75 mg Oral Daily    sodium chloride flush  10 mL Intravenous 2 times per day    enoxaparin  40 mg Subcutaneous Daily    polyethylene glycol  17 g Oral Daily    docusate sodium  100 mg Oral Daily    sodium bicarbonate  1,300 mg Oral BID    calcium replacement protocol   Other RX Placeholder    sodium chloride flush  10 mL Intravenous 2 times per day    acetaminophen  650 mg Oral 4x daily    amLODIPine  2.5 mg Oral BID    atenolol  25 mg Oral Daily    rosuvastatin  10 mg Oral Daily    sertraline  100 mg Oral Daily    Vitamin D  1,000 Units Oral Daily     PRN Meds: sodium chloride flush, sodium chloride, sodium chloride, sodium chloride flush, promethazine **OR** ondansetron, oxyCODONE, HYDROmorphone, oxyCODONE, meclizine      Intake/Output Summary (Last 24 hours) at 3/15/2021 0826  Last data filed at 3/15/2021 0341  Gross per 24 hour   Intake 2820 ml   Output 700 ml   Net 2120 ml       Diet:  DIET CARDIAC; Daily Fluid Restriction: 1500 ml    Exam:  BP (!) 97/55   Pulse 80   Temp 97.5 °F (36.4 °C) (Oral)   Resp 18   Ht 5' 5\" (1.651 m)   Wt 128 lb 8 oz (58.3 kg)   SpO2 96%   BMI 21.38 kg/m²   General appearance: No apparent distress, appears stated age and cooperative. HEENT: R racoon eye. Pupils equal, round, and reactive to light. Conjunctivae/corneas clear. Neck: Supple, with full range of motion. No jugular venous distention. Trachea midline. Respiratory:  Normal respiratory effort. Clear to auscultation, bilaterally without Rales/Wheezes/Rhonchi. Cardiovascular: Regular rate and rhythm with normal S1/S2 without murmurs, rubs or gallops. Abdomen: Soft, non-tender, non-distended with normal bowel sounds. Musculoskeletal: passive and active ROM x 4 extremities. L long arm cast; R hip dressing C/D/I  Skin: Skin color, texture, turgor normal.  No rashes or lesions. Neurologic:  Neurovascularly intact without any focal sensory/motor deficits. Cranial nerves: II-XII intact, grossly non-focal.  Psychiatric: Alert and oriented, thought content appropriate, normal insight  Capillary Refill: Brisk,< 3 seconds   Peripheral Pulses: +2 palpable, equal bilaterally     Labs:   Recent Labs     03/12/21 2015 03/13/21 1918 03/14/21  0644 03/14/21  1629 03/14/21  2238   WBC 5.1 6.6 5.9  --   --    HGB 10.7* 9.4* 8.9* 8.1* 8.4*   HCT 33.3* 30.0* 29.5* 27.5* 27.5*    170 148  --   --      Recent Labs     03/12/21 2015 03/13/21 1918 03/14/21  0644 03/14/21  2330   * 132* 130* 131*   K 4.7 4.9 4.7  --    CL 99 101 102  --    CO2 21* 22* 18*  --    BUN 21 18 14  --    CREATININE 1.1 0.7 0.5  --    CALCIUM 9.3 8.3* 7.9*  --      Recent Labs     03/12/21 2015   AST 30   ALT 20   BILITOT <0.2*   ALKPHOS 95     No results for input(s): INR in the last 72 hours. No results for input(s): Indra Morsecker in the last 72 hours.     Microbiology:    Blood culture #1: No results found for: BC    Blood culture #2:No results found for: BLOODCULT2    Organism:  Lab Results   Component Value Date    ORG Growth of Contaminants 11/04/2018       No results found for: LABGRAM    MRSA culture only:No results found for: [facility]    Urine culture: No results found for: LABURIN    Respiratory culture: No results found for: CULTRESP    Aerobic and Anaerobic :  No results found for: LABAERO  No results found for: LABANAE    Urinalysis:      Lab Results   Component Value Date    NITRU NEGATIVE 02/15/2021    WBCUA 5-9 02/15/2021    BACTERIA NONE SEEN 02/15/2021    RBCUA 0-2 02/15/2021    BLOODU TRACE 02/15/2021    SPECGRAV >1.030 02/15/2021    GLUCOSEU NEGATIVE 11/04/2018       Radiology:  XR PELVIS (1-2 VIEWS)   Final Result       1. Patient has undergone interval right hip replacement which is in satisfactory position. 2. Postoperative changes lumbar spine. 3. Vascular calcification. .               **This report has been created using voice recognition software. It may contain minor errors which are inherent in voice recognition technology. **      Final report electronically signed by DR Emil South on 3/14/2021 2:00 PM      FLUORO FOR SURGICAL PROCEDURES   Final Result      XR HIP RIGHT (2-3 VIEWS)   Final Result       1. Status post right hip replacement which is in satisfactory position. .               **This report has been created using voice recognition software. It may contain minor errors which are inherent in voice recognition technology. **      Final report electronically signed by DR Emil South on 3/14/2021 2:02 PM      CT Head WO Contrast   Final Result   1. No acute intracranial findings. 2. Mild right lateral scalp hematoma. This document has been electronically signed by: Ladarius Collins MD on    03/12/2021 09:50 PM      All CTs at this facility use dose modulation techniques and iterative    reconstructions, and/or weight-based dosing   when appropriate to reduce radiation to a low as reasonably achievable. CT CERVICAL SPINE WO CONTRAST   Final Result   No acute findings.       This document has been electronically signed by: Ladarius Collins MD on    03/12/2021 09:51 PM      All CTs at this facility use dose modulation techniques and iterative    reconstructions, and/or weight-based dosing   when appropriate to reduce radiation to a low as reasonably achievable. XR HIP 2-3 VW W PELVIS RIGHT   Final Result   Acute right subcapital femoral neck fracture with superior displacement of    the distal fracture fragment. This document has been electronically signed by: Melissa Gannon MD on    03/12/2021 09:43 PM         XR CHEST 1 VIEW   Final Result   Stable emphysematous lungs without acute findings. This document has been electronically signed by: Melissa Gannon MD on    03/12/2021 09:50 PM      VL DUP CAROTID BILATERAL    (Results Pending)     Echo Complete 2d W Doppler W Color    Result Date: 3/14/2021  Transthoracic Echocardiography Report (TTE)  Demographics   Patient Name     Amrit Clement Gender                Female   MR #             [id number]       Race                                                      Ethnicity   Account #        [de-identified]       Room Number           [address] [place] [address]   Accession Number [id number]      Date of Study         03/13/2021   Date of Birth    1940      Referring Physician   Walcott Hiram, CNP Tilmon Piles Pappa, PA   Age              80 year(s)      Remberto Alvarez MD                                   Physician  Procedure Type of Study   TTE procedure:ECHOCARDIOGRAM COMPLETE 2D W DOPPLER W COLOR. Procedure Date Date: 03/13/2021 Start: 12:57 PM Study Location: Bedside Technical Quality: Limited visualization due to restricted mobility. Indications:Syncope and Evaluate for valvular heart disease.  Additional Medical History:Coronary artery disease, CABG, MV repair 26mm annuloplasty ring, TV repair 30 mm ring, CHF, Hypertension, Hyperlipidemia, A fib, Ablation Patient Status: Routine Height: 64.96 inches Weight: 126 pounds BSA: 1.62 m^2 BMI: 20.99 kg/m^2 BP: 117/69 mmHg Allergies   - No Known Allergies. - See Epic. - Other allergy:(Crestor). - Other allergy:(Crestor). Conclusions   Summary  Left ventricle size is normal.  Mild concentric left ventricular hypertrophy. Ejection fraction is visually estimated in the range of 40% to 45%. Mildly hypokinetic motion of the apical anteroseptal wall noted in the  left ventricle. Small-to-moderate myocardial infarction of the anteroseptal LV. Moderately dilated left atrium. watchman device  Aortic valve appears tricuspid. Aortic valve leaflets are Moderately  calcified. Mild aortic stenosis is present. Trivial aortic regurgitation  is noted. Mitral annular calcification is present. Decreased mitral valve mobility noted. mitral valve ring   Signature   ----------------------------------------------------------------  Electronically signed by Tor Nair MD (Interpreting  physician) on 03/14/2021 at 07:06 AM  ----------------------------------------------------------------   Findings   Mitral Valve  Mitral annular calcification is present. Decreased mitral valve mobility noted. mitral valve ring   Aortic Valve  Aortic valve appears tricuspid. Aortic valve leaflets are Moderately  calcified. Mild aortic stenosis is present. Trivial aortic regurgitation  is noted. Tricuspid Valve  Tricuspid valve is structurally normal.  No evidence of tricuspid stenosis. Mild to moderate tricuspid regurgitation visualized. Pulmonic Valve  The pulmonic valve was not well visualized . Left Atrium  Moderately dilated left atrium. watchman device   Left Ventricle  Left ventricle size is normal.  Mild concentric left ventricular hypertrophy. Ejection fraction is visually estimated in the range of 40% to 45%. Mildly hypokinetic motion of the apical anteroseptal wall noted in the  left ventricle. Small-to-moderate myocardial infarction of the anteroseptal LV. Right Atrium  Mildly enlarged right atrium size. Right Ventricle  Mildly dilated right ventricle. Right ventricle global systolic function is low normal .  Pacer Wire visualized in right ventricle. Right ventricular systolic pressure of 35 mm Hg consistent with mild  pulmonary hypertension. Pericardial Effusion  No evidence of any pericardial effusion. Pleural Effusion  No evidence of pleural effusion. Aorta / Great Vessels  The aorta is within normal limits.   M-Mode/2D Measurements & Calculations   LV Diastolic    LV Systolic Dimension: 3  AV Cusp Separation: 2 cmLA  Dimension: 3.8  cm                        Dimension: 3.6 cmAO Root  cm              LV Volume Diastolic: 62   Dimension: 3.1 cmLA Area: 20.6  LV FS:21.1 %    ml                        cm^2  LV PW           LV Volume Systolic: 35 ml  Diastolic: 0.9  LV EDV/LV EDV Index: 62  cm              ml/38 m^2LV ESV/LV ESV  Septum          Index: 35 ml/22 m^2       RV Diastolic Dimension: 2.4 cm  Diastolic: 0.9  EF Calculated: 43.6 %  cm                                        LA/Aorta: 1.16                  LV Length: 6.4 cm                                            LA volume/Index: 67.6 ml /42m^2   LV Area  Diastolic: 20  cm^2  LV Area  Systolic: 48.7  cm^2  Doppler Measurements & Calculations   MV Peak E-Wave: 153 cm/s AV Peak Velocity: 105  LVOT Peak Velocity: 92.4  MV Peak A-Wave: 81.5     cm/s                   cm/s  cm/s                     AV Peak Gradient: 4.41 LVOT Peak Gradient: 3 mmHg  MV E/A Ratio: 1.88       mmHg  MV Peak Gradient: 9.36                          TV Peak E-Wave: 77.7 cm/s  mmHg                                            TV Peak A-Wave: 79.3 cm/s  MV Mean Gradient: 5 mmHg  MV Mean Velocity: 98.5                          TV Peak Gradient: 2.41  cm/s                     AV P1/2t: 800 msec     mmHg  MV Deceleration Time:    IVRT: 67 msec          TR Velocity:312 cm/s  246 msec                                        TR Gradient:38.94 mmHg  MV P1/2t: 70 msec  MVA by PHT:3.14 cm^2 AV DVI (Vmax):0.88   MR Velocity: 469 cm/s  [email or web address]    Ct Head Wo Contrast    Result Date: 3/12/2021  CT head without contrast Comparison:  CT,SR  - CT HEAD WO CONTRAST  - 02/15/2021 10:52 AM EST Findings: No intracranial mass, midline shift, hydrocephalus, or acute hemorrhage. Old infarct in the left frontal centrum semiovale. Periventricular white matter hypoattenuation may relate to chronic small vessel ischemic changes. Mild generalized atrophy. Mild right lateral scalp hematoma. Mucosal thickening in the left sphenoid sinus. Mastoid air cells are clear. The orbits are within normal limits. There is no acute fracture. 1. No acute intracranial findings. 2. Mild right lateral scalp hematoma. This document has been electronically signed by: Jayro Soler MD on 03/12/2021 09:50 PM All CTs at this facility use dose modulation techniques and iterative reconstructions, and/or weight-based dosing when appropriate to reduce radiation to a low as reasonably achievable. Ct Cervical Spine Wo Contrast    Result Date: 3/12/2021  CT cervical spine without contrast Comparison:  CT,SR  - CT CERVICAL SPINE WO CONTRAST  - 02/15/2021 10:52 AM EST Findings: Visualized intracranial contents are unremarkable. Soft tissues of the neck are normal. Mild calcified biapical pleural-parenchymal thickening. Vertebral alignment is within normal limits. No acute fractures or dislocations. Disc osteophyte complexes and facet arthropathy causes bilateral neuroforaminal stenosis at C5-6 and C6-7. Central canal stenosis at C4-5 and C5-6. No acute findings.  This document has been electronically signed by: Jayro Soler MD on 03/12/2021 09:51 PM All CTs at this facility use dose modulation techniques and iterative reconstructions, and/or weight-based dosing when appropriate to reduce radiation to a low as reasonably achievable. Xr Chest 1 View    Result Date: 3/12/2021  1 view chest x-ray Comparison:  CR,SR  - XR CHEST 1 VW  - 02/15/2021 11:05 AM EST Findings: Lungs are emphysematous. No consolidation or effusion. No pneumothorax. Normal size heart. No pulmonary vascular congestion. Left atrial appendage clip and left AICD leads are unchanged. Median sternotomy wires are intact. No acute fracture. Stable emphysematous lungs without acute findings. This document has been electronically signed by: Chinmay Brower MD on 03/12/2021 09:50 PM    Xr Hip 2-3 Vw W Pelvis Right    Result Date: 3/12/2021      ** ADDENDUM #1       ** This report was discussed with Deng Rosa RN on Mar 12, 2021 21:50:00 EST. This document has been electronically signed by: Odilia Magdaleno on 03/12/2021 09:50 PM     ** ORIGINAL REPORT     ** 3 view, pelvis and right hip Comparison:  CR,SR  - XR PELVIS (1-2 VIEWS)  - 02/15/2021 11:05 AM EST Findings: Acute right subcapital femoral neck fracture with varus angulation and mild superior displacement of the distal fracture fragment. No dislocation the right femoral head. Degenerative changes in the bilateral sacroiliac joints. Left hip joint and pubic symphysis are intact. Disc spacer at L4-5. Surgical clip in the left inguinal region. Acute right subcapital femoral neck fracture with superior displacement of the distal fracture fragment. This document has been electronically signed by: Chinmay Brower MD on 03/12/2021 09:43 PM     With RN in room, patient was updated about and agreed upon the treatment plan, all the questions and concerns were addressed.     Electronically signed by Den Miranda MD on 3/15/2021 at 8:26 AM

## 2021-03-16 LAB
ANION GAP SERPL CALCULATED.3IONS-SCNC: 9 MEQ/L (ref 8–16)
BUN BLDV-MCNC: 14 MG/DL (ref 7–22)
CALCIUM SERPL-MCNC: 6.5 MG/DL (ref 8.5–10.5)
CHLORIDE BLD-SCNC: 108 MEQ/L (ref 98–111)
CO2: 18 MEQ/L (ref 23–33)
CREAT SERPL-MCNC: 0.6 MG/DL (ref 0.4–1.2)
GFR SERPL CREATININE-BSD FRML MDRD: > 90 ML/MIN/1.73M2
GLUCOSE BLD-MCNC: 116 MG/DL (ref 70–108)
POTASSIUM SERPL-SCNC: 3.9 MEQ/L (ref 3.5–5.2)
SODIUM BLD-SCNC: 134 MEQ/L (ref 135–145)
SODIUM BLD-SCNC: 135 MEQ/L (ref 135–145)
SODIUM BLD-SCNC: 135 MEQ/L (ref 135–145)

## 2021-03-16 PROCEDURE — 97116 GAIT TRAINING THERAPY: CPT

## 2021-03-16 PROCEDURE — 6360000002 HC RX W HCPCS: Performed by: PHYSICIAN ASSISTANT

## 2021-03-16 PROCEDURE — 2580000003 HC RX 258: Performed by: PHYSICIAN ASSISTANT

## 2021-03-16 PROCEDURE — 6370000000 HC RX 637 (ALT 250 FOR IP): Performed by: HOSPITALIST

## 2021-03-16 PROCEDURE — 36415 COLL VENOUS BLD VENIPUNCTURE: CPT

## 2021-03-16 PROCEDURE — 6370000000 HC RX 637 (ALT 250 FOR IP): Performed by: PHYSICIAN ASSISTANT

## 2021-03-16 PROCEDURE — 80048 BASIC METABOLIC PNL TOTAL CA: CPT

## 2021-03-16 PROCEDURE — 97530 THERAPEUTIC ACTIVITIES: CPT

## 2021-03-16 PROCEDURE — 97535 SELF CARE MNGMENT TRAINING: CPT

## 2021-03-16 PROCEDURE — 84295 ASSAY OF SERUM SODIUM: CPT

## 2021-03-16 PROCEDURE — 1200000003 HC TELEMETRY R&B

## 2021-03-16 PROCEDURE — 99233 SBSQ HOSP IP/OBS HIGH 50: CPT | Performed by: HOSPITALIST

## 2021-03-16 PROCEDURE — 6370000000 HC RX 637 (ALT 250 FOR IP): Performed by: FAMILY MEDICINE

## 2021-03-16 PROCEDURE — 97110 THERAPEUTIC EXERCISES: CPT

## 2021-03-16 RX ORDER — FERROUS SULFATE 325(65) MG
325 TABLET ORAL
Status: DISCONTINUED | OUTPATIENT
Start: 2021-03-16 | End: 2021-03-17 | Stop reason: HOSPADM

## 2021-03-16 RX ORDER — FOLIC ACID 1 MG/1
1 TABLET ORAL DAILY
Status: DISCONTINUED | OUTPATIENT
Start: 2021-03-16 | End: 2021-03-17 | Stop reason: HOSPADM

## 2021-03-16 RX ADMIN — SODIUM CHLORIDE: 9 INJECTION, SOLUTION INTRAVENOUS at 16:55

## 2021-03-16 RX ADMIN — Medication 1000 UNITS: at 08:11

## 2021-03-16 RX ADMIN — SERTRALINE 100 MG: 100 TABLET, FILM COATED ORAL at 08:11

## 2021-03-16 RX ADMIN — AMLODIPINE BESYLATE 2.5 MG: 5 TABLET ORAL at 08:14

## 2021-03-16 RX ADMIN — ACETAMINOPHEN 650 MG: 325 TABLET ORAL at 20:27

## 2021-03-16 RX ADMIN — FOLIC ACID 1 MG: 1 TABLET ORAL at 12:24

## 2021-03-16 RX ADMIN — FERROUS SULFATE TAB 325 MG (65 MG ELEMENTAL FE) 325 MG: 325 (65 FE) TAB at 12:24

## 2021-03-16 RX ADMIN — ROSUVASTATIN CALCIUM 10 MG: 10 TABLET, FILM COATED ORAL at 20:27

## 2021-03-16 RX ADMIN — ACETAMINOPHEN 650 MG: 325 TABLET ORAL at 00:12

## 2021-03-16 RX ADMIN — ACETAMINOPHEN 650 MG: 325 TABLET ORAL at 08:10

## 2021-03-16 RX ADMIN — CLOPIDOGREL BISULFATE 75 MG: 75 TABLET ORAL at 08:11

## 2021-03-16 RX ADMIN — ASPIRIN 81 MG: 81 TABLET, CHEWABLE ORAL at 08:11

## 2021-03-16 RX ADMIN — AMLODIPINE BESYLATE 2.5 MG: 5 TABLET ORAL at 20:27

## 2021-03-16 RX ADMIN — ACETAMINOPHEN 650 MG: 325 TABLET ORAL at 16:55

## 2021-03-16 RX ADMIN — DOCUSATE SODIUM 100 MG: 100 CAPSULE, LIQUID FILLED ORAL at 08:11

## 2021-03-16 RX ADMIN — ENOXAPARIN SODIUM 40 MG: 40 INJECTION SUBCUTANEOUS at 08:11

## 2021-03-16 RX ADMIN — SODIUM CHLORIDE, PRESERVATIVE FREE 10 ML: 5 INJECTION INTRAVENOUS at 21:23

## 2021-03-16 RX ADMIN — SODIUM CHLORIDE: 9 INJECTION, SOLUTION INTRAVENOUS at 03:45

## 2021-03-16 RX ADMIN — ATENOLOL 25 MG: 25 TABLET ORAL at 08:11

## 2021-03-16 RX ADMIN — OXYCODONE 10 MG: 5 TABLET ORAL at 20:27

## 2021-03-16 RX ADMIN — POLYETHYLENE GLYCOL 3350 17 G: 17 POWDER, FOR SOLUTION ORAL at 08:32

## 2021-03-16 RX ADMIN — FERROUS SULFATE TAB 325 MG (65 MG ELEMENTAL FE) 325 MG: 325 (65 FE) TAB at 16:55

## 2021-03-16 ASSESSMENT — PAIN SCALES - GENERAL
PAINLEVEL_OUTOF10: 8
PAINLEVEL_OUTOF10: 3
PAINLEVEL_OUTOF10: 5
PAINLEVEL_OUTOF10: 5
PAINLEVEL_OUTOF10: 9
PAINLEVEL_OUTOF10: 6
PAINLEVEL_OUTOF10: 7
PAINLEVEL_OUTOF10: 9
PAINLEVEL_OUTOF10: 7

## 2021-03-16 NOTE — FLOWSHEET NOTE
SPIRITUAL CARE PROGRESS NOTE    Spiritual Assessment:   encountered patient as part of spiritual care rounds. Patient was approachable, receptive of 's presence. Patient shared feelings of emotional struggle relating to health issues/hospitalization and also struggle with grief. However, patient shared emotionally coping and having daughter a as support to her. Patient shared used to attending a [employer] but no longer is part of any aldo community. Intervention:  provided emotional and spiritual support to patient as was a sustaining ministry presence. Outcome: Chaplains are available for specific request visits at any time and may be paged via [facility].        03/16/21 132   Encounter Summary   Services provided to: Patient   Referral/Consult From: [facility] Significant other   Continue Visiting Yes  (3/16/2021)   Complexity of Encounter Low   Length of Encounter 15 minutes   Spiritual Assessment Completed Yes   Routine   Type Initial   Assessment Calm; Approachable;Coping;Grieving   Intervention Active listening;Explored feelings, thoughts, concerns;Explored coping resources;Sustaining presence/ Ministry of presence; Discussed illness/injury and it's impact; Discussed belief system/Worship practices/aldo   Outcome Expressed gratitude   Spiritual/Christian   Type Spiritual support     Electronically signed by Allyssa Wu on 3/16/2021 at 1:34 PM.  Hollis Thorne

## 2021-03-16 NOTE — PROGRESS NOTES
Patient awake in bed, assessment unchanged. Rates pain 5/10. Denies need for medication treatment per MAR. 5 ps addressed and call light within reach.

## 2021-03-16 NOTE — PROGRESS NOTES
[address] [address]  INPATIENT PHYSICAL THERAPY  DAILY NOTE  [facility] CCU-STEPDOWN 3B - 3B-30/030-A    Time In: 1406  Time Out: 1431  Timed Code Treatment Minutes: 25 Minutes  Minutes: 25          Date: 3/16/2021  Patient Name: Main Pennington,  Gender:  female        MRN: [id number]  : 1940  (80 y.o.)     Referring Practitioner: MARKUS May  Diagnosis: syncope and collapse  Additional Pertinent Hx: Per ER note on 3/12/2021: This patient apparently has been having syncopal episodes at the nursing home. On her last visit to the ER she had a syncopal episode resulting in a fracture of her forearm. Tonight had another such episode. This time presents with a hip deformity on the right side. It also looks like she hit her head. Noted to be on Plavix. s/p R THR anterior approach on 3/14     Prior Level of Function:  Type of Home: Facility        ADL Assistance: Needs assistance  Ambulation Assistance: Needs assistance  Transfer Assistance: Needs assistance  Additional Comments: Pt has been at the [facility] since hospital admission 2021 with L radius fx. Pt was using hemiwalker. Pt reports being at home alone prior to the first hospitalization. Restrictions/Precautions:  Restrictions/Precautions: Fall Risk, Weight Bearing  Right Lower Extremity Weight Bearing: Weight Bearing As Tolerated  Left Upper Extremity Weight Bearing: Non Weight Bearing(R forearm (hx of R distal radius fx))  Position Activity Restriction  Other position/activity restrictions: s/p R anterior approach R THR on 3/14, no S.L.R.     SUBJECTIVE: RN approved session. Pt in recliner at start of session; finishing with OT session. Pt with appearance and reports of fatigue. Pt in bed at end of session with all needs in reach. Bed alarm on. PAIN: 10/10: R leg. Vitals: Vitals not assessed per clinical judgement, see nursing flowsheet    OBJECTIVE:  Bed Mobility:  Sit to Supine:  Moderate Assistance, with head of bed raised, with verbal cues , with increased time for completion, Mod A for B LE mgmt    Scooting: Maximum Assistance, with verbal cues , with increased time for completion, Max A to scoot up in bed and proper positioning. Transfers:  Sit<>Stand: Maximum Assistance, with increased time for completion, cues for hand placement, with verbal cues, from recliner x2; cues for anteiror trunk lean to assist with transfer; cues to push through B LE and straighten legs. Verbal and tactile cues to avoid pushing with L UE and to transition from R UE from arm rest to cyrus-walker. Ambulation:  Maximum Assistance, with cues for safety, with verbal cues , with increased time for completion. Multiple cues for proper step and cyrus-walker sequencing. Pt with stated that she is \"about to fall\" with increased knee flexion requiring Max A from therapist to maintain upright posture. Distance: 3 feet   Surface: Level Tile  Device:Cyrus-Walker  Gait Deviations: Forward Flexed Posture, Slow Estrella, Decreased Step Length Bilaterally, Decreased Weight Shift Right, Decreased Gait Speed and Unsteady Gait    Balance:  Dynamic Standing Balance: Maximum Assistance, Max A for ambulation with unilateral UE support on cyrus-walker. Exercise:  Patient was guided in 1 set(s) 15 reps of exercise to both lower extremities. Ankle pumps, Glut sets, Quad sets, Hip abduction/adduction and Standing weight shifts. Exercises were completed for increased independence with functional mobility. Functional Outcome Measures: Completed  AM-PAC Inpatient Mobility without Stair Climbing Raw Score : 10  AM-PAC Inpatient without Stair Climbing T-Scale Score : 34.07    ASSESSMENT:  Assessment: Patient progressing toward established goals. Activity Tolerance:  Patient tolerance of  treatment: fair. Limited due to increase in fatigue and pain.       Equipment Recommendations:Equipment Needed: No  Discharge Recommendations:  Continue to assess pending progress, Patient would benefit from continued therapy after discharge, Subacute/Skilled Nursing Facility    Plan: Times per week: 6X BID, 1x daily  Times per day: Twice a day  Specific instructions for Next Treatment: therex(no S.L.R.) and mobility    Patient Education  Patient Education: Plan of Care, Home Exercise Program, Precautions/Restrictions, Bed Mobility, Transfers, Reviewed Prior Education, Gait, Verbal Exercise Instruction. Education on trusting hardware in R LE with transfers and ambulation. See above. Goals:  Patient goals : plans return to SNF for therapy  Short term goals  Time Frame for Short term goals: by discharge  Short term goal 1: bed mobility with Ab to get in/out of bed  Short term goal 2: transfer with CGA to get in/out of chairs  Short term goal 3: amb >10'x1 with hemiwalker and CGA to walk safely to bathroom  Long term goals  Time Frame for Long term goals : no LTGs set secondary to short ELOS    Following session, patient left in safe position with all fall risk precautions in place.

## 2021-03-16 NOTE — PROGRESS NOTES
[address] [place] [address]  Occupational Therapy  Daily Note  Time In: 9353  Time Out: 1407  Timed Code Treatment Minutes: 23 Minutes  Minutes: 23          Date: 3/16/2021  Patient Name: Michelle Houser,   Gender: female      Room: 3B-30/030-A  MRN: [id number]  : 1940  (77 y.o.)  Referring Practitioner: UTE APARICIO  Diagnosis: syncope & collapse  Additional Pertinent Hx: Per ER note on 3/12/2021: This patient apparently has been having syncopal episodes at the nursing home. On her last visit to the ER she had a syncopal episode resulting in a fracture of her forearm. Tonight had another such episode. This time presents with a hip deformity on the right side. It also looks like she hit her head. Noted to be on Plavix. s/p R THR anterior approach on 3/14    Restrictions/Precautions:  Restrictions/Precautions: Fall Risk, Weight Bearing  Right Lower Extremity Weight Bearing: Weight Bearing As Tolerated  Left Upper Extremity Weight Bearing: Non Weight Bearing(R forearm (hx of R distal radius fx))  Position Activity Restriction  Other position/activity restrictions: s/p R anterior approach R THR on 3/14, no S.L.R.    SUBJECTIVE: RN okayed session. Pt was resting in bed upon arrival. Pt was pleasant and agreeable to OT with min encouragement. PAIN: 8/10: hip    Vitals: Vitals not assessed per clinical judgement, see nursing flowsheet    COGNITION: Decreased Insight, Decreased Problem Solving and Decreased Safety Awareness    ADL:   Toileting: Moderate Assistance. for hygiene while standing at [facility]  Toilet Transfer: Moderate Assistance. with VC for safe body placement and hand placement. BALANCE:  Sitting Balance:  Stand By Assistance. on EOB in prep for mobility  Standing Balance: Minimal Assistance, Moderate Assistance.  with kamaljit walker, pt wanting to utilize BUE with RW, educated on NWB status of LUE    BED MOBILITY:  Supine to Sit: Moderate Assistance with increased time for

## 2021-03-16 NOTE — PROGRESS NOTES
Orthopedic Progress Note    Lilia Almazan  3/16/2021    Subjective:        Post-Operative Day # 2 s/p right total hip arthroplasty. Patient resting comfortably in bed with pain well controlled. Worked with PT/OT this morning.      Hgb: Pending     Systemic or Specific Complaints: No Complaints    Objective:     BP (!) 111/55   Pulse 80   Temp 97.2 °F (36.2 °C) (Oral)   Resp 18   Ht 5' 5\" (1.651 m)   Wt 128 lb 8 oz (58.3 kg)   SpO2 92%   BMI 21.38 kg/m²     Intake/Output Summary (Last 24 hours) at 3/16/2021 0941  Last data filed at 3/16/2021 0845  Gross per 24 hour   Intake 41773 ml   Output 200 ml   Net 76079 ml     DRAIN/TUBE OUTPUT:       General: alert, appears stated age and cooperative   Wound: Dressing clean, dry, intact with no evidence of infection. Extremity: Mild TTP along incision. Motor intact quad, hamstring, TA, GSC, EHL, FHL. Sensation intact light touch SPN, DPN, sural, saphenous, tibial nerve distribution. 2+ DP pulse. Toes warm well perfused. DVT Exam:     No calf pain, negative Yumiko       Data Review  CBC:   Lab Results   Component Value Date    WBC 8.1 03/15/2021    RBC 3.97 03/15/2021    HGB 11.0 03/15/2021    HCT 36.9 03/15/2021     03/15/2021     BMP:    Lab Results   Component Value Date     03/16/2021    K 4.3 03/15/2021    K 4.7 03/12/2021     03/15/2021    CO2 18 03/15/2021    BUN 15 03/15/2021    CREATININE 0.7 03/15/2021    CALCIUM 7.2 03/15/2021    GLUCOSE 120 03/15/2021     PT/INR:    Lab Results   Component Value Date    INR 1.05 02/15/2021       Radiology: See electronic record to view reports. Reports reviewed. Assessment:     Status Post Right total hip arthroplasty surgery, doing well, stable. Plan:     1: Cardiology cleared to transfer to [facility] floor  2: WBAT  3: PT/OT  4: Pain control  5: Post op medical management of chronic medical conditions  6: Lovenox daily, SCDs, and TEDs for DVT ppx.  Resume home Plavix.   7: Anticipate d/c home when medically stable. 8: Ortho signing off at this time. Outpatient follow up in 2 weeks. Electronically signed by MARKUS Grimes on 3/16/2021 at 9:41 AM        Patient care discussed with Malissa Henson PA-C. Agree with assessment and plan. Dressing to remain in place until follow up in 2 weeks. WBAT. PT/OT. PO pain control. Limit narcotics. DVT ppx. Ortho stable for d/c. Outpatient follow up in 2 weeks.     Harini Martino MD  Orthopaedic Surgeon  [facility] [address]   3/17/2021  7:40 AM

## 2021-03-16 NOTE — PROGRESS NOTES
Patient ambulated to bedside commode, states pain has decreased from a 8 to 5/10 and is tolerable. Physical therapy in to help patient ambulate back to bed.

## 2021-03-16 NOTE — PROGRESS NOTES
Patient alert and oriented x3 with clear and appropriate speech. Skin warm, dry and appropriate for ethnicity. PERRLA 3mm to 2mm. Hand grasp moderate bilaterally. Cast to distal left arm from previous fracture. Cap refill and skin turgor <3. S1 & S2 noted. Lung sounds clear throughout. Unlabored breathing, Sp02 93% on 2 liter nasal canula. Active bowel sounds in all four quadrants. Abdomen soft with mild tenderness to palpitation. Sensation present to lower extremities. Push and pedal pull moderate bilaterally. Dorsalis tibias and pedal pulses +2. Patient voices 8/10 pain in right hip. 650 mg of Tylenol given PO per order. 5 ps addressed and call light within reach.

## 2021-03-16 NOTE — CARE COORDINATION
3/16/21, 2:46 PM EDT    DISCHARGE ON GOING [address] day: 4  Location: -30/030-A Reason for admit: Syncope and collapse [R55]   Procedure:   3/14 Right total hip arthroplasty by Dr. Jim Castillo. Barriers to Discharge: Afebrile. O2 on at 2L/nc. Sats 94%. POD #2. IVF. Tylenol qid. Norvasc. Baby ASA. Tenormin, Plavix. Colace. Lovenox. Iron tabs. Folvite. Pain control. Crestor, Zoloft. PT/OT. Discussed with johnny Pina to transfer to [address] [place] [address] when bed available. PCP: PHIL Lawrence CNP  Readmission Risk Score: 20%  Patient Goals/Plan/Treatment Preferences: Pt is from [facility].

## 2021-03-16 NOTE — PROGRESS NOTES
[facility]  INPATIENT PHYSICAL THERAPY  DAILY NOTE  [facility] CCU-STEPDOWN 3B - 3B-30/030-A    Time In: 8290  Time Out: 0957  Timed Code Treatment Minutes: 24 Minutes  Minutes: 24          Date: 3/16/2021  Patient Name: Michelle Houser,  Gender:  female        MRN: [id number]  : 1940  (80 y.o.)     Referring Practitioner: MARKUS Vieira  Diagnosis: syncope and collapse  Additional Pertinent Hx: Per ER note on 3/12/2021: This patient apparently has been having syncopal episodes at the nursing home. On her last visit to the ER she had a syncopal episode resulting in a fracture of her forearm. Tonight had another such episode. This time presents with a hip deformity on the right side. It also looks like she hit her head. Noted to be on Plavix. s/p R THR anterior approach on 3/14     Prior Level of Function:  Type of Home: Facility        ADL Assistance: Needs assistance  Ambulation Assistance: Needs assistance  Transfer Assistance: Needs assistance  Additional Comments: Pt has been at the [facility] since hospital admission 2021 with L radius fx. Pt was using hemiwalker. Pt reports being at home alone prior to the first hospitalization. Restrictions/Precautions:  Restrictions/Precautions: Fall Risk, Weight Bearing  Right Lower Extremity Weight Bearing: Weight Bearing As Tolerated  Left Upper Extremity Weight Bearing: Non Weight Bearing(R forearm (hx of R distal radius fx))  Position Activity Restriction  Other position/activity restrictions: s/p R anterior approach R THR on 3/14, no S.L.R.     SUBJECTIVE: RN approved session. Pt on commode at start of session, agreeable to therapy. PA present mid-session. Pt in bed at end of session with all needs in reach. Bed alarm on. PAIN: 5/10: R hip (increased to 10/10 with movement); 0/10 L arm. Vitals: Vitals not assessed per clinical judgement, see nursing flowsheet    OBJECTIVE:  Bed Mobility:  Sit to Supine:  Moderate Assistance, with head of bed raised, with verbal cues , with increased time for completion, Mod A for B LE mgmt   Scooting: Maximum Assistance, with head of bed raised, with verbal cues , with increased time for completion, to scoot in bed    Transfers:  Sit<>Stand : Moderate Assistance, with increased time for completion, cues for hand placement, with verbal cues, cues for anterior trunk lean to assist with transfer; cues to push from commode with R UE; several reminders to not push through L UE. Ambulation:  Maximum Assistance, with cues for safety, with verbal cues , with increased time for completion, Max A to hold pt up when stepping on R LE. Multiple cues for step/cyrus-walker sequencing. Cues to back all the way up to bed before sitting; pt with increased pain. Distance: 3 feet (from [facility] to bed)  Surface: Level Tile  Device:Cyrus-Walker  Gait Deviations: Forward Flexed Posture, Decreased Step Length Bilaterally, Decreased Weight Shift Right, Decreased Gait Speed and Unsteady Gait    Balance:  Dynamic Sitting Balance: Contact Guard Assistance, with increased time for completion, sat EOB for ~ 5 minutes for dynamic sitting task     Exercise:  Patient was guided in 1 set(s) 10 reps of exercise to both lower extremities. Ankle pumps, Glut sets, Quad sets, Short arc quads, Hip abduction/adduction, Long arc quads and Isometric hip ABD and ADD. [place] Exercises were completed for increased independence with functional mobility. Functional Outcome Measures: Completed  AM-PAC Inpatient Mobility without Stair Climbing Raw Score : 10  AM-PAC Inpatient without Stair Climbing T-Scale Score : 34.07    ASSESSMENT:  Assessment: Patient progressing toward established goals. Activity Tolerance:  Patient tolerance of  treatment: good.       Equipment Recommendations:Equipment Needed: No  Discharge Recommendations:  Continue to assess pending progress, Patient would benefit from continued therapy after discharge, Subacute/Skilled Nursing Facility    Plan: Times per week: 6X BID, 1x daily  Times per day: Twice a day  Specific instructions for Next Treatment: therex(no S.L.R.) and mobility    Patient Education  Patient Education: Plan of Care, Home Exercise Program, Precautions/Restrictions, Bed Mobility, Transfers, Reviewed Prior Education, Gait, Verbal Exercise Instruction. Pt continues to require cues for NWB through L UE. See above. Goals:  Patient goals : plans return to SNF for therapy  Short term goals  Time Frame for Short term goals: by discharge  Short term goal 1: bed mobility with Ab to get in/out of bed  Short term goal 2: transfer with CGA to get in/out of chairs  Short term goal 3: amb >10'x1 with hemiwalker and CGA to walk safely to bathroom  Long term goals  Time Frame for Long term goals : no LTGs set secondary to short ELOS    Following session, patient left in safe position with all fall risk precautions in place.

## 2021-03-17 VITALS
OXYGEN SATURATION: 98 % | HEART RATE: 81 BPM | BODY MASS INDEX: 23.74 KG/M2 | SYSTOLIC BLOOD PRESSURE: 127 MMHG | RESPIRATION RATE: 16 BRPM | HEIGHT: 65 IN | DIASTOLIC BLOOD PRESSURE: 74 MMHG | WEIGHT: 142.5 LBS | TEMPERATURE: 98.1 F

## 2021-03-17 LAB — SARS-COV-2, NAAT: NOT DETECTED

## 2021-03-17 PROCEDURE — 87635 SARS-COV-2 COVID-19 AMP PRB: CPT

## 2021-03-17 PROCEDURE — 6360000002 HC RX W HCPCS: Performed by: PHYSICIAN ASSISTANT

## 2021-03-17 PROCEDURE — 6370000000 HC RX 637 (ALT 250 FOR IP): Performed by: INTERNAL MEDICINE

## 2021-03-17 PROCEDURE — 97530 THERAPEUTIC ACTIVITIES: CPT

## 2021-03-17 PROCEDURE — 6370000000 HC RX 637 (ALT 250 FOR IP): Performed by: FAMILY MEDICINE

## 2021-03-17 PROCEDURE — 6370000000 HC RX 637 (ALT 250 FOR IP): Performed by: PHYSICIAN ASSISTANT

## 2021-03-17 PROCEDURE — 6370000000 HC RX 637 (ALT 250 FOR IP): Performed by: HOSPITALIST

## 2021-03-17 PROCEDURE — 2580000003 HC RX 258: Performed by: PHYSICIAN ASSISTANT

## 2021-03-17 PROCEDURE — 97110 THERAPEUTIC EXERCISES: CPT

## 2021-03-17 PROCEDURE — 99239 HOSP IP/OBS DSCHRG MGMT >30: CPT | Performed by: FAMILY MEDICINE

## 2021-03-17 RX ORDER — CITALOPRAM 20 MG/1
20 TABLET ORAL DAILY
Status: DISCONTINUED | OUTPATIENT
Start: 2021-03-17 | End: 2021-03-17 | Stop reason: HOSPADM

## 2021-03-17 RX ORDER — FOLIC ACID 1 MG/1
1 TABLET ORAL DAILY
Qty: 30 TABLET | Refills: 3 | DISCHARGE
Start: 2021-03-17 | End: 2021-05-26

## 2021-03-17 RX ORDER — FERROUS SULFATE 325(65) MG
325 TABLET ORAL
Qty: 30 TABLET | Refills: 3 | DISCHARGE
Start: 2021-03-17 | End: 2021-05-26

## 2021-03-17 RX ORDER — CITALOPRAM 20 MG/1
20 TABLET ORAL DAILY
Qty: 30 TABLET | Refills: 3 | DISCHARGE
Start: 2021-03-17 | End: 2021-05-26

## 2021-03-17 RX ORDER — PSEUDOEPHEDRINE HCL 30 MG
100 TABLET ORAL 2 TIMES DAILY PRN
DISCHARGE
Start: 2021-03-17 | End: 2021-05-26

## 2021-03-17 RX ORDER — MIDODRINE HYDROCHLORIDE 2.5 MG/1
2.5 TABLET ORAL
Status: DISCONTINUED | OUTPATIENT
Start: 2021-03-17 | End: 2021-03-17 | Stop reason: HOSPADM

## 2021-03-17 RX ORDER — OXYCODONE HYDROCHLORIDE 5 MG/1
5 TABLET ORAL EVERY 4 HOURS PRN
Qty: 9 TABLET | Refills: 0 | Status: SHIPPED | OUTPATIENT
Start: 2021-03-17 | End: 2021-03-20

## 2021-03-17 RX ORDER — MIDODRINE HYDROCHLORIDE 2.5 MG/1
2.5 TABLET ORAL
Qty: 90 TABLET | Refills: 3 | DISCHARGE
Start: 2021-03-17 | End: 2021-05-26

## 2021-03-17 RX ADMIN — DOCUSATE SODIUM 100 MG: 100 CAPSULE, LIQUID FILLED ORAL at 11:03

## 2021-03-17 RX ADMIN — FOLIC ACID 1 MG: 1 TABLET ORAL at 11:03

## 2021-03-17 RX ADMIN — MIDODRINE HYDROCHLORIDE 2.5 MG: 2.5 TABLET ORAL at 11:04

## 2021-03-17 RX ADMIN — ATENOLOL 25 MG: 25 TABLET ORAL at 11:02

## 2021-03-17 RX ADMIN — POLYETHYLENE GLYCOL 3350 17 G: 17 POWDER, FOR SOLUTION ORAL at 11:09

## 2021-03-17 RX ADMIN — Medication 10 ML: at 11:04

## 2021-03-17 RX ADMIN — ASPIRIN 81 MG: 81 TABLET, CHEWABLE ORAL at 11:02

## 2021-03-17 RX ADMIN — ACETAMINOPHEN 650 MG: 325 TABLET ORAL at 08:11

## 2021-03-17 RX ADMIN — CITALOPRAM 20 MG: 20 TABLET, FILM COATED ORAL at 11:02

## 2021-03-17 RX ADMIN — FERROUS SULFATE TAB 325 MG (65 MG ELEMENTAL FE) 325 MG: 325 (65 FE) TAB at 12:46

## 2021-03-17 RX ADMIN — CLOPIDOGREL BISULFATE 75 MG: 75 TABLET ORAL at 11:03

## 2021-03-17 RX ADMIN — ACETAMINOPHEN 650 MG: 325 TABLET ORAL at 12:45

## 2021-03-17 RX ADMIN — FERROUS SULFATE TAB 325 MG (65 MG ELEMENTAL FE) 325 MG: 325 (65 FE) TAB at 08:14

## 2021-03-17 RX ADMIN — OXYCODONE 10 MG: 5 TABLET ORAL at 05:23

## 2021-03-17 RX ADMIN — ENOXAPARIN SODIUM 40 MG: 40 INJECTION SUBCUTANEOUS at 11:03

## 2021-03-17 RX ADMIN — Medication 1000 UNITS: at 11:04

## 2021-03-17 ASSESSMENT — PAIN SCALES - GENERAL
PAINLEVEL_OUTOF10: 0
PAINLEVEL_OUTOF10: 6

## 2021-03-17 NOTE — PROGRESS NOTES
Admit Date: 3/12/2021  Hospital day 4    Subjective:     Patient had no chest pain . Medication side effects: none    Scheduled Meds:   ferrous sulfate  325 mg Oral TID WC    folic acid  1 mg Oral Daily    aspirin  81 mg Oral Daily    clopidogrel  75 mg Oral Daily    sodium chloride flush  10 mL Intravenous 2 times per day    enoxaparin  40 mg Subcutaneous Daily    polyethylene glycol  17 g Oral Daily    docusate sodium  100 mg Oral Daily    calcium replacement protocol   Other RX Placeholder    sodium chloride flush  10 mL Intravenous 2 times per day    acetaminophen  650 mg Oral 4x daily    amLODIPine  2.5 mg Oral BID    atenolol  25 mg Oral Daily    rosuvastatin  10 mg Oral Daily    sertraline  100 mg Oral Daily    Vitamin D  1,000 Units Oral Daily     Continuous Infusions:   sodium chloride      sodium chloride      sodium chloride 75 mL/hr at 03/16/21 1655     PRN Meds:sodium chloride flush, sodium chloride, sodium chloride, sodium chloride flush, promethazine **OR** ondansetron, oxyCODONE, HYDROmorphone, oxyCODONE, meclizine    Review of Systems  Pertinent items are noted in HPI. Objective:     Patient Vitals for the past 8 hrs:   BP Temp Temp src Pulse Resp SpO2 Weight   03/17/21 0452 -- -- -- -- -- -- 142 lb 8 oz (64.6 kg)   03/17/21 0415 127/74 98 °F (36.7 °C) Oral 80 12 96 % --   03/16/21 2330 120/68 98.2 °F (36.8 °C) Oral 78 18 94 % --     I/O last 3 completed shifts: In: 2460 [P.O.:750;  I.V.:1710]  Out: 150 [Urine:150]    No change     ECG: AF.   Data Review:   CBC:  Lab Results   Component Value Date    WBC 8.1 03/15/2021    RBC 3.97 03/15/2021    HGB 11.0 03/15/2021    HCT 36.9 03/15/2021    MCV 92.9 03/15/2021    MCH 27.7 03/15/2021    MCHC 29.8 03/15/2021     03/15/2021    MPV 11.0 03/15/2021     BMP  Lab Results   Component Value Date     03/16/2021     03/16/2021    K 3.9 03/16/2021    K 4.7 03/12/2021     03/16/2021    CO2 18 03/16/2021    BUN 14 03/16/2021    CREATININE 0.6 03/16/2021    CALCIUM 6.5 03/16/2021      COAG PROFILE:  Lab Results   Component Value Date    APTT 27.6 11/12/2018    INR 1.05 02/15/2021       Assessment:     Active Problems:    Hyperlipidemia    Moderate malnutrition (HCC)    Syncope and collapse    Closed subcapital fracture of femur, right, initial encounter ([facility] [address]    Closed fracture of right hip ([facility])    Hyponatremia    Hyperglycemia    High anion gap metabolic acidosis    Hx of coronary artery disease    HFrEF (heart failure with reduced ejection fraction) ([facility])    History of atrial fibrillation    Acute on chronic anemia  Resolved Problems:    * No resolved hospital problems.  *      Plan:   S/p hip replacement    doing well    no chest pain    atrial fib with pace maker    h/o cad and prior intervention    ok for discharge from cardiac stand point    f/u office few weeks

## 2021-03-17 NOTE — PROGRESS NOTES
Pt in chair. Medics present in room to transport pt to nursing home. Pt's belongings packed and given to medics.  Alexis Neither

## 2021-03-17 NOTE — CARE COORDINATION
3/17/21, 9:36 AM EDT    Patient goals/plan/ treatment preferences discussed by  and . Patient goals/plan/ treatment preferences reviewed with patient/ family. Patient/ family verbalize understanding of discharge plan and are in agreement with goal/plan/treatment preferences. Understanding was demonstrated using the teach back method. AVS provided by RN at time of discharge, which includes all necessary medical information pertaining to the patients current course of illness, treatment, post-discharge goals of care, and treatment preferences. Services After Discharge  Services At/After Discharge: In ambulance, Nursing Services, OT, PT, Aide Services([facility])   IMM Letter  IMM Letter given to Patient/Family/Significant other/Guardian/POA/by[de-identified]   IMM Letter date given[de-identified] 03/17/21  IMM Letter time given[de-identified] Angie Stephenson will be [facility]. She will be skilled at the facility under her Medicare benefit. She will be transported by ambulance. Discharge instructions and transport forms are attached to patients blue discharge packet. Spoke with Ray at [facility] to make her aware of discharge plan. Called nella Trinh and she is agreeable to plan. Update 12:18pm: Called and left a message for nella Trin[facility] to make her aware ambulance transport is set up for 3:00pm.  Spoke with Ita Galo to make her aware.

## 2021-03-17 NOTE — DISCHARGE SUMMARY
Hospital Medicine Discharge Summary      Patient Identification:   Johnathon Almazan   : 1940  MRN: [id number]   Account: [de-identified]      Patient's PCP: PHIL Castano - CNP    Admit Date: 3/12/2021     Discharge Date:   3/17/2021    Admitting Physician: Tara Pruett MD     Discharge Physician: Zamzam Dempsey MD     Discharge Diagnoses: Active Hospital Problems    Diagnosis Date Noted    Acute on chronic anemia [D64.9]     Closed subcapital fracture of femur, right, initial encounter [facility]) [S72.011A] 2021    Closed fracture of right hip (HCC) [S72.001A]     Hyponatremia [E87.1]     Hyperglycemia [R73.9]     High anion gap metabolic acidosis [N11.7]     Hx of coronary artery disease [Z86.79]     HFrEF (heart failure with reduced ejection fraction) ([facility] [I50.20]     History of atrial fibrillation [Z86.79]     Syncope and collapse [R55] 2021    Moderate malnutrition ([facility] [E44.0] 2021     Class: Chronic    Hyperlipidemia [E78.5] 2019     Syncope and collapse, recurrent  -echo EF of 40 to 45%, mild aortic stenosis, small to moderate myocardial infarction of the anteroseptal LV (per Dr. Eduardo Route this is an old infarct), possible postural hypotension   -pt has pacemaker, cardiology consulted for pacemaker interrogation   -carotid Doppler unremarkable  -CT head no acute findings  -patient started in midodrine and celexa by cardiology     Right Hip fracture status post total hip arthroplasty 3/14/2021  -Acute right subcapital femoral neck fracture s/p CMN  - PT/OT   - patient going to SNF, f/u with Ortho as outpatient    Mild hyponatremia, resolved   -likely d/t hypovolemia and diuretic therapy     Acute on chronic normocytic anemia  -likely due to ALVARO and post-op anemia  - started on Iron tabs     Hx of CAD  Noted an old infarct (Small-to-moderate myocardial infarction of the anteroseptal LV per echocardiogram 3/13/2021).  Continue atenolol, aspirin, Plavix, statin.      Chronic HFrEF  EF 40 to 45% per echocardiogram 3 /13/2021 (last in 4/11/19 showing EF 40-45%, No aortic valve disease). No signs of volume overload  Resume lasix     HTN  Continue home meds     HLD  on home rosuvastatin     Hx of Afib  Ventricular paced rhythm.   on ASA/plavix. Not on [address], hx of recurrent falls  F/u with cardio       The patient was seen and examined on day of discharge and this discharge summary is in conjunction with any daily progress note from day of discharge. Hospital Course:   Per admission H&P:     \"Mrs. Almazan is a 81 Y/O female with past medical history of CAD, HFrEF, HTN, HLD, Afib, presents to ED for Syncope and hip pain. Per patient report was walking through her kitchen when she suddenly lost consciousness. Patient states this happened in mid February as well resulting in a left wrist fracture. Patient follows with Dr. Maryana Sheriff cardiologist, recent appointment but did not discuss syncopal episodes with him. After most recent fall patient complaining of right hip pain. Patient denies chest pain, shortness of breath, cough, headache, dizziness, lightheadedness, numbness, paraesthesias, weakness, chills, fevers, abdominal pain, nausea, vomiting, neck pain, or back pain. \"    Patient admitted under the hospitalist service. Ortho and cardiology consulted. See above treatment course.     Exam:     Vitals:  Vitals:    03/17/21 0452 03/17/21 0730 03/17/21 1102 03/17/21 1315   BP:  113/78 (!) 91/58 127/74   Pulse:  81 82 81   Resp:  16  16   Temp:  98.3 °F (36.8 °C)  98.1 °F (36.7 °C)   TempSrc:  Oral  Oral   SpO2:  96%  98%   Weight: 142 lb 8 oz (64.6 kg)      Height:         Weight: Weight: 142 lb 8 oz (64.6 kg)     24 hour intake/output:    Intake/Output Summary (Last 24 hours) at 3/17/2021 1440  Last data filed at 3/17/2021 1205  Gross per 24 hour   Intake 2688 ml   Output 350 ml   Net 2338 ml         General appearance:  No apparent distress, appears stated age and cooperative. HEENT:  Normal cephalic, atraumatic without obvious deformity. Pupils equal, round, and reactive to light. Extra ocular muscles intact. Conjunctivae/corneas clear. Neck: Supple, with full range of motion. No jugular venous distention. Trachea midline. Respiratory:  Normal respiratory effort. Clear to auscultation, bilaterally without Rales/Wheezes/Rhonchi. Cardiovascular:  Regular rate and rhythm with normal S1/S2 without murmurs, rubs or gallops. Abdomen: Soft, non-tender, non-distended with normal bowel sounds. Musculoskeletal:  Left hand with cast, dressing noted on R hip  Skin: Skin color, texture, turgor normal.  No rashes or lesions. Neurologic:  Neurovascularly intact without any focal sensory/motor deficits. Cranial nerves: II-XII intact, grossly non-focal.  Psychiatric:  Alert and oriented, thought content appropriate, normal insight  Capillary Refill: Brisk,< 3 seconds   Peripheral Pulses: +2 palpable, equal bilaterally       Labs: For convenience and continuity at follow-up the following most recent labs are provided:      CBC:    Lab Results   Component Value Date    WBC 8.1 03/15/2021    HGB 11.0 03/15/2021    HCT 36.9 03/15/2021     03/15/2021       Renal:    Lab Results   Component Value Date     03/16/2021     03/16/2021    K 3.9 03/16/2021    K 4.7 03/12/2021     03/16/2021    CO2 18 03/16/2021    BUN 14 03/16/2021    CREATININE 0.6 03/16/2021    CALCIUM 6.5 03/16/2021    PHOS 4.6 06/05/2019         Significant Diagnostic Studies    Radiology:   VL DUP CAROTID BILATERAL   Final Result      1. RIGHT   ICA . Windy Rubén Windy Rubén Mild soft plaque, no appreciable stenosis. ..    ECA. . Mild intimal thickening, no appreciable stenosis. CCA. Unremarkable . Windy Rubén VERT Antegrade flow. ..      2. LEFT    ICA. .... Moderate soft plaque, mild stenosis, less than 50%. .. ECA. .. Unremarkable   CCA. .. Mild intimal thickening, no appreciable stenosis. Maggy Juless. Antegrade flow. **This report has been created using voice recognition software. It may contain minor errors which are inherent in voice recognition technology. **      Final report electronically signed by Dr. Magui Steinberg on 3/15/2021 8:37 AM      XR PELVIS (1-2 VIEWS)   Final Result       1. Patient has undergone interval right hip replacement which is in satisfactory position. 2. Postoperative changes lumbar spine. 3. Vascular calcification. .               **This report has been created using voice recognition software. It may contain minor errors which are inherent in voice recognition technology. **      Final report electronically signed by DR Elvia Pickard on 3/14/2021 2:00 PM      FLUORO FOR SURGICAL PROCEDURES   Final Result      XR HIP RIGHT (2-3 VIEWS)   Final Result       1. Status post right hip replacement which is in satisfactory position. .               **This report has been created using voice recognition software. It may contain minor errors which are inherent in voice recognition technology. **      Final report electronically signed by DR Elvia Pickard on 3/14/2021 2:02 PM      CT Head WO Contrast   Final Result   1. No acute intracranial findings. 2. Mild right lateral scalp hematoma. This document has been electronically signed by: Carlito Garcia MD on    03/12/2021 09:50 PM      All CTs at this facility use dose modulation techniques and iterative    reconstructions, and/or weight-based dosing   when appropriate to reduce radiation to a low as reasonably achievable. CT CERVICAL SPINE WO CONTRAST   Final Result   No acute findings. This document has been electronically signed by: Carlito Garcia MD on    03/12/2021 09:51 PM      All CTs at this facility use dose modulation techniques and iterative    reconstructions, and/or weight-based dosing   when appropriate to reduce radiation to a low as reasonably achievable.       XR HIP 2-3 VW W PELVIS RIGHT   Final Result   Acute right subcapital femoral neck fracture with superior displacement of    the distal fracture fragment. This document has been electronically signed by: Sidney Carbajal MD on    03/12/2021 09:43 PM         XR CHEST 1 VIEW   Final Result   Stable emphysematous lungs without acute findings. This document has been electronically signed by: Sidney Carbajal MD on    03/12/2021 09:50 PM             Consults:     [address] [place] TO HOSPITALIST  IP CONSULT TO CARDIOLOGY  IP CONSULT TO SOCIAL WORK    Disposition:    [] Home       [] TCU       [] Rehab       [] Psych       [x] SNF       [] [place]       [] Other-    Condition at Discharge: Stable    Code Status:  Full Code     Patient Instructions:    Discharge lab work: Activity: activity as tolerated  Diet: DIET CARDIAC; Daily Fluid Restriction: 1500 ml  Dietary Nutrition Supplements: Standard High Calorie Oral Supplement      Follow-up visits:   Emmaline Boast, MD  [address]  [phone]    On 3/31/2021  For follow up evaluation @ 8:50    [facility] [address] [place] [address].  [facility]  [phone]             Discharge Medications:      [place] [employer]"   Home Medication Instructions VLD:[id number]    Printed on:03/17/21 1440   Medication Information                      acetaminophen (TYLENOL) 325 MG tablet  Take 1 tablet by mouth every 4 hours as needed for Pain             aspirin 81 MG tablet  Take 81 mg by mouth daily             atenolol (TENORMIN) 25 MG tablet  Take 1 tablet by mouth daily             Calcium Carbonate-Vit D-Min (CALCIUM 1200 PO)  Take 1 tablet by mouth daily             citalopram (CELEXA) 20 MG tablet  Take 1 tablet by mouth daily             clopidogrel (PLAVIX) 75 MG tablet  Take 1 tablet by mouth daily             Coenzyme Q10 (CO Q-10) 200 MG CAPS  Take by mouth             docusate sodium (COLACE, DULCOLAX) 100 MG CAPS  Take 100 mg by mouth 2 times daily as needed for Constipation             ferrous sulfate (IRON 325) 325 (65 Fe) MG tablet  Take 1 tablet by mouth 3 times daily (with meals)             folic acid (FOLVITE) 1 MG tablet  Take 1 tablet by mouth daily             furosemide (LASIX) 20 MG tablet  Take 20 mg by mouth daily             meclizine (ANTIVERT) 12.5 MG tablet  Take 1 tablet by mouth 3 times daily as needed for Dizziness             midodrine (PROAMATINE) 2.5 MG tablet  Take 1 tablet by mouth 3 times daily (with meals)             oxyCODONE (ROXICODONE) 5 MG immediate release tablet  Take 1 tablet by mouth every 4 hours as needed (mild to moderate pain, may increase dose to 10 mg for severe pain) for up to 3 days. potassium chloride (KLOR-CON M) 10 MEQ extended release tablet  Take 10 mEq by mouth daily             rosuvastatin (CRESTOR) 10 MG tablet  Take 10 mg by mouth nightly             vitamin D (CHOLECALCIFEROL) 1000 UNIT TABS tablet  Take 1,000 Units by mouth daily                 Time Spent on discharge is more than 45 minutes in the examination, evaluation, counseling and review of medications and discharge plan. Signed: Thank you PHIL Lauren - BETH for the opportunity to be involved in this patient's care.     Electronically signed by Alfonso Mason MD on 3/17/2021 at 2:40 PM

## 2021-03-17 NOTE — PROGRESS NOTES
80year old female admitted 03/12/2021 for syncope and collapsed. Pt has right shoulder bruising and left eye bruising from fall. Pt complained about pain in right shoulder and right hip. Thumping pain. Pt was given tylenol. She states she is in no pain as of now. Pt is alert and oriented x4. Pt responds to verbal stimuli. Language is clear. LON 2-3 mm bilaterally. IV site free from redness and drainage. Lung sounds clear and diminished in all lung fields. Respiratory pattern is regular. Pt has a history of heart disease. Heart sounds regular. Cardiac rhythm ventricular paced. Bowel sounds hypoactive. Pt has not had a bowel movement today. States she has trouble going regularly. Abdomen soft and non tender. Capillary refill less than equal to 3. Skin turgor less than equal to 3. Right hand  moderate. Left hand  moderate. Pt is calm and cooperative. Call light within reach. Bedside table within reach.  Courtney Jason

## 2021-03-17 NOTE — PROGRESS NOTES
[address] [address]  INPATIENT PHYSICAL THERAPY  DAILY NOTE  STRZ CCU-STEPDOWN 3B - 3B-30/030-A  Timed Code Treatment Minutes: 29 Minutes  PT Co-Treatment Minutes  Time In: 0804  Time Out: 5838  Minutes: 29       Date: 3/17/2021  Patient Name: Trinidad Kwok,  Gender:  female        MRN: [id number]  : 1940  (80 y.o.)     Referring Practitioner: MARKUS Medrano  Diagnosis: syncope and collapse  Additional Pertinent Hx: Per ER note on 3/12/2021: This patient apparently has been having syncopal episodes at the nursing home. On her last visit to the ER she had a syncopal episode resulting in a fracture of her forearm. Tonight had another such episode. This time presents with a hip deformity on the right side. It also looks like she hit her head. Noted to be on Plavix. s/p R THR anterior approach on 3/14     Prior Level of Function:  Type of Home: Facility        ADL Assistance: Needs assistance  Ambulation Assistance: Needs assistance  Transfer Assistance: Needs assistance  Additional Comments: Pt has been at the [facility] since hospital admission 2021 with L radius fx. Pt was using hemiwalker. Pt reports being at home alone prior to the first hospitalization. Restrictions/Precautions:  Restrictions/Precautions: Fall Risk, Weight Bearing  Right Lower Extremity Weight Bearing: Weight Bearing As Tolerated  Left Upper Extremity Weight Bearing: Non Weight Bearing(R forearm (hx of R distal radius fx))  Position Activity Restriction  Other position/activity restrictions: s/p R anterior approach R THR on 3/14, no S.L.R. 3/17 Per ortho, ok to use platform walker and WB through L eblow. SUBJECTIVE: Pt in bed on arrival and agreeable to do therapy. Pt fearful of falling when standing.       PAIN: Not rated: R hip    Vitals: Vitals not assessed per clinical judgement, see nursing flowsheet    OBJECTIVE:  Bed Mobility:  Rolling to Left: Moderate Assistance- use of bed rail, assist from therapist to complete  Supine to Sit: Minimal Assistance- pt able to prop on elbow with assist from therapist to complete  Scooting: Minimal Assistance- to EOB  HOB elevated, pt needing cues for correct sequencing in log roll and prop up on elbow to assist in sitting up. Transfers:  Sit to Stand: Minimal Assistance, X 2- EOB to Cyrus walker  Stand to Sit:Minimal Assistance, X 2- walker to EOB, walker to chair  Verbal cues for hand placement and not to WB through L hand. Pt verbalized fear of falling when standing. Ambulation:  Moderate Assistance, X 1, Minimum Assistance X 1  Distance: 2.5 feet x1 bed to chair  Surface: Level Tile  Device:Cyrus-Walker  Gait Deviations: Pt has decreased stance time in R LE d/t hip pain, decreased B step length, decreased B step height, decreased B heel strike, and unsteady gait. Pt required verbal and tactile cues to bring walker with her when taking steps. Also needed verbal cues to straighten B knees and for upright posture. Balance:  Static Sitting Balance:  Stand By Assistance- in prep for gait, during LE exs  Static Standing Balance: Contact Guard Assistance- at cyrus walker  Dynamic Standing Balance: Contact Guard Assistance- at cyrus walker, R UE at support, with lateral weight shifting     Exercise:  Patient was guided in 1 set(s) 15 reps of exercise to both lower extremities. Ankle pumps, Glut sets, Quad sets, Heelslides, Short arc quads, Hip abduction/adduction, Left SLR, Seated heel/toe raises and Long arc quads. Exercises were completed for increased independence with functional mobility. Functional Outcome Measures: Completed  -PAC Inpatient Mobility Raw Score : 12  -PAC Inpatient T-Scale Score : 35.33    ASSESSMENT:  Assessment: Patient progressing toward established goals. Activity Tolerance:  Patient tolerance of  treatment: fair. Pt improved bed mobility and tolerated LE exercises. Pt has difficulty with ambulation d/t pain in R hip and is guarded.  Pt also very

## 2021-03-17 NOTE — PROGRESS NOTES
CLINICAL PHARMACY: DISCHARGE MED RECONCILIATION/REVIEW    [facility]) Select Patient?: No  Total # of Interventions Recommended: 0   -   Total # Interventions Accepted: 0  Intervention Severity:   - Level 1 Intervention Present?: No   - Level 2 #: 0   - Level 3 #: 0   Time Spent (min): 15    Additional Documentation:

## 2021-03-17 NOTE — DISCHARGE INSTR - DIET
 Good nutrition is important when healing from an illness, injury, or surgery. Follow any nutrition recommendations given to you during your hospital stay.  If you were given an oral nutrition supplement while in the hospital, continue to take this supplement at home. You can take it with meals, in-between meals, and/or before bedtime. These supplements can be purchased at most local grocery stores, pharmacies, and chain super-stores.  If you have any questions about your diet or nutrition, call the hospital and ask for the dietitian. Heart healthy diet     Heart-Healthy Diet: Care Instructions  Your Care Instructions     A heart-healthy diet has lots of vegetables, fruits, nuts, beans, and whole grains, and is low in salt. It limits foods that are high in saturated fat, such as meats, cheeses, and fried foods. It may be hard to change your diet, but even small changes can lower your risk of heart attack and heart disease. Follow-up care is a key part of your treatment and safety. Be sure to make and go to all appointments, and call your doctor if you are having problems. It's also a good idea to know your test results and keep a list of the medicines you take. How can you care for yourself at home? Watch your portions  · Use food labels to learn what the recommended servings are for the foods you eat. · Eat only the number of calories you need to stay at a healthy weight. If you need to lose weight, eat fewer calories than your body burns (through exercise and other physical activity). Eat more fruits and vegetables  · Eat a variety of fruit and vegetables every day. Dark green, deep orange, red, or yellow fruits and vegetables are especially good for you. Examples include spinach, carrots, peaches, and berries. · Keep carrots, celery, and other veggies handy for snacks. Buy fruit that is in season and store it where you can see it so that you will be tempted to eat it.   · Cook dishes that have a lot of veggies in them, such as stir-fries and soups. Limit saturated fat  · Read food labels, and try to avoid saturated fats. They increase your risk of heart disease. · Use olive or canola oil when you cook. · Bake, broil, grill, or steam foods instead of frying them. · Choose lean meats instead of high-fat meats such as hot dogs and sausages. Cut off all visible fat when you prepare meat. · Eat fish, skinless poultry, and meat alternatives such as soy products instead of high-fat meats. Soy products, such as tofu, may be especially good for your heart. · Choose low-fat or fat-free milk and dairy products. Eat foods high in fiber  · Eat a variety of grain products every day. Include whole-grain foods that have lots of fiber and nutrients. Examples of whole-grain foods include oats, whole wheat bread, and brown rice. · Buy whole-grain breads and cereals, instead of white bread or pastries. Limit salt and sodium  · Limit how much salt and sodium you eat to help lower your blood pressure. · Taste food before you salt it. Add only a little salt when you think you need it. With time, your taste buds will adjust to less salt. · Eat fewer snack items, fast foods, and other high-salt, processed foods. Check food labels for the amount of sodium in packaged foods. · Choose low-sodium versions of canned goods (such as soups, vegetables, and beans). Limit sugar  · Limit drinks and foods with added sugar. These include candy, desserts, and soda pop. Limit alcohol  · Limit alcohol to no more than 2 drinks a day for men and 1 drink a day for women. Too much alcohol can cause health problems. When should you call for help? Watch closely for changes in your health, and be sure to contact your doctor if:    · You would like help planning heart-healthy meals. Where can you learn more? Go to https://glen.[employer]. org and sign in to your [employer] account.  Enter V137 in the [place] box to learn more about \"Heart-Healthy Diet: Care Instructions. \"     If you do not have an account, please click on the \"Sign Up Now\" link. Current as of: December 17, 2020               Content Version: 12.8  © 5387-1732 [employer]wise, Incorporated. Care instructions adapted under license by [facility] ([facility]). If you have questions about a medical condition or this instruction, always ask your healthcare professional. [address] any warranty or liability for your use of this information.

## 2021-03-18 ENCOUNTER — CARE COORDINATION (OUTPATIENT)
Dept: CASE MANAGEMENT | Age: 81
End: 2021-03-18

## 2021-03-18 ENCOUNTER — TELEPHONE (OUTPATIENT)
Dept: FAMILY MEDICINE CLINIC | Age: 81
End: 2021-03-18

## 2021-03-18 NOTE — TELEPHONE ENCOUNTER
[facility] Transitions Initial Follow Up Call    Outreach made within 2 business days of discharge: Yes    Patient: Irvin Almazan Patient : 1940   MRN: [id number]  Reason for Admission: There are no discharge diagnoses documented for the most recent discharge. Discharge Date: 3/17/21       Spoke with: Patient    Discharge department/facility: [facility]    TCM Interactive Patient Contact:  Was patient able to fill all prescriptions: N/A-- patient residing at The [address] [place] [address]  Was patient instructed to bring all medications to the follow-up visit: N/A  Is patient taking all medications as directed in the discharge summary?  Yes  Does patient understand their discharge instructions: Yes  Does patient have questions or concerns that need addressed prior to 7-14 day follow up office visit: no    Scheduled appointment with PCP within 7-14 days    Follow Up  Future Appointments   Date Time Provider [place]   2021 10:20 AM STR [address] STR Radiolog   2021  2:00 PM [facility] [address]   2021 10:00 AM STR EXAM ROOM [address] [place] [address] Gabriela Mcbride LPN
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
## 2021-04-10 ENCOUNTER — HOSPITAL ENCOUNTER (INPATIENT)
Age: 81
LOS: 4 days | Discharge: NURSING, REHAB OR LONG-TERM CARE | DRG: 377 | End: 2021-04-14
Attending: EMERGENCY MEDICINE | Admitting: INTERNAL MEDICINE
Payer: MEDICARE

## 2021-04-10 ENCOUNTER — APPOINTMENT (OUTPATIENT)
Dept: CT IMAGING | Age: 81
DRG: 377 | End: 2021-04-10
Payer: MEDICARE

## 2021-04-10 DIAGNOSIS — E87.5 HYPERKALEMIA: ICD-10-CM

## 2021-04-10 DIAGNOSIS — D50.9 IRON DEFICIENCY ANEMIA, UNSPECIFIED IRON DEFICIENCY ANEMIA TYPE: ICD-10-CM

## 2021-04-10 DIAGNOSIS — K92.2 GASTROINTESTINAL HEMORRHAGE, UNSPECIFIED GASTROINTESTINAL HEMORRHAGE TYPE: ICD-10-CM

## 2021-04-10 DIAGNOSIS — R10.9 ABDOMINAL PAIN, UNSPECIFIED ABDOMINAL LOCATION: Primary | ICD-10-CM

## 2021-04-10 PROBLEM — D62 ACUTE BLOOD LOSS ANEMIA: Status: ACTIVE | Noted: 2021-04-10

## 2021-04-10 LAB
ABO: NORMAL
ALBUMIN SERPL-MCNC: 3.8 G/DL (ref 3.5–5.1)
ALP BLD-CCNC: 124 U/L (ref 38–126)
ALT SERPL-CCNC: 9 U/L (ref 11–66)
ANION GAP SERPL CALCULATED.3IONS-SCNC: 12 MEQ/L (ref 8–16)
ANTIBODY SCREEN: NORMAL
AST SERPL-CCNC: 15 U/L (ref 5–40)
BASOPHILS # BLD: 0.4 %
BASOPHILS ABSOLUTE: 0 THOU/MM3 (ref 0–0.1)
BILIRUB SERPL-MCNC: < 0.2 MG/DL (ref 0.3–1.2)
BILIRUBIN DIRECT: < 0.2 MG/DL (ref 0–0.3)
BUN BLDV-MCNC: 58 MG/DL (ref 7–22)
CALCIUM SERPL-MCNC: 8.7 MG/DL (ref 8.5–10.5)
CHLORIDE BLD-SCNC: 101 MEQ/L (ref 98–111)
CO2: 19 MEQ/L (ref 23–33)
CREAT SERPL-MCNC: 1.1 MG/DL (ref 0.4–1.2)
EKG ATRIAL RATE: 61 BPM
EKG Q-T INTERVAL: 426 MS
EKG QRS DURATION: 130 MS
EKG QTC CALCULATION (BAZETT): 500 MS
EKG R AXIS: 70 DEGREES
EKG T AXIS: 74 DEGREES
EKG VENTRICULAR RATE: 83 BPM
EOSINOPHIL # BLD: 0.1 %
EOSINOPHILS ABSOLUTE: 0 THOU/MM3 (ref 0–0.4)
ERYTHROCYTE [DISTWIDTH] IN BLOOD BY AUTOMATED COUNT: 17.1 % (ref 11.5–14.5)
ERYTHROCYTE [DISTWIDTH] IN BLOOD BY AUTOMATED COUNT: 57.7 FL (ref 35–45)
GFR SERPL CREATININE-BSD FRML MDRD: 48 ML/MIN/1.73M2
GLUCOSE BLD-MCNC: 151 MG/DL (ref 70–108)
HCT VFR BLD CALC: 22.4 % (ref 37–47)
HCT VFR BLD CALC: 22.5 % (ref 37–47)
HCT VFR BLD CALC: 30.1 % (ref 37–47)
HEMOCCULT STL QL: POSITIVE
HEMOGLOBIN: 7 GM/DL (ref 12–16)
HEMOGLOBIN: 7.2 GM/DL (ref 12–16)
HEMOGLOBIN: 8.9 GM/DL (ref 12–16)
IMMATURE GRANS (ABS): 0.07 THOU/MM3 (ref 0–0.07)
IMMATURE GRANULOCYTES: 0.9 %
LACTIC ACID, SEPSIS: 1.8 MMOL/L (ref 0.5–1.9)
LIPASE: 38.2 U/L (ref 5.6–51.3)
LYMPHOCYTES # BLD: 15.5 %
LYMPHOCYTES ABSOLUTE: 1.3 THOU/MM3 (ref 1–4.8)
MAGNESIUM: 2 MG/DL (ref 1.6–2.4)
MCH RBC QN AUTO: 28.7 PG (ref 26–33)
MCHC RBC AUTO-ENTMCNC: 31.1 GM/DL (ref 32.2–35.5)
MCV RBC AUTO: 92.2 FL (ref 81–99)
MONOCYTES # BLD: 7.3 %
MONOCYTES ABSOLUTE: 0.6 THOU/MM3 (ref 0.4–1.3)
NUCLEATED RED BLOOD CELLS: 0 /100 WBC
OSMOLALITY CALCULATION: 283.6 MOSMOL/KG (ref 275–300)
PLATELET # BLD: 308 THOU/MM3 (ref 130–400)
PMV BLD AUTO: 11.2 FL (ref 9.4–12.4)
POTASSIUM REFLEX MAGNESIUM: 5.4 MEQ/L (ref 3.5–5.2)
RBC # BLD: 2.44 MILL/MM3 (ref 4.2–5.4)
RH FACTOR: NORMAL
SEG NEUTROPHILS: 75.8 %
SEGMENTED NEUTROPHILS ABSOLUTE COUNT: 6.1 THOU/MM3 (ref 1.8–7.7)
SODIUM BLD-SCNC: 132 MEQ/L (ref 135–145)
TOTAL PROTEIN: 6.9 G/DL (ref 6.1–8)
TROPONIN T: < 0.01 NG/ML
WBC # BLD: 8.1 THOU/MM3 (ref 4.8–10.8)

## 2021-04-10 PROCEDURE — 6360000004 HC RX CONTRAST MEDICATION: Performed by: STUDENT IN AN ORGANIZED HEALTH CARE EDUCATION/TRAINING PROGRAM

## 2021-04-10 PROCEDURE — 96374 THER/PROPH/DIAG INJ IV PUSH: CPT

## 2021-04-10 PROCEDURE — 74177 CT ABD & PELVIS W/CONTRAST: CPT

## 2021-04-10 PROCEDURE — 85018 HEMOGLOBIN: CPT

## 2021-04-10 PROCEDURE — 2580000003 HC RX 258: Performed by: INTERNAL MEDICINE

## 2021-04-10 PROCEDURE — 86923 COMPATIBILITY TEST ELECTRIC: CPT

## 2021-04-10 PROCEDURE — 6360000002 HC RX W HCPCS: Performed by: STUDENT IN AN ORGANIZED HEALTH CARE EDUCATION/TRAINING PROGRAM

## 2021-04-10 PROCEDURE — 85025 COMPLETE CBC W/AUTO DIFF WBC: CPT

## 2021-04-10 PROCEDURE — 86900 BLOOD TYPING SEROLOGIC ABO: CPT

## 2021-04-10 PROCEDURE — 6370000000 HC RX 637 (ALT 250 FOR IP): Performed by: STUDENT IN AN ORGANIZED HEALTH CARE EDUCATION/TRAINING PROGRAM

## 2021-04-10 PROCEDURE — 83690 ASSAY OF LIPASE: CPT

## 2021-04-10 PROCEDURE — 6370000000 HC RX 637 (ALT 250 FOR IP): Performed by: INTERNAL MEDICINE

## 2021-04-10 PROCEDURE — 80053 COMPREHEN METABOLIC PANEL: CPT

## 2021-04-10 PROCEDURE — 86901 BLOOD TYPING SEROLOGIC RH(D): CPT

## 2021-04-10 PROCEDURE — 2580000003 HC RX 258: Performed by: STUDENT IN AN ORGANIZED HEALTH CARE EDUCATION/TRAINING PROGRAM

## 2021-04-10 PROCEDURE — 83735 ASSAY OF MAGNESIUM: CPT

## 2021-04-10 PROCEDURE — 99285 EMERGENCY DEPT VISIT HI MDM: CPT

## 2021-04-10 PROCEDURE — 36415 COLL VENOUS BLD VENIPUNCTURE: CPT

## 2021-04-10 PROCEDURE — C9113 INJ PANTOPRAZOLE SODIUM, VIA: HCPCS | Performed by: STUDENT IN AN ORGANIZED HEALTH CARE EDUCATION/TRAINING PROGRAM

## 2021-04-10 PROCEDURE — 96375 TX/PRO/DX INJ NEW DRUG ADDON: CPT

## 2021-04-10 PROCEDURE — 36430 TRANSFUSION BLD/BLD COMPNT: CPT

## 2021-04-10 PROCEDURE — 84484 ASSAY OF TROPONIN QUANT: CPT

## 2021-04-10 PROCEDURE — 85014 HEMATOCRIT: CPT

## 2021-04-10 PROCEDURE — 1200000003 HC TELEMETRY R&B

## 2021-04-10 PROCEDURE — 93005 ELECTROCARDIOGRAM TRACING: CPT | Performed by: STUDENT IN AN ORGANIZED HEALTH CARE EDUCATION/TRAINING PROGRAM

## 2021-04-10 PROCEDURE — 86850 RBC ANTIBODY SCREEN: CPT

## 2021-04-10 PROCEDURE — 83605 ASSAY OF LACTIC ACID: CPT

## 2021-04-10 PROCEDURE — 99223 1ST HOSP IP/OBS HIGH 75: CPT | Performed by: INTERNAL MEDICINE

## 2021-04-10 PROCEDURE — 82272 OCCULT BLD FECES 1-3 TESTS: CPT

## 2021-04-10 PROCEDURE — P9016 RBC LEUKOCYTES REDUCED: HCPCS

## 2021-04-10 RX ORDER — CALCIUM GLUCONATE 94 MG/ML
1000 INJECTION, SOLUTION INTRAVENOUS ONCE
Status: COMPLETED | OUTPATIENT
Start: 2021-04-10 | End: 2021-04-10

## 2021-04-10 RX ORDER — SODIUM CHLORIDE 9 MG/ML
10 INJECTION INTRAVENOUS DAILY
Status: DISCONTINUED | OUTPATIENT
Start: 2021-04-10 | End: 2021-04-10

## 2021-04-10 RX ORDER — 0.9 % SODIUM CHLORIDE 0.9 %
500 INTRAVENOUS SOLUTION INTRAVENOUS ONCE
Status: COMPLETED | OUTPATIENT
Start: 2021-04-10 | End: 2021-04-10

## 2021-04-10 RX ORDER — PROMETHAZINE HYDROCHLORIDE 25 MG/1
25 SUPPOSITORY RECTAL EVERY 6 HOURS PRN
COMMUNITY
Start: 2021-04-09 | End: 2021-05-26

## 2021-04-10 RX ORDER — SODIUM CHLORIDE 0.9 % (FLUSH) 0.9 %
5-40 SYRINGE (ML) INJECTION EVERY 12 HOURS SCHEDULED
Status: DISCONTINUED | OUTPATIENT
Start: 2021-04-10 | End: 2021-04-14 | Stop reason: HOSPADM

## 2021-04-10 RX ORDER — ACETAMINOPHEN 650 MG/1
650 SUPPOSITORY RECTAL EVERY 6 HOURS PRN
Status: DISCONTINUED | OUTPATIENT
Start: 2021-04-10 | End: 2021-04-14 | Stop reason: HOSPADM

## 2021-04-10 RX ORDER — FOLIC ACID 1 MG/1
1 TABLET ORAL DAILY
Status: DISCONTINUED | OUTPATIENT
Start: 2021-04-10 | End: 2021-04-14 | Stop reason: HOSPADM

## 2021-04-10 RX ORDER — ATORVASTATIN CALCIUM 40 MG/1
40 TABLET, FILM COATED ORAL DAILY
Status: DISCONTINUED | OUTPATIENT
Start: 2021-04-10 | End: 2021-04-14 | Stop reason: HOSPADM

## 2021-04-10 RX ORDER — ONDANSETRON 2 MG/ML
4 INJECTION INTRAMUSCULAR; INTRAVENOUS EVERY 6 HOURS PRN
Status: DISCONTINUED | OUTPATIENT
Start: 2021-04-10 | End: 2021-04-14 | Stop reason: HOSPADM

## 2021-04-10 RX ORDER — MIDODRINE HYDROCHLORIDE 2.5 MG/1
2.5 TABLET ORAL
Status: DISCONTINUED | OUTPATIENT
Start: 2021-04-10 | End: 2021-04-14 | Stop reason: HOSPADM

## 2021-04-10 RX ORDER — ATENOLOL 25 MG/1
25 TABLET ORAL DAILY
Status: DISCONTINUED | OUTPATIENT
Start: 2021-04-10 | End: 2021-04-14 | Stop reason: HOSPADM

## 2021-04-10 RX ORDER — PANTOPRAZOLE SODIUM 40 MG/10ML
40 INJECTION, POWDER, LYOPHILIZED, FOR SOLUTION INTRAVENOUS DAILY
Status: DISCONTINUED | OUTPATIENT
Start: 2021-04-10 | End: 2021-04-10

## 2021-04-10 RX ORDER — ACETAMINOPHEN 500 MG
1000 TABLET ORAL ONCE
Status: COMPLETED | OUTPATIENT
Start: 2021-04-10 | End: 2021-04-10

## 2021-04-10 RX ORDER — PROMETHAZINE HYDROCHLORIDE 25 MG/1
12.5 TABLET ORAL EVERY 6 HOURS PRN
Status: DISCONTINUED | OUTPATIENT
Start: 2021-04-10 | End: 2021-04-14 | Stop reason: HOSPADM

## 2021-04-10 RX ORDER — SODIUM CHLORIDE 9 MG/ML
INJECTION, SOLUTION INTRAVENOUS PRN
Status: DISCONTINUED | OUTPATIENT
Start: 2021-04-10 | End: 2021-04-14 | Stop reason: HOSPADM

## 2021-04-10 RX ORDER — PANTOPRAZOLE SODIUM 40 MG/10ML
40 INJECTION, POWDER, LYOPHILIZED, FOR SOLUTION INTRAVENOUS 2 TIMES DAILY
Status: DISCONTINUED | OUTPATIENT
Start: 2021-04-10 | End: 2021-04-14

## 2021-04-10 RX ORDER — CITALOPRAM 20 MG/1
20 TABLET ORAL DAILY
Status: DISCONTINUED | OUTPATIENT
Start: 2021-04-10 | End: 2021-04-14 | Stop reason: HOSPADM

## 2021-04-10 RX ORDER — POTASSIUM CHLORIDE 750 MG/1
10 CAPSULE, EXTENDED RELEASE ORAL DAILY
COMMUNITY
End: 2021-05-26

## 2021-04-10 RX ORDER — ONDANSETRON 2 MG/ML
4 INJECTION INTRAMUSCULAR; INTRAVENOUS ONCE
Status: COMPLETED | OUTPATIENT
Start: 2021-04-10 | End: 2021-04-10

## 2021-04-10 RX ORDER — SODIUM CHLORIDE 9 MG/ML
25 INJECTION, SOLUTION INTRAVENOUS PRN
Status: DISCONTINUED | OUTPATIENT
Start: 2021-04-10 | End: 2021-04-14 | Stop reason: HOSPADM

## 2021-04-10 RX ORDER — SODIUM CHLORIDE 0.9 % (FLUSH) 0.9 %
5-40 SYRINGE (ML) INJECTION PRN
Status: DISCONTINUED | OUTPATIENT
Start: 2021-04-10 | End: 2021-04-14 | Stop reason: HOSPADM

## 2021-04-10 RX ORDER — POLYETHYLENE GLYCOL 3350 17 G/17G
17 POWDER, FOR SOLUTION ORAL DAILY PRN
Status: DISCONTINUED | OUTPATIENT
Start: 2021-04-10 | End: 2021-04-14 | Stop reason: HOSPADM

## 2021-04-10 RX ORDER — VITAMIN B COMPLEX
1000 TABLET ORAL DAILY
Status: DISCONTINUED | OUTPATIENT
Start: 2021-04-10 | End: 2021-04-14 | Stop reason: HOSPADM

## 2021-04-10 RX ORDER — SODIUM CHLORIDE 9 MG/ML
INJECTION, SOLUTION INTRAVENOUS CONTINUOUS
Status: DISCONTINUED | OUTPATIENT
Start: 2021-04-10 | End: 2021-04-13

## 2021-04-10 RX ORDER — ATORVASTATIN CALCIUM 40 MG/1
40 TABLET, FILM COATED ORAL DAILY
COMMUNITY
End: 2021-05-26

## 2021-04-10 RX ORDER — OXYCODONE HYDROCHLORIDE AND ACETAMINOPHEN 5; 325 MG/1; MG/1
2 TABLET ORAL EVERY 4 HOURS PRN
COMMUNITY
End: 2021-05-26

## 2021-04-10 RX ORDER — ONDANSETRON 4 MG/1
4 TABLET, FILM COATED ORAL EVERY 6 HOURS PRN
COMMUNITY
Start: 2021-04-09 | End: 2021-05-26

## 2021-04-10 RX ORDER — ACETAMINOPHEN 325 MG/1
650 TABLET ORAL EVERY 6 HOURS PRN
Status: DISCONTINUED | OUTPATIENT
Start: 2021-04-10 | End: 2021-04-14 | Stop reason: HOSPADM

## 2021-04-10 RX ORDER — MECLIZINE HCL 12.5 MG/1
12.5 TABLET ORAL 3 TIMES DAILY PRN
Status: DISCONTINUED | OUTPATIENT
Start: 2021-04-10 | End: 2021-04-14 | Stop reason: HOSPADM

## 2021-04-10 RX ADMIN — CALCIUM GLUCONATE 1000 MG: 98 INJECTION, SOLUTION INTRAVENOUS at 10:07

## 2021-04-10 RX ADMIN — ATENOLOL 25 MG: 25 TABLET ORAL at 17:30

## 2021-04-10 RX ADMIN — ACETAMINOPHEN 650 MG: 325 TABLET ORAL at 22:40

## 2021-04-10 RX ADMIN — ONDANSETRON 4 MG: 2 INJECTION INTRAMUSCULAR; INTRAVENOUS at 07:57

## 2021-04-10 RX ADMIN — PANTOPRAZOLE SODIUM 40 MG: 40 INJECTION, POWDER, FOR SOLUTION INTRAVENOUS at 08:50

## 2021-04-10 RX ADMIN — SODIUM CHLORIDE: 9 INJECTION, SOLUTION INTRAVENOUS at 15:22

## 2021-04-10 RX ADMIN — CITALOPRAM 20 MG: 20 TABLET, FILM COATED ORAL at 17:30

## 2021-04-10 RX ADMIN — ACETAMINOPHEN 1000 MG: 500 TABLET ORAL at 10:07

## 2021-04-10 RX ADMIN — Medication 10 ML: at 08:50

## 2021-04-10 RX ADMIN — Medication 1000 UNITS: at 17:30

## 2021-04-10 RX ADMIN — SODIUM CHLORIDE 500 ML: 9 INJECTION, SOLUTION INTRAVENOUS at 07:57

## 2021-04-10 RX ADMIN — IOPAMIDOL 80 ML: 755 INJECTION, SOLUTION INTRAVENOUS at 08:37

## 2021-04-10 RX ADMIN — FOLIC ACID 1 MG: 1 TABLET ORAL at 17:30

## 2021-04-10 RX ADMIN — MIDODRINE HYDROCHLORIDE 2.5 MG: 2.5 TABLET ORAL at 17:30

## 2021-04-10 RX ADMIN — ATORVASTATIN CALCIUM 40 MG: 40 TABLET, FILM COATED ORAL at 17:30

## 2021-04-10 ASSESSMENT — ENCOUNTER SYMPTOMS
COUGH: 0
BLOOD IN STOOL: 1
VOMITING: 1
ABDOMINAL PAIN: 1
EYE REDNESS: 0
SINUS PAIN: 0
BACK PAIN: 0
DIARRHEA: 1
SHORTNESS OF BREATH: 0
NAUSEA: 1
WHEEZING: 0
SORE THROAT: 0
RHINORRHEA: 0

## 2021-04-10 ASSESSMENT — PAIN DESCRIPTION - LOCATION
LOCATION: ABDOMEN
LOCATION: ABDOMEN

## 2021-04-10 ASSESSMENT — PAIN DESCRIPTION - PAIN TYPE
TYPE: ACUTE PAIN
TYPE: ACUTE PAIN

## 2021-04-10 ASSESSMENT — PAIN SCALES - GENERAL
PAINLEVEL_OUTOF10: 0
PAINLEVEL_OUTOF10: 0
PAINLEVEL_OUTOF10: 3
PAINLEVEL_OUTOF10: 2
PAINLEVEL_OUTOF10: 0
PAINLEVEL_OUTOF10: 2

## 2021-04-10 NOTE — ED PROVIDER NOTES
[address] [facility]  EMERGENCY MEDICINE ATTENDING ATTESTATION      Evaluation of Jamal Almazan. Case discussed and care plan developed with resident physician. I agree with the resident physician documentation and plan as documented by him, except if my documentation differs. Patient seen, interviewed and examined by me. I reviewed the medical, surgical, family and social history, medications and allergies. I have reviewed the nursing documentation. I have reviewed the patient's vital signs and are normal per my interpretation at the moment of my evaluation was hypotensive on arrival. Body mass index is 23.8 kg/m². Pulsoxymetry is normal per my interpretation. Brief H&P   Patient c/o 5 days left lower quadrant abdominal pain, also noted to have bloody diarrhea for the last 5 days. Physical exam is notable for chronically ill appearing, pale, not diaphoretic. Left lower quadrant tenderness, otherwise nonfocal exam      Medical Decision Making   MDM:   1. Abdominal pain  2. Bloody diarrhea  3. Hypertension, possibly due to fluid loss versus blood loss  4. Rule out SBO, diverticulitis among others  Plan:    I V line, labs   IV fluid bolus   Analgesia if needed   Imaging   May need transfusion   May need admission    Please see the resident physician completed note for final disposition except as documented on this attestation. I have reviewed and interpreted all available lab, radiology and ekg results available at the moment. Diagnosis, treatment and disposition plans were discussed and agreed upon by patient. This transcription was electronically signed. It was dictated by use of voice recognition software and electronically transcribed. The transcription may contain errors not detected in proofreading.      I performed direct supervision and was present for the critical portion following procedures: None  Critical care time on this case: None    Electronically

## 2021-04-10 NOTE — PLAN OF CARE
Problem: Falls - Risk of:  Goal: Will remain free from falls  Outcome: Ongoing  Goal: Absence of physical injury  Outcome: Ongoing     Problem: Skin Integrity:  Goal: Will show no infection signs and symptoms  Outcome: Ongoing  Goal: Absence of new skin breakdown  Outcome: Ongoing     Problem: Fluid Volume - Imbalance:  Goal: Absence of imbalanced fluid volume signs and symptoms  Outcome: Ongoing

## 2021-04-10 NOTE — ACP (ADVANCE CARE PLANNING)
Advance Care Planning     Advance Care Planning Activator (Inpatient)  Conversation Note      Date of ACP Conversation: 4/10/2021    Conversation Conducted with: Patient with Decision Making Capacity    ACP Activator: [address] Decision Maker:     Current Designated Health Care Decision Maker:     Primary Decision Maker: Kurisu - Child - [phone]    Secondary Decision Maker: Lamberto Allen - Brother/Sister - [phone]       Care Preferences    Ventilation: \"If you were in your present state of health and suddenly became very ill and were unable to breathe on your own, what would your preference be about the use of a ventilator (breathing machine) if it were available to you? \"      Would the patient desire the use of ventilator (breathing machine)?: yes    \"If your health worsens and it becomes clear that your chance of recovery is unlikely, what would your preference be about the use of a ventilator (breathing machine) if it were available to you? \"     Would the patient desire the use of ventilator (breathing machine)?: No      Resuscitation  \"CPR works best to restart the heart when there is a sudden event, like a heart attack, in someone who is otherwise healthy. Unfortunately, CPR does not typically restart the heart for people who have serious health conditions or who are very sick. \"    \"In the event your heart stopped as a result of an underlying serious health condition, would you want attempts to be made to restart your heart (answer \"yes\" for attempt to resuscitate) or would you prefer a natural death (answer \"no\" for do not attempt to resuscitate)? \" yes       [] Yes   [x] No   Educated Patient / Shruthi Chew regarding differences between Advance Directives and portable DNR orders.     Length of ACP Conversation in minutes:  5  Conversation Outcomes:  [x] ACP discussion completed  [] Existing advance directive reviewed with patient; no changes to patient's previously recorded wishes  [] New Advance Directive completed  [] Portable Do Not Rescitate prepared for Provider review and signature  [] POLST/POST/MOLST/MOST prepared for Provider review and signature      Follow-up plan:    [] Schedule follow-up conversation to continue planning  [x] Referred individual to Provider for additional questions/concerns   [] Advised patient/agent/surrogate to review completed ACP document and update if needed with changes in condition, patient preferences or care setting    [] This note routed to one or more involved healthcare providers       Pt came into the ED due to ABD pain. Met with pt in ED-7 to discuss ACP. She wishes to remain full code. Call to dtr. BODØ per pt request to please come see her in the ED.

## 2021-04-10 NOTE — ED NOTES
ED to inpatient nurses report    Chief Complaint   Patient presents with    Abdominal Pain      [facility]  LOC: alert and orientated to name, place, date  Vital signs   Vitals:    04/10/21 1009 04/10/21 1028 04/10/21 1112 04/10/21 1132   BP: 103/68 103/68 108/69 125/61   Pulse: 81 82 80 83   Resp: 20 16 16 18   Temp:   98.7 °F (37.1 °C) 97.7 °F (36.5 °C)   TempSrc:  Oral  Oral   SpO2: 97% 97%  100%   Weight:       Height:          Oxygen Baseline room air    Current needs required room air Bipap/Cpap No  LDAs:   Peripheral IV 04/10/21 Right Antecubital (Active)   Site Assessment Clean;Dry; Intact 04/10/21 1133   Line Status Infusing 04/10/21 1133   Dressing Status Clean;Dry; Intact 04/10/21 1133   Dressing Intervention New 04/10/21 0727     Mobility: Requires assistance * 1 uses a cane  Pending ED orders: urine  Present condition: stable      Electronically signed by Benita Son RN on 4/10/2021 at 11:34 AM       Benita Son RN  04/10/21 1134

## 2021-04-10 NOTE — ED NOTES
Bed: 007A  Expected date:   Expected time:   [facility]  Comments:     Svetlana Gamino RN  04/10/21 0435

## 2021-04-10 NOTE — H&P
History & Physical        Patient:  Amna Almazan  YOB: 1940    MRN: [id number]     Acct: [de-identified]    PCP: PHIL Driscoll CNP    Date of Admission: 4/10/2021    Date of Service: Pt seen/examined on 4/10/2021   and Admitted to Inpatient with expected LOS greater than two midnights due to medical therapy. Chief Complaint:  Nausea, vomiting, diarrhea, abdominal pain, black colored stools       History Of Present Illness:      80 y.o. female who presented to [address] [place] [address] from [facility] with one week history of nausea, vomiting, diarrhea, abdominal pain and black colored stools. Has a past medical history of CAD, HFrEF, HTN, HLD, A. Fib (on ASA and Plavix only). Pt reports this all happened after eating a meal last week. Pt was just discharged from the hospital to [facility] on 3/17/2021 after having a fall and suffering a right hip fracture and underwent total hip arthroplasty (3/14/2021). Pt currently denies any fevers, chills, chest pain, sob, cough, congestion, sore throat, burning with urination, or focal neurological deficits. In the ED, BP 90/67 HR 91. Pulse Ox 97% on RA. Hb down 7.0 (baseline 10). Troponin non elevated. CT A/P no acute process. FOBT +. Pt given NS Bolus 500cc and 1U pRBCs. Started on Protonix 40 BID.      Past Medical History:          Diagnosis Date    Atrial fibrillation (HCC)     CAD (coronary artery disease)     Congestive heart failure (CHF) (HCC)     Hyperlipidemia     Hypertension        Past Surgical History:          Procedure Laterality Date    ATRIAL ABLATION SURGERY      CARDIAC CATHETERIZATION      CARDIAC VALVE REPLACEMENT      CORONARY ANGIOPLASTY WITH STENT PLACEMENT  02/28/2019    CORONARY ARTERY BYPASS GRAFT      x1    HYSTERECTOMY      MITRAL VALVE REPLACEMENT      PACEMAKER PLACEMENT  05/2019    ME OFFICE/OUTPT VISIT,PROCEDURE ONLY N/A 11/12/2018    CABG X3, MITRAL VALVE REPAIR, MAZE, AND TRICUSPID VALVE REPAIR, DEANNA performed by Jenelle Maddox MD at [facility] Right 3/14/2021    HIP TOTAL ARTHROPLASTY performed by Sima Guillory MD at [facility]         Medications Prior to Admission:      Prior to Admission medications    Medication Sig Start Date End Date Taking? Authorizing Provider   atorvastatin (LIPITOR) 40 MG tablet Take 40 mg by mouth daily   Yes Historical Provider, MD   oxyCODONE-acetaminophen (PERCOCET) 5-325 MG per tablet Take 2 tablets by mouth every 4 hours as needed for Pain.    Yes Historical Provider, MD   potassium chloride (MICRO-K) 10 MEQ extended release capsule Take 10 mEq by mouth daily   Yes Historical Provider, MD   citalopram (CELEXA) 20 MG tablet Take 1 tablet by mouth daily 3/17/21  Yes Iva Napier MD   docusate sodium (COLACE, DULCOLAX) 100 MG CAPS Take 100 mg by mouth 2 times daily as needed for Constipation 3/17/21  Yes Iva Napier MD   ferrous sulfate (IRON 325) 325 (65 Fe) MG tablet Take 1 tablet by mouth 3 times daily (with meals) 3/17/21  Yes Iva Napier MD   folic acid (FOLVITE) 1 MG tablet Take 1 tablet by mouth daily 3/17/21  Yes Iva Napier MD   midodrine (PROAMATINE) 2.5 MG tablet Take 1 tablet by mouth 3 times daily (with meals) 3/17/21  Yes Iva Napier MD   furosemide (LASIX) 20 MG tablet Take 20 mg by mouth daily   Yes Historical Provider, MD   Coenzyme Q10 (CO Q-10) 200 MG CAPS Take by mouth   Yes Historical Provider, MD   meclizine (ANTIVERT) 12.5 MG tablet Take 1 tablet by mouth 3 times daily as needed for Dizziness 11/15/19  Yes Annmarie Ham MD   vitamin D (CHOLECALCIFEROL) 1000 UNIT TABS tablet Take 1,000 Units by mouth daily   Yes Historical Provider, MD   atenolol (TENORMIN) 25 MG tablet Take 1 tablet by mouth daily 4/12/19  Yes Tucker Valdovinos MD   clopidogrel (PLAVIX) 75 MG tablet Take 1 tablet by mouth daily 3/1/19  Yes Tucker Valdovinos MD   aspirin 81 MG tablet Take 81 mg by mouth daily   Yes Historical Provider, MD   acetaminophen (TYLENOL) 325 MG tablet Take 1 tablet by mouth every 4 hours as needed for Pain  Patient taking differently: Take 325 mg by mouth every 4 hours as needed for Pain Takes 1-2 tabs every 4 hours prn pain 11/20/18  Yes Mirian Aschoff, PA-C   rosuvastatin (CRESTOR) 10 MG tablet Take 10 mg by mouth nightly    Historical Provider, MD   potassium chloride (KLOR-CON M) 10 MEQ extended release tablet Take 10 mEq by mouth daily    Historical Provider, MD   Calcium Carbonate-Vit D-Min (CALCIUM 1200 PO) Take 1 tablet by mouth daily    Historical Provider, MD       Allergies:  Patient has no known allergies. Social History:     Social History     Socioeconomic History    Marital status:      Spouse name: None    Number of children: 1    Years of education: None    Highest education level: None   Occupational History    None   Social Needs    Financial resource strain: None    Food insecurity     Worry: None     Inability: None    Transportation needs     Medical: None     Non-medical: None   Tobacco Use    Smoking status: Never Smoker    Smokeless tobacco: Never Used   Substance and Sexual Activity    Alcohol use: No    Drug use: No    Sexual activity: Not Currently   Lifestyle    Physical activity     Days per week: None     Minutes per session: None    Stress: None   Relationships    Social connections     Talks on phone: None     Gets together: None     Attends Temple service: None     Active member of club or organization: None     Attends meetings of clubs or organizations: None     Relationship status: None    Intimate partner violence     Fear of current or ex partner: None     Emotionally abused: None     Physically abused: None     Forced sexual activity: None   Other Topics Concern    None   Social History Narrative    None       Family History:       Reviewed in detail and negative for DM, CAD, Cancer, CVA.  Positive as follows: Problem Relation Age of Onset    Other Mother         alzheimers    Heart Attack Father     Heart Attack Brother        Diet:  No diet orders on file    REVIEW OF SYSTEMS:   Pertinent positives as noted in the HPI. All other systems reviewed and negative. PHYSICAL EXAM:    /61   Pulse 83   Temp 97.7 °F (36.5 °C) (Oral)   Resp 18   Ht 5' 5\" (1.651 m)   Wt 143 lb (64.9 kg)   SpO2 100%   BMI 23.80 kg/m²     General appearance:  No apparent distress, appears stated age and cooperative. HEENT:  Normal cephalic, atraumatic without obvious deformity. Pupils equal, round, and reactive to light. Extra ocular muscles intact. Conjunctivae/corneas clear. Neck: Supple, with full range of motion. No jugular venous distention. Trachea midline. Respiratory:  Normal respiratory effort. Clear to auscultation, bilaterally without Rales/Wheezes/Rhonchi. Cardiovascular:  Regular rate and rhythm with normal S1/S2 without murmurs, rubs or gallops. Abdomen: Soft, non-tender, non-distended with normal bowel sounds. Musculoskeletal:  No clubbing, cyanosis or edema bilaterally. Full range of motion without deformity. Skin: Skin color, texture, turgor normal.  No rashes or lesions. Neurologic:  Neurovascularly intact without any focal sensory/motor deficits. Cranial nerves: II-XII intact, grossly non-focal.  Psychiatric:  Alert and oriented, thought content appropriate, normal insight  Capillary Refill: Brisk,< 3 seconds   Peripheral Pulses: +2 palpable, equal bilaterally       Labs:     Recent Labs     04/10/21  0738   WBC 8.1   HGB 7.0*   HCT 22.5*        Recent Labs     04/10/21  0738   *   K 5.4*      CO2 19*   BUN 58*   CREATININE 1.1   CALCIUM 8.7     Recent Labs     04/10/21  0738   AST 15   ALT 9*   BILIDIR <0.2   BILITOT <0.2*   ALKPHOS 124     No results for input(s): INR in the last 72 hours. No results for input(s): [place] in the last 72 hours.     Urinalysis:      Lab Results   Component Value Date    NITRU NEGATIVE 02/15/2021    WBCUA 5-9 02/15/2021    BACTERIA NONE SEEN 02/15/2021    RBCUA 0-2 02/15/2021    BLOODU TRACE 02/15/2021    SPECGRAV >1.030 02/15/2021    GLUCOSEU NEGATIVE 11/04/2018       Radiology:     CT ABDOMEN PELVIS W IV CONTRAST Additional Contrast? None   Final Result       1. No evidence of an acute process. 2. New dense structure near the terminal ileum. This is presumably something ingested. There is no associated obstruction. **This report has been created using voice recognition software. It may contain minor errors which are inherent in voice recognition technology. **      Final report electronically signed by Dr. Mira Johnson on 4/10/2021 9:19 AM            ASSESSMENT:    C/Ericka Saeed 1106 Problems    Diagnosis Date Noted    Acute blood loss anemia [D62] 04/10/2021    Hx of coronary artery disease [Z86.79]     Severe malnutrition ([facility] [address] [E43] 03/01/2019       Assessment/Plan:    1. Acute Blood Loss Anemia like UGIB while on ASA and Plavix: having black colored stools (takes Iron pills). BP 90/67 HR 91. Pulse Ox 97% on RA. Hb down 7.0 (baseline 10). CT A/P no acute process. FOBT +. CT A/P no acute process. Cont to trend H&H q8. Hold all blood thinners. Received 1U blood. Transfuse to keep Hb>8. Protonix 40 BID. 2. Hx of CAD: Noted an old infarct (Small-to-moderate myocardial infarction of the anteroseptal LV per echocardiogram 3/13/2021). Continue BB and Statin. Hold ASA and Plavix. 3. Right Hip fracture status post total hip arthroplasty 3/14/2021: PT/OT. 4. Chronic HFrEF: EF 40 to 45% per echocardiogram 3 /13/2021 (last in 4/11/19 showing EF 40-45%, No aortic valve disease). No signs of volume overload. 5. Hx of A. Fib: Ventricular paced rhythm, on ASA and Plavix. Not on Lendio due to recurrent falls. Cont BB. 6. Essential HTN: BP has been low. Hold BP medications.          Thank you Rikki Hannon APRN - CNP for the opportunity to be involved in this patient's care.     Electronically signed by Jay Escobar MD on 4/10/2021 at 12:21 PM

## 2021-04-11 LAB
AMORPHOUS: ABNORMAL
ANION GAP SERPL CALCULATED.3IONS-SCNC: 11 MEQ/L (ref 8–16)
BACTERIA: ABNORMAL /HPF
BASOPHILS # BLD: 0.6 %
BASOPHILS ABSOLUTE: 0 THOU/MM3 (ref 0–0.1)
BILIRUBIN URINE: NEGATIVE
BLOOD, URINE: ABNORMAL
BUN BLDV-MCNC: 27 MG/DL (ref 7–22)
CALCIUM SERPL-MCNC: 7.8 MG/DL (ref 8.5–10.5)
CASTS UA: ABNORMAL /LPF
CHARACTER, URINE: CLEAR
CHLORIDE BLD-SCNC: 102 MEQ/L (ref 98–111)
CO2: 18 MEQ/L (ref 23–33)
COLOR: YELLOW
CREAT SERPL-MCNC: 0.8 MG/DL (ref 0.4–1.2)
CRYSTALS, UA: ABNORMAL
DIFFERENTIAL TYPE: ABNORMAL
EOSINOPHIL # BLD: 1.4 %
EOSINOPHILS ABSOLUTE: 0.1 THOU/MM3 (ref 0–0.4)
EPITHELIAL CELLS, UA: ABNORMAL /HPF
ERYTHROCYTE [DISTWIDTH] IN BLOOD BY AUTOMATED COUNT: 16.4 % (ref 11.5–14.5)
ERYTHROCYTE [DISTWIDTH] IN BLOOD BY AUTOMATED COUNT: 55.4 FL (ref 35–45)
GFR SERPL CREATININE-BSD FRML MDRD: 69 ML/MIN/1.73M2
GLUCOSE BLD-MCNC: 93 MG/DL (ref 70–108)
GLUCOSE URINE: NEGATIVE MG/DL
HCT VFR BLD CALC: 22.1 % (ref 37–47)
HCT VFR BLD CALC: 25.7 % (ref 37–47)
HCT VFR BLD CALC: 28.6 % (ref 37–47)
HEMOGLOBIN: 6.9 GM/DL (ref 12–16)
HEMOGLOBIN: 8.2 GM/DL (ref 12–16)
HEMOGLOBIN: 8.8 GM/DL (ref 12–16)
IMMATURE GRANS (ABS): 0.1 THOU/MM3 (ref 0–0.07)
IMMATURE GRANULOCYTES: 2 %
KETONES, URINE: NEGATIVE
LEUKOCYTE ESTERASE, URINE: NEGATIVE
LYMPHOCYTES # BLD: 17.3 %
LYMPHOCYTES ABSOLUTE: 0.9 THOU/MM3 (ref 1–4.8)
MAGNESIUM: 2.2 MG/DL (ref 1.6–2.4)
MCH RBC QN AUTO: 28.8 PG (ref 26–33)
MCHC RBC AUTO-ENTMCNC: 31.2 GM/DL (ref 32.2–35.5)
MCV RBC AUTO: 92.1 FL (ref 81–99)
MONOCYTES # BLD: 9.4 %
MONOCYTES ABSOLUTE: 0.5 THOU/MM3 (ref 0.4–1.3)
MUCUS: ABNORMAL
NITRITE, URINE: NEGATIVE
NUCLEATED RED BLOOD CELLS: 0 /100 WBC
PATHOLOGIST REVIEW: ABNORMAL
PH UA: 6 (ref 5–9)
PLATELET # BLD: 170 THOU/MM3 (ref 130–400)
PMV BLD AUTO: 10.6 FL (ref 9.4–12.4)
POTASSIUM SERPL-SCNC: 4.4 MEQ/L (ref 3.5–5.2)
PROTEIN UA: NEGATIVE
RBC # BLD: 2.4 MILL/MM3 (ref 4.2–5.4)
RBC URINE: ABNORMAL /HPF
RENAL EPITHELIAL, UA: ABNORMAL
SARS-COV-2, NAAT: NOT DETECTED
SCAN OF BLOOD SMEAR: NORMAL
SEG NEUTROPHILS: 69.3 %
SEGMENTED NEUTROPHILS ABSOLUTE COUNT: 3.5 THOU/MM3 (ref 1.8–7.7)
SODIUM BLD-SCNC: 131 MEQ/L (ref 135–145)
SPECIFIC GRAVITY, URINE: 1.02 (ref 1–1.03)
UROBILINOGEN, URINE: 0.2 EU/DL (ref 0–1)
WBC # BLD: 5.1 THOU/MM3 (ref 4.8–10.8)
WBC UA: ABNORMAL /HPF
YEAST: ABNORMAL

## 2021-04-11 PROCEDURE — 1200000003 HC TELEMETRY R&B

## 2021-04-11 PROCEDURE — 6370000000 HC RX 637 (ALT 250 FOR IP): Performed by: INTERNAL MEDICINE

## 2021-04-11 PROCEDURE — 85025 COMPLETE CBC W/AUTO DIFF WBC: CPT

## 2021-04-11 PROCEDURE — 2580000003 HC RX 258: Performed by: INTERNAL MEDICINE

## 2021-04-11 PROCEDURE — 6360000002 HC RX W HCPCS: Performed by: INTERNAL MEDICINE

## 2021-04-11 PROCEDURE — 36415 COLL VENOUS BLD VENIPUNCTURE: CPT

## 2021-04-11 PROCEDURE — 80048 BASIC METABOLIC PNL TOTAL CA: CPT

## 2021-04-11 PROCEDURE — 85018 HEMOGLOBIN: CPT

## 2021-04-11 PROCEDURE — 83735 ASSAY OF MAGNESIUM: CPT

## 2021-04-11 PROCEDURE — 85014 HEMATOCRIT: CPT

## 2021-04-11 PROCEDURE — 99232 SBSQ HOSP IP/OBS MODERATE 35: CPT | Performed by: INTERNAL MEDICINE

## 2021-04-11 PROCEDURE — C9113 INJ PANTOPRAZOLE SODIUM, VIA: HCPCS | Performed by: INTERNAL MEDICINE

## 2021-04-11 PROCEDURE — 87635 SARS-COV-2 COVID-19 AMP PRB: CPT

## 2021-04-11 PROCEDURE — 2580000003 HC RX 258: Performed by: NURSE PRACTITIONER

## 2021-04-11 PROCEDURE — 81001 URINALYSIS AUTO W/SCOPE: CPT

## 2021-04-11 RX ORDER — SODIUM CHLORIDE 9 MG/ML
INJECTION, SOLUTION INTRAVENOUS PRN
Status: COMPLETED | OUTPATIENT
Start: 2021-04-11 | End: 2021-04-11

## 2021-04-11 RX ADMIN — MIDODRINE HYDROCHLORIDE 2.5 MG: 2.5 TABLET ORAL at 17:25

## 2021-04-11 RX ADMIN — ACETAMINOPHEN 650 MG: 325 TABLET ORAL at 23:55

## 2021-04-11 RX ADMIN — MIDODRINE HYDROCHLORIDE 2.5 MG: 2.5 TABLET ORAL at 08:16

## 2021-04-11 RX ADMIN — ATENOLOL 25 MG: 25 TABLET ORAL at 08:16

## 2021-04-11 RX ADMIN — PANTOPRAZOLE SODIUM 40 MG: 40 INJECTION, POWDER, FOR SOLUTION INTRAVENOUS at 20:09

## 2021-04-11 RX ADMIN — SODIUM CHLORIDE: 9 INJECTION, SOLUTION INTRAVENOUS at 13:30

## 2021-04-11 RX ADMIN — FOLIC ACID 1 MG: 1 TABLET ORAL at 08:16

## 2021-04-11 RX ADMIN — ATORVASTATIN CALCIUM 40 MG: 40 TABLET, FILM COATED ORAL at 20:09

## 2021-04-11 RX ADMIN — PANTOPRAZOLE SODIUM 40 MG: 40 INJECTION, POWDER, FOR SOLUTION INTRAVENOUS at 00:32

## 2021-04-11 RX ADMIN — Medication 1000 UNITS: at 08:16

## 2021-04-11 RX ADMIN — MIDODRINE HYDROCHLORIDE 2.5 MG: 2.5 TABLET ORAL at 13:32

## 2021-04-11 RX ADMIN — SODIUM CHLORIDE: 9 INJECTION, SOLUTION INTRAVENOUS at 23:55

## 2021-04-11 RX ADMIN — CITALOPRAM 20 MG: 20 TABLET, FILM COATED ORAL at 08:16

## 2021-04-11 RX ADMIN — PANTOPRAZOLE SODIUM 40 MG: 40 INJECTION, POWDER, FOR SOLUTION INTRAVENOUS at 08:16

## 2021-04-11 ASSESSMENT — PAIN SCALES - GENERAL
PAINLEVEL_OUTOF10: 0
PAINLEVEL_OUTOF10: 3
PAINLEVEL_OUTOF10: 0

## 2021-04-11 NOTE — PROGRESS NOTES
Hospitalist      Patient:  Addis Hobbs [place]    Unit/Bed:8B-29/029-A  YOB: 1940  MRN: [id number]   Acct: [de-identified]     PCP: PHIL Alfredo CNP  Date of Admission: 4/10/2021        Assessment and Plan:        1. GI bleed with acute blood loss anemia: Transfusion to keep hemoglobin greater than 8, aspirin and Plavix are on hold, GI has been consulted, Protonix 40 mg twice daily. 2. Coronary artery disease by history we will continue beta-blocker and statin therapy will hold aspirin and Plavix  3. S/PE ORIF right hip 3/14/2021: We will order PT and OT while in hospital  4. Atrial fibrillation by history: Ventricular paced rhythm, aspirin and Plavix are on hold no OAC due to recurrent falls will continue beta-blocker  5. Essential hypertension we will continue to monitor currently blood pressure has been low BP medication on hold    CC: Nausea vomiting diarrhea abdominal pain, black stools    HPI: 77-year-old white female presents to [facility] from [facility] with 1 week history of nausea, vomiting, diarrhea, abdominal pain and black-colored stools. Patient has a past medical history of coronary artery disease heart failure with reduced ejection fraction, hypertension, hyperlipidemia, atrial fibrillation (only on aspirin and Plavix). Patient reports all this happened after eating a meal last week. Patient was just recently discharged from the hospital to [facility] on 3/17/2021 after having a fall and suffering a right hip fracture and underwent total hip arthroplasty. Patient currently denies any fever chills chest pain, shortness of breath, cough, congestion, sore throat, burning with urination, or focal neurological deficits. ER records were reviewed    ROS (14 point review of systems completed. Pertinent positives noted.  Otherwise ROS is negative) : Nausea, vomiting, abdominal pain, black tarry stools,    PMH:  Per HPI and       Diagnosis Date    Atrial fibrillation ([facility] [address]     CAD (coronary artery disease)     Congest[facility] failure (CHF) ([facility] [address]     Hyperlipidemia     Hypertension      SHX:        Procedure Laterality Date    ATRIAL ABLATION SURGERY      CARDIAC CATHETERIZATION      CARDIAC VALVE REPLACEMENT      CORONARY ANGIOPLASTY WITH STENT PLACEMENT  02/28/2019    CORONARY ARTERY BYPASS GRAFT      x1    HYSTERECTOMY      JOINT REPLACEMENT      MITRAL VALVE REPLACEMENT      PACEMAKER PLACEMENT  05/2019    IN OFFICE/OUTPT VISIT,PROCEDURE ONLY N/A 11/12/2018    CABG X3, MITRAL VALVE REPAIR, MAZE, AND TRICUSPID VALVE REPAIR, DEANNA performed by Susan Tyson MD at [address] [facility] Right 3/14/2021    HIP TOTAL ARTHROPLASTY performed by Vargas Giron MD at [address]:       Problem Relation Age of Onset    Other Mother         alzheimers    Heart Attack Father     Heart Attack Brother      [facility]:   Social History     Socioeconomic History    Marital status:       Spouse name: None    Number of children: 1    Years of education: None    Highest education level: None   Occupational History    None   Social Needs    Financial resource strain: None    Food insecurity     Worry: None     Inability: None    Transportation needs     Medical: None     Non-medical: None   Tobacco Use    Smoking status: Never Smoker    Smokeless tobacco: Never Used   Substance and Sexual Activity    Alcohol use: No    Drug use: No    Sexual activity: Not Currently   Lifestyle    Physical activity     Days per week: None     Minutes per session: None    Stress: None   Relationships    Social connections     Talks on phone: None     Gets together: None     Attends Scientologist service: None     Active member of club or organization: None     Attends meetings of clubs or organizations: None     Relationship status: None    Intimate partner violence     Fear of current or ex partner: None     Emotionally abused: None     Physically abused: None     Forced sexual activity: None   Other Topics Concern    None   Social History Narrative    None      Allergies: Patient has no known allergies. Medications:     sodium chloride      sodium chloride      sodium chloride      sodium chloride 75 mL/hr at 04/10/21 1522      atenolol  25 mg Oral Daily    atorvastatin  40 mg Oral Daily    citalopram  20 mg Oral Daily    folic acid  1 mg Oral Daily    midodrine  2.5 mg Oral TID WC    Vitamin D  1,000 Units Oral Daily    pantoprazole  40 mg Intravenous BID    sodium chloride flush  5-40 mL Intravenous 2 times per day     Prior to Admission medications    Medication Sig Start Date End Date Taking? Authorizing Provider   atorvastatin (LIPITOR) 40 MG tablet Take 40 mg by mouth daily   Yes Historical Provider, MD   oxyCODONE-acetaminophen (PERCOCET) 5-325 MG per tablet Take 2 tablets by mouth every 4 hours as needed for Pain.    Yes Historical Provider, MD   potassium chloride (MICRO-K) 10 MEQ extended release capsule Take 10 mEq by mouth daily   Yes Historical Provider, MD   ondansetron (ZOFRAN) 4 MG tablet Take 4 mg by mouth every 6 hours as needed for Nausea or Vomiting Indications: Nausea and Vomiting 4/9/21  Yes Historical Provider, MD   promethazine (PHENERGAN) 25 MG suppository Place 25 mg rectally every 6 hours as needed for Nausea 4/9/21  Yes Historical Provider, MD   citalopram (CELEXA) 20 MG tablet Take 1 tablet by mouth daily 3/17/21  Yes Nany Joshi MD   ferrous sulfate (IRON 325) 325 (65 Fe) MG tablet Take 1 tablet by mouth 3 times daily (with meals) 3/17/21  Yes Nany Joshi MD   folic acid (FOLVITE) 1 MG tablet Take 1 tablet by mouth daily 3/17/21  Yes Nany Joshi MD   midodrine (PROAMATINE) 2.5 MG tablet Take 1 tablet by mouth 3 times daily (with meals) 3/17/21  Yes Nany Joshi MD   furosemide (LASIX) 20 MG tablet Take 20 mg by mouth daily   Yes Historical Provider, MD   Coenzyme Q10 (CO Q-10) 200 MG CAPS Take by mouth   Yes Historical Provider, MD   Calcium Carbonate-Vit D-Min (CALCIUM 1200 PO) Take 1 tablet by mouth daily   Yes Historical Provider, MD   vitamin D (CHOLECALCIFEROL) 1000 UNIT TABS tablet Take 1,000 Units by mouth daily   Yes Historical Provider, MD   atenolol (TENORMIN) 25 MG tablet Take 1 tablet by mouth daily 4/12/19  Yes Ras Garsia MD   clopidogrel (PLAVIX) 75 MG tablet Take 1 tablet by mouth daily 3/1/19  Yes Ras Garsia MD   aspirin 81 MG tablet Take 81 mg by mouth daily   Yes Historical Provider, MD   acetaminophen (TYLENOL) 325 MG tablet Take 1 tablet by mouth every 4 hours as needed for Pain  Patient taking differently: Take 325 mg by mouth every 4 hours as needed for Pain Takes 1-2 tabs every 4 hours prn pain 11/20/18  Yes Carmelo Vizcarra PA-C   docusate sodium (COLACE, DULCOLAX) 100 MG CAPS Take 100 mg by mouth 2 times daily as needed for Constipation 3/17/21   Jean Phan MD   rosuvastatin (CRESTOR) 10 MG tablet Take 10 mg by mouth nightly    Historical Provider, MD   potassium chloride (KLOR-CON M) 10 MEQ extended release tablet Take 10 mEq by mouth daily    Historical Provider, MD   meclizine (ANTIVERT) 12.5 MG tablet Take 1 tablet by mouth 3 times daily as needed for Dizziness 11/15/19   Aleksandr Hwang MD      PHYSICAL EXAM:    /62   Pulse 81   Temp 98.6 °F (37 °C) (Oral)   Resp 18   Ht 5' 5\" (1.651 m)   Wt 134 lb 7.7 oz (61 kg)   SpO2 97%   BMI 22.38 kg/m²     General appearance:  No apparent distress, appears stated age and cooperative. HEENT:  Normal cephalic, atraumatic without obvious deformity. Pupils equal, round, and reactive to light. Extra ocular muscles intact. Conjunctivae/corneas clear. Conjunctiva are pale in appearance  Neck: Supple, with full range of motion. no jugular venous distention. Trachea midline. no carotid bruits  Respiratory:  Normal respiratory effort.  Clear to auscultation, bilaterally without Rales/Wheezes/Rhonchi. Breath sounds equal bilaterally  Cardiovascular:  Regular rate and rhythm with normal S1/S2 without murmurs, rubs or gallops. PMI non displaced  Abdomen: Soft, non-tender, non-distended with normal bowel sounds. No guarding, rebound. Musculoskeletal:  No clubbing, cyanosis or edema bilaterally. Full range of motion without deformity. Skin: Skin color, texture, turgor normal.  No rashes or lesions, or suspicious lesions. Palmar creases are pale  Neurologic:  Neurovascularly intact without any focal sensory/motor deficits. Cranial nerves: II-XII intact, grossly non-focal.  Psychiatric:  Alert and oriented, thought content appropriate, normal insight  Capillary Refill: Brisk,< 2 seconds   Peripheral Pulses: +2 palpable, equal bilaterally upper and lower extremities  Lymphatics: no lymphadenopathy    Data: (All radiographs, tracings, PFTs, and imaging are personally viewed and interpreted unless otherwise noted).  Hemoglobin this a.m. 6.9   Sodium 131   Creatinine 0.8  Recent Labs     04/10/21  0738 04/10/21  1430 04/10/21  2034 04/11/21  0538   WBC 8.1  --   --  5.1   HGB 7.0* 8.9* 7.2* 6.9*   HCT 22.5* 30.1* 22.4* 22.1*     --   --  170      Recent Labs     04/10/21  0738 04/11/21  0538   * 131*   K 5.4* 4.4    102   CO2 19* 18*   BUN 58* 27*   CREATININE 1.1 0.8   CALCIUM 8.7 7.8*      Recent Labs     04/10/21  0738   AST 15   ALT 9*   BILIDIR <0.2   BILITOT <0.2*   ALKPHOS 124       No results for input(s): INR in the last 72 hours.  No results for input(s): Tawana Anchors in the last 72 hours.     Radiology reports-images to be reviewed in PACS  Echo Complete 2d W Doppler W Color    Result Date: 3/13/2021  Transthoracic Echocardiography Report (TTE)  Demographics   Patient Name     Divya Kennedy Gender                Female   MR #             [id number]       Race                                                      Ethnicity   Account # [id number]       [facility] Number           [id number]   Accession Number [id number]      Date of Study         03/13/2021   Date of Birth    1940      Referring Physician   Bal Domínguez, MARKUS Gómez   Age              80 year(s)      Kemal Perry, [facility]                                    Interpreting          Kelley Cannon MD                                   Physician  Procedure Type of Study   TTE procedure:ECHOCARDIOGRAM COMPLETE 2D W DOPPLER W COLOR. Procedure Date Date: 03/13/2021 Start: 12:57 PM Study Location: Bedside Technical Quality: Limited visualization due to restricted mobility. Indications:Syncope and Evaluate for valvular heart disease. Additional Medical History:Coronary artery disease, CABG, MV repair 26mm annuloplasty ring, TV repair 30 mm ring, CHF, Hypertension, Hyperlipidemia, A fib, Ablation Patient Status: Routine Height: 64.96 inches Weight: 126 pounds BSA: 1.62 m^2 BMI: 20.99 kg/m^2 BP: 117/69 mmHg Allergies   - No Known Allergies. - See Epic. - Other allergy:(Crestor). - Other allergy:(Crestor). Conclusions   Summary  Left ventricle size is normal.  Mild concentric left ventricular hypertrophy. Ejection fraction is visually estimated in the range of 40% to 45%. Mildly hypokinetic motion of the apical anteroseptal wall noted in the  left ventricle. Small-to-moderate myocardial infarction of the anteroseptal LV. Moderately dilated left atrium. watchman device  Aortic valve appears tricuspid. Aortic valve leaflets are Moderately  calcified. Mild aortic stenosis is present. Trivial aortic regurgitation  is noted. Mitral annular calcification is present. Decreased mitral valve mobility noted.  mitral valve ring   Signature   ----------------------------------------------------------------  Electronically signed by Kelley Cannon MD (Interpreting  physician) on 03/14/2021 at 07:06 AM ----------------------------------------------------------------   Findings   Mitral Valve  Mitral annular calcification is present. Decreased mitral valve mobility noted. mitral valve ring   Aortic Valve  Aortic valve appears tricuspid. Aortic valve leaflets are Moderately  calcified. Mild aortic stenosis is present. Trivial aortic regurgitation  is noted. Tricuspid Valve  Tricuspid valve is structurally normal.  No evidence of tricuspid stenosis. Mild to moderate tricuspid regurgitation visualized. Pulmonic Valve  The pulmonic valve was not well visualized . Left Atrium  Moderately dilated left atrium. watchman device   Left Ventricle  Left ventricle size is normal.  Mild concentric left ventricular hypertrophy. Ejection fraction is visually estimated in the range of 40% to 45%. Mildly hypokinetic motion of the apical anteroseptal wall noted in the  left ventricle. Small-to-moderate myocardial infarction of the anteroseptal LV. Right Atrium  Mildly enlarged right atrium size. Right Ventricle  Mildly dilated right ventricle. Right ventricle global systolic function is low normal .  Pacer Wire visualized in right ventricle. Right ventricular systolic pressure of 35 mm Hg consistent with mild  pulmonary hypertension. Pericardial Effusion  No evidence of any pericardial effusion. Pleural Effusion  No evidence of pleural effusion. Aorta / Great Vessels  The aorta is within normal limits.   M-Mode/2D Measurements & Calculations   LV Diastolic    LV Systolic Dimension: 3  AV Cusp Separation: 2 cmLA  Dimension: 3.8  cm                        Dimension: 3.6 cmAO Root  cm              LV Volume Diastolic: 62   Dimension: 3.1 cmLA Area: 20.6  LV FS:21.1 %    ml                        cm^2  LV PW           LV Volume Systolic: 35 ml  Diastolic: 0.9  LV EDV/LV EDV Index: 62  cm              ml/38 m^2LV ESV/LV ESV  Septum          Index: 35 ml/22 m^2       RV Diastolic Dimension: 2.4 cm  Diastolic: 0.9  EF report has been created using voice recognition software. It may contain minor errors which are inherent in voice recognition technology. ** Final report electronically signed by DR Danile Hudson on 3/14/2021 2:00 PM    Xr Hip Right (2-3 Views)    Result Date: 3/14/2021  PROCEDURE: XR HIP RIGHT (2-3 VIEWS) CLINICAL INFORMATION: arthroplasty. COMPARISON: Plain radiographs 10 March 2021. TECHNIQUE: 4 images were obtained through the right and left hips in surgery. FINDINGS:  The patient has undergone interval right hip replacement which is in satisfactory position. 1. Status post right hip replacement which is in satisfactory position. . **This report has been created using voice recognition software. It may contain minor errors which are inherent in voice recognition technology. ** Final report electronically signed by DR Daniel Hudson on 3/14/2021 2:02 PM    Ct Head Wo Contrast    Result Date: 3/12/2021  CT head without contrast Comparison:  CT,SR  - CT HEAD WO CONTRAST  - 02/15/2021 10:52 AM EST Findings: No intracranial mass, midline shift, hydrocephalus, or acute hemorrhage. Old infarct in the left frontal centrum semiovale. Periventricular white matter hypoattenuation may relate to chronic small vessel ischemic changes. Mild generalized atrophy. Mild right lateral scalp hematoma. Mucosal thickening in the left sphenoid sinus. Mastoid air cells are clear. The orbits are within normal limits. There is no acute fracture. 1. No acute intracranial findings. 2. Mild right lateral scalp hematoma. This document has been electronically signed by: John Robert MD on 03/12/2021 09:50 PM All CTs at this facility use dose modulation techniques and iterative reconstructions, and/or weight-based dosing when appropriate to reduce radiation to a low as reasonably achievable.     Ct Cervical Spine Wo Contrast    Result Date: 3/12/2021  CT cervical spine without contrast Comparison:  CT,SR  - CT CERVICAL SPINE WO CONTRAST  - 02/15/2021 10:52 AM EST Findings: Visualized intracranial contents are unremarkable. Soft tissues of the neck are normal. Mild calcified biapical pleural-parenchymal thickening. Vertebral alignment is within normal limits. No acute fractures or dislocations. Disc osteophyte complexes and facet arthropathy causes bilateral neuroforaminal stenosis at C5-6 and C6-7. Central canal stenosis at C4-5 and C5-6. No acute findings. This document has been electronically signed by: Thu Munoz MD on 03/12/2021 09:51 PM All CTs at this facility use dose modulation techniques and iterative reconstructions, and/or weight-based dosing when appropriate to reduce radiation to a low as reasonably achievable. Ct Abdomen Pelvis W Iv Contrast Additional Contrast? None    Result Date: 4/10/2021  PROCEDURE: CT ABDOMEN PELVIS W IV CONTRAST CLINICAL INFORMATION: llq . Left lower abdominal pain. Left hip pain. Right hip replacement. COMPARISON: 2/15/2021. TECHNIQUE: Axial 5 mm CT images were obtained through the abdomen and pelvis after the administration of intravenous contrast. Coronal and sagittal reconstructions were obtained. All CT scans at this facility use dose modulation, iterative reconstruction, and/or weight-based dosing when appropriate to reduce radiation dose to as low as reasonably achievable. FINDINGS: The visualized aspects of the lung bases are clear. The base of the heart is within appropriate limits. The liver is normal. The spleen is normal. The adrenals and pancreas are normal. The gallbladder is normal.    There is no hydronephrosis or stones of either kidney. No renal masses are noted. No abnormalities of the small bowel loops are noted. There is atherosclerosis of the arterial structures. There is no adenopathy. The urinary bladder is normal. There is no pelvic free fluid. The colon is redundant. There is no colonic inflammation. At the junction of the terminal ileum and: There is a dense structure.  This ... Mild soft plaque, no appreciable stenosis. .. ECA. . Mild intimal thickening, no appreciable stenosis. CCA. Unremarkable . Flaca Sida VERT Antegrade flow. .. 2. LEFT ICA. .... Moderate soft plaque, mild stenosis, less than 50%. .. ECA. .. Unremarkable CCA. .. Mild intimal thickening, no appreciable stenosis. Catrina Rank. Antegrade flow. **This report has been created using voice recognition software. It may contain minor errors which are inherent in voice recognition technology. ** Final report electronically signed by Dr. Rolan Lu on 3/15/2021 8:37 AM    Fluoro For Surgical Procedures    Result Date: 3/14/2021  Radiology exam is complete. No Radiologist dictation. Please follow up with ordering provider. Xr Hip 2-3 Vw W Pelvis Right    Result Date: 3/12/2021  ** ADDENDUM #1 ** This report was discussed with Richie Mejia RN on Mar 12, 2021 21:50:00 EST. This document has been electronically signed by: Marleny Norman on 03/12/2021 09:50 PM ** ORIGINAL REPORT ** 3 view, pelvis and right hip Comparison:  CR,SR  - XR PELVIS (1-2 VIEWS)  - 02/15/2021 11:05 AM EST Findings: Acute right subcapital femoral neck fracture with varus angulation and mild superior displacement of the distal fracture fragment. No dislocation the right femoral head. Degenerative changes in the bilateral sacroiliac joints. Left hip joint and pubic symphysis are intact. Disc spacer at L4-5. Surgical clip in the left inguinal region. Acute right subcapital femoral neck fracture with superior displacement of the distal fracture fragment.  This document has been electronically signed by: Narendra Marie MD on 03/12/2021 09:43 PM       Electronically signed by Deb Burnette DO on 4/11/2021 at 8:00 AM

## 2021-04-11 NOTE — CONSULTS
tablet Take 40 mg by mouth daily   Yes Historical Provider, MD   oxyCODONE-acetaminophen (PERCOCET) 5-325 MG per tablet Take 2 tablets by mouth every 4 hours as needed for Pain.    Yes Historical Provider, MD   potassium chloride (MICRO-K) 10 MEQ extended release capsule Take 10 mEq by mouth daily   Yes Historical Provider, MD   ondansetron (ZOFRAN) 4 MG tablet Take 4 mg by mouth every 6 hours as needed for Nausea or Vomiting Indications: Nausea and Vomiting 4/9/21  Yes Historical Provider, MD   promethazine (PHENERGAN) 25 MG suppository Place 25 mg rectally every 6 hours as needed for Nausea 4/9/21  Yes Historical Provider, MD   citalopram (CELEXA) 20 MG tablet Take 1 tablet by mouth daily 3/17/21  Yes Deepa Preston MD   ferrous sulfate (IRON 325) 325 (65 Fe) MG tablet Take 1 tablet by mouth 3 times daily (with meals) 3/17/21  Yes Deepa Preston MD   folic acid (FOLVITE) 1 MG tablet Take 1 tablet by mouth daily 3/17/21  Yes Deepa Preston MD   midodrine (PROAMATINE) 2.5 MG tablet Take 1 tablet by mouth 3 times daily (with meals) 3/17/21  Yes Deepa Preston MD   furosemide (LASIX) 20 MG tablet Take 20 mg by mouth daily   Yes Historical Provider, MD   Coenzyme Q10 (CO Q-10) 200 MG CAPS Take by mouth   Yes Historical Provider, MD   Calcium Carbonate-Vit D-Min (CALCIUM 1200 PO) Take 1 tablet by mouth daily   Yes Historical Provider, MD   vitamin D (CHOLECALCIFEROL) 1000 UNIT TABS tablet Take 1,000 Units by mouth daily   Yes Historical Provider, MD   atenolol (TENORMIN) 25 MG tablet Take 1 tablet by mouth daily 4/12/19  Yes Demetrio Jones MD   clopidogrel (PLAVIX) 75 MG tablet Take 1 tablet by mouth daily 3/1/19  Yes Demetrio Jones MD   aspirin 81 MG tablet Take 81 mg by mouth daily   Yes Historical Provider, MD   acetaminophen (TYLENOL) 325 MG tablet Take 1 tablet by mouth every 4 hours as needed for Pain  Patient taking differently: Take 325 mg by mouth every 4 hours as needed for Pain Takes 1-2 tabs every 4 hours prn pain 11/20/18  Yes Roe Hughes PA-C   docusate sodium (COLACE, DULCOLAX) 100 MG CAPS Take 100 mg by mouth 2 times daily as needed for Constipation 3/17/21   Javon Peraza MD   rosuvastatin (CRESTOR) 10 MG tablet Take 10 mg by mouth nightly    Historical Provider, MD   potassium chloride (KLOR-CON M) 10 MEQ extended release tablet Take 10 mEq by mouth daily    Historical Provider, MD   meclizine (ANTIVERT) 12.5 MG tablet Take 1 tablet by mouth 3 times daily as needed for Dizziness 11/15/19   Michael Meredith MD       Surgical History:  Past Surgical History:   Procedure Laterality Date    ATRIAL ABLATION SURGERY      CARDIAC CATHETERIZATION      CARDIAC VALVE REPLACEMENT      CORONARY ANGIOPLASTY WITH STENT PLACEMENT  02/28/2019    CORONARY ARTERY BYPASS GRAFT      x1    HYSTERECTOMY      JOINT REPLACEMENT      MITRAL VALVE REPLACEMENT      PACEMAKER PLACEMENT  05/2019    DE OFFICE/OUTPT VISIT,PROCEDURE ONLY N/A 11/12/2018    CABG X3, MITRAL VALVE REPAIR, MAZE, AND TRICUSPID VALVE REPAIR, DEANNA performed by Vanessa Giang MD at [facility] Right 3/14/2021    HIP TOTAL ARTHROPLASTY performed by Ayush Bellamy MD at [facility]         Family History:  Family History   Problem Relation Age of Onset    Other Mother         alzheimers    Heart Attack Father     Heart Attack Brother        Past GI History:  Remote history of gastric ulcers per patient    Allergies:  Patient has no known allergies. Social History:   TOBACCO:   reports that she has never smoked. She has never used smokeless tobacco.  ETOH:   reports no history of alcohol use. Review Of Systems  GENERAL: No fever, chills or weight loss. EYES:  No  blurred vision, double vision   CARDIOVASCULAR: No chest pain or palpitations. RESPIRATORY:  No dyspnea or cough. GI:  See HPI  MUSCULOSKELETAL: No new painful or swollen joints or myalgias.     : No dysuria or hematuria. SKIN:  No rashes or jaundice. NEUROLOGIC:  No headaches or seizures, numbness or tingling of arms, or legs. PSYCH:  No anxiety or depression. ENDOCRINE:  No polyuria or polydipsia. BLOOD:  No anemia, bleeding disorder, blood or blood product transfusion. PHYSICAL EXAM:  /61   Pulse 81   Temp 98.2 °F (36.8 °C) (Oral)   Resp 18   Ht 5' 5\" (1.651 m)   Wt 134 lb 7.7 oz (61 kg)   SpO2 98%   BMI 22.38 kg/m²     General appearance: No apparent distress, appears stated age and cooperative. HEENT: Normal cephalic, atraumatic without obvious deformity. Pupils equal, round, and reactive to light. Neck: Supple, with full range of motion. No jugular venous distention. Trachea midline. Respiratory:  Normal respiratory effort. Clear to auscultation, bilaterally without Rales/Wheezes/Rhonchi. Cardiovascular: Regular rate and rhythm without murmurs, rubs or gallops. Abdomen: Soft, non-tender, non-distended with active bowel sounds. Musculoskeletal: No clubbing, cyanosis or edema bilaterally. Skin: Pink, warm, dry. No rashes or lesions. Psychiatric: Alert and oriented, thought content appropriate, normal insight    Labs:   Recent Labs     04/11/21  0538   WBC 5.1   HGB 6.9*   HCT 22.1*        Recent Labs     04/11/21  0538   *   K 4.4      CO2 18*   BUN 27*   CREATININE 0.8   CALCIUM 7.8*     Recent Labs     04/10/21  0738   AST 15   ALT 9*   BILIDIR <0.2   BILITOT <0.2*   ALKPHOS 124     No results for input(s): INR in the last 72 hours. Radiology:   CT A/P with IV contrast   Impression   1. No evidence of an acute process. 2. New dense structure near the terminal ileum. This is presumably something ingested. There is no associated obstruction. Code Status: Full Code    ASSESSMENT:  Acute on chronic anemia: presumed upper GI bleed in the setting of reported melena.  DDx include PUD vs gastritis given reported intermittent epigastric pain vs AVM. Hgb 7.0 on admission from 11 previously on 3/15/2021. S/p 2u pRBC transfusion. Reports no pain defecation, has remote history of hemorrhoids. No NSAID use reported. Recent right hip fracture s/p total hip arthroplasty. Was not placed on anticoagulation for DVT prophylaxis  Borderline BP: home meds include midodrine  HLD on lipitor  Hx CAD s/p stenting and CABG: on aspiring and plavix. Hx Afib, s/p pacemaker     PLAN:     Plan for EGD tomorrow 04/12/2021   Continue Protonix 40 mg IV BID   NPO past midnight, continue clear liquids for now   Continue to trend H&H Q8H. Transfuse as needed   Continue to hold aspirin and plavix       Case reviewed and impression/plan reviewed in collaboration with Dr. Jed Guevara. Electronically signed by Bryan Willis MD on 4/11/2021 at 10:58 AM    [facility]  Thank you for the consultation.

## 2021-04-12 ENCOUNTER — ANESTHESIA EVENT (OUTPATIENT)
Dept: ENDOSCOPY | Age: 81
DRG: 377 | End: 2021-04-12
Payer: MEDICARE

## 2021-04-12 ENCOUNTER — ANESTHESIA (OUTPATIENT)
Dept: ENDOSCOPY | Age: 81
DRG: 377 | End: 2021-04-12
Payer: MEDICARE

## 2021-04-12 VITALS
RESPIRATION RATE: 21 BRPM | DIASTOLIC BLOOD PRESSURE: 79 MMHG | SYSTOLIC BLOOD PRESSURE: 174 MMHG | OXYGEN SATURATION: 93 %

## 2021-04-12 LAB
HCT VFR BLD CALC: 26.7 % (ref 37–47)
HCT VFR BLD CALC: 26.8 % (ref 37–47)
HCT VFR BLD CALC: 30.5 % (ref 37–47)
HEMOGLOBIN: 8.4 GM/DL (ref 12–16)
HEMOGLOBIN: 8.4 GM/DL (ref 12–16)
HEMOGLOBIN: 9.8 GM/DL (ref 12–16)

## 2021-04-12 PROCEDURE — 97110 THERAPEUTIC EXERCISES: CPT

## 2021-04-12 PROCEDURE — 97166 OT EVAL MOD COMPLEX 45 MIN: CPT

## 2021-04-12 PROCEDURE — 3609012400 HC EGD TRANSORAL BIOPSY SINGLE/MULTIPLE: Performed by: INTERNAL MEDICINE

## 2021-04-12 PROCEDURE — 6360000002 HC RX W HCPCS: Performed by: INTERNAL MEDICINE

## 2021-04-12 PROCEDURE — 0DB98ZX EXCISION OF DUODENUM, VIA NATURAL OR ARTIFICIAL OPENING ENDOSCOPIC, DIAGNOSTIC: ICD-10-PCS | Performed by: INTERNAL MEDICINE

## 2021-04-12 PROCEDURE — 6370000000 HC RX 637 (ALT 250 FOR IP): Performed by: INTERNAL MEDICINE

## 2021-04-12 PROCEDURE — 7100000010 HC PHASE II RECOVERY - FIRST 15 MIN: Performed by: INTERNAL MEDICINE

## 2021-04-12 PROCEDURE — 7100000011 HC PHASE II RECOVERY - ADDTL 15 MIN: Performed by: INTERNAL MEDICINE

## 2021-04-12 PROCEDURE — 2580000003 HC RX 258: Performed by: NURSE ANESTHETIST, CERTIFIED REGISTERED

## 2021-04-12 PROCEDURE — 0DB68ZX EXCISION OF STOMACH, VIA NATURAL OR ARTIFICIAL OPENING ENDOSCOPIC, DIAGNOSTIC: ICD-10-PCS | Performed by: INTERNAL MEDICINE

## 2021-04-12 PROCEDURE — 85018 HEMOGLOBIN: CPT

## 2021-04-12 PROCEDURE — 2709999900 HC NON-CHARGEABLE SUPPLY: Performed by: INTERNAL MEDICINE

## 2021-04-12 PROCEDURE — 3E0G8GC INTRODUCTION OF OTHER THERAPEUTIC SUBSTANCE INTO UPPER GI, VIA NATURAL OR ARTIFICIAL OPENING ENDOSCOPIC: ICD-10-PCS | Performed by: INTERNAL MEDICINE

## 2021-04-12 PROCEDURE — 3700000000 HC ANESTHESIA ATTENDED CARE: Performed by: INTERNAL MEDICINE

## 2021-04-12 PROCEDURE — 6360000002 HC RX W HCPCS: Performed by: NURSE ANESTHETIST, CERTIFIED REGISTERED

## 2021-04-12 PROCEDURE — 2580000003 HC RX 258: Performed by: INTERNAL MEDICINE

## 2021-04-12 PROCEDURE — 99232 SBSQ HOSP IP/OBS MODERATE 35: CPT | Performed by: INTERNAL MEDICINE

## 2021-04-12 PROCEDURE — 3609013000 HC EGD TRANSORAL CONTROL BLEEDING ANY METHOD: Performed by: INTERNAL MEDICINE

## 2021-04-12 PROCEDURE — 97163 PT EVAL HIGH COMPLEX 45 MIN: CPT

## 2021-04-12 PROCEDURE — C9113 INJ PANTOPRAZOLE SODIUM, VIA: HCPCS | Performed by: INTERNAL MEDICINE

## 2021-04-12 PROCEDURE — 88305 TISSUE EXAM BY PATHOLOGIST: CPT

## 2021-04-12 PROCEDURE — 85014 HEMATOCRIT: CPT

## 2021-04-12 PROCEDURE — 3700000001 HC ADD 15 MINUTES (ANESTHESIA): Performed by: INTERNAL MEDICINE

## 2021-04-12 PROCEDURE — 36415 COLL VENOUS BLD VENIPUNCTURE: CPT

## 2021-04-12 PROCEDURE — 1200000003 HC TELEMETRY R&B

## 2021-04-12 PROCEDURE — 2500000003 HC RX 250 WO HCPCS: Performed by: NURSE ANESTHETIST, CERTIFIED REGISTERED

## 2021-04-12 RX ORDER — PANTOPRAZOLE SODIUM 40 MG/1
40 TABLET, DELAYED RELEASE ORAL
Qty: 60 TABLET | Refills: 2 | OUTPATIENT
Start: 2021-04-12

## 2021-04-12 RX ORDER — PROPOFOL 10 MG/ML
INJECTION, EMULSION INTRAVENOUS PRN
Status: DISCONTINUED | OUTPATIENT
Start: 2021-04-12 | End: 2021-04-12 | Stop reason: SDUPTHER

## 2021-04-12 RX ORDER — EPINEPHRINE 0.1 MG/ML
SYRINGE (ML) INJECTION
Status: DISPENSED
Start: 2021-04-12 | End: 2021-04-13

## 2021-04-12 RX ORDER — LIDOCAINE HYDROCHLORIDE 20 MG/ML
INJECTION, SOLUTION EPIDURAL; INFILTRATION; INTRACAUDAL; PERINEURAL PRN
Status: DISCONTINUED | OUTPATIENT
Start: 2021-04-12 | End: 2021-04-12 | Stop reason: SDUPTHER

## 2021-04-12 RX ORDER — SODIUM CHLORIDE 450 MG/100ML
INJECTION, SOLUTION INTRAVENOUS CONTINUOUS PRN
Status: DISCONTINUED | OUTPATIENT
Start: 2021-04-12 | End: 2021-04-12 | Stop reason: SDUPTHER

## 2021-04-12 RX ADMIN — ONDANSETRON 4 MG: 2 INJECTION INTRAMUSCULAR; INTRAVENOUS at 13:50

## 2021-04-12 RX ADMIN — ATORVASTATIN CALCIUM 40 MG: 40 TABLET, FILM COATED ORAL at 21:31

## 2021-04-12 RX ADMIN — SODIUM CHLORIDE, PRESERVATIVE FREE 10 ML: 5 INJECTION INTRAVENOUS at 21:32

## 2021-04-12 RX ADMIN — LIDOCAINE HYDROCHLORIDE 60 MG: 20 INJECTION, SOLUTION EPIDURAL; INFILTRATION; INTRACAUDAL; PERINEURAL at 13:17

## 2021-04-12 RX ADMIN — PROPOFOL 100 MG: 10 INJECTION, EMULSION INTRAVENOUS at 13:17

## 2021-04-12 RX ADMIN — SODIUM CHLORIDE: 4.5 INJECTION, SOLUTION INTRAVENOUS at 13:15

## 2021-04-12 RX ADMIN — PANTOPRAZOLE SODIUM 40 MG: 40 INJECTION, POWDER, FOR SOLUTION INTRAVENOUS at 21:31

## 2021-04-12 ASSESSMENT — PAIN SCALES - GENERAL
PAINLEVEL_OUTOF10: 0
PAINLEVEL_OUTOF10: 0

## 2021-04-12 NOTE — OP NOTE
[facility]  EGD Procedure Note    Patient: Denisse Almazan  : 1940      Procedure: Esophagogastroduodenoscopy with biopsy, control of bleeding, epinephrine injection          Date:  2021     Endoscopist:   Naina Esteban MD    Referring Physician: Miguel A West DO  Primary Care Physician: Gwyn Castillo APRN - CNP    Indications: This is a 80y.o. year old female who presents with anemia and melenma    Anesthesia: MAC per Anesthesia. Please see anesthesia report. Consent:  The patient or their legal guardian has signed a consent, and is aware of the potential risks, benefits, alternatives, and potential complications of this procedure. These include, but are not limited to hemorrhage, bleeding, post procedural pain, perforation, phlebitis, aspiration, hypotension, hypoxia, cardiovascular events such as arrhythmia, and possibly death. Description of Procedure: The patient was then taken to the endoscopy suite and placed in the left lateral decubitus position and the above IV sedation was administered. A forward-viewing Olympus video endoscope was lubricated and inserted through the patient's mouth into the oropharynx. Under direct visualization, the upper esophagus was intubated. The scope was advanced through the esophagus to the extent of the second portion of duodenum. The scope was then withdrawn with good views of the mucosal surfaces. Both forward and retroflexed views of the stomach were obtained. The stomach was decompressed and the scope was completely withdrawn. The patient tolerated the procedure well and was taken to the recovery area in good condition. There were no immediate complications. Estimated Blood Loss: minimal    Findings:    Esophagus: The GE junction was noted to be at 40 cm. There was evidence of a 2cm hital hernia. LA Grade A esophagitis. Stomach: Mild antral gastritis. Cold biopsies were taken and placed in Jar 2.     Duodenum: There was

## 2021-04-12 NOTE — PLAN OF CARE
Problem: Falls - Risk of:  Goal: Will remain free from falls  Description: Will remain free from falls  4/12/2021 1447 by Elo Dill RN  Outcome: Ongoing  Note: Pt using call light appropriately to call for assistance with ambulation to the bathroom and to chair. Pt is also compliant with use of non-skid slippers. Pt reports understanding of fall prevention when discussed. Problem: Skin Integrity:  Goal: Will show no infection signs and symptoms  Description: Will show no infection signs and symptoms  Outcome: Ongoing  Note: No skin impairments noted. Pt understands the importance of frequent repositioning in order to prevent any skin breakdown. Problem: Pain:  Goal: Pain level will decrease  Description: Pain level will decrease  Outcome: Ongoing  Note: Pt denies pain this shift. Problem: DISCHARGE BARRIERS  Goal: Patient's continuum of care needs are met  4/12/2021 1447 by Elo Dill RN  Outcome: Ongoing  Note: Pt planning on BALJEET Ramirez at time of discharge. Care plan reviewed with patient. Patient verbalize understanding of the plan of care and contribute to goal setting.

## 2021-04-12 NOTE — ANESTHESIA PRE PROCEDURE
Department of Anesthesiology  Preprocedure Note       Name:  Johnson Prado   Age:  80 y.o.  :  1940                                          MRN:  [id number]         Date:  2021      Surgeon: Breonna Yepez):  Rosa Isela Capps MD    Procedure: Procedure(s):  EGD ESOPHAGOGASTRODUODENOSCOPY ENDOSCOPIC VARICEAL TREATMENT    Medications prior to admission:   Prior to Admission medications    Medication Sig Start Date End Date Taking? Authorizing Provider   atorvastatin (LIPITOR) 40 MG tablet Take 40 mg by mouth daily   Yes Historical Provider, MD   oxyCODONE-acetaminophen (PERCOCET) 5-325 MG per tablet Take 2 tablets by mouth every 4 hours as needed for Pain.    Yes Historical Provider, MD   potassium chloride (MICRO-K) 10 MEQ extended release capsule Take 10 mEq by mouth daily   Yes Historical Provider, MD   ondansetron (ZOFRAN) 4 MG tablet Take 4 mg by mouth every 6 hours as needed for Nausea or Vomiting Indications: Nausea and Vomiting 21  Yes Historical Provider, MD   promethazine (PHENERGAN) 25 MG suppository Place 25 mg rectally every 6 hours as needed for Nausea 21  Yes Historical Provider, MD   citalopram (CELEXA) 20 MG tablet Take 1 tablet by mouth daily 3/17/21  Yes Alexis Monson MD   ferrous sulfate (IRON 325) 325 (65 Fe) MG tablet Take 1 tablet by mouth 3 times daily (with meals) 3/17/21  Yes Alexis Monson MD   folic acid (FOLVITE) 1 MG tablet Take 1 tablet by mouth daily 3/17/21  Yes Alexis Monson MD   midodrine (PROAMATINE) 2.5 MG tablet Take 1 tablet by mouth 3 times daily (with meals) 3/17/21  Yes Alexis Monson MD   furosemide (LASIX) 20 MG tablet Take 20 mg by mouth daily   Yes Historical Provider, MD   Coenzyme Q10 (CO Q-10) 200 MG CAPS Take by mouth   Yes Historical Provider, MD   Calcium Carbonate-Vit D-Min (CALCIUM 1200 PO) Take 1 tablet by mouth daily   Yes Historical Provider, MD   vitamin D (CHOLECALCIFEROL) 1000 UNIT TABS tablet Take 1,000 Units by mouth daily   Yes Historical Provider, MD   atenolol (TENORMIN) 25 MG tablet Take 1 tablet by mouth daily 4/12/19  Yes Jackie Plunkett MD   clopidogrel (PLAVIX) 75 MG tablet Take 1 tablet by mouth daily 3/1/19  Yes Jackie Plunkett MD   aspirin 81 MG tablet Take 81 mg by mouth daily   Yes Historical Provider, MD   acetaminophen (TYLENOL) 325 MG tablet Take 1 tablet by mouth every 4 hours as needed for Pain  Patient taking differently: Take 325 mg by mouth every 4 hours as needed for Pain Takes 1-2 tabs every 4 hours prn pain 11/20/18  Yes Aimee Flynn PA-C   docusate sodium (COLACE, DULCOLAX) 100 MG CAPS Take 100 mg by mouth 2 times daily as needed for Constipation 3/17/21   Paula Eugene MD   rosuvastatin (CRESTOR) 10 MG tablet Take 10 mg by mouth nightly    Historical Provider, MD   potassium chloride (KLOR-CON M) 10 MEQ extended release tablet Take 10 mEq by mouth daily    Historical Provider, MD   meclizine (ANTIVERT) 12.5 MG tablet Take 1 tablet by mouth 3 times daily as needed for Dizziness 11/15/19   Josephine Dao MD       Current medications:    Current Facility-Administered Medications   Medication Dose Route Frequency Provider Last Rate Last Admin    0.9 % sodium chloride infusion   Intravenous PRN Stephany Nick MD        atenolol (TENORMIN) tablet 25 mg  25 mg Oral Daily Stephany Nick MD   25 mg at 04/11/21 0816    atorvastatin (LIPITOR) tablet 40 mg  40 mg Oral Daily Stephany Nick MD   40 mg at 04/11/21 2009    citalopram (CELEXA) tablet 20 mg  20 mg Oral Daily Stephany Nick MD   20 mg at 63/92/30 7926    folic acid (FOLVITE) tablet 1 mg  1 mg Oral Daily Stephany Nick MD   1 mg at 04/11/21 0816    meclizine (ANTIVERT) tablet 12.5 mg  12.5 mg Oral TID PRN Stephany Nick MD        midodrine (PROAMATINE) tablet 2.5 mg  2.5 mg Oral TID WC Stephany Nick MD   2.5 mg at 04/11/21 1725    vitamin D (CHOLECALCIFEROL) tablet 1,000 Units  1,000 Units Oral Daily Stephany Nick MD   1,000 Units at 04/11/21 0816    pantoprazole (PROTONIX) injection 40 mg  40 mg Intravenous BID Sheri Bowman MD   40 mg at 04/11/21 2009    sodium chloride flush 0.9 % injection 5-40 mL  5-40 mL Intravenous 2 times per day Sheri Bowman MD        sodium chloride flush 0.9 % injection 5-40 mL  5-40 mL Intravenous PRN Sheri Bowman MD        0.9 % sodium chloride infusion  25 mL Intravenous PRN Sheri Bowman MD        promethazine (PHENERGAN) tablet 12.5 mg  12.5 mg Oral Q6H PRN Sheri Bowman MD        Or    ondansetron (ZOFRAN) injection 4 mg  4 mg Intravenous Q6H PRN Sheri Bowman MD        polyethylene glycol (GLYCOLAX) packet 17 g  17 g Oral Daily PRN Sheri Bowman MD        acetaminophen (TYLENOL) tablet 650 mg  650 mg Oral Q6H PRN Sheri Bowman MD   650 mg at 04/11/21 2355    Or    acetaminophen (TYLENOL) suppository 650 mg  650 mg Rectal Q6H PRN Sheri Bowman MD        0.9 % sodium chloride infusion   Intravenous Continuous Sheri Bowman MD 75 mL/hr at 04/11/21 2355 New Bag at 04/11/21 2355       Allergies:  No Known Allergies    Problem List:    Patient Active Problem List   Diagnosis Code    Atrial fibrillation with RVR (HCC) I48.91    S/P coronary artery bypass graft x 1, 11/12/18. Z95.1    Status post tricuspid valve repair, 11/12/18.  Z98.890    S/P mitral valve repair Z98.890    Situational depression F43.21    Hyperlipidemia E78.5    Coronary artery disease involving coronary bypass graft of native heart without angina pectoris I25.810    Risk for falls Z91.81    Physical deconditioning R53.81    CAD in native artery I25.10    Severe malnutrition (HCC) E43    Post PTCA Z98.61    Pacemaker Z95.0    Age-related osteoporosis without current pathological fracture M81.0    Hypertension I10    Closed fracture of left distal radius S52.502A    Orthostatic syncope I95.1    Moderate malnutrition (HCC) E44.0    Syncope and collapse R55    Closed subcapital fracture of femur, right, initial encounter ([facility] [address] S72.011A    Closed fracture of right hip ([facility] [address] S72.001A    Hyponatremia E87.1    Hyperglycemia R73.9    High anion gap metabolic acidosis B17.0    Hx of coronary artery disease Z86.79    HFrEF (heart failure with reduced ejection fraction) ([facility]) I50.20    History of atrial fibrillation Z86.79    Acute on chronic anemia D64.9    Acute blood loss anemia D62       Past Medical History:        Diagnosis Date    Atrial fibrillation ([facility])     CAD (coronary artery disease)     Congestive heart failure (CHF) ([facility])     Hyperlipidemia     Hypertension        Past Surgical History:        Procedure Laterality Date    ATRIAL ABLATION SURGERY      CARDIAC CATHETERIZATION      CARDIAC VALVE REPLACEMENT      CORONARY ANGIOPLASTY WITH STENT PLACEMENT  02/28/2019    CORONARY ARTERY BYPASS GRAFT      x1    HYSTERECTOMY      JOINT REPLACEMENT      MITRAL VALVE REPLACEMENT      PACEMAKER PLACEMENT  05/2019    DC OFFICE/OUTPT VISIT,PROCEDURE ONLY N/A 11/12/2018    CABG X3, MITRAL VALVE REPAIR, MAZE, AND TRICUSPID VALVE REPAIR, DEANNA performed by Adonis Esparza MD at [facility] Right 3/14/2021    HIP TOTAL ARTHROPLASTY performed by Anthony Dangelo MD at [address] [place] [address]         Social History:    Social History     Tobacco Use    Smoking status: Never Smoker    Smokeless tobacco: Never Used   Substance Use Topics    Alcohol use:  No                                Counseling given: Not Answered      Vital Signs (Current):   Vitals:    04/12/21 0338 04/12/21 0955 04/12/21 1112 04/12/21 1200   BP: 120/65 116/69 134/63 135/72   Pulse: 81 79 84 80   Resp: 16 16 18 16   Temp: 36.7 °C (98 °F) 36.8 °C (98.2 °F) 36.3 °C (97.3 °F)    TempSrc: Oral Oral Oral    SpO2: 91% 93% 93%    Weight:       Height:                                                  BP Readings from Last 3 Encounters:   04/12/21 135/72   03/17/21 127/74   03/14/21 116/60       NPO Status: Time of last liquid consumption: 0730(sips)                        Time of last solid consumption: 0900                        Date of last liquid consumption: 04/12/21                        Date of last solid food consumption: 04/09/21    BMI:   Wt Readings from Last 3 Encounters:   04/11/21 119 lb 8 oz (54.2 kg)   03/17/21 142 lb 8 oz (64.6 kg)   02/19/21 126 lb 12.8 oz (57.5 kg)     Body mass index is 19.89 kg/m². CBC:   Lab Results   Component Value Date    WBC 5.1 04/11/2021    RBC 2.40 04/11/2021    HGB 8.4 04/12/2021    HCT 26.8 04/12/2021    MCV 92.1 04/11/2021     04/11/2021       CMP:   Lab Results   Component Value Date     04/11/2021    K 4.4 04/11/2021    K 5.4 04/10/2021     04/11/2021    CO2 18 04/11/2021    BUN 27 04/11/2021    CREATININE 0.8 04/11/2021    LABGLOM 69 04/11/2021    GLUCOSE 93 04/11/2021    PROT 6.9 04/10/2021    CALCIUM 7.8 04/11/2021    BILITOT <0.2 04/10/2021    ALKPHOS 124 04/10/2021    AST 15 04/10/2021    ALT 9 04/10/2021       POC Tests: No results for input(s): POCGLU, POCNA, POCK, POCCL, POCBUN, POCHEMO, POCHCT in the last 72 hours. Coags:   Lab Results   Component Value Date    INR 1.05 02/15/2021    APTT 27.6 11/12/2018       HCG (If Applicable): No results found for: PREGTESTUR, PREGSERUM, HCG, HCGQUANT     ABGs: No results found for: PHART, PO2ART, SPC1PZE, ERD0AUX, BEART, G4ODTPNA     Type & Screen (If Applicable):  Lab Results   Component Value Date    LABRH POS 04/10/2021       Drug/Infectious Status (If Applicable):  Lab Results   Component Value Date    HEPCAB Negative 11/04/2018       COVID-19 Screening (If Applicable):   Lab Results   Component Value Date    COVID19 NOT DETECTED 04/11/2021           Anesthesia Evaluation     history of anesthetic complications: PONV.   Airway: Mallampati: II  TM distance: >3 FB   Neck ROM: full  Mouth opening: > = 3 FB Dental:      Comment: Pt. States ? slightly Loose molars on the right  Some worn teeth    Pulmonary:Negative Pulmonary ROS breath sounds clear to auscultation                             Cardiovascular:  Exercise tolerance: poor (<4 METS),   (+) hypertension: no interval change, pacemaker: pacemaker, CAD: no interval change, CABG/stent: no interval change, CHF: no interval change, hyperlipidemia      ECG reviewed  Rhythm: regular  Rate: normal  Echocardiogram reviewed         Beta Blocker:  Dose within 24 Hrs      ROS comment: EF 40-45%  And mild AS     Neuro/Psych:   (+) neuromuscular disease:, psychiatric history: stable with treatment            GI/Hepatic/Renal:            ROS comment: Anemia with otrthostatic hypotension  Pt. Stated she passed out then fell and broke hip recently . Endo/Other:    (+) blood dyscrasia: anemia:., .          Pt had no PAT visit       Abdominal:       Abdomen: soft. Vascular:                                      Anesthesia Plan      MAC     ASA 4       Induction: intravenous. Anesthetic plan and risks discussed with patient. Plan discussed with attending.                   PHIL Perez - ANIKA   4/12/2021

## 2021-04-12 NOTE — PROGRESS NOTES
Hospitalist      Patient:  Tonette Galeazzi [place]    Unit/Bed:8B-29/029-A  YOB: 1940  MRN: [id number]   Acct: [de-identified]     PCP: PHIL Villa CNP  Date of Admission: 4/10/2021        Assessment and Plan:        1. GI bleed with acute blood loss anemia: Transfusion to keep hemoglobin greater than 8, aspirin and Plavix are on hold, GI has been consulted, Protonix 40 mg twice daily. 2. Coronary artery disease by history we will continue beta-blocker and statin therapy will hold aspirin and Plavix  3. S/PE ORIF right hip 3/14/2021: We will order PT and OT while in hospital  4. Atrial fibrillation by history: Ventricular paced rhythm, aspirin and Plavix are on hold no OAC due to recurrent falls will continue beta-blocker  5. Essential hypertension we will continue to monitor currently blood pressure has been low BP medication on hold    4.12.2021 Hgb stable, EGD today, will dc to e[facility] when ok with GI    CC: Nausea vomiting diarrhea abdominal pain, black stools    HPI: 72-year-old white female presents to [facility] from [facility] with 1 week history of nausea, vomiting, diarrhea, abdominal pain and black-colored stools. Patient has a past medical history of coronary artery disease heart failure with reduced ejection fraction, hypertension, hyperlipidemia, atrial fibrillation (only on aspirin and Plavix). Patient reports all this happened after eating a meal last week. Patient was just recently discharged from the hospital to [facility] on 3/17/2021 after having a fall and suffering a right hip fracture and underwent total hip arthroplasty. Patient currently denies any fever chills chest pain, shortness of breath, cough, congestion, sore throat, burning with urination, or focal neurological deficits. ER records were reviewed    ROS (14 point review of systems completed. Pertinent positives noted.  Otherwise ROS is negative) : Nausea, vomiting, abdominal pain, black tarry stools,    PMH:  Per HPI and       Diagnosis Date    Atrial fibrillation ([facility] [address]     CAD (coronary artery disease)     Congestive heart failure (CHF) (HCC)     Hyperlipidemia     Hypertension      SHX:        Procedure Laterality Date    ATRIAL ABLATION SURGERY      CARDIAC CATHETERIZATION      CARDIAC VALVE REPLACEMENT      CORONARY ANGIOPLASTY WITH STENT PLACEMENT  02/28/2019    CORONARY ARTERY BYPASS GRAFT      x1    HYSTERECTOMY      JOINT REPLACEMENT      MITRAL VALVE REPLACEMENT      PACEMAKER PLACEMENT  05/2019    [place] OFFICE/OUTPT VISIT,PROCEDURE ONLY N/A 11/12/2018    CABG X3, MITRAL VALVE REPAIR, MAZE, AND TRICUSPID VALVE REPAIR, DEANNA performed by Sam Munoz MD at [address] [facility] Right 3/14/2021    HIP TOTAL ARTHROPLASTY performed by Randall Liu MD at [address]:       Problem Relation Age of Onset    Other Mother         alzheimers    Heart Attack Father     Heart Attack Brother      [facility]:   Social History     Socioeconomic History    Marital status:       Spouse name: None    Number of children: 1    Years of education: None    Highest education level: None   Occupational History    None   Social Needs    Financial resource strain: None    Food insecurity     Worry: None     Inability: None    Transportation needs     Medical: None     Non-medical: None   Tobacco Use    Smoking status: Never Smoker    Smokeless tobacco: Never Used   Substance and Sexual Activity    Alcohol use: No    Drug use: No    Sexual activity: Not Currently   Lifestyle    Physical activity     Days per week: None     Minutes per session: None    Stress: None   Relationships    Social connections     Talks on phone: None     Gets together: None     Attends Synagogue service: None     Active member of club or organization: None     Attends meetings of clubs or organizations: None     Relationship status: None    Intimate partner Yes Historical Provider, MD   Coenzyme Q10 (CO Q-10) 200 MG CAPS Take by mouth   Yes Historical Provider, MD   Calcium Carbonate-Vit D-Min (CALCIUM 1200 PO) Take 1 tablet by mouth daily   Yes Historical Provider, MD   vitamin D (CHOLECALCIFEROL) 1000 UNIT TABS tablet Take 1,000 Units by mouth daily   Yes Historical Provider, MD   atenolol (TENORMIN) 25 MG tablet Take 1 tablet by mouth daily 4/12/19  Yes Lucila Hightower MD   clopidogrel (PLAVIX) 75 MG tablet Take 1 tablet by mouth daily 3/1/19  Yes Lucila Hightower MD   aspirin 81 MG tablet Take 81 mg by mouth daily   Yes Historical Provider, MD   acetaminophen (TYLENOL) 325 MG tablet Take 1 tablet by mouth every 4 hours as needed for Pain  Patient taking differently: Take 325 mg by mouth every 4 hours as needed for Pain Takes 1-2 tabs every 4 hours prn pain 11/20/18  Yes Rika Scruggs PA-C   docusate sodium (COLACE, DULCOLAX) 100 MG CAPS Take 100 mg by mouth 2 times daily as needed for Constipation 3/17/21   Nany Joshi MD   rosuvastatin (CRESTOR) 10 MG tablet Take 10 mg by mouth nightly    Historical Provider, MD   potassium chloride (KLOR-CON M) 10 MEQ extended release tablet Take 10 mEq by mouth daily    Historical Provider, MD   meclizine (ANTIVERT) 12.5 MG tablet Take 1 tablet by mouth 3 times daily as needed for Dizziness 11/15/19   Ishmael Sewell MD      PHYSICAL EXAM:    /69   Pulse 79   Temp 98.2 °F (36.8 °C) (Oral)   Resp 16   Ht 5' 5\" (1.651 m)   Wt 119 lb 8 oz (54.2 kg)   SpO2 93%   BMI 19.89 kg/m²     General appearance:  No apparent distress, appears stated age and cooperative. HEENT:  Normal cephalic, atraumatic without obvious deformity. Pupils equal, round, and reactive to light. Extra ocular muscles intact. Conjunctivae/corneas clear. Conjunctiva are pale in appearance  Neck: Supple, with full range of motion. no jugular venous distention. Trachea midline. no carotid bruits  Respiratory:  Normal respiratory effort.  Clear to auscultation, bilaterally without Rales/Wheezes/Rhonchi. Breath sounds equal bilaterally  Cardiovascular:  Regular rate and rhythm with normal S1/S2 without murmurs, rubs or gallops. PMI non displaced  Abdomen: Soft, non-tender, non-distended with normal bowel sounds. No guarding, rebound. Musculoskeletal:  No clubbing, cyanosis or edema bilaterally. Full range of motion without deformity. Skin: Skin color, texture, turgor normal.  No rashes or lesions, or suspicious lesions. Palmar creases are pale  Neurologic:  Neurovascularly intact without any focal sensory/motor deficits. Cranial nerves: II-XII intact, grossly non-focal.  Psychiatric:  Alert and oriented, thought content appropriate, normal insight  Capillary Refill: Brisk,< 2 seconds   Peripheral Pulses: +2 palpable, equal bilaterally upper and lower extremities  Lymphatics: no lymphadenopathy    Data: (All radiographs, tracings, PFTs, and imaging are personally viewed and interpreted unless otherwise noted).  Hemoglobin this a.m. 6.9   Sodium 131   Creatinine 0.8  Recent Labs     04/10/21  0738 04/10/21  0738 04/11/21  0538 04/11/21  1437 04/11/21  2037 04/12/21  0423   WBC 8.1  --  5.1  --   --   --    HGB 7.0*   < > 6.9* 8.8* 8.2* 8.4*   HCT 22.5*   < > 22.1* 28.6* 25.7* 26.8*     --  170  --   --   --     < > = values in this interval not displayed. Recent Labs     04/10/21  0738 04/11/21  0538   * 131*   K 5.4* 4.4    102   CO2 19* 18*   BUN 58* 27*   CREATININE 1.1 0.8   CALCIUM 8.7 7.8*     Recent Labs     04/10/21  0738   AST 15   ALT 9*   BILIDIR <0.2   BILITOT <0.2*   ALKPHOS 124     No results for input(s): INR in the last 72 hours. No results for input(s): Leveda Rhymes in the last 72 hours.     Radiology reports-images to be reviewed in PACS  Echo Complete 2d W Doppler W Color    Result Date: 3/13/2021  Transthoracic Echocardiography Report (TTE)  Demographics   Patient Name     [address] [facility] Gender Female   MR #             [id number]       Race                                                      Ethnicity   Account #        [de-identified]       Room Number           6391   Accession Number [id number]      Date of Study         03/13/2021   Date of Birth    1940      Referring Physician   BETH Martinez PA   Age              80 year(s)      Ector Ordonez, RDCS                                    Interpreting          Luis Omalley MD                                   Physician  Procedure Type of Study   TTE procedure:ECHOCARDIOGRAM COMPLETE 2D W DOPPLER W COLOR. Procedure Date Date: 03/13/2021 Start: 12:57 PM Study Location: Bedside Technical Quality: Limited visualization due to restricted mobility. Indications:Syncope and Evaluate for valvular heart disease. Additional Medical History:Coronary artery disease, CABG, MV repair 26mm annuloplasty ring, TV repair 30 mm ring, CHF, Hypertension, Hyperlipidemia, A fib, Ablation Patient Status: Routine Height: 64.96 inches Weight: 126 pounds BSA: 1.62 m^2 BMI: 20.99 kg/m^2 BP: 117/69 mmHg Allergies   - No Known Allergies. - See Epic. - Other allergy:(Crestor). - Other allergy:(Crestor). Conclusions   Summary  Left ventricle size is normal.  Mild concentric left ventricular hypertrophy. Ejection fraction is visually estimated in the range of 40% to 45%. Mildly hypokinetic motion of the apical anteroseptal wall noted in the  left ventricle. Small-to-moderate myocardial infarction of the anteroseptal LV. Moderately dilated left atrium. watchman device  Aortic valve appears tricuspid. Aortic valve leaflets are Moderately  calcified. Mild aortic stenosis is present. Trivial aortic regurgitation  is noted. Mitral annular calcification is present. Decreased mitral valve mobility noted.  mitral valve ring   Signature ----------------------------------------------------------------  Electronically signed by Myrtie Burkitt MD (Interpreting  physician) on 03/14/2021 at 07:06 AM  ----------------------------------------------------------------   Findings   Mitral Valve  Mitral annular calcification is present. Decreased mitral valve mobility noted. mitral valve ring   Aortic Valve  Aortic valve appears tricuspid. Aortic valve leaflets are Moderately  calcified. Mild aortic stenosis is present. Trivial aortic regurgitation  is noted. Tricuspid Valve  Tricuspid valve is structurally normal.  No evidence of tricuspid stenosis. Mild to moderate tricuspid regurgitation visualized. Pulmonic Valve  The pulmonic valve was not well visualized . Left Atrium  Moderately dilated left atrium. watchman device   Left Ventricle  Left ventricle size is normal.  Mild concentric left ventricular hypertrophy. Ejection fraction is visually estimated in the range of 40% to 45%. Mildly hypokinetic motion of the apical anteroseptal wall noted in the  left ventricle. Small-to-moderate myocardial infarction of the anteroseptal LV. Right Atrium  Mildly enlarged right atrium size. Right Ventricle  Mildly dilated right ventricle. Right ventricle global systolic function is low normal .  Pacer Wire visualized in right ventricle. Right ventricular systolic pressure of 35 mm Hg consistent with mild  pulmonary hypertension. Pericardial Effusion  No evidence of any pericardial effusion. Pleural Effusion  No evidence of pleural effusion. Aorta / Great Vessels  The aorta is within normal limits.   M-Mode/2D Measurements & Calculations   LV Diastolic    LV Systolic Dimension: 3  AV Cusp Separation: 2 cmLA  Dimension: 3.8  cm                        Dimension: 3.6 cmAO Root  cm              LV Volume Diastolic: 62   Dimension: 3.1 cmLA Area: 20.6  LV FS:21.1 %    ml                        cm^2  LV PW           LV Volume Systolic: 35 ml  Diastolic: Patient has undergone interval right hip replacement which is in satisfactory position. 2. Postoperative changes lumbar spine. 3. Vascular calcification. . **This report has been created using voice recognition software. It may contain minor errors which are inherent in voice recognition technology. ** Final report electronically signed by DR Daniel Hudson on 3/14/2021 2:00 PM    Xr Hip Right (2-3 Views)    Result Date: 3/14/2021  PROCEDURE: XR HIP RIGHT (2-3 VIEWS) CLINICAL INFORMATION: arthroplasty. COMPARISON: Plain radiographs 10 March 2021. TECHNIQUE: 4 images were obtained through the right and left hips in surgery. FINDINGS:  The patient has undergone interval right hip replacement which is in satisfactory position. 1. Status post right hip replacement which is in satisfactory position. . **This report has been created using voice recognition software. It may contain minor errors which are inherent in voice recognition technology. ** Final report electronically signed by DR Daniel Hudson on 3/14/2021 2:02 PM    Ct Head Wo Contrast    Result Date: 3/12/2021  CT head without contrast Comparison:  CT,SR  - CT HEAD WO CONTRAST  - 02/15/2021 10:52 AM EST Findings: No intracranial mass, midline shift, hydrocephalus, or acute hemorrhage. Old infarct in the left frontal centrum semiovale. Periventricular white matter hypoattenuation may relate to chronic small vessel ischemic changes. Mild generalized atrophy. Mild right lateral scalp hematoma. Mucosal thickening in the left sphenoid sinus. Mastoid air cells are clear. The orbits are within normal limits. There is no acute fracture. 1. No acute intracranial findings. 2. Mild right lateral scalp hematoma.  This document has been electronically signed by: John Robert MD on 03/12/2021 09:50 PM All CTs at this facility use dose modulation techniques and iterative reconstructions, and/or weight-based dosing when appropriate to reduce radiation to a low as reasonably achievable. Ct Cervical Spine Wo Contrast    Result Date: 3/12/2021  CT cervical spine without contrast Comparison:  CT,SR  - CT CERVICAL SPINE WO CONTRAST  - 02/15/2021 10:52 AM EST Findings: Visualized intracranial contents are unremarkable. Soft tissues of the neck are normal. Mild calcified biapical pleural-parenchymal thickening. Vertebral alignment is within normal limits. No acute fractures or dislocations. Disc osteophyte complexes and facet arthropathy causes bilateral neuroforaminal stenosis at C5-6 and C6-7. Central canal stenosis at C4-5 and C5-6. No acute findings. This document has been electronically signed by: Amy Gaspar MD on 03/12/2021 09:51 PM All CTs at this facility use dose modulation techniques and iterative reconstructions, and/or weight-based dosing when appropriate to reduce radiation to a low as reasonably achievable. Ct Abdomen Pelvis W Iv Contrast Additional Contrast? None    Result Date: 4/10/2021  PROCEDURE: CT ABDOMEN PELVIS W IV CONTRAST CLINICAL INFORMATION: llq . Left lower abdominal pain. Left hip pain. Right hip replacement. COMPARISON: 2/15/2021. TECHNIQUE: Axial 5 mm CT images were obtained through the abdomen and pelvis after the administration of intravenous contrast. Coronal and sagittal reconstructions were obtained. All CT scans at this facility use dose modulation, iterative reconstruction, and/or weight-based dosing when appropriate to reduce radiation dose to as low as reasonably achievable. FINDINGS: The visualized aspects of the lung bases are clear. The base of the heart is within appropriate limits. The liver is normal. The spleen is normal. The adrenals and pancreas are normal. The gallbladder is normal.    There is no hydronephrosis or stones of either kidney. No renal masses are noted. No abnormalities of the small bowel loops are noted. There is atherosclerosis of the arterial structures. There is no adenopathy.   The urinary bladder is normal. There is no pelvic free fluid. The colon is redundant. There is no colonic inflammation. At the junction of the terminal ileum and: There is a dense structure. This is seen on axial image 58. This was not present on the prior examination. This is presumably something ingested. There is no associated obstruction. There is no adenopathy. The patient has had a prior hysterectomy. No suspicious osseous lesions are identified. There is a replaced left hip. There is an X-Stop device at the L4-5 level. There are some dilated nerve root sleeves at the level of sacrum. This is unchanged. 1. No evidence of an acute process. 2. New dense structure near the terminal ileum. This is presumably something ingested. There is no associated obstruction. **This report has been created using voice recognition software. It may contain minor errors which are inherent in voice recognition technology. ** Final report electronically signed by Dr. Sharad Alexander on 4/10/2021 9:19 AM    Xr Chest 1 View    Result Date: 3/12/2021  1 view chest x-ray Comparison:  CR,SR  - XR CHEST 1 VW  - 02/15/2021 11:05 AM EST Findings: Lungs are emphysematous. No consolidation or effusion. No pneumothorax. Normal size heart. No pulmonary vascular congestion. Left atrial appendage clip and left AICD leads are unchanged. Median sternotomy wires are intact. No acute fracture. Stable emphysematous lungs without acute findings. This document has been electronically signed by: Irma Henson MD on 03/12/2021 09:50 PM    Vl Dup Carotid Bilateral    Result Date: 3/15/2021  PROCEDURE: VL DUP CAROTID BILATERAL CLINICAL INFORMATION: syncope COMPARISON: 2/15/2021 TECHNIQUE: Multiple grayscale and color flow images of both carotid systems and both vertebral arteries were obtained. Spectral Doppler waveforms were generated and velocity measurements were obtained.  RIGHT PSV/EDV DIST CCA-------->73/22cm/s PROX ICA-------->70/17cm/s ECA---------------->87/0cm/s VERT-------------->79/21cm/s LEFT PSV/EDV DIST CCA-------->69/19cm/s PROX ICA-------->102/20cm/s ECA---------------->62/8cm/s VERT-------------->59/15cm/s     1. RIGHT ICA . [place] Brill Carly Brill Mild soft plaque, no appreciable stenosis. .. ECA. . Mild intimal thickening, no appreciable stenosis. CCA. Unremarkable . Carly Brill VERT Antegrade flow. .. 2. LEFT ICA. .... Moderate soft plaque, mild stenosis, less than 50%. .. ECA. .. Unremarkable CCA. .. Mild intimal thickening, no appreciable stenosis. Veleta Mow. Antegrade flow. **This report has been created using voice recognition software. It may contain minor errors which are inherent in voice recognition technology. ** Final report electronically signed by Dr. Edmar Murray on 3/15/2021 8:37 AM    Fluoro For Surgical Procedures    Result Date: 3/14/2021  Radiology exam is complete. No Radiologist dictation. Please follow up with ordering provider. Xr Hip 2-3 Vw W Pelvis Right    Result Date: 3/12/2021  ** ADDENDUM #1 ** This report was discussed with Nils Parish RN on Mar 12, 2021 21:50:00 EST. This document has been electronically signed by: Cassandra Pantoja on 03/12/2021 09:50 PM ** ORIGINAL REPORT ** 3 view, pelvis and right hip Comparison:  CR,SR  - XR PELVIS (1-2 VIEWS)  - 02/15/2021 11:05 AM EST Findings: Acute right subcapital femoral neck fracture with varus angulation and mild superior displacement of the distal fracture fragment. No dislocation the right femoral head. Degenerative changes in the bilateral sacroiliac joints. Left hip joint and pubic symphysis are intact. Disc spacer at L4-5. Surgical clip in the left inguinal region. Acute right subcapital femoral neck fracture with superior displacement of the distal fracture fragment.  This document has been electronically signed by: Marcie Oneill MD on 03/12/2021 09:43 PM       Electronically signed by Brenda Gaucher, DO on 4/12/2021 at 10:08 AM

## 2021-04-12 NOTE — DISCHARGE INSTR - COC
Continuity of Care Form    Patient Name: Dinesh Shaw   :  1940  MRN:  [id number]    Admit date:  4/10/2021  Discharge date:  2021    Code Status Order: Full Code   Advance Directives:      Admitting Physician:  Aidan Meyer DO  PCP: PHIL Delgado - CNP    Discharging Nurse: Tori Woods RN  [address] Unit/Room#: 8B-29/029-A  Discharging Unit Phone Number: [phone]    Emergency Contact:   Extended Emergency Contact Information  Primary Emergency Contact: [address] [place] [address] Phone: [phone]  Relation: Child  Secondary Emergency Contact: [address] Phone: [phone]  Relation: Brother/Sister    Past Surgical History:  Past Surgical History:   Procedure Laterality Date    ATRIAL ABLATION SURGERY      CARDIAC CATHETERIZATION      CARDIAC VALVE REPLACEMENT      CORONARY ANGIOPLASTY WITH STENT PLACEMENT  2019    CORONARY ARTERY BYPASS GRAFT      x1    HYSTERECTOMY      JOINT REPLACEMENT      MITRAL VALVE REPLACEMENT      PACEMAKER PLACEMENT  2019    [place] OFFICE/OUTPT VISIT,PROCEDURE ONLY N/A 2018    CABG X3, MITRAL VALVE REPAIR, MAZE, AND TRICUSPID VALVE REPAIR, DEANNA performed by Ladarius Gilliam MD at [address] [facility] Right 3/14/2021    HIP TOTAL ARTHROPLASTY performed by Isabel Cornelius MD at [address] [facility] [address]         Immunization History: There is no immunization history on file for this patient. Active Problems:  Patient Active Problem List   Diagnosis Code    Atrial fibrillation with RVR ([facility]) I48.91    S/P coronary artery bypass graft x 1, 18. Z95.1    Status post tricuspid valve repair, 18.  Z98.890    S/P mitral valve repair Z98.890    Situational depression F43.21    Hyperlipidemia E78.5    Coronary artery disease involving coronary bypass graft of native heart without angina pectoris I25.810    Risk Admin  Assisted  Med Delivery   whole    Wound Care Documentation and Therapy:        Elimination:  Continence:   · Bowel: Yes  · Bladder: Yes  Urinary Catheter: None   Colostomy/Ileostomy/Ileal Conduit: No       Date of Last BM: 4/14/2021    Intake/Output Summary (Last 24 hours) at 4/12/2021 1026  Last data filed at 4/12/2021 0338  Gross per 24 hour   Intake 2259.61 ml   Output 0 ml   Net 2259.61 ml     I/O last 3 completed shifts: In: 2259.6 [P.O.:250; I.V.:2009.6]  Out: 0     Safety Concerns: At Risk for Falls    Impairments/Disabilities:      None    Nutrition Therapy:  Current Nutrition Therapy:   - Oral Diet:  General peptic diet    Routes of Feeding: Oral  Liquids: Thin Liquids  Daily Fluid Restriction: no  Last Modified Barium Swallow with Video (Video Swallowing Test): not done    Treatments at the Time of Hospital Discharge:   Respiratory Treatments: None  Oxygen Therapy:  is not on home oxygen therapy. Ventilator:    - No ventilator support    Rehab Therapies: Physical Therapy and Occupational Therapy  Weight Bearing Status/Restrictions: No weight bearing restirctions  Other Medical Equipment (for information only, NOT a DME order):  wheelchair and walker  Other Treatments: None    Patient's personal belongings (please select all that are sent with patient):  Glasses    RN SIGNATURE:  Electronically signed by Michael Cunningham RN on 4/14/21 at 12:46 PM EDT    CASE MANAGEMENT/SOCIAL WORK SECTION    Inpatient Status Date: 4/12/2021    Readmission Risk Assessment Score:  Readmission Risk              Risk of Unplanned Readmission:        24           Discharging to Facility/ Agency   · Name: [address]  · Address: [address] [place] [address].  [address] [place] [address], [address] [place] [address] Road  · Phone: [phone]  · Fax: [phone]    Dialysis Facility (if applicable)   · Name:  · Address:  · Dialysis Schedule:  · Phone:  · Fax:    / signature: Electronically signed by ARTURO Earl on 4/12/21 at 10:27 AM EDT    PHYSICIAN SECTION    Prognosis: Good    Condition at Discharge: Stable    Rehab Potential (if transferring to Rehab): Good    Recommended Labs or Other Treatments After Discharge: Therapy    Physician Certification: I certify the above information and transfer of Shruti Almazan  is necessary for the continuing treatment of the diagnosis listed and that she requires [facility] for greater 30 days.      Update Admission H&P: No change in H&P    PHYSICIAN SIGNATURE:  Electronically signed by De Wilson MD on 4/14/21 at 12:51 PM EDT

## 2021-04-12 NOTE — PROGRESS NOTES
[facility]  INPATIENT OCCUPATIONAL THERAPY  STRZ MED SURG 8B  EVALUATION    Time:   Time In: 1034  Time Out: 1111  Timed Code Treatment Minutes: 23 Minutes  Minutes: 37          Date: 2021  Patient Name: Van Preston,   Gender: female      MRN: [id number]  : 1940  (80 y.o.)  Referring Practitioner: Dimple Apgar, DO  Diagnosis: acute blood loss anemia  Additional Pertinent Hx: Per H&P:81 y.o. female who presented to [address] [place] [address] from [facility] with one week history of nausea, vomiting, diarrhea, abdominal pain and black colored stools. Has a past medical history of CAD, HFrEF, HTN, HLD, A. Fib (on ASA and Plavix only). Pt reports this all happened after eating a meal last week. Pt was just discharged from the hospital to [facility] on 3/17/2021 after having a fall and suffering a right hip fracture and underwent total hip arthroplasty (3/14/2021). Hemoglobin down to 7.0 on arrival, given 1 unit PRBC. Restrictions/Precautions:  Restrictions/Precautions: Weight Bearing, General Precautions, Fall Risk  Right Lower Extremity Weight Bearing: Weight Bearing As Tolerated  Position Activity Restriction  Other position/activity restrictions: R ANIL anterior approach 3/14/21; h/o L distal radius fx ()    Subjective  Chart Reviewed: Yes, Orders, Progress Notes, History and Physical  Patient assessed for rehabilitation services?: Yes  Family / Caregiver Present: No    Subjective: Pt seated in bed upon arrival, agreeable to OT session. Pt scheduled for EGD this PM.  Comments: Pt reported L wrist cast had been removed >1 month ago, stating has no restrictions anymore (has been using weights for UE exercises, use of RW, and pushing with LUE as able during transfers).     Pain:  Pain Assessment  Patient Currently in Pain: No    Vitals: Vitals not assessed per clinical judgement, see nursing flowsheet    Social/Functional History:  Type of Home: Facility(pt has been at [facility] since R ANIL-discharged to ECF on 3/17/21.))  Home Equipment: Rolling walker           ADL Assistance: Needs assistance  Ambulation Assistance: Needs assistance  Transfer Assistance: Needs assistance          Additional Comments: Pt reported staff has still been assisting with ADLs, transfers, and mobility. Pt reported was still participating in daily therapy, was able to walk in halls with RW. Prior to initial fall in 02/21, pt was living at home and independent with all tasks. VISION:Corrected    HEARING:  WFL    COGNITION: WFL    RANGE OF MOTION:  Bilateral Upper Extremity:  WFL   Wrist ROM not completed as pt reported slight pain still with more extreme/end ranges of ROM     STRENGTH:  Bilateral Upper Extremity:  Impaired - grossly 4/5    ADL:   Grooming: Stand By Assistance. washing/drying hands while standing at sink  Toileting: Minimal Assistance. thoroughness with hygiene after bm  Toilet Transfer: [address] To/from [facility] placed over toilet. BALANCE:  Sitting Balance:  Supervision. Standing Balance: Stand By Assistance, [address] Standing for ~2 minutes during ADL completion    BED MOBILITY:  Supine to Sit: Supervision    Scooting: Supervision      TRANSFERS:  Sit to Stand:  Stand By Assistance, with increased time for completion. ; from EOB  Stand to Sit: Stand By Assistance. to bedside chair    FUNCTIONAL MOBILITY:  Assistive Device: Rolling Walker  Assist Level:  Contact Guard Assistance. Distance: To and from bathroom  Steady, slower pace. Exercise:  Pt completed BUE minimal resistance theraband exercises while seated in chair. Pt completed 1 set X 10 repetitions in all planes with short rest breaks in between variations. Pt declined any pain with exercises. Exercises completed to increase overall strength/endurance needed for ADLs and transfers. Activity Tolerance:  Patient tolerance of  treatment: good.         Assessment:  Assessment: Pt presents requiring increased independence with self care tasks. Short term goal 3: Pt will tolerate dynamic standing X5 minutes with SBA in prep for sinkside grooming tasks. Following session, patient left in safe position with all fall risk precautions in place.

## 2021-04-12 NOTE — PROGRESS NOTES
[facility] [address] [facility]  INPATIENT PHYSICAL THERAPY  EVALUATION  STRZ ENDOSCOPY - STRZ ENDO POOL RM/NONE    Time In: 3617  Time Out: 1130  Timed Code Treatment Minutes: 8 Minutes  Minutes: 18          Date: 2021  Patient Name: Zarina Etienne,  Gender:  female        MRN: [id number]  : 1940  (80 y.o.)      Referring Practitioner: Dr. Tara Medrano  Diagnosis: acute blood loss anemia  Additional Pertinent Hx: Per H&P:81 y.o. female who presented to [facility] from [facility] with one week history of nausea, vomiting, diarrhea, abdominal pain and black colored stools. Has a past medical history of CAD, HFrEF, HTN, HLD, A. Fib (on ASA and Plavix only). Pt reports this all happened after eating a meal last week. Pt was just discharged from the hospital to [facility] on 3/17/2021 after having a fall and suffering a right hip fracture and underwent total hip arthroplasty (3/14/2021).  Hemoglobin down to 7.0 on arrival, given 1 unit PRBC     Restrictions/Precautions:  Restrictions/Precautions: Weight Bearing, General Precautions, Fall Risk  Right Lower Extremity Weight Bearing: Weight Bearing As Tolerated  Position Activity Restriction  Other position/activity restrictions: R ANIL anterior approach 3/14/21; h/o L distal radius fx ()    Subjective:  Chart Reviewed: Yes  Patient assessed for rehabilitation services?: Yes  Subjective: pleasant and cooperative, motivated, pt just recently finished with OT and tech in room assisting to get back to bed upon arrival, pt agreeable for eval    General:       Vision: Impaired  Vision Exceptions: Wears glasses for reading    Hearing: Within functional limits         Pain: 1/10: right hip some soreness    Vitals: Vitals not assessed per clinical judgement, see nursing flowsheet    Social/Functional History:    Type of Home: Facility(pt has been at [facility] since R ANIL-discharged to [facility] on 3/17/21.))  Home Equipment: Rolling walker             ADL Assistance: Needs assistance     Ambulation Assistance: Needs assistance  Transfer Assistance: Needs assistance          Additional Comments: Pt reported staff has still been assisting with ADLs, transfers, and mobility. Pt reported was still participating in daily therapy, was able to walk in halls with RW. Prior to initial fall in 02/21, pt was living at home and independent with all tasks. OBJECTIVE:  Range of Motion:  Bilateral Lower Extremity: WFL    Strength:  Right Lower Extremity: WFL  Left Lower Extremity: WFL    Balance:  Static Sitting Balance:  Supervision  Static Standing Balance: Contact Guard Assistance, to SBA with walker    Bed Mobility:  Rolling to Left: Stand By Assistance   Supine to Sit: Stand By Assistance  Sit to Supine: Stand By Assistance   Scooting: Stand By Assistance  HOB Up 10 degrees  Transfers:  Sit to Stand: Contact Guard Assistance  Stand to [address] for hand placement  Ambulation:  Contact Guard Assistance  Distance: 5'x1, 3'x1  Surface: Level Tile  Device:Rolling Walker  Gait Deviations: Forward Flexed Posture, Slow Estrella and no LOB    Exercise:  Patient was guided in 1 set(s) 10 reps of exercise to both lower extremities. Ankle pumps, Glut sets, Quad sets, Heelslides and Hip abduction/adduction. Exercises were completed for increased independence with functional mobility. Functional Outcome Measures: Completed  AM-PAC Inpatient Mobility Raw Score : 18  -PAC Inpatient T-Scale Score : 43.63    ASSESSMENT:  Activity Tolerance:  Patient tolerance of  treatment: good. Treatment Initiated: Treatment and education initiated within context of evaluation. Evaluation time included review of current medical information, gathering information related to past medical, social and functional history, completion of standardized testing, formal and informal observation of tasks, assessment of data and development of plan of care and goals.   Treatment time included skilled education and facilitation of tasks to increase safety and independence with functional mobility for improved independence and quality of life. Assessment: Body structures, Functions, Activity limitations: Decreased functional mobility , Decreased balance, Decreased strength  Assessment: pt tolerated well, generalized weakness RLE more than LLE with hx of recent ANIL, dec balance, use of walker and inc assist for safe mobility, pt lives alone prior to SNF stay, recommend cont PT to inc pt I with functional mobility  Prognosis: Excellent    REQUIRES PT FOLLOW UP: Yes    Discharge Recommendations:  Discharge Recommendations: Continue to assess pending progress, Subacute/Skilled Nursing Facility, Patient would benefit from continued therapy after discharge    Patient Education:  PT Education: Goals, PT Role, Plan of Care, Home Exercise Program, Functional Mobility Training    Equipment Recommendations:  Equipment Needed: No  Other: cont to assess needs    Plan:  Times per week: 3-5X GM  Times per day: Daily  Specific instructions for Next Treatment: therex(no S.L.R.) and mobility    Goals:  Patient goals : get stronger, plans return to SNF for rehab and hopefully home soon  Short term goals  Time Frame for Short term goals: by discharge  Short term goal 1: bed mobility with S to get in/out of bed  Short term goal 2: transfer with S to get in/out of chairs  Short term goal 3: amb 75'x1 with walker and S to walk safely in home  Long term goals  Time Frame for Long term goals : no LTGs set secondary to short ELOS    Following session, patient left in safe position with all fall risk precautions in place.

## 2021-04-12 NOTE — PROGRESS NOTES
Travon Panda is here for EGD , scope #  used. Photos taken. 5 ml ofF EPI 1:1000 injected into ulcer bulb. Specimens taken see specimen form for details. Patient tolerated procedure well.

## 2021-04-12 NOTE — CARE COORDINATION
4/12/21, 8:45 AM EDT  DISCHARGE PLANNING EVALUATION:    Drea Almazan       Admitted: 4/10/2021/ 3880   Hospital day: 2   Location: 8B-29/029-A Reason for admit: Acute blood loss anemia [D62]   PMH:  has a past medical history of Atrial fibrillation ([facility] [address] CAD (coronary artery disease), Congestive heart failure (CHF) ([facility] [address] Hyperlipidemia, and Hypertension. Procedure: No  Barriers to Discharge: To Er with abd pain. Hx CAD, CHF, A-fib, HTN. Bloody diarrhea reported. Hx anemia and on Iron. Gi consulted. PT/OT and SW. EGD planned for today. Hgb 7.0 on admit. 2 units PRC's administered thus far. PCP: PHIL Merritt CNP  Readmission Risk Score: 24%    Patient Goals/Plan/Treatment Preferences: From The [address] [place] [address] of [facility] [email or web address]. Met with pt briefly this morning. She is currently in rehab at [facility] following a hip surgery. She states she plans to return there at discharge. She states she does have an apartment in [facility] [email or web address]. She confirms she has transportation per her family members. She has a PCP. SW is following for continuity of services and additional discharge needs. Transportation/Food Security/Housekeeping Addressed:  No issues identified.

## 2021-04-12 NOTE — H&P
Take 1 tablet by mouth daily 30 tablet 3    ferrous sulfate (IRON 325) 325 (65 Fe) MG tablet Take 1 tablet by mouth 3 times daily (with meals) 30 tablet 3    folic acid (FOLVITE) 1 MG tablet Take 1 tablet by mouth daily 30 tablet 3    midodrine (PROAMATINE) 2.5 MG tablet Take 1 tablet by mouth 3 times daily (with meals) 90 tablet 3    furosemide (LASIX) 20 MG tablet Take 20 mg by mouth daily      Coenzyme Q10 (CO Q-10) 200 MG CAPS Take by mouth      Calcium Carbonate-Vit D-Min (CALCIUM 1200 PO) Take 1 tablet by mouth daily      vitamin D (CHOLECALCIFEROL) 1000 UNIT TABS tablet Take 1,000 Units by mouth daily      atenolol (TENORMIN) 25 MG tablet Take 1 tablet by mouth daily 30 tablet 3    clopidogrel (PLAVIX) 75 MG tablet Take 1 tablet by mouth daily 30 tablet 3    aspirin 81 MG tablet Take 81 mg by mouth daily      acetaminophen (TYLENOL) 325 MG tablet Take 1 tablet by mouth every 4 hours as needed for Pain (Patient taking differently: Take 325 mg by mouth every 4 hours as needed for Pain Takes 1-2 tabs every 4 hours prn pain) 120 tablet 3    docusate sodium (COLACE, DULCOLAX) 100 MG CAPS Take 100 mg by mouth 2 times daily as needed for Constipation      rosuvastatin (CRESTOR) 10 MG tablet Take 10 mg by mouth nightly      potassium chloride (KLOR-CON M) 10 MEQ extended release tablet Take 10 mEq by mouth daily      meclizine (ANTIVERT) 12.5 MG tablet Take 1 tablet by mouth 3 times daily as needed for Dizziness 30 tablet 0       Allergies:  Patient has no known allergies. Social History:   TOBACCO:   reports that she has never smoked. She has never used smokeless tobacco.  ETOH:   reports no history of alcohol use. DRUGS:   reports no history of drug use.   Family History:       Problem Relation Age of Onset    Other Mother         alzheimers    Heart Attack Father     Heart Attack Brother          PHYSICAL EXAM:      /72   Pulse 80   Temp 97.3 °F (36.3 °C) (Oral)   Resp 16   Ht 5' 5\" (1.651 m)   Wt 119 lb 8 oz (54.2 kg)   SpO2 93%   BMI 19.89 kg/m²  I      Heart:  Regular rate and rhythm    Lungs:  No increased work of breathing    Abdomen:  BS+, soft, non-distended, non-tender, no masses palpated      ASA Grade:  See Anesthesia documentation    Mallampati Classification:  See Anesthesia documentation      ASSESSMENT AND PLAN:    1. Patient is a 80 y.o. female here for an EGD with MAC    2. Procedure options, risks and benefits reviewed with patient. We specifically discussed that risks include but are not limited to infection, bleeding, perforation, death, and missed lesions. Patient expresses understanding.       Electronically signed by Keith Zamudio MD on 4/12/2021 at 1:14 PM    Keith Zamudio MD  [facility]

## 2021-04-12 NOTE — PROGRESS NOTES
Dr Dharmesh Chambers came in to speak to patient about findings and discharge plans. Report called to floor.

## 2021-04-12 NOTE — PROGRESS NOTES
Physician Progress Note      PATIENTConi Severin  CSN #:                  [id number]  :                       1940  ADMIT DATE:       4/10/2021 7:11 AM  DISCH DATE:  RESPONDING  PROVIDER #:        Rebecca Schafer DO          QUERY TEXT:    Patient admitted with acute blood loss anemia and UGIB documented and is on   chronic ASA and Plavix. If possible, please document below in the progress   notes and discharge summary if you are evaluating and/or treating any of the   following: The medical record reflects the following:  Risk Factors: Acute on chronic anemia: presumed upper GI bleed in the setting   of reported melena. Clinical Indicators: H/H .5, 1U pRBCs. Started on Protonix 40 BID. Treatment: GI consult, Hold ASA and Plavix, 1U pRBCs. Started on Protonix 40   BID and lab draws. Thank you. Please call if you have any questions. (P) [phone]. Signed   by Tana Stover RN Clinical , CRCR  Options provided:  -- Bleeding (Hemorrhagic disorder) associated with Aspirin and Plavix. -- Bleeding unrelated to anticoagulation  -- Other - I will add my own diagnosis  -- Disagree - Not applicable / Not valid  -- Disagree - Clinically unable to determine / Unknown  -- Refer to Clinical Documentation Reviewer    PROVIDER RESPONSE TEXT:    This patient has bleeding associated with Aspirin and Plavix.     Query created by: Rocky Barrow on 2021 10:34 AM      Electronically signed by:  Rebecca Schafer DO 2021 3:19 PM

## 2021-04-12 NOTE — CARE COORDINATION
4/12/21, 10:13 AM EDT  Discharge Planning Evaluation  Social work consult received, patient from [facility]. Patient/Family preference is to return to The [address] [place] [address], per patient. The patient's current payor source at the facility is Medicare. Medicare skilled days available: yes  Insurance precert:  no  Spoke with Ray at the facility. Patient bed hold: no, did not pay to hold bed, but is available at this time  Anticipated transport plan: family or [facility] or [facility]  Do they require COVID 19 test to return to ECF: yes  Is there a required time frame which which COVID test needs done: 48 hours. COVID test completed 4/11.

## 2021-04-13 LAB
ANION GAP SERPL CALCULATED.3IONS-SCNC: 15 MEQ/L (ref 8–16)
BUN BLDV-MCNC: 5 MG/DL (ref 7–22)
CALCIUM SERPL-MCNC: 7.2 MG/DL (ref 8.5–10.5)
CHLORIDE BLD-SCNC: 101 MEQ/L (ref 98–111)
CO2: 16 MEQ/L (ref 23–33)
CREAT SERPL-MCNC: 0.5 MG/DL (ref 0.4–1.2)
GFR SERPL CREATININE-BSD FRML MDRD: > 90 ML/MIN/1.73M2
GLUCOSE BLD-MCNC: 65 MG/DL (ref 70–108)
HCT VFR BLD CALC: 29.1 % (ref 37–47)
HEMOGLOBIN: 8.9 GM/DL (ref 12–16)
POTASSIUM SERPL-SCNC: 3.9 MEQ/L (ref 3.5–5.2)
SODIUM BLD-SCNC: 132 MEQ/L (ref 135–145)

## 2021-04-13 PROCEDURE — 36415 COLL VENOUS BLD VENIPUNCTURE: CPT

## 2021-04-13 PROCEDURE — C9113 INJ PANTOPRAZOLE SODIUM, VIA: HCPCS | Performed by: INTERNAL MEDICINE

## 2021-04-13 PROCEDURE — 1200000003 HC TELEMETRY R&B

## 2021-04-13 PROCEDURE — 97535 SELF CARE MNGMENT TRAINING: CPT

## 2021-04-13 PROCEDURE — 6360000002 HC RX W HCPCS: Performed by: INTERNAL MEDICINE

## 2021-04-13 PROCEDURE — 99232 SBSQ HOSP IP/OBS MODERATE 35: CPT | Performed by: PHYSICIAN ASSISTANT

## 2021-04-13 PROCEDURE — 97110 THERAPEUTIC EXERCISES: CPT

## 2021-04-13 PROCEDURE — 6370000000 HC RX 637 (ALT 250 FOR IP): Performed by: INTERNAL MEDICINE

## 2021-04-13 PROCEDURE — 2580000003 HC RX 258: Performed by: INTERNAL MEDICINE

## 2021-04-13 PROCEDURE — 85018 HEMOGLOBIN: CPT

## 2021-04-13 PROCEDURE — 80048 BASIC METABOLIC PNL TOTAL CA: CPT

## 2021-04-13 PROCEDURE — 85014 HEMATOCRIT: CPT

## 2021-04-13 RX ADMIN — SODIUM CHLORIDE: 9 INJECTION, SOLUTION INTRAVENOUS at 06:49

## 2021-04-13 RX ADMIN — FOLIC ACID 1 MG: 1 TABLET ORAL at 08:41

## 2021-04-13 RX ADMIN — Medication 1000 UNITS: at 08:41

## 2021-04-13 RX ADMIN — CITALOPRAM 20 MG: 20 TABLET, FILM COATED ORAL at 08:41

## 2021-04-13 RX ADMIN — PANTOPRAZOLE SODIUM 40 MG: 40 INJECTION, POWDER, FOR SOLUTION INTRAVENOUS at 08:41

## 2021-04-13 RX ADMIN — ATENOLOL 25 MG: 25 TABLET ORAL at 08:41

## 2021-04-13 RX ADMIN — ATORVASTATIN CALCIUM 40 MG: 40 TABLET, FILM COATED ORAL at 21:27

## 2021-04-13 RX ADMIN — MIDODRINE HYDROCHLORIDE 2.5 MG: 2.5 TABLET ORAL at 13:58

## 2021-04-13 RX ADMIN — PANTOPRAZOLE SODIUM 40 MG: 40 INJECTION, POWDER, FOR SOLUTION INTRAVENOUS at 21:27

## 2021-04-13 RX ADMIN — SODIUM CHLORIDE, PRESERVATIVE FREE 10 ML: 5 INJECTION INTRAVENOUS at 21:27

## 2021-04-13 RX ADMIN — MIDODRINE HYDROCHLORIDE 2.5 MG: 2.5 TABLET ORAL at 17:54

## 2021-04-13 RX ADMIN — MIDODRINE HYDROCHLORIDE 2.5 MG: 2.5 TABLET ORAL at 08:41

## 2021-04-13 ASSESSMENT — PAIN SCALES - GENERAL
PAINLEVEL_OUTOF10: 0
PAINLEVEL_OUTOF10: 0

## 2021-04-13 NOTE — CARE COORDINATION
4/13/21, 9:57 AM EDT    DISCHARGE ON [address] day: 3  Location: 8B-29/029-A Reason for admit: Acute blood loss anemia [D62]   Procedure: 4-12-21 EGD  Barriers to Discharge: EGD yesterday. GI following. Continue PPI IV but transition to po. HGB stable at 8.9 this am. Cont to hold Plavix. IVF at 75/hr. PCP: PHIL Tate CNP  Readmission Risk Score: 24%  Patient Goals/Plan/Treatment Preferences: Return to [address] [place] [address], no pre-cert.

## 2021-04-13 NOTE — FLOWSHEET NOTE
Pt is a 76y.o. female, propped up in bed watching television, in ; her daughter Troy Sawant is also in the room. Larry Garcia is calm, approachable, coping with recovering from bleeding ulcer, but yet seems a bit 'down in the dumps'. Larry Garcia shared, along with her daughter, that this past month has been rough-detailing three issues that led to her hospitalizations and injury recoveries. While in conversation, she shared that her   three years ago, and how they used to do everything together, thinking they would die together. She shared this wistfully and a bit 'ho hum'.  provided active listening, prayer, encouragement and acknowledgement of her grief and pain from his loss. [id number]   Encounter Summary   Services provided to: Patient and family together   Referral/Consult From: [address] [place] [address] Road Visiting Yes  ()   Complexity of Encounter Low   Length of Encounter 30 minutes   Spiritual/Congregation   Type Spiritual support   Assessment Calm; Approachable;Coping   Intervention Active listening;Explored feelings, thoughts, concerns;Prayer;Nurtured hope   Outcome Expressed gratitude;Engaged in conversation;Encouraged;Receptive

## 2021-04-13 NOTE — PLAN OF CARE
Problem: Falls - Risk of:  Goal: Will remain free from falls  Description: Will remain free from falls  4/13/2021 0344 by Stefano Cat RN  Outcome: Ongoing  Note: Fall precaution in place. Bed alarm/chair alarm. Bed locked in lowest position. Fall band applied if applicable. Call light and overhead table within reach. Will continue to monitor. Problem: Falls - Risk of:  Goal: Absence of physical injury  Description: Absence of physical injury  Outcome: Ongoing  Note: Absence of physical injury. Problem: Skin Integrity:  Goal: Will show no infection signs and symptoms  Description: Will show no infection signs and symptoms  4/13/2021 0344 by Stefano Cat RN  Outcome: Ongoing  Note: Pt shows no new signs or symptoms of infection on my shift. Will continue to assess. Problem: Skin Integrity:  Goal: Absence of new skin breakdown  Description: Absence of new skin breakdown  Outcome: Ongoing  Note: Absence of new skin integrity issues on my shift. Will continue to assess. Problem: Pain:  Goal: Pain level will decrease  Description: Pain level will decrease  4/13/2021 0344 by Stefano Cat RN  Outcome: Ongoing  Note: Pt denies pain on my shift. Non pharmaceutical interventions like ice and repositioning offered before pain medications. Will continue to assess. Problem: Pain:  Goal: Control of acute pain  Description: Control of acute pain  Outcome: Ongoing  Note: Pt denies pain on my shift. Non pharmaceutical interventions like ice and repositioning offered before pain medications. Will continue to assess. Problem: Pain:  Goal: Control of chronic pain  Description: Control of chronic pain  Outcome: Ongoing  Note: Pt denies pain on my shift. Non pharmaceutical interventions like ice and repositioning offered before pain medications. Will continue to assess.        Problem: DISCHARGE BARRIERS  Goal: Patient's continuum of care needs are met  4/13/2021 0344 by Stefano Cat

## 2021-04-13 NOTE — PLAN OF CARE
Problem: Falls - Risk of:  Goal: Will remain free from falls  Description: Will remain free from falls  4/13/2021 1525 by Ladi Donato RN  Outcome: Ongoing  Note: Patient free from falls. Bed alarm, chair alarm, call light within reach, non skid footwear on when up. Problem: Falls - Risk of:  Goal: Absence of physical injury  Description: Absence of physical injury  4/13/2021 1525 by Ladi Donato RN  Outcome: Ongoing  Note: Patient free from falls. Bed alarm, chair alarm, call light within reach, non skid footwear on when up. Problem: Skin Integrity:  Goal: Will show no infection signs and symptoms  Description: Will show no infection signs and symptoms  4/13/2021 1525 by Ladi Donato RN  Outcome: Ongoing  Note: No signs or symptoms of infection noted. Problem: Skin Integrity:  Goal: Absence of new skin breakdown  Description: Absence of new skin breakdown  4/13/2021 1525 by Ladi Donato RN  Outcome: Ongoing  Note: No signs or symptoms of skin breakdown noted. Patient turns and repositions self. Problem: Fluid Volume - Imbalance:  Goal: Absence of imbalanced fluid volume signs and symptoms  Description: Absence of imbalanced fluid volume signs and symptoms  4/13/2021 1525 by Ladi oDnato RN  Outcome: Ongoing  Note: Continue to monitor I&O. Problem: Pain:  Goal: Pain level will decrease  Description: Pain level will decrease  4/13/2021 1525 by Ladi Donato RN  Outcome: Ongoing  Note: Patients pain level is 0/10. Patients pain goal is no pain. Patient repositioned. Pain goal met at this time. Problem: DISCHARGE BARRIERS  Goal: Patient's continuum of care needs are met  4/13/2021 1525 by Ladi Donato RN  Outcome: Ongoing  Note: Patient plans to return to The [address] [place] [address] for further rehab at discharge.      Problem: Musculor/Skeletal Functional Status  Goal: Highest potential functional level  4/13/2021 1525 by Ladi Donato RN  Note: Continues to work with therapy. Care plan reviewed with patient. Patient verbalize understanding of the plan of care and contribute to goal setting.

## 2021-04-13 NOTE — PROGRESS NOTES
Hospitalist Progress Note      Patient:  Emmanuel Moore Mcdonough    Unit/Bed:8B-29/029-A  YOB: 1940  MRN: [id number]   Acct: [de-identified]   PCP: PHIL Ward CNP  Date of Admission: 4/10/2021    Assessment/Plan:    1. Melena secondary to 1 cm ulcer in duodenum / 2 cm hiatal hernia, LA grade A esophagitis, mild antral gastritis: GI consulted, underwent EGD 4/12/21. stop NSAID use, will need to hold Plavix for 7 days. Repeat EGD in 8 weeks. Continue PPI PO BID for 8 weeks at discharge. 2. Acute on chronic anemia: secondary to above, s/p 2 units PRBCs. Hgb stable, will continue to monitor overnight and if remains stable will likely be able to discharge. 3. Recent total hip arthroplasty for R hip fx 3/14: PT/OT  4. CAD s/p PCI / CABG: pt on ASA/Plavix, defer to GI service when okay to resume. BB / statin. 5. PAF not on [facility]: BB, recurrent falls so not on [facility], noted  6. Essential HTN: continue to monitor BP, had some intermittent low BPs  7. S/p pacemaker: aware    Chief Complaint: Nausea, vomiting, diarrhea, abdominal pain, black colored stools     Initial H and P:-    \"81 y.o. female who presented to [facility] from [facility] with one week history of nausea, vomiting, diarrhea, abdominal pain and black colored stools. Has a past medical history of CAD, HFrEF, HTN, HLD, A. Fib (on ASA and Plavix only). Pt reports this all happened after eating a meal last week. Pt was just discharged from the hospital to [facility] on 3/17/2021 after having a fall and suffering a right hip fracture and underwent total hip arthroplasty (3/14/2021). Pt currently denies any fevers, chills, chest pain, sob, cough, congestion, sore throat, burning with urination, or focal neurological deficits.      In the ED, BP 90/67 HR 91. Pulse Ox 97% on RA. Hb down 7.0 (baseline 10). Troponin non elevated. CT A/P no acute process. FOBT +.      Pt given NS Bolus 500cc and 1U pRBCs. Started on Protonix 40 BID. \"    Subjective (past 24 hours):   4/13-> pt sitting up in bed, denies fever/chills. No CP/SOB. No abd pain, n/v. Reports small BMs, reports stool as being black. Hgb stable, continue to monitor till tomorrow. Diet being advanced. Past medical history, family history, social history and allergies reviewed again and is unchanged since admission. ROS (12 point review of systems completed. Pertinent positives noted. Otherwise ROS is negative)     Medications:  Reviewed    Infusion Medications    sodium chloride      sodium chloride      sodium chloride 75 mL/hr at 04/13/21 6790     Scheduled Medications    atenolol  25 mg Oral Daily    atorvastatin  40 mg Oral Daily    citalopram  20 mg Oral Daily    folic acid  1 mg Oral Daily    midodrine  2.5 mg Oral TID WC    Vitamin D  1,000 Units Oral Daily    pantoprazole  40 mg Intravenous BID    sodium chloride flush  5-40 mL Intravenous 2 times per day     PRN Meds: sodium chloride, meclizine, sodium chloride flush, sodium chloride, promethazine **OR** ondansetron, polyethylene glycol, acetaminophen **OR** acetaminophen      Intake/Output Summary (Last 24 hours) at 4/13/2021 1026  Last data filed at 4/12/2021 1947  Gross per 24 hour   Intake 600 ml   Output --   Net 600 ml       Diet:  DIET CLEAR LIQUID;    Exam:  /75   Pulse 80   Temp 98.4 °F (36.9 °C) (Oral)   Resp 16   Ht 5' 5\" (1.651 m)   Wt 119 lb 8 oz (54.2 kg)   SpO2 94%   BMI 19.89 kg/m²   General appearance: elderly, frail, no apparent distress, appears stated age and cooperative. HEENT: Pupils equal, round, and reactive to light. Conjunctivae/corneas clear. Neck: Supple, with full range of motion. No jugular venous distention. Trachea midline. Respiratory:  Normal respiratory effort. Clear to auscultation, bilaterally without Rales/Wheezes/Rhonchi. Cardiovascular: Regular rate and rhythm with normal S1/S2 without murmurs, rubs or gallops.   Abdomen: Soft, non-tender, non-distended with normal bowel sounds. Musculoskeletal: passive and active ROM x 4 extremities. Skin: Skin color, texture, turgor normal.  No rashes or lesions. Neurologic:  Neurovascularly intact without any focal sensory/motor deficits. Cranial nerves: II-XII intact, grossly non-focal.  Psychiatric: Alert and oriented, thought content appropriate, normal insight  Capillary Refill: Brisk,< 3 seconds   Peripheral Pulses: +2 palpable, equal bilaterally     Labs:   Recent Labs     04/11/21  0538 04/11/21  0538 04/12/21  1507 04/12/21  2334 04/13/21  0806   WBC 5.1  --   --   --   --    HGB 6.9*   < > 9.8* 8.4* 8.9*   HCT 22.1*   < > 30.5* 26.7* 29.1*     --   --   --   --     < > = values in this interval not displayed. Recent Labs     04/11/21  0538   *   K 4.4      CO2 18*   BUN 27*   CREATININE 0.8   CALCIUM 7.8*     No results for input(s): AST, ALT, BILIDIR, BILITOT, ALKPHOS in the last 72 hours. No results for input(s): INR in the last 72 hours. No results for input(s): Charley Loth in the last 72 hours. Microbiology:    Blood culture #1: No results found for: BC    Blood culture #2:No results found for: BLOODCULT2    Organism:  Lab Results   Component Value Date    ORG Growth of Contaminants 11/04/2018       No results found for: LABGRAM    MRSA culture only:No results found for: [facility]    Urine culture: No results found for: LABURIN    Respiratory culture: No results found for: CULTRESP    Aerobic and Anaerobic :  No results found for: LABAERO  No results found for: LABANAE    Urinalysis:      Lab Results   Component Value Date    NITRU NEGATIVE 04/11/2021    WBCUA 2-4 04/11/2021    BACTERIA NONE 04/11/2021    RBCUA 0-2 04/11/2021    BLOODU TRACE 04/11/2021    SPECGRAV >1.030 02/15/2021    GLUCOSEU NEGATIVE 04/11/2021       Radiology:  CT ABDOMEN PELVIS W IV CONTRAST Additional Contrast? None   Final Result       1. No evidence of an acute process.    2. New dense structure near the terminal ileum. This is presumably something ingested. There is no associated obstruction. **This report has been created using voice recognition software. It may contain minor errors which are inherent in voice recognition technology. **      Final report electronically signed by Dr. Demetrius Gilbert on 4/10/2021 9:19 AM        Ct Abdomen Pelvis W Iv Contrast Additional Contrast? None    Result Date: 4/10/2021  PROCEDURE: CT ABDOMEN PELVIS W IV CONTRAST CLINICAL INFORMATION: llq . Left lower abdominal pain. Left hip pain. Right hip replacement. COMPARISON: 2/15/2021. TECHNIQUE: Axial 5 mm CT images were obtained through the abdomen and pelvis after the administration of intravenous contrast. Coronal and sagittal reconstructions were obtained. All CT scans at this facility use dose modulation, iterative reconstruction, and/or weight-based dosing when appropriate to reduce radiation dose to as low as reasonably achievable. FINDINGS: The visualized aspects of the lung bases are clear. The base of the heart is within appropriate limits. The liver is normal. The spleen is normal. The adrenals and pancreas are normal. The gallbladder is normal.    There is no hydronephrosis or stones of either kidney. No renal masses are noted. No abnormalities of the small bowel loops are noted. There is atherosclerosis of the arterial structures. There is no adenopathy. The urinary bladder is normal. There is no pelvic free fluid. The colon is redundant. There is no colonic inflammation. At the junction of the terminal ileum and: There is a dense structure. This is seen on axial image 58. This was not present on the prior examination. This is presumably something ingested. There is no associated obstruction. There is no adenopathy. The patient has had a prior hysterectomy. No suspicious osseous lesions are identified. There is a replaced left hip. There is an X-Stop device at the L4-5 level. There are some dilated nerve root sleeves at the level of sacrum. This is unchanged. 1. No evidence of an acute process. 2. New dense structure near the terminal ileum. This is presumably something ingested. There is no associated obstruction. **This report has been created using voice recognition software. It may contain minor errors which are inherent in voice recognition technology. ** Final report electronically signed by Dr. Nataliia Gonsales on 4/10/2021 9:19 AM      Electronically signed by Robert Almanza PA-C on 4/13/2021 at 10:26 AM

## 2021-04-13 NOTE — PROGRESS NOTES
[facility] 8B  Occupational Therapy  Daily Note  Time:    Time In: 1118  Time Out: 9778  Timed Code Treatment Minutes: 38 Minutes  Minutes: 38          Date: 2021  Patient Name: Chip Zapien,   Gender: female      Room: 8B-29/029-A  MRN: [id number]  : 1940  (80 y.o.)  Referring Practitioner: Miguel Foster DO  Diagnosis: acute blood loss anemia  Additional Pertinent Hx: Per H&P:81 y.o. female who presented to [address] [place] [address] from [facility] with one week history of nausea, vomiting, diarrhea, abdominal pain and black colored stools. Has a past medical history of CAD, HFrEF, HTN, HLD, A. Fib (on ASA and Plavix only). Pt reports this all happened after eating a meal last week. Pt was just discharged from the hospital to [facility] on 3/17/2021 after having a fall and suffering a right hip fracture and underwent total hip arthroplasty (3/14/2021). Hemoglobin down to 7.0 on arrival, given 1 unit PRBC. Restrictions/Precautions:  Restrictions/Precautions: Weight Bearing, General Precautions, Fall Risk  Right Lower Extremity Weight Bearing: Weight Bearing As Tolerated  Position Activity Restriction  Other position/activity restrictions: R ANIL anterior approach 3/14/21; h/o L distal radius fx ()      SUBJECTIVE: cooperative    PAIN: 0/10: no c/o pain during session    Vitals: Vitals not assessed per clinical judgement, see nursing flowsheet    COGNITION: Decreased Insight    ADL:   Bathing: Contact Guard Assistance. for dynamic standing while washing periarea; sponge bath completed standing x 10 min for  UB & seated while washing LE  Upper Extremity Dressing: Moderate Assistance. hospital gown  Lower Extremity Dressing: stand by assist  don/doff slipper socks  Toileting: Stand By Assistance. encouragement to complete for self  Toilet Transfer: [employer]. with ETS. BALANCE:  Standing Balance: Stand By Assistance.       BED MOBILITY:  Supine to Sit: Stand By Assistance      TRANSFERS:  Sit to Stand:  Contact Guard Assistance. from EOB    FUNCTIONAL MOBILITY:  Assistive Device: Rolling Walker  Assist Level:  Contact Guard Assistance. Distance: To and from bathroom      ASSESSMENT:     Activity Tolerance:  Patient tolerance of  treatment: fair. Discharge Recommendations: Subacute/Skilled Nursing Facility    Equipment Recommendations: Equipment Needed: No  Plan: Times per week: 3-5x  Current Treatment Recommendations: Strengthening, Balance Training, Functional Mobility Training, Endurance Training, Safety Education & Training, Patient/Caregiver Education & Training, Equipment Evaluation, Education, & procurement, Self-Care / ADL    Patient Education  Patient Education: see above    Goals  Short term goals  Time Frame for Short term goals: by discharge  Short term goal 1: Pt will increase activity tolerance for functional mobility household distances with SBA in prep for ADL completion. Short term goal 2: Pt will complete BADL tasks with SBA to increase independence with self care tasks. Short term goal 3: Pt will tolerate dynamic standing X5 minutes with SBA in prep for sinkside grooming tasks. Following session, patient left in safe position with all fall risk precautions in place.

## 2021-04-14 VITALS
BODY MASS INDEX: 19.91 KG/M2 | OXYGEN SATURATION: 96 % | WEIGHT: 119.5 LBS | RESPIRATION RATE: 18 BRPM | DIASTOLIC BLOOD PRESSURE: 69 MMHG | SYSTOLIC BLOOD PRESSURE: 139 MMHG | HEART RATE: 80 BPM | TEMPERATURE: 98.8 F | HEIGHT: 65 IN

## 2021-04-14 LAB
ANION GAP SERPL CALCULATED.3IONS-SCNC: 11 MEQ/L (ref 8–16)
BUN BLDV-MCNC: 4 MG/DL (ref 7–22)
CALCIUM SERPL-MCNC: 7.6 MG/DL (ref 8.5–10.5)
CHLORIDE BLD-SCNC: 106 MEQ/L (ref 98–111)
CO2: 20 MEQ/L (ref 23–33)
CREAT SERPL-MCNC: 0.5 MG/DL (ref 0.4–1.2)
GFR SERPL CREATININE-BSD FRML MDRD: > 90 ML/MIN/1.73M2
GLUCOSE BLD-MCNC: 87 MG/DL (ref 70–108)
HCT VFR BLD CALC: 27.4 % (ref 37–47)
HEMOGLOBIN: 8.7 GM/DL (ref 12–16)
POTASSIUM SERPL-SCNC: 3.4 MEQ/L (ref 3.5–5.2)
SARS-COV-2, NAAT: NOT DETECTED
SODIUM BLD-SCNC: 137 MEQ/L (ref 135–145)

## 2021-04-14 PROCEDURE — 85014 HEMATOCRIT: CPT

## 2021-04-14 PROCEDURE — 2580000003 HC RX 258: Performed by: INTERNAL MEDICINE

## 2021-04-14 PROCEDURE — 6370000000 HC RX 637 (ALT 250 FOR IP): Performed by: PHYSICIAN ASSISTANT

## 2021-04-14 PROCEDURE — 36415 COLL VENOUS BLD VENIPUNCTURE: CPT

## 2021-04-14 PROCEDURE — 85018 HEMOGLOBIN: CPT

## 2021-04-14 PROCEDURE — 80048 BASIC METABOLIC PNL TOTAL CA: CPT

## 2021-04-14 PROCEDURE — 99239 HOSP IP/OBS DSCHRG MGMT >30: CPT | Performed by: PHYSICIAN ASSISTANT

## 2021-04-14 PROCEDURE — 6360000002 HC RX W HCPCS: Performed by: INTERNAL MEDICINE

## 2021-04-14 PROCEDURE — C9113 INJ PANTOPRAZOLE SODIUM, VIA: HCPCS | Performed by: INTERNAL MEDICINE

## 2021-04-14 PROCEDURE — 6370000000 HC RX 637 (ALT 250 FOR IP): Performed by: INTERNAL MEDICINE

## 2021-04-14 PROCEDURE — 87635 SARS-COV-2 COVID-19 AMP PRB: CPT

## 2021-04-14 RX ORDER — POTASSIUM CHLORIDE 7.45 MG/ML
10 INJECTION INTRAVENOUS PRN
Status: DISCONTINUED | OUTPATIENT
Start: 2021-04-14 | End: 2021-04-14 | Stop reason: HOSPADM

## 2021-04-14 RX ORDER — POTASSIUM CHLORIDE 20 MEQ/1
40 TABLET, EXTENDED RELEASE ORAL PRN
Status: DISCONTINUED | OUTPATIENT
Start: 2021-04-14 | End: 2021-04-14 | Stop reason: HOSPADM

## 2021-04-14 RX ORDER — ATENOLOL 25 MG/1
25 TABLET ORAL DAILY
Qty: 30 TABLET | Refills: 3 | Status: SHIPPED | OUTPATIENT
Start: 2021-04-14 | End: 2021-05-26 | Stop reason: SDUPTHER

## 2021-04-14 RX ORDER — PANTOPRAZOLE SODIUM 40 MG/1
40 TABLET, DELAYED RELEASE ORAL
Status: DISCONTINUED | OUTPATIENT
Start: 2021-04-14 | End: 2021-04-14 | Stop reason: HOSPADM

## 2021-04-14 RX ORDER — CLOPIDOGREL BISULFATE 75 MG/1
75 TABLET ORAL DAILY
Qty: 30 TABLET | Refills: 1 | Status: SHIPPED | OUTPATIENT
Start: 2021-04-20 | End: 2021-05-26 | Stop reason: SDUPTHER

## 2021-04-14 RX ORDER — PANTOPRAZOLE SODIUM 40 MG/1
40 TABLET, DELAYED RELEASE ORAL
Qty: 30 TABLET | Refills: 2 | DISCHARGE
Start: 2021-04-14 | End: 2021-05-26

## 2021-04-14 RX ADMIN — ATENOLOL 25 MG: 25 TABLET ORAL at 08:38

## 2021-04-14 RX ADMIN — SODIUM CHLORIDE, PRESERVATIVE FREE 10 ML: 5 INJECTION INTRAVENOUS at 08:39

## 2021-04-14 RX ADMIN — CITALOPRAM 20 MG: 20 TABLET, FILM COATED ORAL at 08:38

## 2021-04-14 RX ADMIN — MIDODRINE HYDROCHLORIDE 2.5 MG: 2.5 TABLET ORAL at 11:33

## 2021-04-14 RX ADMIN — Medication 1000 UNITS: at 08:38

## 2021-04-14 RX ADMIN — POTASSIUM BICARBONATE 40 MEQ: 782 TABLET, EFFERVESCENT ORAL at 11:38

## 2021-04-14 RX ADMIN — MIDODRINE HYDROCHLORIDE 2.5 MG: 2.5 TABLET ORAL at 08:38

## 2021-04-14 RX ADMIN — PANTOPRAZOLE SODIUM 40 MG: 40 INJECTION, POWDER, FOR SOLUTION INTRAVENOUS at 08:39

## 2021-04-14 RX ADMIN — FOLIC ACID 1 MG: 1 TABLET ORAL at 08:38

## 2021-04-14 NOTE — DISCHARGE SUMMARY
Hospitalist Discharge Summary        Patient: Eugenia Almazan  YOB: 1940  MRN: [id number]   Acct: [de-identified]    Primary Care Physician: PHIL Fowler - CNP    Admit date  4/10/2021    Discharge date:  4/14/2021 11:36 AM    Chief Complaint on presentation :-  Nausea, vomiting, diarrhea, abdominal pain, black colored stools    Discharge Assessment and Plan:-   1. Melena secondary to 1 cm ulcer in duodenum / 2 cm hiatal hernia / LA grade A esophagitis / mild antral gastritis: GI consulted, underwent EGD 4/12/21. Stop NSAID use, will need to hold Plavix for 7 days (okay to resume 4/20). Repeat EGD in 8 weeks -> scheduled per GI on 6/14/21. Continue PPI PO BID for 8 weeks at discharge. No abd pain / n / v. Low fat diet. 2. Acute on chronic anemia, stable: secondary to above, s/p 2 units PRBCs. Hgb stable, will continue to monitor overnight and if remains stable will likely be able to discharge -> okay with GI for discharge. Hgb stable. No bleeding. 3. CAD s/p PCI / CABG / HLD: pt on ASA/Plavix, defer to GI service when okay to resume. BB / statin. (hold Plavix until 4/20 - 7 days after procedure, then okay to resume). 4. Chronic HFrEF: EF 40 to 45% per echocardiogram 3 /13/2021 (last in 4/11/19 showing EF 40-45%, No aortic valve disease). Appears compensated. 5. PAF not on [facility] [address]: BB, recurrent falls so not on [address], noted  6. Recent total hip arthroplasty for R hip fx 3/14: PT/OT  7. Chronic hypotension: continue to monitor BP, had some intermittent low Bps. Takes Midodrine 2.5 mg TID, continue. 8. Anxiety / depression: Celexa  9. S/p pacemaker: aware       Initial H and P and Hospital course:-  \"81 y.o. female who presented to [address] [place] [address] from [facility] with one week history of nausea, vomiting, diarrhea, abdominal pain and black colored stools. Has a past medical history of CAD, HFrEF, HTN, HLD, A. Fib (on ASA and Plavix only).  Pt reports this all happened after eating a meal last week. Pt was just discharged from the hospital to [facility] on 3/17/2021 after having a fall and suffering a right hip fracture and underwent total hip arthroplasty (3/14/2021). Pt currently denies any fevers, chills, chest pain, sob, cough, congestion, sore throat, burning with urination, or focal neurological deficits.      In the ED, BP 90/67 HR 91. Pulse Ox 97% on RA. Hb down 7.0 (baseline 10). Troponin non elevated. CT A/P no acute process. FOBT +.      Pt given NS Bolus 500cc and 1U pRBCs. Started on Protonix 40 BID. \"    Pt admitted for n/v/d/abd pain and black colored stools while on ASA/Plavix. FOBT +. GI was consulted. Pt underwent EGD and was noted have a bleeding ulcer in duodenum treated with epi. Pt was instructed to hold her Plavix for 7 days after the procedure, per GI instruction. At discharge t was tolerating a diet without difficulty, no abd pain, n/v noted. Pt was discharge with PPI BID for 8 weeks per GI recs given pt had gastritis / esophagitis. Pt is to follow up for repeat EGD in 6/2021. All VSS and suitable for discharge to SNF and follow up with PCP / GI.       Physical Exam:-  Vitals:   Patient Vitals for the past 24 hrs:   BP Temp Temp src Pulse Resp SpO2   04/14/21 1112 (!) 95/50 98.8 °F (37.1 °C) Oral 80 18 96 %   04/14/21 0830 132/86 97.7 °F (36.5 °C) Oral 88 16 96 %   04/14/21 0319 126/71 97.6 °F (36.4 °C) Oral 89 16 94 %   04/13/21 2332 137/77 97.4 °F (36.3 °C) Oral 80 16 92 %   04/13/21 2118 132/80 98.1 °F (36.7 °C) Oral 82 16 94 %   04/13/21 1532 124/74 98.9 °F (37.2 °C) Oral 82 16 92 %   04/13/21 1216 133/70 97.4 °F (36.3 °C) Oral 83 14 99 %     Weight:   Weight: 119 lb 8 oz (54.2 kg)   24 hour intake/output:     Intake/Output Summary (Last 24 hours) at 4/14/2021 1136  Last data filed at 4/14/2021 0838  Gross per 24 hour   Intake 430 ml   Output --   Net 430 ml       1. General appearance: Frail, elderly, no apparent distress, appears stated age and cooperative.   2. HEENT: Normal cephalic, atraumatic without obvious deformity. Pupils equal, round, and reactive to light. Extra ocular muscles intact. Conjunctivae/corneas clear. 3. Neck: Supple, with full range of motion. No jugular venous distention. Trachea midline. 4. Respiratory:  Normal respiratory effort. Clear to auscultation, bilaterally without Rales/Wheezes/Rhonchi. 5. Cardiovascular: Regular rate and rhythm with normal S1/S2 without murmurs, rubs or gallops. 6. Abdomen: Soft, non-tender, non-distended with normal bowel sounds. 7. Musculoskeletal:  No clubbing, cyanosis or edema bilaterally. 8. Skin: Skin color, texture, turgor normal.  No rashes or lesions. 9. Neurologic:  Neurovascularly intact without any focal sensory/motor deficits. Cranial nerves: II-XII intact, grossly non-focal.  10. Psychiatric: Alert and oriented x4, thought content appropriate, normal insight  11. Capillary Refill: Brisk,< 3 seconds   12. Peripheral Pulses: +2 palpable, equal bilaterally       Discharge Medications:-      Medication List      START taking these medications    pantoprazole 40 MG tablet  Commonly known as: PROTONIX  Take 1 tablet by mouth 2 times daily (before meals)        CHANGE how you take these medications    acetaminophen 325 MG tablet  Commonly known as: TYLENOL  Take 1 tablet by mouth every 4 hours as needed for Pain  What changed: additional instructions     clopidogrel 75 MG tablet  Commonly known as: Plavix  Take 1 tablet by mouth daily  Start taking on: April 20, 2021  What changed: These instructions start on April 20, 2021. If you are unsure what to do until then, ask your doctor or other care provider.         CONTINUE taking these medications    aspirin 81 MG tablet     atenolol 25 MG tablet  Commonly known as: Tenormin  Take 1 tablet by mouth daily     atorvastatin 40 MG tablet  Commonly known as: LIPITOR     CALCIUM 1200 PO     citalopram 20 MG tablet  Commonly known as: CELEXA  Take 1 tablet by mouth daily     Co Q-10 200 MG Caps     docusate 100 MG Caps  Commonly known as: COLACE, DULCOLAX  Take 100 mg by mouth 2 times daily as needed for Constipation     ferrous sulfate 325 (65 Fe) MG tablet  Commonly known as: IRON 325  Take 1 tablet by mouth 3 times daily (with meals)     folic acid 1 MG tablet  Commonly known as: FOLVITE  Take 1 tablet by mouth daily     furosemide 20 MG tablet  Commonly known as: LASIX     meclizine 12.5 MG tablet  Commonly known as: ANTIVERT  Take 1 tablet by mouth 3 times daily as needed for Dizziness     midodrine 2.5 MG tablet  Commonly known as: PROAMATINE  Take 1 tablet by mouth 3 times daily (with meals)     ondansetron 4 MG tablet  Commonly known as: ZOFRAN     oxyCODONE-acetaminophen 5-325 MG per tablet  Commonly known as: PERCOCET     potassium chloride 10 MEQ extended release capsule  Commonly known as: MICRO-K     potassium chloride 10 MEQ extended release tablet  Commonly known as: KLOR-CON M     promethazine 25 MG suppository  Commonly known as: PHENERGAN     rosuvastatin 10 MG tablet  Commonly known as: CRESTOR     vitamin D 1000 UNIT Tabs tablet  Commonly known as: CHOLECALCIFEROL           Where to Get Your Medications      You can get these medications from any pharmacy    Bring a paper prescription for each of these medications  · clopidogrel 75 MG tablet     Information about where to get these medications is not yet available    Ask your nurse or doctor about these medications  · pantoprazole 40 MG tablet          Labs :-  Recent Results (from the past 72 hour(s))   Hemoglobin and hematocrit, blood    Collection Time: 04/11/21  2:37 PM   Result Value Ref Range    Hemoglobin 8.8 (L) 12.0 - 16.0 gm/dl    Hematocrit 28.6 (L) 37.0 - 47.0 %   COVID-19, Rapid    Collection Time: 04/11/21  3:28 PM   Result Value Ref Range    SARS-CoV-2, NAAT NOT DETECTED NOT DETECTED   Hemoglobin and Hematocrit, Blood    Collection Time: 04/11/21  8:37 PM   Result Value Ref Range    Hemoglobin 8.2 (L) 12.0 - 16.0 gm/dl    Hematocrit 25.7 (L) 37.0 - 47.0 %   Hemoglobin and Hematocrit, Blood    Collection Time: 04/12/21  4:23 AM   Result Value Ref Range    Hemoglobin 8.4 (L) 12.0 - 16.0 gm/dl    Hematocrit 26.8 (L) 37.0 - 47.0 %   Hemoglobin and Hematocrit, Blood    Collection Time: 04/12/21  3:07 PM   Result Value Ref Range    Hemoglobin 9.8 (L) 12.0 - 16.0 gm/dl    Hematocrit 30.5 (L) 37.0 - 47.0 %   Hemoglobin and Hematocrit, Blood    Collection Time: 04/12/21 11:34 PM   Result Value Ref Range    Hemoglobin 8.4 (L) 12.0 - 16.0 gm/dl    Hematocrit 26.7 (L) 37.0 - 47.0 %   Hemoglobin and Hematocrit, Blood    Collection Time: 04/13/21  8:06 AM   Result Value Ref Range    Hemoglobin 8.9 (L) 12.0 - 16.0 gm/dl    Hematocrit 29.1 (L) 37.0 - 47.0 %   Basic Metabolic Panel    Collection Time: 04/13/21 10:00 AM   Result Value Ref Range    Sodium 132 (L) 135 - 145 meq/L    Potassium 3.9 3.5 - 5.2 meq/L    Chloride 101 98 - 111 meq/L    CO2 16 (L) 23 - 33 meq/L    Glucose 65 (L) 70 - 108 mg/dL    BUN 5 (L) 7 - 22 mg/dL    CREATININE 0.5 0.4 - 1.2 mg/dL    Calcium 7.2 (L) 8.5 - 10.5 mg/dL   Anion Gap    Collection Time: 04/13/21 10:00 AM   Result Value Ref Range    Anion Gap 15.0 8.0 - 16.0 meq/L   Glomerular Filtration Rate, Estimated    Collection Time: 04/13/21 10:00 AM   Result Value Ref Range    Est, Glom Filt Rate >90 ml/min/1.73m2   Hemoglobin and Hematocrit, Blood    Collection Time: 04/14/21  4:37 AM   Result Value Ref Range    Hemoglobin 8.7 (L) 12.0 - 16.0 gm/dl    Hematocrit 27.4 (L) 37.0 - 47.0 %   Basic Metabolic Panel    Collection Time: 04/14/21  8:20 AM   Result Value Ref Range    Sodium 137 135 - 145 meq/L    Potassium 3.4 (L) 3.5 - 5.2 meq/L    Chloride 106 98 - 111 meq/L    CO2 20 (L) 23 - 33 meq/L    Glucose 87 70 - 108 mg/dL    BUN 4 (L) 7 - 22 mg/dL    CREATININE 0.5 0.4 - 1.2 mg/dL    Calcium 7.6 (L) 8.5 - 10.5 mg/dL   Anion Gap    Collection Time: 04/14/21  8:20 AM   Result Value Ref Range    Anion Gap 11.0 8.0 - 16.0 meq/L   Glomerular Filtration Rate, Estimated    Collection Time: 04/14/21  8:20 AM   Result Value Ref Range    Est, Glom Filt Rate >90 ml/min/1.73m2   COVID-19, Rapid    Collection Time: 04/14/21  8:52 AM   Result Value Ref Range    SARS-CoV-2, NAAT NOT DETECTED NOT DETECTED        Microbiology:    Blood culture #1: No results found for: BC    Blood culture #2:No results found for: BLOODCULT2    Organism:  No results found for: LABGRAM    MRSA culture only:No results found for: [facility]    Urine culture: No results found for: LABURIN  Lab Results   Component Value Date    ORG Growth of Contaminants 11/04/2018        Respiratory culture: No results found for: CULTRESP    Aerobic and Anaerobic :  No results found for: LABAERO  No results found for: LABANAE    Urinalysis:      Lab Results   Component Value Date    NITRU NEGATIVE 04/11/2021    WBCUA 2-4 04/11/2021    BACTERIA NONE 04/11/2021    RBCUA 0-2 04/11/2021    BLOODU TRACE 04/11/2021    SPECGRAV >1.030 02/15/2021    GLUCOSEU NEGATIVE 04/11/2021       Radiology:-  Ct Abdomen Pelvis W Iv Contrast Additional Contrast? None    Result Date: 4/10/2021  PROCEDURE: CT ABDOMEN PELVIS W IV CONTRAST CLINICAL INFORMATION: llq . Left lower abdominal pain. Left hip pain. Right hip replacement. COMPARISON: 2/15/2021. TECHNIQUE: Axial 5 mm CT images were obtained through the abdomen and pelvis after the administration of intravenous contrast. Coronal and sagittal reconstructions were obtained. All CT scans at this facility use dose modulation, iterative reconstruction, and/or weight-based dosing when appropriate to reduce radiation dose to as low as reasonably achievable. FINDINGS: The visualized aspects of the lung bases are clear. The base of the heart is within appropriate limits. The liver is normal. The spleen is normal. The adrenals and pancreas are normal. The gallbladder is normal.    There is no hydronephrosis or stones of either kidney.  No renal masses are noted. No abnormalities of the small bowel loops are noted. There is atherosclerosis of the arterial structures. There is no adenopathy. The urinary bladder is normal. There is no pelvic free fluid. The colon is redundant. There is no colonic inflammation. At the junction of the terminal ileum and: There is a dense structure. This is seen on axial image 58. This was not present on the prior examination. This is presumably something ingested. There is no associated obstruction. There is no adenopathy. The patient has had a prior hysterectomy. No suspicious osseous lesions are identified. There is a replaced left hip. There is an X-Stop device at the L4-5 level. There are some dilated nerve root sleeves at the level of sacrum. This is unchanged. 1. No evidence of an acute process. 2. New dense structure near the terminal ileum. This is presumably something ingested. There is no associated obstruction. **This report has been created using voice recognition software. It may contain minor errors which are inherent in voice recognition technology. ** Final report electronically signed by Dr. Lenore Gerardo on 4/10/2021 9:19 AM       Follow-up scheduled after discharge :-    in the next few days with PHIL Walsh - CNP  in ~ 2 months with GI for repeat EGD (06/2021)    Consultations during this hospital stay:-  [] NONE [] Cardiology  [] Nephrology  [] Hemo onco   [x] GI   [] ID  [] Endocrine  [] Pulm    [] Neuro    [] Psych   [] Urology  [] ENT   [] G SURGERY   []Ortho    []CV surg    [] Palliative  [] Hospice [] Pain management   []    []TCU   [] PT/OT  OTHERS:-    Disposition: SNF  Condition at Discharge: Stable    Time Spent:- 40 minutes    Electronically signed by Andrea Leos PA-C on 4/14/2021 at 11:36 AM  Discharging Hospitalist

## 2021-04-14 NOTE — PROGRESS NOTES
Discharge teaching and instructions for diagnosis/procedure of gastric ulcer completed with patient using teachback method. AVS reviewed. Printed prescriptions given to patient. Patient voiced understanding regarding prescriptions, follow up appointments, and care of self at home.  Discharged in a wheelchair to  skilled nursing per EMS transportation

## 2021-04-14 NOTE — PROGRESS NOTES
Gastroenterology Progress Note:     Patient Name:  Marj Mercedes   MRN: [id number]  [id number]  YOB: 1940  Admit Date: 4/10/2021  7:11 AM  Primary Care Physician: PHIL Alvarez - CNP   [id number]     Patient seen and examined. 24 hours events and chart reviewed. Subjective: Patient resting in bed. Denies abdominal pain, nausea, & vomiting. She is tolerating a general diet. Hgb 8.7    Objective:  /86   Pulse 88   Temp 97.7 °F (36.5 °C) (Oral)   Resp 16   Ht 5' 5\" (1.651 m)   Wt 119 lb 8 oz (54.2 kg)   SpO2 96%   BMI 19.89 kg/m²     Physical Exam:    General:  Nourished in no distress  HEENT: Atraumatic, normocephalic. Moist oral mucous membranes. Neck: Supple without adenopathy, JVD, thyromegaly or masses. Trachea midline. CV: Heart RRR, no murmurs, rubs, gallops. Resp: Even, easy without cough or accessory use. Lungs clear to ascultation bilaterally. Abd: Round, soft, nontender. No hepatosplenomegaly or mass present. Active bowel sounds heard. No distention noted. Ext:  Without cyanosis, clubbing, edema. Skin: Pale, warm, dry  Neuro:  Alert, oriented x 3 with no obvious deficits.        Rectal: deferred    Labs:   CBC:   Lab Results   Component Value Date    WBC 5.1 04/11/2021    HGB 8.7 04/14/2021    HCT 27.4 04/14/2021    MCV 92.1 04/11/2021     04/11/2021     BMP:   Lab Results   Component Value Date     04/14/2021    K 3.4 04/14/2021    K 5.4 04/10/2021     04/14/2021    CO2 20 04/14/2021    PHOS 4.6 06/05/2019    BUN 4 04/14/2021    CREATININE 0.5 04/14/2021    CALCIUM 7.6 04/14/2021     PT/INR:   Lab Results   Component Value Date    INR 1.05 02/15/2021     Lipids:   Lab Results   Component Value Date    ALKPHOS 124 04/10/2021    ALT 9 04/10/2021    AST 15 04/10/2021    BILITOT <0.2 04/10/2021    BILIDIR <0.2 04/10/2021    LABALBU 3.8 04/10/2021    LIPASE 38.2 04/10/2021     Current Meds:  Scheduled Meds:   atenolol  25 mg Oral Daily    atorvastatin  40 mg Oral Daily    citalopram  20 mg Oral Daily    folic acid  1 mg Oral Daily    midodrine  2.5 mg Oral TID WC    Vitamin D  1,000 Units Oral Daily    pantoprazole  40 mg Intravenous BID    sodium chloride flush  5-40 mL Intravenous 2 times per day     Continuous Infusions:   sodium chloride      sodium chloride         Assessment:  79 yo F admitted 04/10/21 for n/v/d/abd pain, and melena. Recent total hip arthroplasty for right hip fracture 03/14/21. H/O CAD s/p PCI & CABG on aspirin & Plavix. EGD 04/12/21 demonstrated 2 cm hiatal hernia, LA grade A esophagitis, mild antral gastritis, 1 cm ulcer in the duodenum with noted bleeding which was injected with Epinephrine. 1. Melena  2. Acute on chronic anemia- s/p 2 units PRBC  3. NSAID use  4. S/P total hip arthroplasty 03/14/21  5. CAD s/p PCI & CABG- on aspirin & Plavix  6. Afib- not on [address]  7.  Pacemaker     Plan:    · Monitor H & H, transfuse prn  · Nursing to monitor stool output & document  · Switch PPI to PO BID, script sent  · Continue to hold Plavix for 7 days  · Peptic ulcer, low fat diet  · Supportive care per primary team  · Okay to discharge from GI standpoint   · EGD 06/14/21 with Dr. Saniya Jacobo signing off    Case reviewed and impression/plan reviewed in collaboration with Dr. Lexy Jean  Electronically signed by PHIL Rodriguez CNP on 4/14/2021 at 9:34 AM    [facility]

## 2021-04-14 NOTE — CARE COORDINATION
4/14/21, 11:21 AM EDT    Patient goals/plan/ treatment preferences discussed by  and . Patient goals/plan/ treatment preferences reviewed with patient/ family. Patient/ family verbalize understanding of discharge plan and are in agreement with goal/plan/treatment preferences. Understanding was demonstrated using the teach back method. AVS provided by RN at time of discharge, which includes all necessary medical information pertaining to the patients current course of illness, treatment, post-discharge goals of care, and treatment preferences. Services After Discharge  Services At/After Discharge: [facility], Nursing Services, Skilled Therapy([address])   IMM Letter  IMM Letter given to Patient/Family/Significant other/Guardian/POA/by[de-identified] [facility] Case Manager. IMM Letter date given[de-identified] 04/14/21  IMM Letter time given[de-identified] 9949       Patient discharged to [facility], skilled Medicare bed. Ray at [facility] aware of discharge, their transport will  patient at 1:30 at patient discharge door. Will fax AVS and MAR when completed.    Spoke with daughter Coretta Mars, informed of discharge and [facility] transporting at 1:30 pm.

## 2021-05-18 ENCOUNTER — CARE COORDINATION (OUTPATIENT)
Dept: CASE MANAGEMENT | Age: 81
End: 2021-05-18

## 2021-05-18 NOTE — CARE COORDINATION
[facility] Transitions Initial Follow Up Call    Call within 2 business days of discharge: Yes    Patient: Pratima Almazan Patient : 1940   MRN: [id number]  Reason for Admission: duodenal ulcer  Discharge Date: 21 RARS: Readmission Risk Score: 23      Last Discharge [facility]       Complaint Diagnosis Description Type Department Provider    4/10/21 Abdominal Pain Abdominal pain, unspecified abdominal location . .. ED to Hosp-Admission (Discharged) (ADMITTED) ANGELICA Gamble PA-C; Mearl Servant Tor. .. Acute Care Course:  Pt to [facility] from 4/10 to  with abd pain and n/v and found to melena and a duodenal ulcer. Pt on plavix. Pt then went to [address] [place] [address] with a d/c home. No HH    Sig Hx:   Anemia, CAD, CHF, R total hip arthroplasty. Anxiety, depression, s/p pacemaker. DME: walker, FWB    Conversation:  Spoke to Travon Panda after 2 IDs. She is doing well and has her appts scheduled and daughter will take. She has a shower on the first floor and has grab bars. She also has a raised toilet seat. She has a bad appetite but tries to force herself to eat. Encouraged 6 small meals as it is hard to eat a lot at once. Daughter manages meds. No swelling. There is some leg pain when she lays on her right side. She has seen ortho. Her daughter visits everyday for lunch and she also picks up her supplies. Follow up plan:   Will re-attempt           Care Transitions 24 Hour Call    Do you have any ongoing symptoms?: No  Do you have all of your prescriptions and are they filled?: Yes  Have you scheduled your follow up appointment?: Yes  How are you going to get to your appointment?: Car - family or friend to transport  Do you feel like you have everything you need to keep you well at home?: Yes  Care Transitions Interventions         Follow Up  Future Appointments   Date Time Provider [place]   2021 10:20 AM [facility]UE[facility] Radiolog   2021  1:30 PM Breanna Gates, APRN - CNP [id number]   7/21/2021  9:00 AM PHIL Seth CNP [facility] Rheum [address]   7/27/2021 10:00 AM STR EXAM ROOM 2 STRZ OP NURS Koenig MOY Chambers, ALEXANDRUN, RN   [address] [facility] [facility] Transition Nurse  [phone]

## 2021-05-24 ENCOUNTER — HOSPITAL ENCOUNTER (OUTPATIENT)
Dept: WOMENS IMAGING | Age: 81
Discharge: HOME | End: 2021-05-24
Payer: MEDICARE

## 2021-05-24 DIAGNOSIS — M81.0 AGE-RELATED OSTEOPOROSIS WITHOUT CURRENT PATHOLOGICAL FRACTURE: ICD-10-CM

## 2021-05-24 PROCEDURE — 77080 DXA BONE DENSITY AXIAL: CPT

## 2021-05-25 ENCOUNTER — TELEPHONE (OUTPATIENT)
Dept: RHEUMATOLOGY | Age: 81
End: 2021-05-25

## 2021-05-26 ENCOUNTER — OFFICE VISIT (OUTPATIENT)
Dept: FAMILY MEDICINE CLINIC | Age: 81
End: 2021-05-26
Payer: MEDICARE

## 2021-05-26 VITALS
HEART RATE: 84 BPM | DIASTOLIC BLOOD PRESSURE: 70 MMHG | RESPIRATION RATE: 16 BRPM | BODY MASS INDEX: 20.3 KG/M2 | WEIGHT: 122 LBS | SYSTOLIC BLOOD PRESSURE: 126 MMHG

## 2021-05-26 DIAGNOSIS — R26.81 UNSTEADY GAIT: ICD-10-CM

## 2021-05-26 DIAGNOSIS — Z95.0 PACEMAKER: ICD-10-CM

## 2021-05-26 DIAGNOSIS — R53.81 PHYSICAL DECONDITIONING: ICD-10-CM

## 2021-05-26 DIAGNOSIS — F43.21 SITUATIONAL DEPRESSION: ICD-10-CM

## 2021-05-26 DIAGNOSIS — Z95.1 S/P CORONARY ARTERY BYPASS GRAFT X 1: ICD-10-CM

## 2021-05-26 DIAGNOSIS — M81.0 AGE-RELATED OSTEOPOROSIS WITHOUT CURRENT PATHOLOGICAL FRACTURE: ICD-10-CM

## 2021-05-26 DIAGNOSIS — E78.2 MIXED HYPERLIPIDEMIA: ICD-10-CM

## 2021-05-26 DIAGNOSIS — I48.91 ATRIAL FIBRILLATION WITH RVR (HCC): ICD-10-CM

## 2021-05-26 DIAGNOSIS — S72.011A CLOSED SUBCAPITAL FRACTURE OF FEMUR, RIGHT, INITIAL ENCOUNTER (HCC): Primary | ICD-10-CM

## 2021-05-26 DIAGNOSIS — K21.9 GASTROESOPHAGEAL REFLUX DISEASE WITHOUT ESOPHAGITIS: ICD-10-CM

## 2021-05-26 PROCEDURE — 1036F TOBACCO NON-USER: CPT | Performed by: NURSE PRACTITIONER

## 2021-05-26 PROCEDURE — 1123F ACP DISCUSS/DSCN MKR DOCD: CPT | Performed by: NURSE PRACTITIONER

## 2021-05-26 PROCEDURE — G8427 DOCREV CUR MEDS BY ELIG CLIN: HCPCS | Performed by: NURSE PRACTITIONER

## 2021-05-26 PROCEDURE — 99215 OFFICE O/P EST HI 40 MIN: CPT | Performed by: NURSE PRACTITIONER

## 2021-05-26 PROCEDURE — 1090F PRES/ABSN URINE INCON ASSESS: CPT | Performed by: NURSE PRACTITIONER

## 2021-05-26 PROCEDURE — G8399 PT W/DXA RESULTS DOCUMENT: HCPCS | Performed by: NURSE PRACTITIONER

## 2021-05-26 PROCEDURE — G8420 CALC BMI NORM PARAMETERS: HCPCS | Performed by: NURSE PRACTITIONER

## 2021-05-26 PROCEDURE — 4040F PNEUMOC VAC/ADMIN/RCVD: CPT | Performed by: NURSE PRACTITIONER

## 2021-05-26 RX ORDER — PANTOPRAZOLE SODIUM 40 MG/1
40 TABLET, DELAYED RELEASE ORAL
Qty: 60 TABLET | Refills: 5 | Status: SHIPPED | OUTPATIENT
Start: 2021-05-26

## 2021-05-26 RX ORDER — POTASSIUM CHLORIDE 20 MEQ/1
20 TABLET, EXTENDED RELEASE ORAL DAILY
Qty: 30 TABLET | Refills: 11 | Status: ON HOLD | OUTPATIENT
Start: 2021-05-26 | End: 2022-01-25 | Stop reason: HOSPADM

## 2021-05-26 RX ORDER — HYDROXYZINE HYDROCHLORIDE 25 MG/1
25 TABLET, FILM COATED ORAL NIGHTLY PRN
Qty: 30 TABLET | Refills: 2 | Status: SHIPPED | OUTPATIENT
Start: 2021-05-26 | End: 2021-08-31 | Stop reason: SDUPTHER

## 2021-05-26 RX ORDER — ATENOLOL 25 MG/1
25 TABLET ORAL DAILY
Qty: 30 TABLET | Refills: 11 | Status: SHIPPED | OUTPATIENT
Start: 2021-05-26

## 2021-05-26 RX ORDER — SERTRALINE HYDROCHLORIDE 100 MG/1
100 TABLET, FILM COATED ORAL DAILY
Qty: 30 TABLET | Refills: 11 | Status: ON HOLD | OUTPATIENT
Start: 2021-05-26 | End: 2022-01-25 | Stop reason: HOSPADM

## 2021-05-26 RX ORDER — FUROSEMIDE 20 MG/1
20 TABLET ORAL DAILY
Qty: 30 TABLET | Refills: 11 | Status: ON HOLD | OUTPATIENT
Start: 2021-05-26 | End: 2022-01-25 | Stop reason: HOSPADM

## 2021-05-26 RX ORDER — CLOPIDOGREL BISULFATE 75 MG/1
75 TABLET ORAL DAILY
Qty: 30 TABLET | Refills: 11 | Status: SHIPPED | OUTPATIENT
Start: 2021-05-26

## 2021-05-26 RX ORDER — ROSUVASTATIN CALCIUM 10 MG/1
10 TABLET, COATED ORAL DAILY
Qty: 30 TABLET | Refills: 11 | Status: SHIPPED | OUTPATIENT
Start: 2021-05-26

## 2021-05-26 ASSESSMENT — ENCOUNTER SYMPTOMS
BACK PAIN: 0
ALLERGIC/IMMUNOLOGIC NEGATIVE: 1
RESPIRATORY NEGATIVE: 1
SHORTNESS OF BREATH: 0
GASTROINTESTINAL NEGATIVE: 1

## 2021-05-26 NOTE — PROGRESS NOTES
Subjective:      Patient ID: Eugenia Almazan 1940 is a 80 y.o. female here for evaluation. Chief Complaint   Patient presents with    Follow-up     DC 5/14/21 from [facility] [address] [facility]     pt is very confused on what she is to be taking now that she is DC from [facility]       Has been at [facility] since March 2021 due to leg fracture. Had subsequent admission in April 2021 for stomach complaints. She will be following up with [facility] for repeat EGD. To be on protonix BID until seen by Ricardo Fairbanks    Was set up with [facility] from d/c from [facility] but patient denied. Yue Gottlieb - Dr. Jessica [facility] - Dr. Pauly Walsh date  4/10/2021                         Discharge date:  4/14/2021      Chief Complaint on presentation :-  Nausea, vomiting, diarrhea, abdominal pain, black colored stools     Discharge Assessment and Plan:-   1. Melena secondary to 1 cm ulcer in duodenum / 2 cm hiatal hernia / LA grade A esophagitis / mild antral gastritis: GI consulted, underwent EGD 4/12/21. Stop NSAID use, will need to hold Plavix for 7 days (okay to resume 4/20). Repeat EGD in 8 weeks -> scheduled per GI on 6/14/21. Continue PPI PO BID for 8 weeks at discharge. No abd pain / n / v. Low fat diet. 2. Acute on chronic anemia, stable: secondary to above, s/p 2 units PRBCs. Hgb stable, will continue to monitor overnight and if remains stable will likely be able to discharge -> okay with GI for discharge. Hgb stable. No bleeding. 3. CAD s/p PCI / CABG / HLD: pt on ASA/Plavix, defer to GI service when okay to resume. BB / statin. (hold Plavix until 4/20 - 7 days after procedure, then okay to resume). 4. Chronic HFrEF: EF 40 to 45% per echocardiogram 3 /13/2021 (last in 4/11/19 showing EF 40-45%, No aortic valve disease). Appears compensated. 5. PAF not on [address]: BB, recurrent falls so not on [address], noted  6.  Recent total hip arthroplasty for R hip fx 3/14: PT/OT  7. Chronic hypotension: continue to monitor BP, had some intermittent low Bps. Takes Midodrine 2.5 mg TID, continue. 8. Anxiety / depression: Celexa  9. S/p pacemaker: aware        Initial H and P and Hospital course:-  \"81 y.o. female who [facility] [address] [facility] with one week history of nausea, vomiting, diarrhea, abdominal pain and black colored stools. Has a past medical history of CAD, HFrEF, HTN, HLD, A. Fib (on ASA and Plavix only). Pt reports this all happened after eating a meal last week. Pt was just discharged from the hospital to [facility] on 3/17/2021 after having a fall and suffering a right hip fracture and underwent total hip arthroplasty (3/14/2021). Pt currently denies any fevers, chills, chest pain, sob, cough, congestion, sore throat, burning with urination, or focal neurological deficits.      In the ED, BP 90/67 HR 91. Pulse Ox 97% on RA. Hb down 7.0 (baseline 10). Troponin non elevated. CT A/P no acute process. FOBT +.      Pt given NS Bolus 500cc and 1U pRBCs. Started on Protonix 40 BID. \"     Pt admitted for n/v/d/abd pain and black colored stools while on ASA/Plavix. FOBT +. GI was consulted. Pt underwent EGD and was noted have a bleeding ulcer in duodenum treated with epi. Pt was instructed to hold her Plavix for 7 days after the procedure, per GI instruction. At discharge t was tolerating a diet without difficulty, no abd pain, n/v noted. Pt was discharge with PPI BID for 8 weeks per GI recs given pt had gastritis / esophagitis. Pt is to follow up for repeat EGD in 6/2021. All VSS and suitable for discharge to SNF and follow up with PCP / GI.       Admit Date: 3/12/2021  Discharge Date:   3/17/2021       Syncope and collapse, recurrent  -echo EF of 40 to 45%, mild aortic stenosis, small to moderate myocardial infarction of the anteroseptal LV (per Dr. Ghazal Hernández this is an old infarct), possible postural hypotension   -pt has pacemaker, cardiology consulted for pacemaker interrogation   -carotid Doppler unremarkable  -CT head no acute findings  -patient started in midodrine and celexa by cardiology     Right Hip fracture status post total hip arthroplasty 3/14/2021  -Acute right subcapital femoral neck fracture s/p CMN  - PT/OT   - patient going to SNF, f/u with Ortho as outpatient     Mild hyponatremia, resolved   -likely d/t hypovolemia and diuretic therapy     Acute on chronic normocytic anemia  -likely due to ALVARO and post-op anemia  - started on Iron tabs     Hx of CAD  Noted an old infarct (Small-to-moderate myocardial infarction of the anteroseptal LV per echocardiogram 3/13/2021). Continue atenolol, aspirin, Plavix, statin.      Chronic HFrEF  EF 40 to 45% per echocardiogram 3 /13/2021 (last in 4/11/19 showing EF 40-45%, No aortic valve disease). No signs of volume overload  Resume lasix     HTN  Continue home meds     HLD  on home rosuvastatin     Hx of Afib  Ventricular paced rhythm.   on ASA/plavix. Not on [address], hx of recurrent falls  F/u with cardio        The patient was seen and examined on day of discharge and this discharge summary is in conjunction with any daily progress note from day of discharge.     Hospital Course:   Per admission H&P:      \"Mrs. Almazan is a 80 Y/O female with past medical history of CAD, HFrEF, HTN, HLD, Afib, presents to ED for Syncope and hip pain. Per patient report was walking through her kitchen when she suddenly lost consciousness. Patient states this happened in mid February as well resulting in a left wrist fracture. Patient follows with Dr. Magui Cueto cardiologist, recent appointment but did not discuss syncopal episodes with him. After most recent fall patient complaining of right hip pain. Patient denies chest pain, shortness of breath, cough, headache, dizziness, lightheadedness, numbness, paraesthesias, weakness, chills, fevers, abdominal pain, nausea, vomiting, neck pain, or back pain. \"     Patient admitted under the hospitalist service. Ortho and cardiology consulted. See above treatment course. Patient Active Problem List   Diagnosis    Atrial fibrillation with RVR ([facility] [address]    S/P coronary artery bypass graft x 1, 11/12/18. 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  S/P mitral valve repair    Situational depression    Hyperlipidemia    Coronary artery disease involving coronary bypass graft of native heart without angina pectoris    Risk for falls    Physical deconditioning    CAD in native artery    Severe malnutrition (HCC)    Post PTCA    Pacemaker    Age-related osteoporosis without current pathological fracture    Hypertension    Closed fracture of left distal radius    Orthostatic syncope    Moderate malnutrition (HCC)    Syncope and collapse    Closed subcapital fracture of femur, right, initial encounter ([facility]    Closed fracture of right hip (HCC)    Hyponatremia    Hyperglycemia    High anion gap metabolic acidosis    Hx of coronary artery disease    HFrEF (heart failure with reduced ejection fraction) (HCC)    History of atrial fibrillation    Acute on chronic anemia    Acute blood loss anemia     Atrial fibrillation treated with ablation 2 years ago and pacemaker insertion. She still has some lightheadedness when standing which is been a longstanding issue. She has no chest pain with exertion and continues to do well status post coronary artery bypass graft times 1 November 2018. On Plavix, Lasix and Potassium supplement. Vitals:    05/26/21 1334   BP: 126/70   Pulse: 84   Resp: 16        BP Readings from Last 3 Encounters:   05/26/21 126/70   04/14/21 139/69   04/12/21 (!) 174/79       BP wnl. On atenolol. On Crestor 10 mg.      Lab Results   Component Value Date    LABA1C 5.2 03/14/2021     No results found for: EAG    No components found for: CHLPL  Lab Results   Component Value Date    TRIG 82 12/30/2020    TRIG 112 10/14/2019    TRIG 71 04/11/2019     Lab Results   Component Value Date    HDL 71 12/30/2020    HDL 81 10/14/2019    HDL 79 04/11/2019     Lab Results   Component Value Date    LDLCALC 87 12/30/2020    LDLCALC 139 10/14/2019    LDLCALC 86 04/11/2019     No results found for: LABVLDL      Chemistry        Component Value Date/Time     04/14/2021 0820    K 3.4 (L) 04/14/2021 0820    K 5.4 (H) 04/10/2021 0738     04/14/2021 0820    CO2 20 (L) 04/14/2021 0820    BUN 4 (L) 04/14/2021 0820    CREATININE 0.5 04/14/2021 0820        Component Value Date/Time    CALCIUM 7.6 (L) 04/14/2021 0820    ALKPHOS 124 04/10/2021 0738    AST 15 04/10/2021 0738    ALT 9 (L) 04/10/2021 0738    BILITOT <0.2 (L) 04/10/2021 0738            Lab Results   Component Value Date    TSH 1.270 02/15/2021       Lab Results   Component Value Date    WBC 5.1 04/11/2021    HGB 8.7 (L) 04/14/2021    HCT 27.4 (L) 04/14/2021    MCV 92.1 04/11/2021     04/11/2021         Health Maintenance   Topic Date Due    COVID-19 Vaccine (1) Never done    DTaP/Tdap/Td vaccine (1 - Tdap) Never done    Shingles Vaccine (1 of 2) Never done    Pneumococcal 65+ years Vaccine (1 of 1 - PPSV23) Never done   [employer] Visit (AWV)  Never done    Flu vaccine (Season Ended) 09/01/2021    Lipid screen  12/30/2021    Potassium monitoring  04/14/2022    Creatinine monitoring  04/14/2022    DEXA (modify frequency per FRAX score)  Completed    Hepatitis C screen  Completed    Hepatitis A vaccine  Aged Out    Hepatitis B vaccine  Aged Out    Hib vaccine  Aged Out    Meningococcal (ACWY) vaccine  Aged Out         There is no immunization history on file for this patient. Review of Systems   Constitutional: Negative for activity change, appetite change and unexpected weight change. HENT: Negative. Respiratory: Negative. Negative for shortness of breath. Cardiovascular: Negative. Negative for chest pain, palpitations and leg swelling. Gastrointestinal: Negative. Musculoskeletal: Positive for arthralgias and gait problem. Negative for back pain. See HPI. Skin: Negative. Allergic/Immunologic: Negative. Neurological: Negative for dizziness, tremors, light-headedness and headaches. Hematological: Negative. Psychiatric/Behavioral: Positive for dysphoric mood and sleep disturbance. Patient has longstanding insomnia. All other systems reviewed and are negative. Objective:   Physical Exam  Vitals and nursing note reviewed. Exam conducted with a chaperone present. Constitutional:       Appearance: She is normal weight. HENT:      Head: Normocephalic. Right Ear: Tympanic membrane, ear canal and external ear normal.      Left Ear: Tympanic membrane, ear canal and external ear normal.      Nose: Nose normal.      Mouth/Throat:      Mouth: Mucous membranes are moist.      Pharynx: Oropharynx is clear. Eyes:      Conjunctiva/sclera: Conjunctivae normal.      Pupils: Pupils are equal, round, and reactive to light. Cardiovascular:      Rate and Rhythm: Normal rate and regular rhythm. Pulses: Normal pulses. Heart sounds: S1 normal and S2 normal. Murmur heard. Systolic murmur is present with a grade of 1/6. Pulmonary:      Effort: Pulmonary effort is normal.      Breath sounds: Normal breath sounds. Abdominal:      General: Abdomen is flat. Bowel sounds are normal.   Musculoskeletal:         General: No swelling. Right lower leg: No edema. Left lower leg: No edema. Skin:     General: Skin is warm and dry. Capillary Refill: Capillary refill takes less than 2 seconds. Neurological:      General: No focal deficit present. Mental Status: She is alert and oriented to person, place, and time. Psychiatric:         Mood and Affect: Mood normal.          Assessment:       Diagnosis Orders   1. Closed subcapital fracture of femur, right, initial encounter ([facility] [address] [address]     2. Unsteady gait     3.  Physical deconditioning     4. Atrial fibrillation with RVR (HCC)  furosemide (LASIX) 20 MG tablet    atenolol (TENORMIN) 25 MG tablet   5. S/P coronary artery bypass graft x 1, 11/12/18. clopidogrel (PLAVIX) 75 MG tablet    furosemide (LASIX) 20 MG tablet    rosuvastatin (CRESTOR) 10 MG tablet    potassium chloride (KLOR-CON M) 20 MEQ extended release tablet   6. Pacemaker     7. Age-related osteoporosis without current pathological fracture     8. Situational depression  sertraline (ZOLOFT) 100 MG tablet    hydrOXYzine (ATARAX) 25 MG tablet   9. Mixed hyperlipidemia     10.  Gastroesophageal reflux disease without esophagitis  pantoprazole (PROTONIX) 40 MG tablet    CBC    Basic Metabolic Panel           Plan:      Chronic conditions stable  Medication List reviewed and updated  Refills as above   - hydroxyzine HS for sleep  Labs as above to monitor  Follow up with Specialists  Stress importance of [place] - daughter will discuss again with patient  RTO in 3 months      Current Outpatient Medications   Medication Sig Dispense Refill    Cholecalciferol (VITAMIN D3 PO) Take by mouth      clopidogrel (PLAVIX) 75 MG tablet Take 1 tablet by mouth daily 30 tablet 11    furosemide (LASIX) 20 MG tablet Take 1 tablet by mouth daily 30 tablet 11    sertraline (ZOLOFT) 100 MG tablet Take 1 tablet by mouth daily 30 tablet 11    rosuvastatin (CRESTOR) 10 MG tablet Take 1 tablet by mouth daily 30 tablet 11    potassium chloride (KLOR-CON M) 20 MEQ extended release tablet Take 1 tablet by mouth daily 30 tablet 11    pantoprazole (PROTONIX) 40 MG tablet Take 1 tablet by mouth daily (with breakfast) 60 tablet 5    atenolol (TENORMIN) 25 MG tablet Take 1 tablet by mouth daily Hold for SBP less than 100 mmHg, if HR less than 55 bpm 30 tablet 11    hydrOXYzine (ATARAX) 25 MG tablet Take 1 tablet by mouth nightly as needed (sleep) 30 tablet 2    Coenzyme Q10 (CO Q-10) 200 MG CAPS Take by mouth      Calcium Carbonate-Vit D-Min (CALCIUM 1200 PO) Take 1 tablet by mouth daily      aspirin 81 MG tablet Take 81 mg by mouth daily      acetaminophen (TYLENOL) 325 MG tablet Take 1 tablet by mouth every 4 hours as needed for Pain (Patient taking differently: Take 325 mg by mouth every 4 hours as needed for Pain Takes 1-2 tabs every 4 hours prn pain) 120 tablet 3     No current facility-administered medications for this visit.

## 2021-05-27 LAB
ABSOLUTE BASO #: 0.1 X10E9/L (ref 0–0.2)
ABSOLUTE EOS #: 0.2 X10E9/L (ref 0–0.4)
ABSOLUTE LYMPH #: 0.7 X10E9/L (ref 1–3.5)
ABSOLUTE MONO #: 0.6 X10E9/L (ref 0–0.9)
ABSOLUTE NEUT #: 4.3 X10E9/L (ref 1.5–6.6)
ANION GAP SERPL CALCULATED.3IONS-SCNC: 8 MMOL/L (ref 5–15)
BASOPHILS RELATIVE PERCENT: 1 %
BUN BLDV-MCNC: 24 MG/DL (ref 5–27)
CALCIUM SERPL-MCNC: 9.7 MG/DL (ref 8.5–10.5)
CHLORIDE BLD-SCNC: 104 MMOL/L (ref 98–109)
CO2: 28 MMOL/L (ref 22–32)
CREAT SERPL-MCNC: 0.76 MG/DL (ref 0.4–1)
EGFR AFRICAN AMERICAN: >60 ML/MIN/1.73SQ.M
EGFR IF NONAFRICAN AMERICAN: >60 ML/MIN/1.73SQ.M
EOSINOPHILS RELATIVE PERCENT: 3.1 %
GLUCOSE: 87 MG/DL (ref 65–99)
HCT VFR BLD CALC: 30.9 % (ref 35–47)
HEMOGLOBIN: 10.6 G/DL (ref 11.7–15.5)
LYMPHOCYTE %: 12 %
MCH RBC QN AUTO: 31.7 PG (ref 27–34)
MCHC RBC AUTO-ENTMCNC: 34.2 G/DL (ref 32–36)
MCV RBC AUTO: 93 FL (ref 80–100)
MONOCYTES # BLD: 9.7 %
NEUTROPHILS RELATIVE PERCENT: 74.2 %
PDW BLD-RTO: 16.1 % (ref 11.5–15)
PLATELETS: 225 X10E9/L (ref 150–450)
PMV BLD AUTO: 9.6 FL (ref 7–12)
POTASSIUM SERPL-SCNC: 4.9 MMOL/L (ref 3.5–5)
RBC: 3.33 X10E12/L (ref 3.8–5.2)
SODIUM BLD-SCNC: 140 MMOL/L (ref 134–146)
WBC: 5.8 X10E9/L (ref 4–11)

## 2021-06-15 ENCOUNTER — TELEPHONE (OUTPATIENT)
Dept: FAMILY MEDICINE CLINIC | Age: 81
End: 2021-06-15

## 2021-06-15 NOTE — TELEPHONE ENCOUNTER
----- Message from Edilia Karlavaughn sent at 6/14/2021  4:31 PM EDT -----  Subject: Medication Problem    QUESTIONS  Name of Medication? atenolol (TENORMIN) 25 MG tablet  Patient-reported dosage and instructions? 25 mg one daily  What question or problem do you have with the medication? Pt would like   some clarification on the instructions for the medication. Preferred Pharmacy? [id number] - [place], [address] [place] [address],[address] [place] [address] - F [phone]  Pharmacy phone number (if available)? [phone]  Additional Information for Provider? The bottle states to either take it 1   25mg daily by mouth or it says Hold for BP less than 100 or if hear rate   is less than 55. So the pt has not been taking the medication. She would   like some clarification if that is okay. Please advise. Please call   Janusz Garber [phone]. She is on the [employer] form. ---------------------------------------------------------------------------  --------------  Jennifer JOHNSON  What is the best way for the office to contact you? OK to leave message on   voicemail  Preferred Call Back Phone Number? [phone]  ---------------------------------------------------------------------------  --------------  SCRIPT ANSWERS  Relationship to Patient? Other  Representative Name? Anam Brink  Is the Representative on the appropriate HIPAA document in Epic?  Yes

## 2021-07-06 ENCOUNTER — TELEPHONE (OUTPATIENT)
Dept: FAMILY MEDICINE CLINIC | Age: 81
End: 2021-07-06

## 2021-07-06 NOTE — TELEPHONE ENCOUNTER
----- Message from Wash Primes sent at 7/6/2021  3:25 PM EDT -----  Subject: Message to Provider    QUESTIONS  Information for Provider? Patient's sister-in-law wanting to see if PCP   can call in some meds for patient having been having a fever, is achy, a   little out of breath and nauseated for past couple weeks w/ possible BP   getting low. Patient does NOT want to come in for an appointment.  ---------------------------------------------------------------------------  --------------  CALL BACK INFO  What is the best way for the office to contact you? OK to leave message on   voicemail  Preferred Call Back Phone Number? [phone]  ---------------------------------------------------------------------------  --------------  SCRIPT ANSWERS  Relationship to Patient? Other  Representative Name? Jc Arriaga  Is the Representative on the appropriate HIPAA document in Epic?  Yes

## 2021-07-07 NOTE — TELEPHONE ENCOUNTER
Spoke with [facility] and advised of above. Declines appt at this time. \"She does not want to see anymore doctors, have any more appts or be seen at hospital for evaluation. Offered VV and [facility] will discuss with  her daughter and call the office back if desires VV appt.

## 2021-07-21 ENCOUNTER — OFFICE VISIT (OUTPATIENT)
Dept: RHEUMATOLOGY | Age: 81
End: 2021-07-21
Payer: MEDICARE

## 2021-07-21 VITALS
WEIGHT: 119.4 LBS | OXYGEN SATURATION: 93 % | DIASTOLIC BLOOD PRESSURE: 82 MMHG | HEIGHT: 65 IN | SYSTOLIC BLOOD PRESSURE: 126 MMHG | BODY MASS INDEX: 19.89 KG/M2 | HEART RATE: 83 BPM

## 2021-07-21 DIAGNOSIS — M81.0 AGE-RELATED OSTEOPOROSIS WITHOUT CURRENT PATHOLOGICAL FRACTURE: Primary | ICD-10-CM

## 2021-07-21 PROCEDURE — 1090F PRES/ABSN URINE INCON ASSESS: CPT | Performed by: NURSE PRACTITIONER

## 2021-07-21 PROCEDURE — 4040F PNEUMOC VAC/ADMIN/RCVD: CPT | Performed by: NURSE PRACTITIONER

## 2021-07-21 PROCEDURE — G8420 CALC BMI NORM PARAMETERS: HCPCS | Performed by: NURSE PRACTITIONER

## 2021-07-21 PROCEDURE — G8399 PT W/DXA RESULTS DOCUMENT: HCPCS | Performed by: NURSE PRACTITIONER

## 2021-07-21 PROCEDURE — 1036F TOBACCO NON-USER: CPT | Performed by: NURSE PRACTITIONER

## 2021-07-21 PROCEDURE — G8427 DOCREV CUR MEDS BY ELIG CLIN: HCPCS | Performed by: NURSE PRACTITIONER

## 2021-07-21 PROCEDURE — 99213 OFFICE O/P EST LOW 20 MIN: CPT | Performed by: NURSE PRACTITIONER

## 2021-07-21 PROCEDURE — 1123F ACP DISCUSS/DSCN MKR DOCD: CPT | Performed by: NURSE PRACTITIONER

## 2021-07-21 ASSESSMENT — ENCOUNTER SYMPTOMS
NAUSEA: 0
DIARRHEA: 0
SHORTNESS OF BREATH: 1
VOMITING: 0
COUGH: 0
EYE REDNESS: 0
TROUBLE SWALLOWING: 0
BACK PAIN: 1
EYE PAIN: 0

## 2021-07-21 NOTE — PROGRESS NOTES
hospitals  Bone Fragility Follow up     Visit Date: 7/21/2021  MRN: [id number]  Cc:   Chief Complaint   Patient presents with    6 Month Follow-Up         HPI:   Mitzi Almazan  is a(n)81 y.o. female here today for follow up of Osteoporosis. Has been tolerating Prolia injections. Denies any falls or fractures. Recent hospitalization with low calcium levels. Last prolia injection was in January. ROS:  Review of Systems   Constitutional: Positive for fatigue. Negative for chills and fever. HENT: Negative for congestion and trouble swallowing. Eyes: Negative for pain and redness. Respiratory: Positive for shortness of breath (with exertion). Negative for cough. Cardiovascular: Negative for chest pain and leg swelling. Gastrointestinal: Negative for diarrhea, nausea and vomiting. Endocrine: Negative for cold intolerance and heat intolerance. Genitourinary: Negative for difficulty urinating and urgency. Musculoskeletal: Positive for back pain. Negative for arthralgias and joint swelling. Neurological: Negative for dizziness, weakness and numbness. Hematological: Does not bruise/bleed easily. Psychiatric/Behavioral: The patient is not nervous/anxious. PAST MEDICAL HISTORY  Past Medical History:   Diagnosis Date    Atrial fibrillation ([facility] [address]     CAD (coronary artery disease)     Congestive heart failure (CHF) (HCC)     Hyperlipidemia     Hypertension        SOCIAL HISTORY  Social History     Socioeconomic History    Marital status:       Spouse name: None    Number of children: 1    Years of education: None    Highest education level: None   Occupational History    None   Tobacco Use    Smoking status: Never Smoker    Smokeless tobacco: Never Used   Vaping Use    Vaping Use: Never used   Substance and Sexual Activity    Alcohol use: No    Drug use: No    Sexual activity: Not Currently   Other Topics Concern    None   Social History Narrative    None     Social Determinants of Health     Financial Resource Strain: Low Risk     Difficulty of Paying Living Expenses: Not hard at all   Food Insecurity: No Food Insecurity    Worried About Running Out of Food in the Last Year: Never true    Sierra of Food in the Last Year: Never true   Transportation Needs:     Lack of Transportation (Medical):      Lack of Transportation (Non-Medical):    Physical Activity:     Days of Exercise per Week:     Minutes of Exercise per Session:    Stress:     Feeling of Stress :    Social Connections:     Frequency of Communication with Friends and Family:     Frequency of Social Gatherings with Friends and Family:     Attends Yarsani Services:     Active Member of Clubs or Organizations:     Attends Club or Organization Meetings:     Marital Status:    Intimate Partner Violence:     Fear of Current or Ex-Partner:     Emotionally Abused:     Physically Abused:     Sexually Abused:        FAMILY HISTORY  Family History   Problem Relation Age of Onset    Other Mother         alzheimers    Heart Attack Father     Heart Attack Brother        SURGICAL HISTORY  Past Surgical History:   Procedure Laterality Date    [address]  02/28/2019    CORONARY ARTERY BYPASS GRAFT      x1    HYSTERECTOMY      JOINT REPLACEMENT      MITRAL VALVE REPLACEMENT      PACEMAKER PLACEMENT  05/2019    IL OFFICE/OUTPT VISIT,PROCEDURE ONLY N/A 11/12/2018    CABG X3, MITRAL VALVE REPAIR, MAZE, AND TRICUSPID VALVE REPAIR, DEANNA performed by Jacky Curran MD at [facility] Right 3/14/2021    HIP TOTAL ARTHROPLASTY performed by Tatiana Trinidad MD at [facility] ENDOSCOPY  4/12/2021    EGD BIOPSY performed by Opal Johnston MD at [facility] GASTROINTESTINAL ENDOSCOPY  4/12/2021    EGD CONTROL HEMORRHAGE performed by Que Burns MD at [facility] Endoscopy       ALLERGIES  No Known Allergies    CURRENTMEDICATIONS  Current Outpatient Medications   Medication Sig Dispense Refill    Cholecalciferol (VITAMIN D3 PO) Take by mouth      clopidogrel (PLAVIX) 75 MG tablet Take 1 tablet by mouth daily 30 tablet 11    furosemide (LASIX) 20 MG tablet Take 1 tablet by mouth daily 30 tablet 11    sertraline (ZOLOFT) 100 MG tablet Take 1 tablet by mouth daily 30 tablet 11    rosuvastatin (CRESTOR) 10 MG tablet Take 1 tablet by mouth daily 30 tablet 11    potassium chloride (KLOR-CON M) 20 MEQ extended release tablet Take 1 tablet by mouth daily 30 tablet 11    pantoprazole (PROTONIX) 40 MG tablet Take 1 tablet by mouth daily (with breakfast) 60 tablet 5    atenolol (TENORMIN) 25 MG tablet Take 1 tablet by mouth daily Hold for SBP less than 100 mmHg, if HR less than 55 bpm 30 tablet 11    Calcium Carbonate-Vit D-Min (CALCIUM 1200 PO) Take 1 tablet by mouth daily      aspirin 81 MG tablet Take 81 mg by mouth daily      Coenzyme Q10 (CO Q-10) 200 MG CAPS Take by mouth (Patient not taking: Reported on 7/21/2021)      acetaminophen (TYLENOL) 325 MG tablet Take 1 tablet by mouth every 4 hours as needed for Pain (Patient taking differently: Take 325 mg by mouth every 4 hours as needed for Pain Takes 1-2 tabs every 4 hours prn pain) 120 tablet 3     No current facility-administered medications for this visit. Objective:  /82 (Site: Left Upper Arm, Position: Sitting, Cuff Size: Medium Adult)   Pulse 83   Ht 5' 5\" (1.651 m)   Wt 119 lb 6.4 oz (54.2 kg)   SpO2 93%   BMI 19.87 kg/m²     General: No distress. Alert. Eyes: PERRL. No conjunctival injection. ENT: No discharge. Pharynx clear. Neck: Trachea midline. Normal thyroid. Resp: No accessory muscle use. Lung sounds clear. CV: Regular rate. Regularrhythm. No mumur or rub. No edema.    GI: Non-tender. Non-distended. M/S:   Mild kyphosis  Upper extremities:  Muscle strength +4/5, FROM, No Synovitis     Lower Extremities:   Muscle strength 5/5, FROM, No Synovitis     Neuro: Oriented to person, place, time. DTR's 2/4 bilat and equal  Psych:  No anxiety or agitation. Skin: Warm and dry. No rash on exposed extremities. Lymph: No cervical LAD. No supraclavicular LAD.        Labs:  CBC  Lab Results   Component Value Date    WBC 5.8 05/26/2021    WBC 5.1 04/11/2021    RBC 3.33 05/26/2021    HGB 10.6 05/26/2021    HCT 30.9 05/26/2021    MCV 93 05/26/2021    MCH 31.7 05/26/2021    MCHC 34.2 05/26/2021    RDW 16.1 05/26/2021     05/26/2021     04/11/2021       CMP  Lab Results   Component Value Date    CALCIUM 9.7 05/26/2021    LABALBU 3.8 04/10/2021    PROT 6.9 04/10/2021     05/26/2021    K 4.9 05/26/2021    K 5.4 04/10/2021    CO2 28 05/26/2021     05/26/2021    BUN 24 05/26/2021    CREATININE 0.76 05/26/2021    ALKPHOS 124 04/10/2021    ALT 9 04/10/2021    AST 15 04/10/2021       HgBA1c: No components found for: HGBA1C    Lab Results   Component Value Date    TSH 1.270 02/15/2021     Lab Results   Component Value Date    VITD25 36 03/14/2021       Lab Results   Component Value Date    SEDRATE 25 (H) 10/14/2019     Lab Results   Component Value Date    CRP 0.04 06/05/2019       Lab Results   Component Value Date    VITD25 36 03/14/2021    CALCIUM 9.7 05/26/2021    MG 2.2 04/11/2021     Lab Results   Component Value Date     05/26/2021    K 4.9 05/26/2021     05/26/2021    CO2 28 05/26/2021    BUN 24 05/26/2021    CREATININE 0.76 05/26/2021    GLUCOSE 87 05/26/2021    CALCIUM 9.7 05/26/2021    PROT 6.9 04/10/2021    LABALBU 3.8 04/10/2021    BILITOT <0.2 (L) 04/10/2021    ALKPHOS 124 04/10/2021    AST 15 04/10/2021    ALT 9 (L) 04/10/2021         RADIOLOGY:       DEXA 5/24/2021  Left hip: -3.3, 0.538  Lumbar spine: -2.70, 0.718  Right 1/3 forearm: T score -5.7, 0.351    DEXA 5/22/2019  Left hip: -3.80  Right hip: -3.60  Lumbar spine: -3.40      Assessment and plan:  Osteoporosis, postmenopausal  - A 70year old  female diagnosed with osteoporosis by recent DEXA on 5/22/2019 with T score -3.80 at left femoral neck. Patient with history of right clavicular fracture 15 years ago, otherwise no other fractures. No family history of osteoporosis or fracture. Has not been on any prior treatment for osteoporosis. - review of records indicates patient did have fall with right hip fracture s/p surgery in march- pt previously not compliant with prolia injection- now taking consistently  - Continue Prolia 60 mg subcu q 6 months- checking BMP 2 weeks post injection  - Xray thoracic and lumbar spine with no compression fractures (12/2019)  - Continue calcium and vitamin D supplements  - Encouraged to participate in regular weight bearing exercises. - Discussed fall prevention  - Repeat DEXA 5/2023    Hypocalcemia  - during recent hospitalization, normalized on last evaluation. ? Medication related. - checking BMP 2 weeks post prolia injection       No follow-ups on file. Electronically signed by PHIL Jackson CNP on 7/21/2021 at 9:14 AM      Thank you for allowing me to participate in the care of this patient. Please call if there are any questions.

## 2021-07-27 ENCOUNTER — NURSE ONLY (OUTPATIENT)
Dept: LAB | Age: 81
End: 2021-07-27

## 2021-07-27 ENCOUNTER — HOSPITAL ENCOUNTER (OUTPATIENT)
Dept: NURSING | Age: 81
Discharge: HOME | End: 2021-07-27
Payer: MEDICARE

## 2021-07-27 VITALS
SYSTOLIC BLOOD PRESSURE: 127 MMHG | DIASTOLIC BLOOD PRESSURE: 74 MMHG | RESPIRATION RATE: 18 BRPM | HEART RATE: 86 BPM | BODY MASS INDEX: 19.8 KG/M2 | WEIGHT: 119 LBS | OXYGEN SATURATION: 96 % | TEMPERATURE: 97.9 F

## 2021-07-27 DIAGNOSIS — M81.0 AGE-RELATED OSTEOPOROSIS WITHOUT CURRENT PATHOLOGICAL FRACTURE: ICD-10-CM

## 2021-07-27 DIAGNOSIS — M81.0 AGE-RELATED OSTEOPOROSIS WITHOUT CURRENT PATHOLOGICAL FRACTURE: Primary | ICD-10-CM

## 2021-07-27 LAB
ANION GAP SERPL CALCULATED.3IONS-SCNC: 10 MEQ/L (ref 8–16)
BUN BLDV-MCNC: 22 MG/DL (ref 7–22)
CALCIUM SERPL-MCNC: 9.6 MG/DL (ref 8.5–10.5)
CHLORIDE BLD-SCNC: 101 MEQ/L (ref 98–111)
CO2: 28 MEQ/L (ref 23–33)
CREAT SERPL-MCNC: 0.9 MG/DL (ref 0.4–1.2)
GFR SERPL CREATININE-BSD FRML MDRD: 60 ML/MIN/1.73M2
GLUCOSE BLD-MCNC: 88 MG/DL (ref 70–108)
POTASSIUM SERPL-SCNC: 4.8 MEQ/L (ref 3.5–5.2)
SODIUM BLD-SCNC: 139 MEQ/L (ref 135–145)

## 2021-07-27 PROCEDURE — 6360000002 HC RX W HCPCS: Performed by: NURSE PRACTITIONER

## 2021-07-27 PROCEDURE — 96372 THER/PROPH/DIAG INJ SC/IM: CPT

## 2021-07-27 RX ORDER — SODIUM CHLORIDE 9 MG/ML
INJECTION, SOLUTION INTRAVENOUS CONTINUOUS
Status: CANCELLED | OUTPATIENT
Start: 2022-01-25

## 2021-07-27 RX ORDER — METHYLPREDNISOLONE SODIUM SUCCINATE 125 MG/2ML
125 INJECTION, POWDER, LYOPHILIZED, FOR SOLUTION INTRAMUSCULAR; INTRAVENOUS ONCE
Status: CANCELLED | OUTPATIENT
Start: 2022-01-25 | End: 2022-01-25

## 2021-07-27 RX ORDER — DIPHENHYDRAMINE HYDROCHLORIDE 50 MG/ML
50 INJECTION INTRAMUSCULAR; INTRAVENOUS ONCE
Status: CANCELLED | OUTPATIENT
Start: 2022-01-25 | End: 2022-01-25

## 2021-07-27 RX ADMIN — DENOSUMAB 60 MG: 60 INJECTION SUBCUTANEOUS at 10:27

## 2021-07-27 ASSESSMENT — PAIN - FUNCTIONAL ASSESSMENT: PAIN_FUNCTIONAL_ASSESSMENT: 0-10

## 2021-07-27 NOTE — PROGRESS NOTES
__m__ Safety:       (Environmental)   [place] to environment   Ensure ID band is correct and in place/ allergy band as needed   Assess for fall risk   Initiate fall precautions as applicable (fall band, side rails, etc.)   Call light within reach   Bed in low position/ wheels locked    _m__ Pain:        Assess pain level and characteristics   Administer analgesics as ordered   Assess effectiveness of pain management and report to MD as needed    __m__ Knowledge Deficit:   Assess baseline knowledge   Provide teaching at level of understanding   Provide teaching via preferred learning method   Evaluate teaching effectiveness    ___m_ Hemodynamic/Respiratory Status:       (Pre and Post Procedure Monitoring)   Assess/Monitor vital signs and LOC   Assess Baseline SpO2 prior to any sedation   Obtain weight/height   Assess vital signs/ LOC until patient meets discharge criteria   Monitor procedure site and notify MD of any issues    __

## 2021-07-28 ENCOUNTER — TELEPHONE (OUTPATIENT)
Dept: RHEUMATOLOGY | Age: 81
End: 2021-07-28

## 2021-07-28 NOTE — TELEPHONE ENCOUNTER
----- Message from PHIL Florence CNP sent at 7/27/2021  3:59 PM EDT -----  Kidneys and calcium level normal.

## 2021-08-31 ENCOUNTER — OFFICE VISIT (OUTPATIENT)
Dept: FAMILY MEDICINE CLINIC | Age: 81
End: 2021-08-31
Payer: MEDICARE

## 2021-08-31 VITALS
DIASTOLIC BLOOD PRESSURE: 72 MMHG | HEIGHT: 65 IN | BODY MASS INDEX: 20.46 KG/M2 | HEART RATE: 72 BPM | SYSTOLIC BLOOD PRESSURE: 110 MMHG | WEIGHT: 122.8 LBS | RESPIRATION RATE: 18 BRPM

## 2021-08-31 DIAGNOSIS — F43.21 SITUATIONAL DEPRESSION: ICD-10-CM

## 2021-08-31 DIAGNOSIS — R53.81 PHYSICAL DECONDITIONING: ICD-10-CM

## 2021-08-31 DIAGNOSIS — E78.2 MIXED HYPERLIPIDEMIA: ICD-10-CM

## 2021-08-31 DIAGNOSIS — Z95.0 PACEMAKER: ICD-10-CM

## 2021-08-31 DIAGNOSIS — M81.0 AGE-RELATED OSTEOPOROSIS WITHOUT CURRENT PATHOLOGICAL FRACTURE: ICD-10-CM

## 2021-08-31 DIAGNOSIS — K21.9 GASTROESOPHAGEAL REFLUX DISEASE WITHOUT ESOPHAGITIS: ICD-10-CM

## 2021-08-31 DIAGNOSIS — I48.91 ATRIAL FIBRILLATION WITH RVR (HCC): ICD-10-CM

## 2021-08-31 DIAGNOSIS — Z95.1 S/P CORONARY ARTERY BYPASS GRAFT X 1: ICD-10-CM

## 2021-08-31 DIAGNOSIS — Z00.00 ROUTINE GENERAL MEDICAL EXAMINATION AT A HEALTH CARE FACILITY: Primary | ICD-10-CM

## 2021-08-31 PROCEDURE — G8427 DOCREV CUR MEDS BY ELIG CLIN: HCPCS | Performed by: NURSE PRACTITIONER

## 2021-08-31 PROCEDURE — 4040F PNEUMOC VAC/ADMIN/RCVD: CPT | Performed by: NURSE PRACTITIONER

## 2021-08-31 PROCEDURE — G8420 CALC BMI NORM PARAMETERS: HCPCS | Performed by: NURSE PRACTITIONER

## 2021-08-31 PROCEDURE — G8399 PT W/DXA RESULTS DOCUMENT: HCPCS | Performed by: NURSE PRACTITIONER

## 2021-08-31 PROCEDURE — 99214 OFFICE O/P EST MOD 30 MIN: CPT | Performed by: NURSE PRACTITIONER

## 2021-08-31 PROCEDURE — 1123F ACP DISCUSS/DSCN MKR DOCD: CPT | Performed by: NURSE PRACTITIONER

## 2021-08-31 PROCEDURE — 1090F PRES/ABSN URINE INCON ASSESS: CPT | Performed by: NURSE PRACTITIONER

## 2021-08-31 PROCEDURE — G0439 PPPS, SUBSEQ VISIT: HCPCS | Performed by: NURSE PRACTITIONER

## 2021-08-31 PROCEDURE — 1036F TOBACCO NON-USER: CPT | Performed by: NURSE PRACTITIONER

## 2021-08-31 RX ORDER — BUPROPION HYDROCHLORIDE 150 MG/1
150 TABLET ORAL EVERY MORNING
Qty: 30 TABLET | Refills: 2 | Status: SHIPPED | OUTPATIENT
Start: 2021-08-31 | End: 2021-12-23

## 2021-08-31 RX ORDER — HYDROXYZINE HYDROCHLORIDE 25 MG/1
25 TABLET, FILM COATED ORAL NIGHTLY PRN
Qty: 30 TABLET | Refills: 11 | Status: SHIPPED | OUTPATIENT
Start: 2021-08-31 | End: 2021-09-30

## 2021-08-31 RX ORDER — HYDROXYZINE HYDROCHLORIDE 25 MG/1
1 TABLET, FILM COATED ORAL NIGHTLY PRN
COMMUNITY
Start: 2021-07-28

## 2021-08-31 ASSESSMENT — PATIENT HEALTH QUESTIONNAIRE - PHQ9
SUM OF ALL RESPONSES TO PHQ QUESTIONS 1-9: 0
SUM OF ALL RESPONSES TO PHQ QUESTIONS 1-9: 0
1. LITTLE INTEREST OR PLEASURE IN DOING THINGS: 0
SUM OF ALL RESPONSES TO PHQ QUESTIONS 1-9: 0
2. FEELING DOWN, DEPRESSED OR HOPELESS: 0
SUM OF ALL RESPONSES TO PHQ9 QUESTIONS 1 & 2: 0

## 2021-08-31 ASSESSMENT — ENCOUNTER SYMPTOMS
GASTROINTESTINAL NEGATIVE: 1
SHORTNESS OF BREATH: 0
BACK PAIN: 0
RESPIRATORY NEGATIVE: 1
ALLERGIC/IMMUNOLOGIC NEGATIVE: 1

## 2021-08-31 ASSESSMENT — LIFESTYLE VARIABLES: HOW OFTEN DO YOU HAVE A DRINK CONTAINING ALCOHOL: 0

## 2021-08-31 NOTE — PROGRESS NOTES
Subjective:      Patient ID: Tatiana Almazan 1940 is a 80 y.o. female here for evaluation. Chief Complaint   Patient presents with    Medicare AWV    3 Month Follow-Up    Medication Refill   [address] Maintenance     needs a pneumonia vaccine       Patient Active Problem List   Diagnosis    Atrial fibrillation with RVR ([facility] [address]    S/P coronary artery bypass graft x 1, 11/12/18. 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  S/P mitral valve repair    Situational depression    Hyperlipidemia    Coronary artery disease involving coronary bypass graft of native heart without angina pectoris    Risk for falls    Physical deconditioning    CAD in native artery    Severe malnutrition (HCC)    Post PTCA    Pacemaker    Age-related osteoporosis without current pathological fracture    Hypertension    Closed fracture of left distal radius    Orthostatic syncope    Moderate malnutrition (HCC)    Syncope and collapse    Closed subcapital fracture of femur, right, initial encounter ([facility] [address]    Closed fracture of right hip (HCC)    Hyponatremia    Hyperglycemia    High anion gap metabolic acidosis    Hx of coronary artery disease    HFrEF (heart failure with reduced ejection fraction) (HCC)    History of atrial fibrillation    Acute on chronic anemia    Acute blood loss anemia       [facility] - Dr. Merary Main - Dr. Eunice Wilkins CNP  [facility] - Dr. Alexandra Abdullahi    Sister n law with patient. Patient lack of motivations. No desire to do anything. Stays at home and naps. Complains of depressed mood. Depressed since passing of her . Daughter comes and eats lunch with her most days. Sleep poor due to daytime napping. On Zoloft 100 mg. Hydroxyzine 25 mg HS for sleep. Atrial fibrillation treated with ablation 2 years ago and pacemaker insertion.   She has no chest pain with exertion and continues to do well status post coronary artery bypass graft times 1 November 2018. On atenolol, aspirin, Plavix, statin. Follows with [facility]. Last ECHO 2021. Carotids 2021      Vitals:    08/31/21 1122   BP: 110/72   Pulse: 72   Resp: 18        BP wnl. On Atenolol and Lasix. On Crestor 10 mg.      Lab Results   Component Value Date    LABA1C 5.2 03/14/2021     No results found for: EAG    No components found for: CHLPL  Lab Results   Component Value Date    TRIG 82 12/30/2020    TRIG 112 10/14/2019    TRIG 71 04/11/2019     Lab Results   Component Value Date    HDL 71 12/30/2020    HDL 81 10/14/2019    HDL 79 04/11/2019     Lab Results   Component Value Date    LDLCALC 87 12/30/2020    LDLCALC 139 10/14/2019    LDLCALC 86 04/11/2019     No results found for: LABVLDL      Chemistry        Component Value Date/Time     07/27/2021 1059    K 4.8 07/27/2021 1059    K 5.4 (H) 04/10/2021 0738     07/27/2021 1059    CO2 28 07/27/2021 1059    BUN 22 07/27/2021 1059    CREATININE 0.9 07/27/2021 1059        Component Value Date/Time    CALCIUM 9.6 07/27/2021 1059    ALKPHOS 124 04/10/2021 0738    AST 15 04/10/2021 0738    ALT 9 (L) 04/10/2021 0738    BILITOT <0.2 (L) 04/10/2021 0738            Lab Results   Component Value Date    TSH 1.270 02/15/2021       Lab Results   Component Value Date    WBC 5.8 05/26/2021    HGB 10.6 (L) 05/26/2021    HCT 30.9 (L) 05/26/2021    MCV 93 05/26/2021     05/26/2021         Health Maintenance   Topic Date Due    COVID-19 Vaccine (1) Never done    DTaP/Tdap/Td vaccine (1 - Tdap) Never done    Shingles Vaccine (1 of 2) Never done    Pneumococcal 65+ years Vaccine (1 of 1 - PPSV23) Never done   [employer] Visit (AWV)  Never done    Flu vaccine (1) 09/01/2021    Lipid screen  12/30/2021    Potassium monitoring  07/27/2022    Creatinine monitoring  07/27/2022    DEXA (modify frequency per FRAX score)  Completed    Hepatitis C screen  Completed    Hepatitis A vaccine  Aged Out    Hepatitis B vaccine  Aged Out    Hib vaccine  Aged Out    Meningococcal (ACWY) vaccine  Aged Out         There is no immunization history on file for this patient. Review of Systems   Constitutional: Negative for activity change, appetite change and unexpected weight change. HENT: Negative. Respiratory: Negative. Negative for shortness of breath. Cardiovascular: Negative. Negative for chest pain, palpitations and leg swelling. Gastrointestinal: Negative. Musculoskeletal: Positive for arthralgias and gait problem. Negative for back pain. See HPI. Skin: Negative. Allergic/Immunologic: Negative. Neurological: Negative for dizziness, tremors, light-headedness and headaches. Hematological: Negative. Psychiatric/Behavioral: Positive for dysphoric mood and sleep disturbance. Patient has longstanding insomnia. All other systems reviewed and are negative. Objective:   Physical Exam  Vitals and nursing note reviewed. Exam conducted with a chaperone present. Constitutional:       Appearance: She is normal weight. HENT:      Head: Normocephalic. Right Ear: Tympanic membrane, ear canal and external ear normal.      Left Ear: Tympanic membrane, ear canal and external ear normal.      Nose: Nose normal.      Mouth/Throat:      Mouth: Mucous membranes are moist.      Pharynx: Oropharynx is clear. Eyes:      Conjunctiva/sclera: Conjunctivae normal.      Pupils: Pupils are equal, round, and reactive to light. Cardiovascular:      Rate and Rhythm: Normal rate and regular rhythm. Pulses: Normal pulses. Heart sounds: S1 normal and S2 normal. Murmur heard. Systolic murmur is present with a grade of 1/6. Pulmonary:      Effort: Pulmonary effort is normal.      Breath sounds: Normal breath sounds. Abdominal:      General: Abdomen is flat. Bowel sounds are normal.   Musculoskeletal:         General: No swelling. Right lower leg: No edema. Left lower leg: No edema.    Skin:     General: Skin is warm and dry. Capillary Refill: Capillary refill takes less than 2 seconds. Neurological:      General: No focal deficit present. Mental Status: She is alert and oriented to person, place, and time. Psychiatric:         Mood and Affect: Mood is depressed. Behavior: Behavior is slowed and withdrawn. Assessment:       Diagnosis Orders   1. Routine general medical examination at a health care facility     2. Situational depression  hydrOXYzine (ATARAX) 25 MG tablet    buPROPion (WELLBUTRIN XL) 150 MG extended release tablet   3. Atrial fibrillation with RVR (HCC)  CBC    Basic Metabolic Panel   4. S/P coronary artery bypass graft x 1, 11/12/18. 5. Pacemaker     6. Age-related osteoporosis without current pathological fracture     7. Mixed hyperlipidemia     8. Physical deconditioning     9.  Gastroesophageal reflux disease without esophagitis             Plan:      Chronic conditions stable  Labs reviewed  Refills as above   - add on Wellbutrin for MDD  Labs as above to monitor in 3 months  Follow up with [facility]  Immunizations discussed - denied at this time  RTO in 3 months      Current Outpatient Medications   Medication Sig Dispense Refill    hydrOXYzine (ATARAX) 25 MG tablet Take 1 tablet by mouth nightly as needed (sleep) 30 tablet 11    hydrOXYzine (ATARAX) 25 MG tablet Take 1 tablet by mouth nightly as needed      buPROPion (WELLBUTRIN XL) 150 MG extended release tablet Take 1 tablet by mouth every morning 30 tablet 2    Cholecalciferol (VITAMIN D3 PO) Take by mouth      clopidogrel (PLAVIX) 75 MG tablet Take 1 tablet by mouth daily 30 tablet 11    furosemide (LASIX) 20 MG tablet Take 1 tablet by mouth daily 30 tablet 11    sertraline (ZOLOFT) 100 MG tablet Take 1 tablet by mouth daily 30 tablet 11    rosuvastatin (CRESTOR) 10 MG tablet Take 1 tablet by mouth daily 30 tablet 11    potassium chloride (KLOR-CON M) 20 MEQ extended release tablet Take 1 tablet by mouth daily 30 tablet 11    pantoprazole (PROTONIX) 40 MG tablet Take 1 tablet by mouth daily (with breakfast) 60 tablet 5    atenolol (TENORMIN) 25 MG tablet Take 1 tablet by mouth daily Hold for SBP less than 100 mmHg, if HR less than 55 bpm 30 tablet 11    Calcium Carbonate-Vit D-Min (CALCIUM 1200 PO) Take 1 tablet by mouth daily      aspirin 81 MG tablet Take 81 mg by mouth daily      acetaminophen (TYLENOL) 325 MG tablet Take 1 tablet by mouth every 4 hours as needed for Pain (Patient taking differently: Take 325 mg by mouth every 4 hours as needed for Pain Takes 1-2 tabs every 4 hours prn pain) 120 tablet 3     No current facility-administered medications for this visit.

## 2021-08-31 NOTE — PROGRESS NOTES
Medicare Annual Wellness Visit  Name: Percy Poon Date: 2021   MRN: [id number] Sex: Female   Age: 80 y.o. Ethnicity: Non- / Non    : 1940 Race: White (non-)      Nadja CULVER [place] is here for Medicare AWV, 3 Month Follow-Up, and Medication Refill    Screenings for behavioral, psychosocial and functional/safety risks, and cognitive dysfunction are all negative except as indicated below. These results, as well as other patient data from the [address] form, are documented in Flowsheets linked to this Encounter. No Known Allergies    Prior to Visit Medications    Medication Sig Taking?  Authorizing Provider   hydrOXYzine (ATARAX) 25 MG tablet Take 1 tablet by mouth nightly as needed Yes Historical Provider, MD   Cholecalciferol (VITAMIN D3 PO) Take by mouth Yes Historical Provider, MD   clopidogrel (PLAVIX) 75 MG tablet Take 1 tablet by mouth daily Yes PHIL Mcnamara CNP   furosemide (LASIX) 20 MG tablet Take 1 tablet by mouth daily Yes PHIL Mcnamara CNP   sertraline (ZOLOFT) 100 MG tablet Take 1 tablet by mouth daily Yes PHIL Mcnamara CNP   rosuvastatin (CRESTOR) 10 MG tablet Take 1 tablet by mouth daily Yes PHIL Mcnamara CNP   potassium chloride (KLOR-CON M) 20 MEQ extended release tablet Take 1 tablet by mouth daily Yes PHIL Mcnamara CNP   pantoprazole (PROTONIX) 40 MG tablet Take 1 tablet by mouth daily (with breakfast) Yes PHIL Mcnamara CNP   atenolol (TENORMIN) 25 MG tablet Take 1 tablet by mouth daily Hold for SBP less than 100 mmHg, if HR less than 55 bpm Yes PHIL Mcnamara CNP   Calcium Carbonate-Vit D-Min (CALCIUM 1200 PO) Take 1 tablet by mouth daily Yes Historical Provider, MD   aspirin 81 MG tablet Take 81 mg by mouth daily Yes Historical Provider, MD   acetaminophen (TYLENOL) 325 MG tablet Take 1 tablet by mouth every 4 hours as needed for Pain  Patient taking differently: Take 325 mg by mouth every 4 hours as needed for Pain Takes 1-2 tabs every 4 hours prn pain Yes Laurie Singh PA-C   Coenzyme Q10 (CO Q-10) 200 MG CAPS Take by mouth  Patient not taking: Reported on 7/21/2021  Historical Provider, MD       Past Medical History:   Diagnosis Date    Atrial fibrillation ([facility] [address]     CAD (coronary artery disease)     Congestive heart failure (CHF) ([facility] [address]     Hyperlipidemia     Hypertension        Past Surgical History:   Procedure Laterality Date    ATRIAL ABLATION SURGERY      CARDIAC CATHETERIZATION      CARDIAC VALVE REPLACEMENT      CORONARY ANGIOPLASTY WITH STENT PLACEMENT  02/28/2019    CORONARY ARTERY BYPASS GRAFT      x1    HYSTERECTOMY      JOINT REPLACEMENT      MITRAL VALVE REPLACEMENT      PACEMAKER PLACEMENT  05/2019    [place] OFFICE/OUTPT VISIT,PROCEDURE ONLY N/A 11/12/2018    CABG X3, MITRAL VALVE REPAIR, MAZE, AND TRICUSPID VALVE REPAIR, DEANNA performed by Jorge Toribio MD at [address] [facility] 3/14/2021    HIP TOTAL ARTHROPLASTY performed by Marvin Valentine MD at [address] [place] [address]  4/12/2021    EGD BIOPSY performed by Audrey Ball MD at [facility]    UPPER GASTROINTESTINAL ENDOSCOPY  4/12/2021    EGD CONTROL HEMORRHAGE performed by Audrey Ball MD at [facility]       Family History   Problem Relation Age of Onset    Other Mother         alzheimers    Heart Attack Father     Heart Attack Brother        CareTeam (Including outside providers/suppliers regularly involved in providing care):   Patient Care Team:  PHIL Elliott CNP as PCP - General (Family Nurse Practitioner)  PHIL Elliott CNP as PCP - [facility] Empaneled Provider    Wt Readings from Last 3 Encounters:   08/31/21 122 lb 12.8 oz (55.7 kg)   07/27/21 119 lb (54 kg)   07/21/21 119 lb 6.4 oz (54.2 kg)     Vitals:    08/31/21 1122   BP: 110/72   Site: Left Upper Arm   Position: Sitting   Cuff your family notice any trouble with your hearing that hasn't been managed with hearing aids?: No  Do you have difficulty driving, watching TV, or doing any of your daily activities because of your eyesight?: (!) Yes  Have you had an eye exam within the past year?: (!) No  Hearing/Vision Interventions:  · Vision concerns:  patient encouraged to make appointment with his/her eye specialist     ADL:  ADLs  In the past 7 days, did you need help from others to perform any of the following everyday activities? Eating, dressing, grooming, bathing, toileting, or walking/balance?: None  In the past 7 days, did you need help from others to take care of any of the following? Laundry, housekeeping, banking/finances, shopping, telephone use, food preparation, transportation, or taking medications?: (!) Banking/Finances, Shopping, Transportation  ADL Interventions:  · Patient declines any further evaluation/treatment for this issue    Personalized Preventive Plan   Current Health Maintenance Status    There is no immunization history on file for this patient. Health Maintenance   Topic Date Due    COVID-19 Vaccine (1) Never done    DTaP/Tdap/Td vaccine (1 - Tdap) Never done    Shingles Vaccine (1 of 2) Never done    Pneumococcal 65+ years Vaccine (1 of 1 - PPSV23) Never done   [employer] Visit (AWV)  Never done    Flu vaccine (1) 09/01/2021    Lipid screen  12/30/2021    Potassium monitoring  07/27/2022    Creatinine monitoring  07/27/2022    DEXA (modify frequency per FRAX score)  Completed    Hepatitis C screen  Completed    Hepatitis A vaccine  Aged Out    Hepatitis B vaccine  Aged Out    Hib vaccine  Aged Out    Meningococcal (ACWY) vaccine  Aged Out     Recommendations for Particle Code Due: see orders and patient instructions/AVS.  .   Recommended screening schedule for the next 5-10 years is provided to the patient in written form: see Patient Instructions/AVS.    Venice Tucker was seen today for medicare aw, 3 month follow-up and medication refill.     Diagnoses and all orders for this visit:    Situational depression

## 2021-08-31 NOTE — PATIENT INSTRUCTIONS
You may receive a survey regarding the care you received during your visit. Your input is valuable to us. We encourage you to complete and return your survey. We hope you will choose us in the future for your healthcare needs. Personalized Preventive Plan for Stuart Almazan - 8/31/2021  Medicare offers a range of preventive health benefits. Some of the tests and screenings are paid in full while other may be subject to a deductible, co-insurance, and/or copay. Some of these benefits include a comprehensive review of your medical history including lifestyle, illnesses that may run in your family, and various assessments and screenings as appropriate. After reviewing your medical record and screening and assessments performed today your provider may have ordered immunizations, labs, imaging, and/or referrals for you. A list of these orders (if applicable) as well as your Preventive Care list are included within your After Visit Summary for your review. Other Preventive Recommendations:    · A preventive eye exam performed by an eye specialist is recommended every 1-2 years to screen for glaucoma; cataracts, macular degeneration, and other eye disorders. · A preventive dental visit is recommended every 6 months. · Try to get at least 150 minutes of exercise per week or 10,000 steps per day on a pedometer . · Order or download the FREE \"Exercise & Physical Activity: Your Everyday Guide\" from [employer]. Call 5-225.914.1985 or search [employer] online. · You need 3027-9055 mg of calcium and 4273-0937 IU of vitamin D per day. It is possible to meet your calcium requirement with diet alone, but a vitamin D supplement is usually necessary to meet this goal.  · When exposed to the sun, use a sunscreen that protects against both UVA and UVB radiation with an SPF of 30 or greater.  Reapply every 2 to 3 hours or after sweating, drying off with a towel, or swimming. · Always wear a seat belt when traveling in a car. Always wear a helmet when riding a bicycle or motorcycle.

## 2022-01-21 ENCOUNTER — APPOINTMENT (OUTPATIENT)
Dept: GENERAL RADIOLOGY | Age: 82
DRG: 291 | End: 2022-01-21
Payer: MEDICARE

## 2022-01-21 ENCOUNTER — APPOINTMENT (OUTPATIENT)
Dept: CT IMAGING | Age: 82
DRG: 291 | End: 2022-01-21
Payer: MEDICARE

## 2022-01-21 ENCOUNTER — HOSPITAL ENCOUNTER (INPATIENT)
Age: 82
LOS: 4 days | Discharge: NURSING, REHAB OR LONG-TERM CARE | DRG: 291 | End: 2022-01-25
Attending: EMERGENCY MEDICINE | Admitting: INTERNAL MEDICINE
Payer: MEDICARE

## 2022-01-21 DIAGNOSIS — J96.01 ACUTE RESPIRATORY FAILURE WITH HYPOXIA (HCC): ICD-10-CM

## 2022-01-21 DIAGNOSIS — R93.89 ABNORMAL CT OF THE CHEST: ICD-10-CM

## 2022-01-21 DIAGNOSIS — I50.9 ACUTE CONGESTIVE HEART FAILURE, UNSPECIFIED HEART FAILURE TYPE (HCC): Primary | ICD-10-CM

## 2022-01-21 LAB
ANION GAP SERPL CALCULATED.3IONS-SCNC: 15 MEQ/L (ref 8–16)
BACTERIA: ABNORMAL /HPF
BASOPHILS # BLD: 0.2 %
BASOPHILS ABSOLUTE: 0 THOU/MM3 (ref 0–0.1)
BILIRUBIN URINE: NEGATIVE
BLOOD, URINE: ABNORMAL
BUN BLDV-MCNC: 28 MG/DL (ref 7–22)
CALCIUM SERPL-MCNC: 9.1 MG/DL (ref 8.5–10.5)
CASTS 2: ABNORMAL /LPF
CASTS UA: ABNORMAL /LPF
CHARACTER, URINE: ABNORMAL
CHLORIDE BLD-SCNC: 99 MEQ/L (ref 98–111)
CO2: 19 MEQ/L (ref 23–33)
COLOR: YELLOW
CREAT SERPL-MCNC: 1.2 MG/DL (ref 0.4–1.2)
CRYSTALS, UA: ABNORMAL
EKG ATRIAL RATE: 78 BPM
EKG ATRIAL RATE: 80 BPM
EKG Q-T INTERVAL: 432 MS
EKG Q-T INTERVAL: 476 MS
EKG QRS DURATION: 140 MS
EKG QRS DURATION: 140 MS
EKG QTC CALCULATION (BAZETT): 498 MS
EKG QTC CALCULATION (BAZETT): 517 MS
EKG R AXIS: 74 DEGREES
EKG R AXIS: 77 DEGREES
EKG T AXIS: 47 DEGREES
EKG T AXIS: 77 DEGREES
EKG VENTRICULAR RATE: 71 BPM
EKG VENTRICULAR RATE: 80 BPM
EOSINOPHIL # BLD: 0 %
EOSINOPHILS ABSOLUTE: 0 THOU/MM3 (ref 0–0.4)
EPITHELIAL CELLS, UA: ABNORMAL /HPF
ERYTHROCYTE [DISTWIDTH] IN BLOOD BY AUTOMATED COUNT: 13.2 % (ref 11.5–14.5)
ERYTHROCYTE [DISTWIDTH] IN BLOOD BY AUTOMATED COUNT: 42.3 FL (ref 35–45)
GFR SERPL CREATININE-BSD FRML MDRD: 43 ML/MIN/1.73M2
GLUCOSE BLD-MCNC: 167 MG/DL (ref 70–108)
GLUCOSE URINE: NEGATIVE MG/DL
HCT VFR BLD CALC: 32.1 % (ref 37–47)
HEMOGLOBIN: 10.6 GM/DL (ref 12–16)
IMMATURE GRANS (ABS): 0.04 THOU/MM3 (ref 0–0.07)
IMMATURE GRANULOCYTES: 0.4 %
KETONES, URINE: 15
LEUKOCYTE ESTERASE, URINE: ABNORMAL
LYMPHOCYTES # BLD: 4.1 %
LYMPHOCYTES ABSOLUTE: 0.4 THOU/MM3 (ref 1–4.8)
MCH RBC QN AUTO: 28.9 PG (ref 26–33)
MCHC RBC AUTO-ENTMCNC: 33 GM/DL (ref 32.2–35.5)
MCV RBC AUTO: 87.5 FL (ref 81–99)
MISCELLANEOUS 2: ABNORMAL
MONOCYTES # BLD: 5.4 %
MONOCYTES ABSOLUTE: 0.5 THOU/MM3 (ref 0.4–1.3)
NITRITE, URINE: NEGATIVE
NUCLEATED RED BLOOD CELLS: 0 /100 WBC
OSMOLALITY CALCULATION: 275.7 MOSMOL/KG (ref 275–300)
PH UA: 5 (ref 5–9)
PLATELET # BLD: 180 THOU/MM3 (ref 130–400)
PMV BLD AUTO: 10.6 FL (ref 9.4–12.4)
POTASSIUM REFLEX MAGNESIUM: 4.6 MEQ/L (ref 3.5–5.2)
PRO-BNP: ABNORMAL PG/ML (ref 0–1800)
PROTEIN UA: 30
RBC # BLD: 3.67 MILL/MM3 (ref 4.2–5.4)
RBC URINE: ABNORMAL /HPF
RENAL EPITHELIAL, UA: ABNORMAL
SARS-COV-2, NAAT: NOT  DETECTED
SEG NEUTROPHILS: 89.9 %
SEGMENTED NEUTROPHILS ABSOLUTE COUNT: 8.2 THOU/MM3 (ref 1.8–7.7)
SODIUM BLD-SCNC: 133 MEQ/L (ref 135–145)
SPECIFIC GRAVITY, URINE: 1.02 (ref 1–1.03)
TROPONIN T: < 0.01 NG/ML
TROPONIN T: < 0.01 NG/ML
UROBILINOGEN, URINE: 0.2 EU/DL (ref 0–1)
WBC # BLD: 9.1 THOU/MM3 (ref 4.8–10.8)
WBC UA: ABNORMAL /HPF
YEAST: ABNORMAL

## 2022-01-21 PROCEDURE — 72125 CT NECK SPINE W/O DYE: CPT

## 2022-01-21 PROCEDURE — 87086 URINE CULTURE/COLONY COUNT: CPT

## 2022-01-21 PROCEDURE — 74177 CT ABD & PELVIS W/CONTRAST: CPT

## 2022-01-21 PROCEDURE — 6360000004 HC RX CONTRAST MEDICATION: Performed by: STUDENT IN AN ORGANIZED HEALTH CARE EDUCATION/TRAINING PROGRAM

## 2022-01-21 PROCEDURE — 36415 COLL VENOUS BLD VENIPUNCTURE: CPT

## 2022-01-21 PROCEDURE — 70450 CT HEAD/BRAIN W/O DYE: CPT

## 2022-01-21 PROCEDURE — 83880 ASSAY OF NATRIURETIC PEPTIDE: CPT

## 2022-01-21 PROCEDURE — 6360000002 HC RX W HCPCS: Performed by: REGISTERED NURSE

## 2022-01-21 PROCEDURE — 87077 CULTURE AEROBIC IDENTIFY: CPT

## 2022-01-21 PROCEDURE — 85025 COMPLETE CBC W/AUTO DIFF WBC: CPT

## 2022-01-21 PROCEDURE — 73502 X-RAY EXAM HIP UNI 2-3 VIEWS: CPT

## 2022-01-21 PROCEDURE — 6370000000 HC RX 637 (ALT 250 FOR IP): Performed by: STUDENT IN AN ORGANIZED HEALTH CARE EDUCATION/TRAINING PROGRAM

## 2022-01-21 PROCEDURE — 87635 SARS-COV-2 COVID-19 AMP PRB: CPT

## 2022-01-21 PROCEDURE — 99222 1ST HOSP IP/OBS MODERATE 55: CPT | Performed by: INTERNAL MEDICINE

## 2022-01-21 PROCEDURE — 71045 X-RAY EXAM CHEST 1 VIEW: CPT

## 2022-01-21 PROCEDURE — 99223 1ST HOSP IP/OBS HIGH 75: CPT | Performed by: SOCIAL WORKER

## 2022-01-21 PROCEDURE — 99285 EMERGENCY DEPT VISIT HI MDM: CPT

## 2022-01-21 PROCEDURE — 6370000000 HC RX 637 (ALT 250 FOR IP): Performed by: INTERNAL MEDICINE

## 2022-01-21 PROCEDURE — 93010 ELECTROCARDIOGRAM REPORT: CPT | Performed by: NUCLEAR MEDICINE

## 2022-01-21 PROCEDURE — 80048 BASIC METABOLIC PNL TOTAL CA: CPT

## 2022-01-21 PROCEDURE — 1200000003 HC TELEMETRY R&B

## 2022-01-21 PROCEDURE — 87186 SC STD MICRODIL/AGAR DIL: CPT

## 2022-01-21 PROCEDURE — 2580000003 HC RX 258: Performed by: REGISTERED NURSE

## 2022-01-21 PROCEDURE — 6370000000 HC RX 637 (ALT 250 FOR IP): Performed by: REGISTERED NURSE

## 2022-01-21 PROCEDURE — 81001 URINALYSIS AUTO W/SCOPE: CPT

## 2022-01-21 PROCEDURE — 84484 ASSAY OF TROPONIN QUANT: CPT

## 2022-01-21 PROCEDURE — 93005 ELECTROCARDIOGRAM TRACING: CPT | Performed by: STUDENT IN AN ORGANIZED HEALTH CARE EDUCATION/TRAINING PROGRAM

## 2022-01-21 PROCEDURE — 71275 CT ANGIOGRAPHY CHEST: CPT

## 2022-01-21 RX ORDER — ONDANSETRON 2 MG/ML
4 INJECTION INTRAMUSCULAR; INTRAVENOUS EVERY 6 HOURS PRN
Status: DISCONTINUED | OUTPATIENT
Start: 2022-01-21 | End: 2022-01-25 | Stop reason: HOSPADM

## 2022-01-21 RX ORDER — ASPIRIN 81 MG/1
81 TABLET ORAL DAILY
Status: DISCONTINUED | OUTPATIENT
Start: 2022-01-21 | End: 2022-01-25 | Stop reason: HOSPADM

## 2022-01-21 RX ORDER — IPRATROPIUM BROMIDE AND ALBUTEROL SULFATE 2.5; .5 MG/3ML; MG/3ML
1 SOLUTION RESPIRATORY (INHALATION) EVERY 4 HOURS PRN
Status: DISCONTINUED | OUTPATIENT
Start: 2022-01-21 | End: 2022-01-25 | Stop reason: HOSPADM

## 2022-01-21 RX ORDER — HYDROXYZINE HYDROCHLORIDE 25 MG/1
25 TABLET, FILM COATED ORAL NIGHTLY PRN
Status: DISCONTINUED | OUTPATIENT
Start: 2022-01-21 | End: 2022-01-25 | Stop reason: HOSPADM

## 2022-01-21 RX ORDER — SODIUM CHLORIDE 0.9 % (FLUSH) 0.9 %
5-40 SYRINGE (ML) INJECTION EVERY 12 HOURS SCHEDULED
Status: DISCONTINUED | OUTPATIENT
Start: 2022-01-21 | End: 2022-01-25 | Stop reason: HOSPADM

## 2022-01-21 RX ORDER — ROSUVASTATIN CALCIUM 10 MG/1
10 TABLET, COATED ORAL DAILY
Status: DISCONTINUED | OUTPATIENT
Start: 2022-01-21 | End: 2022-01-25 | Stop reason: HOSPADM

## 2022-01-21 RX ORDER — ACETAMINOPHEN 325 MG/1
650 TABLET ORAL EVERY 6 HOURS PRN
Status: DISCONTINUED | OUTPATIENT
Start: 2022-01-21 | End: 2022-01-25 | Stop reason: HOSPADM

## 2022-01-21 RX ORDER — BUPROPION HYDROCHLORIDE 150 MG/1
150 TABLET ORAL DAILY
Status: DISCONTINUED | OUTPATIENT
Start: 2022-01-21 | End: 2022-01-25 | Stop reason: HOSPADM

## 2022-01-21 RX ORDER — MIDODRINE HYDROCHLORIDE 2.5 MG/1
2.5 TABLET ORAL
Status: DISCONTINUED | OUTPATIENT
Start: 2022-01-22 | End: 2022-01-25 | Stop reason: HOSPADM

## 2022-01-21 RX ORDER — PANTOPRAZOLE SODIUM 40 MG/1
40 TABLET, DELAYED RELEASE ORAL
Status: DISCONTINUED | OUTPATIENT
Start: 2022-01-22 | End: 2022-01-25 | Stop reason: HOSPADM

## 2022-01-21 RX ORDER — SODIUM CHLORIDE 0.9 % (FLUSH) 0.9 %
5-40 SYRINGE (ML) INJECTION PRN
Status: DISCONTINUED | OUTPATIENT
Start: 2022-01-21 | End: 2022-01-25 | Stop reason: HOSPADM

## 2022-01-21 RX ORDER — POTASSIUM CHLORIDE 750 MG/1
10 TABLET, FILM COATED, EXTENDED RELEASE ORAL 3 TIMES DAILY
Status: DISCONTINUED | OUTPATIENT
Start: 2022-01-21 | End: 2022-01-25 | Stop reason: HOSPADM

## 2022-01-21 RX ORDER — ACETAMINOPHEN 650 MG/1
650 SUPPOSITORY RECTAL EVERY 6 HOURS PRN
Status: DISCONTINUED | OUTPATIENT
Start: 2022-01-21 | End: 2022-01-25 | Stop reason: HOSPADM

## 2022-01-21 RX ORDER — PROMETHAZINE HYDROCHLORIDE 25 MG/1
12.5 TABLET ORAL EVERY 6 HOURS PRN
Status: DISCONTINUED | OUTPATIENT
Start: 2022-01-21 | End: 2022-01-25 | Stop reason: HOSPADM

## 2022-01-21 RX ORDER — FUROSEMIDE 10 MG/ML
20 INJECTION INTRAMUSCULAR; INTRAVENOUS 3 TIMES DAILY
Status: DISCONTINUED | OUTPATIENT
Start: 2022-01-21 | End: 2022-01-24

## 2022-01-21 RX ORDER — SODIUM CHLORIDE 9 MG/ML
25 INJECTION, SOLUTION INTRAVENOUS PRN
Status: DISCONTINUED | OUTPATIENT
Start: 2022-01-21 | End: 2022-01-25 | Stop reason: HOSPADM

## 2022-01-21 RX ORDER — ACETAMINOPHEN 500 MG
1000 TABLET ORAL ONCE
Status: COMPLETED | OUTPATIENT
Start: 2022-01-21 | End: 2022-01-21

## 2022-01-21 RX ORDER — ATENOLOL 25 MG/1
25 TABLET ORAL DAILY
Status: DISCONTINUED | OUTPATIENT
Start: 2022-01-21 | End: 2022-01-25 | Stop reason: HOSPADM

## 2022-01-21 RX ORDER — CLOPIDOGREL BISULFATE 75 MG/1
75 TABLET ORAL DAILY
Status: DISCONTINUED | OUTPATIENT
Start: 2022-01-21 | End: 2022-01-25 | Stop reason: HOSPADM

## 2022-01-21 RX ADMIN — ROSUVASTATIN 10 MG: 10 TABLET, FILM COATED ORAL at 17:46

## 2022-01-21 RX ADMIN — ENOXAPARIN SODIUM 30 MG: 100 INJECTION SUBCUTANEOUS at 17:49

## 2022-01-21 RX ADMIN — IOPAMIDOL 80 ML: 755 INJECTION, SOLUTION INTRAVENOUS at 13:48

## 2022-01-21 RX ADMIN — FUROSEMIDE 20 MG: 10 INJECTION, SOLUTION INTRAMUSCULAR; INTRAVENOUS at 20:10

## 2022-01-21 RX ADMIN — BUPROPION HYDROCHLORIDE 150 MG: 150 TABLET, EXTENDED RELEASE ORAL at 17:46

## 2022-01-21 RX ADMIN — ONDANSETRON 4 MG: 2 INJECTION INTRAMUSCULAR; INTRAVENOUS at 20:10

## 2022-01-21 RX ADMIN — FUROSEMIDE 20 MG: 10 INJECTION, SOLUTION INTRAMUSCULAR; INTRAVENOUS at 17:44

## 2022-01-21 RX ADMIN — CLOPIDOGREL BISULFATE 75 MG: 75 TABLET, FILM COATED ORAL at 17:49

## 2022-01-21 RX ADMIN — SODIUM CHLORIDE, PRESERVATIVE FREE 10 ML: 5 INJECTION INTRAVENOUS at 20:10

## 2022-01-21 RX ADMIN — POTASSIUM CHLORIDE 10 MEQ: 750 TABLET, EXTENDED RELEASE ORAL at 17:46

## 2022-01-21 RX ADMIN — CEFTRIAXONE SODIUM 1000 MG: 1 INJECTION, POWDER, FOR SOLUTION INTRAMUSCULAR; INTRAVENOUS at 17:45

## 2022-01-21 RX ADMIN — ATENOLOL 25 MG: 25 TABLET ORAL at 17:46

## 2022-01-21 RX ADMIN — POTASSIUM CHLORIDE 10 MEQ: 750 TABLET, EXTENDED RELEASE ORAL at 20:54

## 2022-01-21 RX ADMIN — ASPIRIN 81 MG: 81 TABLET, COATED ORAL at 17:49

## 2022-01-21 RX ADMIN — ACETAMINOPHEN 1000 MG: 500 TABLET ORAL at 12:42

## 2022-01-21 ASSESSMENT — PAIN SCALES - GENERAL
PAINLEVEL_OUTOF10: 9
PAINLEVEL_OUTOF10: 4
PAINLEVEL_OUTOF10: 6

## 2022-01-21 ASSESSMENT — PAIN DESCRIPTION - ONSET: ONSET: ON-GOING

## 2022-01-21 ASSESSMENT — PAIN DESCRIPTION - PAIN TYPE
TYPE: ACUTE PAIN

## 2022-01-21 ASSESSMENT — ENCOUNTER SYMPTOMS
RHINORRHEA: 0
TROUBLE SWALLOWING: 0
COUGH: 0
PHOTOPHOBIA: 0
BACK PAIN: 0
COLOR CHANGE: 0
SINUS PAIN: 0
DIARRHEA: 0
EYE REDNESS: 0
SORE THROAT: 0
VOMITING: 0
SHORTNESS OF BREATH: 0
NAUSEA: 0
ABDOMINAL PAIN: 0

## 2022-01-21 ASSESSMENT — PAIN DESCRIPTION - ORIENTATION
ORIENTATION: RIGHT
ORIENTATION: RIGHT

## 2022-01-21 ASSESSMENT — PAIN DESCRIPTION - LOCATION
LOCATION: RIB CAGE
LOCATION: RIB CAGE

## 2022-01-21 ASSESSMENT — PAIN DESCRIPTION - PROGRESSION: CLINICAL_PROGRESSION: NOT CHANGED

## 2022-01-21 ASSESSMENT — PAIN - FUNCTIONAL ASSESSMENT: PAIN_FUNCTIONAL_ASSESSMENT: PREVENTS OR INTERFERES SOME ACTIVE ACTIVITIES AND ADLS

## 2022-01-21 ASSESSMENT — PAIN DESCRIPTION - FREQUENCY: FREQUENCY: CONTINUOUS

## 2022-01-21 ASSESSMENT — PAIN DESCRIPTION - DESCRIPTORS: DESCRIPTORS: ACHING

## 2022-01-21 NOTE — ED PROVIDER NOTES
[place] ENCOUNTER          Pt Name: Migue Noel  MRN: [id number]  [place] 1940  Date of evaluation: 1/21/2022  Treating Resident Physician: Toni Van MD  Supervising Physician: Dr Ade Whiting       Chief Complaint   Patient presents with    Shortness of Breath    Rib Injury     History obtained from the patient. HISTORY OF PRESENT ILLNESS    FIDELINA Almazan is a 80 y.o. female with past medical history of atrial fibrillation, CAD, CHF, hypertension, hyperlipidemia who presents to the emergency department for evaluation of right-sided rib pain shortness of breath after multiple falls that occurred today. Patient states she has had some dizziness described as lightheadedness and occasional room spinning of the last year has been worsening the last few months. She had multiple falls lately getting in and out of bed, believes she hit her head multiple times. Denies any loss of conscious. She complains of right sided rib pain as well as right-sided hip pain patient is a prior hip replacement there. She denies any fever, nausea, vomiting, vision changes or palpitations      Patient is on Plavix, and aspirin. The patient has no other acute complaints at this time. REVIEW OF SYSTEMS   Review of Systems   Constitutional: Negative for chills and fever. HENT: Negative for rhinorrhea, sinus pain and sore throat. Eyes: Negative for redness. Respiratory: Negative for cough and shortness of breath. Cardiovascular: Positive for chest pain. Gastrointestinal: Negative for abdominal pain, diarrhea, nausea and vomiting. Genitourinary: Negative for dysuria. Musculoskeletal: Negative for back pain. Right hip pain. Skin: Negative for rash. Neurological: Positive for dizziness and light-headedness. Negative for headaches. Psychiatric/Behavioral: Negative for agitation.          PAST MEDICAL Rfl: 11    potassium chloride (KLOR-CON M) 20 MEQ extended release tablet, Take 1 tablet by mouth daily, Disp: 30 tablet, Rfl: 11    pantoprazole (PROTONIX) 40 MG tablet, Take 1 tablet by mouth daily (with breakfast), Disp: 60 tablet, Rfl: 5    atenolol (TENORMIN) 25 MG tablet, Take 1 tablet by mouth daily Hold for SBP less than 100 mmHg, if HR less than 55 bpm, Disp: 30 tablet, Rfl: 11    Calcium Carbonate-Vit D-Min (CALCIUM 1200 PO), Take 1 tablet by mouth daily, Disp: , Rfl:     aspirin 81 MG tablet, Take 81 mg by mouth daily, Disp: , Rfl:     acetaminophen (TYLENOL) 325 MG tablet, Take 1 tablet by mouth every 4 hours as needed for Pain (Patient taking differently: Take 325 mg by mouth every 4 hours as needed for Pain Takes 1-2 tabs every 4 hours prn pain), Disp: 120 tablet, Rfl: 3      SOCIAL HISTORY     Social History     Social History Narrative    Not on file     Social History     Tobacco Use    Smoking status: Never Smoker    Smokeless tobacco: Never Used   Vaping Use    Vaping Use: Never used   Substance Use Topics    Alcohol use: No    Drug use: No         ALLERGIES   No Known Allergies      FAMILY HISTORY     Family History   Problem Relation Age of Onset    Other Mother         alzheimers    Heart Attack Father     Heart Attack Brother          PREVIOUS RECORDS   Previous records reviewed: She was seen here on 4/10/2021 for abdominal pain. PHYSICAL EXAM     ED Triage Vitals   BP Temp Temp src Pulse Resp SpO2 Height Weight   -- -- -- -- -- -- -- --     Initial vital signs and nursing assessment reviewed and normal. Pulsoximetry is abnormal per my interpretation. Additional Vital Signs:  Vitals:    01/21/22 1408   BP: 100/64   Pulse: 72   Resp: 26   Temp:    SpO2: 100%       Physical Exam  Vitals and nursing note reviewed. Constitutional:       General: She is in acute distress. Appearance: She is ill-appearing. HENT:      Head: Normocephalic and atraumatic.       Right Ear: External ear normal.      Left Ear: External ear normal.      Nose: Nose normal.      Mouth/Throat:      Mouth: Mucous membranes are moist.      Pharynx: Oropharynx is clear. Eyes:      General: No scleral icterus. Extraocular Movements: Extraocular movements intact. Conjunctiva/sclera: Conjunctivae normal.      Pupils: Pupils are equal, round, and reactive to light. Cardiovascular:      Rate and Rhythm: Normal rate and regular rhythm. Pulses: Normal pulses. Heart sounds: Normal heart sounds. Comments: Radial pulse 2+ bilaterally, DP pulses 2+ bilaterally. Pulmonary:      Effort: Respiratory distress present. Breath sounds: No wheezing. Comments: Diminished breath sounds in bilateral lower lobes, however bilateral breath sounds present. Chest wall crepitus no chest wall bruising, pacemaker noted left upper chest wall. No surrounding erythema or pain to palpation. Chest:      Chest wall: Tenderness present. Abdominal:      General: Abdomen is flat. There is no distension. Palpations: Abdomen is soft. Tenderness: There is no abdominal tenderness. There is no guarding or rebound. Comments: No abdominal bruising noted, pelvis is stable. Musculoskeletal:      Cervical back: Normal range of motion and neck supple. No rigidity. No muscular tenderness. Comments: Pain over right lateral hip, limited flexion extension secondary to pain. Neurovascular tact in bilateral lower extremities. Lymphadenopathy:      Cervical: No cervical adenopathy. Skin:     General: Skin is warm and dry. Capillary Refill: Capillary refill takes less than 2 seconds. Coloration: Skin is not jaundiced. Neurological:      General: No focal deficit present. Mental Status: She is alert and oriented to person, place, and time. GCS: GCS eye subscore is 4. GCS verbal subscore is 5. GCS motor subscore is 6.    Psychiatric:         Mood and Affect: Mood normal. limits   BRAIN NATRIURETIC PEPTIDE - Abnormal; Notable for the following components:    Pro-BNP 58557.0 (*)     All other components within normal limits   GLOMERULAR FILTRATION RATE, ESTIMATED - Abnormal; Notable for the following components:    Est, Glom Filt Rate 43 (*)     All other components within normal limits   COVID-19, RAPID   CULTURE, REFLEXED, URINE   ANION GAP   OSMOLALITY   URINE WITH REFLEXED MICRO       Radiologic studies results:  CT Head WO Contrast   Final Result      CT HEAD:   1. No acute intracranial hemorrhage. 2. Sequela of chronic microvascular changes. 3. Mild diffuse cerebral volume loss with ex vacuo dilatation of the ventricles. CT CERVICAL SPINE   1. No cervical spine fracture. 2. Multilevel degenerative changes of the cervical spine. 3. Reversal of cervical lordosis. 4. Other findings as described above. **This report has been created using voice recognition software. It may contain minor errors which are inherent in voice recognition technology. **      Final report electronically signed by Dr Chinmay Waldron on 1/21/2022 2:14 PM      CT Cervical Spine WO Contrast   Final Result      CT HEAD:   1. No acute intracranial hemorrhage. 2. Sequela of chronic microvascular changes. 3. Mild diffuse cerebral volume loss with ex vacuo dilatation of the ventricles. CT CERVICAL SPINE   1. No cervical spine fracture. 2. Multilevel degenerative changes of the cervical spine. 3. Reversal of cervical lordosis. 4. Other findings as described above. **This report has been created using voice recognition software. It may contain minor errors which are inherent in voice recognition technology. **      Final report electronically signed by Dr Chinmay Waldron on 1/21/2022 2:14 PM      CTA Chest W WO Contrast   Final Result      CT CHEST:   1. No CT evidence of pulmonary embolus. 2. A geographic groundglass appearance of the lungs is noted.  Differential consideration would include hypersensitivity pneumonitis, small airway disease, small vessel disease, or an infiltrative lung disease. 3. Mild cardiomegaly. CT ABDOMEN and PELVIS:   1. No acute inflammatory bowel process. No acute bowel obstruction. 2. Other findings as described above            **This report has been created using voice recognition software. It may contain minor errors which are inherent in voice recognition technology. **      Final report electronically signed by Dr Fei Collier on 1/21/2022 2:31 PM      CT ABDOMEN PELVIS W IV CONTRAST Additional Contrast? None   Final Result      CT CHEST:   1. No CT evidence of pulmonary embolus. 2. A geographic groundglass appearance of the lungs is noted. Differential consideration would include hypersensitivity pneumonitis, small airway disease, small vessel disease, or an infiltrative lung disease. 3. Mild cardiomegaly. CT ABDOMEN and PELVIS:   1. No acute inflammatory bowel process. No acute bowel obstruction. 2. Other findings as described above            **This report has been created using voice recognition software. It may contain minor errors which are inherent in voice recognition technology. **      Final report electronically signed by Dr Fei Collier on 1/21/2022 2:31 PM      XR CHEST PORTABLE   Final Result   COPD and chronic findings are discussed. No acute intrathoracic process. **This report has been created using voice recognition software. It may contain minor errors which are inherent in voice recognition technology. **      Final report electronically signed by Dr Mayi Pickering on 1/21/2022 12:51 PM      XR HIP 2-3 VW W PELVIS RIGHT   Final Result   No acute finding            **This report has been created using voice recognition software. It may contain minor errors which are inherent in voice recognition technology. **      Final report electronically signed by Dr. Francois Schmitz on 1/21/2022 1:09 PM          ED Medications administered this visit:   Medications   acetaminophen (TYLENOL) tablet 1,000 mg (1,000 mg Oral Given 1/21/22 1242)   iopamidol (ISOVUE-370) 76 % injection 80 mL (80 mLs IntraVENous Given 1/21/22 1348)         ED COURSE     ED Course as of 01/21/22 1442   Fri Jan 21, 2022   1247 WBC: 9.1 [AL]   1247 Hemoglobin Quant(!): 10.6 [AL]   1247 Hematocrit(!): 32.1 [AL]   1247 Platelet Count: 336 [AL]   1301 SARS-CoV-2, NAAT: NOT  DETECTED [AL]   1302 XR CHEST PORTABLE  \"IMPRESSION:  COPD and chronic findings are discussed. No acute intrathoracic process.   \" [AL]   1318 XR HIP 2-3 VW W PELVIS RIGHT  \"IMPRESSION:  No acute finding   \" [AL]   1325 BUN(!): 28 [AL]   1326 Glucose(!): 167 [AL]   1326 CO2(!): 19 [AL]   1326 Sodium(!): 133 [AL]   1326 Est, Glom Filt Rate(!): 43 [AL]   1326 Pro-BNP(!): 67749.0 [AL]   1330 [employer] contacted us back regarding patient's pacemaker denied any acute arrhythmias and advised her the pacemaker is functioning within his normal limits. [AL]   1808 CT Head WO Contrast  \"   IMPRESSION:     CT HEAD:  1. No acute intracranial hemorrhage. 2. Sequela of chronic microvascular changes. 3. Mild diffuse cerebral volume loss with ex vacuo dilatation of the ventricles.     CT CERVICAL SPINE  1. No cervical spine fracture. 2. Multilevel degenerative changes of the cervical spine. 3. Reversal of cervical lordosis. 4. Other findings as described above. \" [AL]   9753 CTA Chest W WO Contrast  \"IMPRESSION:     CT CHEST:  1. No CT evidence of pulmonary embolus. 2. A geographic groundglass appearance of the lungs is noted. Differential consideration would include hypersensitivity pneumonitis, small airway disease, small vessel disease, or an infiltrative lung disease. 3. Mild cardiomegaly.     CT ABDOMEN and PELVIS:  1. No acute inflammatory bowel process. No acute bowel obstruction.   2. Other findings as described above\" [AL]   1440 Patient was updated on her laboratory studies as well as imaging results, the hospitalist was contacted for admission. [AL]   21  The hospitalist group advises to reach out to the internal medicine group for admission. [AL]      ED Course User Index  [AL] Earnestine Umana MD       MEDICATION CHANGES     New Prescriptions    No medications on file         FINAL DISPOSITION     Final diagnoses:   Acute congestive heart failure, unspecified heart failure type ([facility] [address]   Acute respiratory failure with hypoxia (HCC)     Condition: condition: fair  Dispo: Admit to telemetry      This transcription was electronically signed. Parts of this transcriptions may have been dictated by use of voice recognition software and electronically transcribed, and parts may have been transcribed with the assistance of an ED scribe. The transcription may contain errors not detected in proofreading. Please refer to my supervising physician's documentation if my documentation differs.     Electronically Signed: Earnestine Umana MD, 01/21/22, 2:42 PM         Earnestine Umana MD  Resident  01/21/22 1120

## 2022-01-21 NOTE — H&P
Internal Medicine Specialties  H&P  1/21/2022  3:27 PM    Patient:  Caro Almazan  YOB: 1940    MRN: [id number]   Acct:  [de-identified]   TOM /TOM  Primary Care Physician: PHIL Melgar CNP    Chief Complaint:  Chief Complaint   Patient presents with    Shortness of Breath    Rib Injury       History of Present Illness: The patient is a 80 y.o. female with pmhx of A fib s/p Watchman and pacemaker, CHF reduced EF, HTN, HLD, Valve replacements, CABG, CAD s/p multiple stents on ASA/plavix who presents with multiple falls over the past year and SOB that began the last few days. Per her daughter at bedside the falls are almost always unwitnessed but they believe she is actually passing out as she does not remember the episodes and they do not know how long she is down for each time. She does have bruises periodically from the falls but tries to hide them. Pt has no known hx of seizures. She lives home alone but her daughter comes to check on her every day. She gets around with a walker. She denies tobacco or alcohol use. She currently denies chest pain, N/V, dysuria. She has some lower suprapubic abdominal tenderness, urinary incontinence and chronic headaches. In the emergency department pt was found to be hypoxic and placed on 3LNC. BNP was elevated from baseline, CXR shows COPD, CTA chows no PE but ground glass opacities and pneumonitis, COVID negative, CT head and cervical spine no acute changes, UA positive for infection. . Pt reports exposure to second hand smoke most of her life from her father. She agrees to be a full code at this time.        Past Medical History:        Diagnosis Date    Atrial fibrillation ([address]     CAD (coronary artery disease)     Congestive heart failure (CHF) (HCC)     Hyperlipidemia     Hypertension        Past Surgical History:        Procedure Laterality Date    ATRIAL ABLATION SURGERY      CARDIAC CATHETERIZATION      CARDIAC VALVE REPLACEMENT      CORONARY ANGIOPLASTY WITH STENT PLACEMENT  02/28/2019    CORONARY ARTERY BYPASS GRAFT      x1    ENDOSCOPY, COLON, DIAGNOSTIC      HYSTERECTOMY      JOINT REPLACEMENT      MITRAL VALVE REPLACEMENT      PACEMAKER PLACEMENT  05/2019    RI OFFICE/OUTPT VISIT,PROCEDURE ONLY N/A 11/12/2018    CABG X3, MITRAL VALVE REPAIR, MAZE, AND TRICUSPID VALVE REPAIR, DEANNA performed by Maximo Trejo MD at [facility] Right 3/14/2021    HIP TOTAL ARTHROPLASTY performed by Felice Caballero MD at [facility] ENDOSCOPY  4/12/2021    [facility] [place] [facility]    UPPER GASTROINTESTINAL ENDOSCOPY  4/12/2021    EGD CONTROL HEMORRHAGE performed by William Jin MD at [facility] [place] [facility]       Home Medications: Not in a hospital admission. Allergies:  Patient has no known allergies. Social History:    reports that she has never smoked. She has never used smokeless tobacco. She reports that she does not drink alcohol and does not use drugs.       Family History:       Problem Relation Age of Onset    Other Mother         alzheimers    Heart Attack Father     Heart Attack Brother        Review of Systems:    General ROS: negative  Psychological ROS: negative  Hematological and Lymphatic ROS: negative  Endocrine ROS: negative  Vision:negative  ENT ROS: Denies  Respiratory ROS: no cough, has SOB  Cardiovascular ROS: no chest pain , has DAS  Gastrointestinal ROS: suprapubic abdominal pain, no N/V  Genito-Urinary ROS: no dysuria, is having urinary incontinence  Musculoskeletal ROS: negative  Neurological ROS: multiple syncopal episodes  Dermatological ROS: negative  Weight:no change in weight  Appetite:ok      Physical Exam:    Vitals:  Patient Vitals for the past 24 hrs:   BP Temp Temp src Pulse Resp SpO2 Height Weight   01/21/22 1408 100/64 -- -- 72 26 100 % -- --   01/21/22 1242 118/86 -- -- 71 20 92 % -- --   01/21/22 1217 -- -- -- -- -- 94 % -- --   01/21/22 1212 (!) 134/98 98.6 °F (37 °C) Oral 74 20 (!) 87 % 5' 4\" (1.626 m) 110 lb (49.9 kg)     Weight: Weight: 110 lb (49.9 kg)     24 hour intake/output:No intake or output data in the 24 hours ending 01/21/22 1527    General appearance - alert, ill appearing, and in no distress  Eyes - pupils equal and reactive, extraocular eye movements intact  Ears - bilateral TM's and external ear canals normal  Nose - normal and patent, no erythema, discharge or polyps  Mouth - mucous membranes moist, pharynx normal without lesions  Neck - supple, no significant adenopathy  Lymphatics - no palpable lymphadenopathy, no hepatosplenomegaly  Chest - clear to auscultation, no wheezes, rales or rhonchi, symmetric air entry   Distant Breath Sounds: No  Heart - normal rate, regular rhythm, normal S1, S2, no murmurs, rubs, clicks or gallops  Abdomen - soft, mildly tender in suprapubic region, nondistended, no masses or organomegaly  Neurological - alert, oriented, intermittent confusion  Musculoskeletal - no joint tenderness, deformity or swelling  Extremities - peripheral pulses normal, no pedal edema, no clubbing or cyanosis  Skin - normal coloration and turgor, no rashes, no suspicious skin lesions noted    Review of Labs and Diagnostic Testing:    CBC:   Recent Labs     01/21/22  1230   WBC 9.1   HGB 10.6*   HCT 32.1*   MCV 87.5        BMP:   Recent Labs     01/21/22  1230   *   K 4.6   CL 99   CO2 19*   BUN 28*   CREATININE 1.2   CALCIUM 9.1   GLUCOSE 167*     PT/INR: No results for input(s): PROTIME, INR in the last 72 hours. APTT: No results for input(s): APTT in the last 72 hours. Lipids: No results for input(s): ALKPHOS, ALT, AST, BILITOT, BILIDIR, LABALBU, AMYLASE, LIPASE in the last 72 hours. Troponin: No results for input(s): TROPONINT in the last 72 hours.      Imaging:  CT Head WO Contrast    Result Date: 1/21/2022  PROCEDURE: CT HEAD WO CONTRAST, CT CERVICAL SPINE WO CONTRAST CLINICAL INFORMATION:fall COMPARISON: No prior study. TECHNIQUE: 1. 5 mm axial imaging through the head without IV contrast. 2. Helical acquisition of the cervical spine was obtained without contrast. 3 mm axial images were provided for interpretation. Coronal and sagittal MPR images are also provided for interpretation. All CT scans at this facility use dose modulation, iterative reconstruction, and/or weight based dosing when appropriate to reduce the radiation dose to as low as reasonably achievable. FINDINGS: CT head: Mild cerebral volume loss. Diffuse periventricular and subcortical white matter hypodensities that likely relate to chronic microangiopathic disease. Ex vacuo dilatation of the ventricles. No midline shift or mass effect. No acute intracranial hemorrhage. No intracranial collection. Gray-white differentiation is unremarkable. The posterior fossa is unremarkable. The craniocervical junction is unremarkable. No acute bony abnormality. The  paranasal sinuses are clear. The  mastoid air cells are clear. The orbits are unremarkable. CT cervical spine: The bones are demineralized. Multilevel degenerative changes of the cervical spine. There is reversal of the cervical lordosis. No acute cervical spine fracture is seen. There is anterolisthesis of C3 and C4. Marked narrowing of the disc space at C4-C5, C5-C6 and C6-C7. Multilevel degenerative changes of these cervical spine. There is mild to moderate narrowing of the central canal at C5-C6 with mild bilateral neural foraminal narrowing. Mild central canal narrowing at C5-C6 with mild bilateral foraminal narrowing. Calcific biapical pleural scarring is seen. Carotid calcifications noted. No neck masses seen. CT HEAD: 1. No acute intracranial hemorrhage. 2. Sequela of chronic microvascular changes. 3. Mild diffuse cerebral volume loss with ex vacuo dilatation of the ventricles. CT CERVICAL SPINE 1.  No cervical spine ascending thoracic aorta is not dilated. The main pulmonary artery is not dilated. No significant lymphadenopathy in the chest. No chest wall mass. The thyroid is not enlarged. ABDOMEN: Aortoiliac calcifications. Moderate atrophy of the pancreas. Stool fills the colon. Small fat-containing umbilical hernia is seen. Hepatomegaly. Otherwise, the gallbladder, biliary tree , adrenal glands, spleen, and kidneys are unremarkable. No bowel obstruction or acute inflammatory bowel process. No intrahepatic changes seen in the right lower quadrant. The abdominal aorta is not aneurysmal. No significantly enlarged lymph nodes are seen. The bladder is grossly unremarkable. The uterus is surgically absent. Bones: The bones appear demineralized. Degenerative changes of the lumbar spine. Right hip arthroplasty has been performed. Scoliosis of the lumbar spine. Surgical hardware is seen at L4-L5 in the spinous process region. CT CHEST: 1. No CT evidence of pulmonary embolus. 2. A geographic groundglass appearance of the lungs is noted. Differential consideration would include hypersensitivity pneumonitis, small airway disease, small vessel disease, or an infiltrative lung disease. 3. Mild cardiomegaly. CT ABDOMEN and PELVIS: 1. No acute inflammatory bowel process. No acute bowel obstruction. 2. Other findings as described above **This report has been created using voice recognition software. It may contain minor errors which are inherent in voice recognition technology. ** Final report electronically signed by Dr Sugar Umanzor on 1/21/2022 2:31 PM    CT Cervical Spine WO Contrast    Result Date: 1/21/2022  PROCEDURE: CT HEAD WO CONTRAST, CT CERVICAL SPINE WO CONTRAST CLINICAL INFORMATION:fall COMPARISON: No prior study. TECHNIQUE: 1. 5 mm axial imaging through the head without IV contrast. 2. Helical acquisition of the cervical spine was obtained without contrast. 3 mm axial images were provided for interpretation.  Coronal and sagittal MPR images are also provided for interpretation. All CT scans at this facility use dose modulation, iterative reconstruction, and/or weight based dosing when appropriate to reduce the radiation dose to as low as reasonably achievable. FINDINGS: CT head: Mild cerebral volume loss. Diffuse periventricular and subcortical white matter hypodensities that likely relate to chronic microangiopathic disease. Ex vacuo dilatation of the ventricles. No midline shift or mass effect. No acute intracranial hemorrhage. No intracranial collection. Gray-white differentiation is unremarkable. The posterior fossa is unremarkable. The craniocervical junction is unremarkable. No acute bony abnormality. The  paranasal sinuses are clear. The  mastoid air cells are clear. The orbits are unremarkable. CT cervical spine: The bones are demineralized. Multilevel degenerative changes of the cervical spine. There is reversal of the cervical lordosis. No acute cervical spine fracture is seen. There is anterolisthesis of C3 and C4. Marked narrowing of the disc space at C4-C5, C5-C6 and C6-C7. Multilevel degenerative changes of these cervical spine. There is mild to moderate narrowing of the central canal at C5-C6 with mild bilateral neural foraminal narrowing. Mild central canal narrowing at C5-C6 with mild bilateral foraminal narrowing. Calcific biapical pleural scarring is seen. Carotid calcifications noted. No neck masses seen. CT HEAD: 1. No acute intracranial hemorrhage. 2. Sequela of chronic microvascular changes. 3. Mild diffuse cerebral volume loss with ex vacuo dilatation of the ventricles. CT CERVICAL SPINE 1. No cervical spine fracture. 2. Multilevel degenerative changes of the cervical spine. 3. Reversal of cervical lordosis. 4. Other findings as described above. **This report has been created using voice recognition software. It may contain minor errors which are inherent in voice recognition technology. ** Final report electronically signed by Dr Romi Bacon on 1/21/2022 2:14 PM    CT ABDOMEN PELVIS W IV CONTRAST Additional Contrast? None    Result Date: 1/21/2022  PROCEDURE: CTA CHEST W WO CONTRAST, CT ABDOMEN PELVIS W IV CONTRAST CLINICAL INFORMATION: fall cp sob. Right lower quadrant pain COMPARISON: CT chest 2/15/2021 CT abdomen and pelvis 11/4/2018 TECHNIQUE: 1. Helical CT angiography of the chest was obtained with intravenous contrast. Multiplanar reformats are provided. PE protocol was utilized. 1  mm and 3  mm axial images were provided through the chest after the administration of IV contrast. A non-contrast localizer was obtained. 10 mm MIP reconstructions of the chest performed in coronal and sagittal planes 2. Helical acquisition of the abdomen, and pelvis with  contrast. Multiplanar reformats are provided All CT scans at this facility use dose modulation, iterative reconstruction, and/or weight based dosing when appropriate to reduce the radiation dose to as low as reasonably achievable. CONTRAST: 80  cc of [id number]  intravenously FINDINGS: CHEST: No filling defects are seen in the pulmonary arteries to reflect CT evidence of pulmonary embolus. Pacemaker with leads noted. Aortocoronary calcifications. The heart is mildly enlarged. A small hiatal hernia is present. Platelike atelectasis is seen in the lower lobes bilaterally. There is a geographic groundglass appearance of the lungs. Median sternotomy has been performed. The bones appear demineralized. Degenerative changes of the thoracic spine is noted. No focal pulmonary consolidation. No large pulmonary mass. Central airway is patent. No pleural effusions. No significant pericardial effusion. The ascending thoracic aorta is not dilated. The main pulmonary artery is not dilated. No significant lymphadenopathy in the chest. No chest wall mass. The thyroid is not enlarged. ABDOMEN: Aortoiliac calcifications. Moderate atrophy of the pancreas.  Stool fills the osteopenia is present. The visualized skeletal structures appear intact. COPD and chronic findings are discussed. No acute intrathoracic process. **This report has been created using voice recognition software. It may contain minor errors which are inherent in voice recognition technology. ** Final report electronically signed by Dr Idania Chaves on 1/21/2022 12:51 PM    XR HIP 2-3 VW W PELVIS RIGHT    Result Date: 1/21/2022  PROCEDURE: XR HIP 2-3 VW W PELVIS RIGHT CLINICAL INFORMATION: fall pain . TECHNIQUE: Single AP pelvis and AP and frog leg projections of the right hip, COMPARISON: 3/14/2021 FINDINGS: Prior total right hip arthroplasty. No evidence of acute fracture. Prosthetic components are unremarkable and stable. X-Stop device at L4-5. Bones are severely osteopenic. The lower portion of the sacrum can't be assessed. SI joint degenerative changes noted. Vascular calcifications are noted. No acute finding **This report has been created using voice recognition software. It may contain minor errors which are inherent in voice recognition technology. ** Final report electronically signed by Dr. Sara Soto on 1/21/2022 1:09 PM        Assessment/Plan:    1. Syncopal episodes  a. Consult neuro  b. Would like to obtain MRI but unable to due to incompatible pacemaker   c. No focal neurologic deficits  d. No hx of seizures   e. Vestibular PT consulted although BPPV would not typically cause pt to pass out   2. Acute hypoxic resp failure; most likely due to CHF exacerbation  a. On 3LNC; wean as able  b. Consult cardio and pulm   c. Diurese with Lasix 20mg TID with KCl supplementation  d. Obtain complete echo cardiogram  e. Obtain troponin X2  f. Bronchodilators   3. UTI  a. Start Rocephin, await culture   4. A fib s/p Watchman and pacemaker   a. Cont BB   b. Tele monitoring   5. CAD s/p multiple stents and hx CABG   a. Cont ASA/Plavix   6. PT/OT  7. DVT prophylaxis   a.  Lovenox     Assessment and plan of care discussed with supervising physician, Dr Sandra Almaraz    Electronically signed by Soyla Schlatter, APRN - CNP on 1/21/2022 at 3:27 PM         Copy: Primary Care Physician: Luanne Hodgkin, APRN - CNP

## 2022-01-21 NOTE — ED NOTES
Pt weaned to 2L NC. Pt denies any SOB at this time. Family at bedside. Call light within reach.       Crista Thomson RN  01/21/22 2545

## 2022-01-21 NOTE — FLOWSHEET NOTE
Pt was in bed as her sister was visiting. She was dealing with acute respiratory failure. She was not giving up but wanted prayer to cope and heal. Prayer was appreciated. 01/21/22 2020   Encounter Summary   Services provided to: Patient and family together   Referral/Consult From: [facility] Family members   Continue Visiting Yes  (1.21)   Complexity of Encounter Low   Length of Encounter 15 minutes   Routine   Type Initial   Assessment Approachable;Calm   Intervention [place];Prayer;Nurtured hope   Outcome Acceptance;Expressed gratitude;Encouraged; Hopeful;Receptive

## 2022-01-21 NOTE — ED NOTES
Pt transported to [facility] at this time. Floor notified.       Ming Livers  01/21/22 1529

## 2022-01-21 NOTE — ED NOTES
Bed: 002A  Expected date: 1/21/22  Expected time: 12:00 PM  Means of arrival:   Comments:  DUSTIN Rausch RN  01/21/22 0954

## 2022-01-21 NOTE — CONSULTS
[facility]    Patient - Kahlil Almazan   MRN -  [id number]   Acct # - [de-identified]   - 1940      Date of Admission -  2022 12:10 PM  Date of evaluation -  2022  Room - [address], [place] Primary Care Physician - PHIL Mosqueda - BETH   Chief Complaint   Acute respiratory failure with hypoxia  Right-sided chest wall contusion status post fall  Active Hospital Problem List      Active Hospital Problems    Diagnosis Date Noted    Acute respiratory failure with hypoxia [facility]) [J96.01] 2022     HPI   The patient is a 80 y.o. female with pmhx coronary artery disease status post CABG and stent on aspirin and Plavix, of A fib s/p Watchman and pacemaker, CHF reduced EF, HTN, HLD, Valve replacements who presents with multiple falls over the past year in the setting of syncope. Per her daughter at bedside the falls are almost always unwitnessed . She does have bruises periodically from the falls but tries to hide them. Pt has no known hx of seizures. She denies cough or sputum production, she lives home alone but her daughter comes to check on her every day. In the emergency department pt was found to be hypoxic and placed on 3LNC. BNP was elevated from baseline, CXR shows COPD, CTA chows no PE mild atelectasis of the right lower lobe.   COVID-negative  Patient is non-smoker  Does not use home oxygen  Past Medical History         Diagnosis Date    Atrial fibrillation ([address]     CAD (coronary artery disease)     Congestive heart failure (CHF) ([address]     Hyperlipidemia     Hypertension       Past Surgical History           Procedure Laterality Date    ATRIAL ABLATION SURGERY      CARDIAC CATHETERIZATION      CARDIAC VALVE REPLACEMENT      CORONARY ANGIOPLASTY WITH STENT PLACEMENT  2019    CORONARY ARTERY BYPASS GRAFT      x1    ENDOSCOPY, COLON, DIAGNOSTIC      HYSTERECTOMY      JOINT REPLACEMENT      MITRAL VALVE REPLACEMENT      PACEMAKER PLACEMENT  05/2019    PA OFFICE/OUTPT VISIT,PROCEDURE ONLY N/A 11/12/2018    CABG X3, MITRAL VALVE REPAIR, MAZE, AND TRICUSPID VALVE REPAIR, DEANNA performed by Roberth Downs MD at [facility] Right 3/14/2021    HIP TOTAL ARTHROPLASTY performed by Martine Hernandez MD at [facility] ENDOSCOPY  4/12/2021    EGD BIOPSY performed by Una Sheikh MD at [facility]   [facility] ENDOSCOPY  4/12/2021    EGD CONTROL HEMORRHAGE performed by Una Sheikh MD at [facility] 2; Regular; Low Sodium (2 gm)  Allergies    Patient has no known allergies. Social History     Social History     Socioeconomic History    Marital status:      Spouse name: Not on file    Number of children: 1    Years of education: Not on file    Highest education level: Not on file   Occupational History    Not on file   Tobacco Use    Smoking status: Never Smoker    Smokeless tobacco: Never Used   Vaping Use    Vaping Use: Never used   Substance and Sexual Activity    Alcohol use: No    Drug use: No    Sexual activity: Not Currently   Other Topics Concern    Not on file   Social History Narrative    Not on file     Social Determinants of Health     Financial Resource Strain: Low Risk     Difficulty of Paying Living Expenses: Not hard at all   Food Insecurity: No Food Insecurity    Worried About [facility] in the Last Year: Never true    [facility] in the Last Year: Never true   Transportation Needs:     Lack of Transportation (Medical): Not on file    Lack of Transportation (Non-Medical):  Not on file   Physical Activity:     Days of Exercise per Week: Not on file    Minutes of Exercise per Session: Not on file   Stress:     Feeling of Stress : Not on file   Social Connections:     Frequency of Communication with Friends and Family: Not on file    Frequency of Social Gatherings with Friends and Family: Not on file    Attends Anglican Services: Not on file    Active Member of Clubs or Organizations: Not on file    Attends Club or Organization Meetings: Not on file    Marital Status: Not on file   Intimate Partner Violence:     Fear of Current or Ex-Partner: Not on file    Emotionally Abused: Not on file    Physically Abused: Not on file    Sexually Abused: Not on file   Housing Stability:     Unable to Pay for Housing in the Last Year: Not on file    Number of [place] in the Last Year: Not on file    Unstable Housing in the Last Year: Not on file     Family History          Problem Relation Age of Onset    Other Mother         alzheimers    Heart Attack Father     Heart Attack Brother      Sleep History    No history  ROS    General/Constitutional: No recent loss of weight or appetite changes. No fever or chills. HENT: Negative. Eyes: Negative. Upper respiratory tract: No nasal stuffiness or post nasal drip. Lower respiratory tract/ lungs: No cough or sputum production. No hemoptysis. Cardiovascular: Syncope, right-sided chest wall pain  Gastrointestinal: No nausea or vomiting. Neurological: Syncope  Extremities: No tenderness. Musculoskeletal: Recurrent falls  Genitourinary: No complaints. Hematological: Negative. Denies easy buising  Skin: No itching.   Meds    Current Medications    aspirin  81 mg Oral Daily    buPROPion  150 mg Oral Daily    atenolol  25 mg Oral Daily    clopidogrel  75 mg Oral Daily    [START ON 1/22/2022] pantoprazole  40 mg Oral Daily with breakfast    rosuvastatin  10 mg Oral Daily    sodium chloride flush  5-40 mL IntraVENous 2 times per day    enoxaparin  30 mg SubCUTAneous Daily    furosemide  20 mg IntraVENous TID    potassium chloride  10 mEq Oral TID    cefTRIAXone (ROCEPHIN) IV  1,000 mg IntraVENous Q24H     hydrOXYzine, sodium chloride flush, sodium chloride, acetaminophen **OR** acetaminophen, promethazine **OR** ondansetron, ipratropium-albuterol  IV Drips/Infusions   sodium chloride       Vitals    Vitals    height is 5' 4\" (1.626 m) and weight is 110 lb (49.9 kg). Her oral temperature is 98.2 °F (36.8 °C). Her blood pressure is 117/56 (abnormal) and her pulse is 72. Her respiration is 18 and oxygen saturation is 96%. O2 Flow Rate (L/min): 2 L/min  I/O  No intake or output data in the 24 hours ending 01/21/22 1851  Patient Vitals for the past 96 hrs (Last 3 readings):   Weight   01/21/22 1212 110 lb (49.9 kg)     Exam   Constitutional: Patient appears moderately built and moderately nourished. Head: Normocephalic and atraumatic. Mouth/Throat: Oropharynx is clear and moist.  No oral thrush. Eyes: Conjunctivae are normal. Pupils are equal, round, and reactive to light. No scleral icterus. Neck: Neck supple. No JVD or tracheal deviation present. Cardiovascular: Regular rate, regular rhythm, S1 and S2 with systolic murmur no peripheral edema  Pulmonary/Chest: Poor inspiratory effort secondary to right-sided chest pain  Abdominal: Soft. Bowel sounds audible. No distension or tenderness to palp  Musculoskeletal: Moves all extremities  Lymphadenopathy:  No cervical adenopathy. Neurological: Patient is alert and oriented to person, place, and time. Skin: Skin is warm and dry.     Labs   ABG  Lab Results   Component Value Date    PH 7.41 11/13/2018    PO2 110 11/13/2018    PCO2 42 11/13/2018    HCO3 27 11/13/2018    O2SAT 98 11/13/2018     Lab Results   Component Value Date    IFIO2 30 11/13/2018    MODE CPAP/PS 11/13/2018    SETTIDVOL 470 11/13/2018    SETPEEP 5.0 11/13/2018     CBC  Recent Labs     01/21/22  1230   WBC 9.1   RBC 3.67*   HGB 10.6*   HCT 32.1*   MCV 87.5   MCH 28.9   MCHC 33.0      MPV 10.6      BMP  Recent Labs     01/21/22  1230   *   K 4.6   CL 99   CO2 19*   BUN 28*   CREATININE 1.2   GLUCOSE 167*   CALCIUM 9.1     LFT  No results for input(s): AST, ALT, ALB, BILITOT, ALKPHOS, LIPASE in the last 72 hours. Invalid input(s): AMYLASE  TROP  Lab Results   Component Value Date    TROPONINT < 0.010 01/21/2022    TROPONINT < 0.010 04/10/2021    TROPONINT < 0.010 03/12/2021     BNP  Lab Results   Component Value Date    PROBNP 42239.0 01/21/2022    PROBNP 962.9 03/12/2021    PROBNP 3062.0 11/04/2018     D-Dimer  No results found for: DDIMER  Lactic Acid  No results for input(s): LACTA in the last 72 hours. INR  No results for input(s): INR, PROTIME in the last 72 hours. PTT  No results for input(s): APTT in the last 72 hours. Glucose  No results for input(s): POCGLU in the last 72 hours. UA   Recent Labs     01/21/22  1408   PHUR 5.0   COLORU YELLOW   PROTEINU 30*   BLOODU MODERATE*   RBCUA 0-2   WBCUA 15-25   BACTERIA MANY   NITRU NEGATIVE   GLUCOSEU NEGATIVE   BILIRUBINUR NEGATIVE   UROBILINOGEN 0.2   KETUA 15*   . PFTs   No record  Echo     Left ventricle size is normal.   Mild concentric left ventricular hypertrophy. Ejection fraction is visually estimated in the range of 40% to 45%. Mildly hypokinetic motion of the apical anteroseptal wall noted in the   left ventricle. Small-to-moderate myocardial infarction of the anteroseptal LV. Moderately dilated left atrium. watchman device   Aortic valve appears tricuspid. Aortic valve leaflets are Moderately   calcified. Mild aortic stenosis is present. Trivial aortic regurgitation   is noted. Mitral annular calcification is present. Decreased mitral valve mobility noted.  mitral valve ring  Cultures    Procalcitonin  No results found for: [facility]    Radiology    CXR    CT Scans    (See actual reports for details)    Assessment   Acute/chronic respiratory failure, unclear etiology suspect related to congestive heart failure and right-sided atelectasis/infiltrate  Syncope and recurrent falls  Right-sided chest wall contusion associated with right-sided infiltrate/atelectasis Coronary disease status post CABG and stent  Congestive heart failure with reduced ejection fraction  Valvular heart disease  Status post pacemaker  Recommendations   Continue oxygen by nasal cannula to keep saturation above 92%, patient will need home oxygen evaluation  Right-sided infiltrate could be related to poor inspiratory effort secondary to pain, could be aspiration as well, will continue supportive pain, lidocaine patch for chest wall pain  Cardiology consulted to assist with syncope/coronary disease management    Thank you for the consult and allowing us to participate in the care of your patient. Case discussed with nurse and patient/family. Questions and concerns addressed. Meds and Orders reviewed.     Electronically signed by     Rodney Vivas MD on 1/21/2022 at 6:51 PM

## 2022-01-22 PROBLEM — N30.00 ACUTE CYSTITIS WITHOUT HEMATURIA: Status: ACTIVE | Noted: 2022-01-22

## 2022-01-22 LAB
ANION GAP SERPL CALCULATED.3IONS-SCNC: 14 MEQ/L (ref 8–16)
BASOPHILS # BLD: 0.3 %
BASOPHILS ABSOLUTE: 0 THOU/MM3 (ref 0–0.1)
BUN BLDV-MCNC: 27 MG/DL (ref 7–22)
CALCIUM SERPL-MCNC: 8.5 MG/DL (ref 8.5–10.5)
CHLORIDE BLD-SCNC: 98 MEQ/L (ref 98–111)
CO2: 20 MEQ/L (ref 23–33)
CREAT SERPL-MCNC: 1.2 MG/DL (ref 0.4–1.2)
EOSINOPHIL # BLD: 0 %
EOSINOPHILS ABSOLUTE: 0 THOU/MM3 (ref 0–0.4)
ERYTHROCYTE [DISTWIDTH] IN BLOOD BY AUTOMATED COUNT: 13.2 % (ref 11.5–14.5)
ERYTHROCYTE [DISTWIDTH] IN BLOOD BY AUTOMATED COUNT: 43.3 FL (ref 35–45)
GFR SERPL CREATININE-BSD FRML MDRD: 43 ML/MIN/1.73M2
GLUCOSE BLD-MCNC: 133 MG/DL (ref 70–108)
HCT VFR BLD CALC: 33.3 % (ref 37–47)
HEMOGLOBIN: 10.7 GM/DL (ref 12–16)
IMMATURE GRANS (ABS): 0.02 THOU/MM3 (ref 0–0.07)
IMMATURE GRANULOCYTES: 0.3 %
LV EF: 50 %
LVEF MODALITY: NORMAL
LYMPHOCYTES # BLD: 3.8 %
LYMPHOCYTES ABSOLUTE: 0.3 THOU/MM3 (ref 1–4.8)
MCH RBC QN AUTO: 28.7 PG (ref 26–33)
MCHC RBC AUTO-ENTMCNC: 32.1 GM/DL (ref 32.2–35.5)
MCV RBC AUTO: 89.3 FL (ref 81–99)
MONOCYTES # BLD: 8.5 %
MONOCYTES ABSOLUTE: 0.6 THOU/MM3 (ref 0.4–1.3)
NUCLEATED RED BLOOD CELLS: 0 /100 WBC
PLATELET # BLD: 166 THOU/MM3 (ref 130–400)
PMV BLD AUTO: 10.8 FL (ref 9.4–12.4)
POTASSIUM REFLEX MAGNESIUM: 4.1 MEQ/L (ref 3.5–5.2)
RBC # BLD: 3.73 MILL/MM3 (ref 4.2–5.4)
SEG NEUTROPHILS: 87.1 %
SEGMENTED NEUTROPHILS ABSOLUTE COUNT: 6.2 THOU/MM3 (ref 1.8–7.7)
SODIUM BLD-SCNC: 132 MEQ/L (ref 135–145)
WBC # BLD: 7.1 THOU/MM3 (ref 4.8–10.8)

## 2022-01-22 PROCEDURE — 80048 BASIC METABOLIC PNL TOTAL CA: CPT

## 2022-01-22 PROCEDURE — 85025 COMPLETE CBC W/AUTO DIFF WBC: CPT

## 2022-01-22 PROCEDURE — 36415 COLL VENOUS BLD VENIPUNCTURE: CPT

## 2022-01-22 PROCEDURE — 1200000003 HC TELEMETRY R&B

## 2022-01-22 PROCEDURE — 2580000003 HC RX 258: Performed by: REGISTERED NURSE

## 2022-01-22 PROCEDURE — 6370000000 HC RX 637 (ALT 250 FOR IP): Performed by: INTERNAL MEDICINE

## 2022-01-22 PROCEDURE — 6370000000 HC RX 637 (ALT 250 FOR IP): Performed by: SOCIAL WORKER

## 2022-01-22 PROCEDURE — 6370000000 HC RX 637 (ALT 250 FOR IP): Performed by: REGISTERED NURSE

## 2022-01-22 PROCEDURE — 93306 TTE W/DOPPLER COMPLETE: CPT

## 2022-01-22 PROCEDURE — 6360000002 HC RX W HCPCS: Performed by: REGISTERED NURSE

## 2022-01-22 RX ADMIN — ACETAMINOPHEN 650 MG: 325 TABLET ORAL at 04:26

## 2022-01-22 RX ADMIN — SODIUM CHLORIDE, PRESERVATIVE FREE 10 ML: 5 INJECTION INTRAVENOUS at 20:09

## 2022-01-22 RX ADMIN — CEFTRIAXONE SODIUM 1000 MG: 1 INJECTION, POWDER, FOR SOLUTION INTRAMUSCULAR; INTRAVENOUS at 16:06

## 2022-01-22 RX ADMIN — ACETAMINOPHEN 650 MG: 325 TABLET ORAL at 09:27

## 2022-01-22 RX ADMIN — ATENOLOL 25 MG: 25 TABLET ORAL at 09:16

## 2022-01-22 RX ADMIN — FUROSEMIDE 20 MG: 10 INJECTION, SOLUTION INTRAMUSCULAR; INTRAVENOUS at 09:15

## 2022-01-22 RX ADMIN — BUPROPION HYDROCHLORIDE 150 MG: 150 TABLET, EXTENDED RELEASE ORAL at 09:16

## 2022-01-22 RX ADMIN — ENOXAPARIN SODIUM 30 MG: 100 INJECTION SUBCUTANEOUS at 09:17

## 2022-01-22 RX ADMIN — MIDODRINE HYDROCHLORIDE 2.5 MG: 2.5 TABLET ORAL at 09:16

## 2022-01-22 RX ADMIN — POTASSIUM CHLORIDE 10 MEQ: 750 TABLET, EXTENDED RELEASE ORAL at 20:08

## 2022-01-22 RX ADMIN — FUROSEMIDE 20 MG: 10 INJECTION, SOLUTION INTRAMUSCULAR; INTRAVENOUS at 16:04

## 2022-01-22 RX ADMIN — MIDODRINE HYDROCHLORIDE 2.5 MG: 2.5 TABLET ORAL at 12:39

## 2022-01-22 RX ADMIN — CLOPIDOGREL BISULFATE 75 MG: 75 TABLET, FILM COATED ORAL at 09:15

## 2022-01-22 RX ADMIN — FUROSEMIDE 20 MG: 10 INJECTION, SOLUTION INTRAMUSCULAR; INTRAVENOUS at 20:08

## 2022-01-22 RX ADMIN — PANTOPRAZOLE SODIUM 40 MG: 40 TABLET, DELAYED RELEASE ORAL at 09:15

## 2022-01-22 RX ADMIN — MIDODRINE HYDROCHLORIDE 2.5 MG: 2.5 TABLET ORAL at 16:05

## 2022-01-22 RX ADMIN — ACETAMINOPHEN 650 MG: 325 TABLET ORAL at 16:15

## 2022-01-22 RX ADMIN — POTASSIUM CHLORIDE 10 MEQ: 750 TABLET, EXTENDED RELEASE ORAL at 09:19

## 2022-01-22 RX ADMIN — ROSUVASTATIN 10 MG: 10 TABLET, FILM COATED ORAL at 09:16

## 2022-01-22 RX ADMIN — SODIUM CHLORIDE, PRESERVATIVE FREE 10 ML: 5 INJECTION INTRAVENOUS at 09:17

## 2022-01-22 RX ADMIN — POTASSIUM CHLORIDE 10 MEQ: 750 TABLET, EXTENDED RELEASE ORAL at 16:04

## 2022-01-22 RX ADMIN — ASPIRIN 81 MG: 81 TABLET, COATED ORAL at 09:15

## 2022-01-22 ASSESSMENT — PAIN SCALES - GENERAL
PAINLEVEL_OUTOF10: 5
PAINLEVEL_OUTOF10: 4
PAINLEVEL_OUTOF10: 0
PAINLEVEL_OUTOF10: 5
PAINLEVEL_OUTOF10: 3
PAINLEVEL_OUTOF10: 0

## 2022-01-22 ASSESSMENT — PAIN DESCRIPTION - LOCATION: LOCATION: HEAD

## 2022-01-22 NOTE — PROGRESS NOTES
Progress Note    Patient:  Abdirahman Blandon Harwood      Unit/Bed:6K-28/028-A    YOB: 1940    MRN: [id number]     Acct: [de-identified]     Admit date: 1/21/2022    72-year-old female admitted yesterday with a chief complaint of right-sided rib pain associated of with shortness of breath following a fall. The episode was preceded by some dizziness and lightheadedness which is not new. She was also complaining of right hip pain. In the emergency room she was found to be hypoxemic. CTA of the chest showed no evidence of pulmonary embolus but there was geographic groundglass appearance of the lungs suggestive of hypersensitivity pneumonitis versus small airways disease versus infiltrative lung disease. In addition she had mild cardiomegaly. BNP was elevated at 13,753 which is up from March of last year. .  Her chest x-ray was consistent with COPD but no other acute findings. Hip x-rays did not show any acute findings. There was no acute change on head CT. In addition, CT of the C-spine did not show any fractures. Her UA was suggestive of a UTI. Review of systems:  Neuro: Mild headache. Denies seizures. Musculoskeletal: Reports mild soreness on the right side of her chest with movement. Cardiovascular: No palpitations. No lower extremity edema. GI: No abdominal pain, nausea and vomiting. Past Medical History:   Diagnosis Date    Atrial fibrillation ([address]     CAD (coronary artery disease)     Congestive heart failure (CHF) (HCC)     Hyperlipidemia     Hypertension      Family History   Problem Relation Age of Onset    Other Mother         alzheimers    Heart Attack Father     Heart Attack Brother      Social History     Socioeconomic History    Marital status:       Spouse name: Not on file    Number of children: 1    Years of education: Not on file    Highest education level: Not on file   Occupational History    Not on file   Tobacco Use    Smoking status: Never Smoker    Smokeless tobacco: Never Used   Vaping Use    Vaping Use: Never used   Substance and Sexual Activity    Alcohol use: No    Drug use: No    Sexual activity: Not Currently   Other Topics Concern    Not on file   Social History Narrative    Not on file     Social Determinants of Health     Financial Resource Strain: Low Risk     Difficulty of Paying Living Expenses: Not hard at all   Food Insecurity: No Food Insecurity    Worried About Running Out of Food in the Last Year: Never true    [address] in the Last Year: Never true   Transportation Needs:     Lack of Transportation (Medical): Not on file    Lack of Transportation (Non-Medical): Not on file   Physical Activity:     Days of Exercise per Week: Not on file    Minutes of Exercise per Session: Not on file   Stress:     Feeling of Stress : Not on file   Social Connections:     Frequency of Communication with Friends and Family: Not on file    Frequency of Social Gatherings with Friends and Family: Not on file    Attends Hinduism Services: Not on file    Active Member of [address] [place] [address] or Organizations: Not on file    Attends Club or Organization Meetings: Not on file    Marital Status: Not on file   Intimate Partner Violence:     Fear of Current or Ex-Partner: Not on file    Emotionally Abused: Not on file    Physically Abused: Not on file    Sexually Abused: Not on file   Housing Stability:     Unable to Pay for Housing in the Last Year: Not on file    Number of [place] in the Last Year: Not on file    Unstable Housing in the Last Year: Not on file         Diet:  ADULT DIET; Regular;  Low Sodium (2 gm)    Medications:  Scheduled Meds:   aspirin  81 mg Oral Daily    buPROPion  150 mg Oral Daily    atenolol  25 mg Oral Daily    clopidogrel  75 mg Oral Daily    pantoprazole  40 mg Oral Daily with breakfast    rosuvastatin  10 mg Oral Daily    sodium chloride flush  5-40 mL IntraVENous 2 times per day    enoxaparin  30 mg SubCUTAneous Daily    furosemide  20 mg IntraVENous TID    potassium chloride  10 mEq Oral TID    cefTRIAXone (ROCEPHIN) IV  1,000 mg IntraVENous Q24H    midodrine  2.5 mg Oral TID WC     Continuous Infusions:   sodium chloride       PRN Meds:hydrOXYzine, sodium chloride flush, sodium chloride, acetaminophen **OR** acetaminophen, promethazine **OR** ondansetron, ipratropium-albuterol    Objective:    CBC:   Recent Labs     01/21/22  1230 01/22/22  0403   WBC 9.1 7.1   HGB 10.6* 10.7*    166     BMP:    Recent Labs     01/21/22  1230 01/22/22  0403   * 132*   K 4.6 4.1   CL 99 98   CO2 19* 20*   BUN 28* 27*   CREATININE 1.2 1.2   GLUCOSE 167* 133*     Calcium:  Recent Labs     01/22/22  0403   CALCIUM 8.5     Ionized Calcium:No results for input(s): IONCA in the last 72 hours. Magnesium:No results for input(s): MG in the last 72 hours. Phosphorus:No results for input(s): PHOS in the last 72 hours. BNP:No results for input(s): BNP in the last 72 hours. Glucose:No results for input(s): POCGLU in the last 72 hours. HgbA1C: No results for input(s): LABA1C in the last 72 hours. INR: No results for input(s): INR in the last 72 hours. Hepatic: No results for input(s): ALKPHOS, ALT, AST, PROT, BILITOT, BILIDIR, LABALBU in the last 72 hours. Amylase and Lipase:No results for input(s): LACTA, AMYLASE in the last 72 hours. Lactic Acid: No results for input(s): LACTA in the last 72 hours. Troponin:   Recent Labs     01/21/22  1619 01/21/22  2040   TROPONINT < 0.010 < 0.010     BNP: No results for input(s): BNP in the last 72 hours. Lipids: No results for input(s): CHOL, TRIG, HDL, LDL, LDLCALC in the last 72 hours.   ABGs:   Lab Results   Component Value Date    PH 7.41 11/13/2018    PCO2 42 11/13/2018    PO2 110 11/13/2018    HCO3 27 11/13/2018    O2SAT 98 11/13/2018       Radiology reports as per the Radiologist  Radiology: CT Head WO Contrast    Result Date: 1/21/2022  PROCEDURE: CT HEAD WO CONTRAST, CT CERVICAL SPINE WO CONTRAST CLINICAL INFORMATION:fall COMPARISON: No prior study. TECHNIQUE: 1. 5 mm axial imaging through the head without IV contrast. 2. Helical acquisition of the cervical spine was obtained without contrast. 3 mm axial images were provided for interpretation. Coronal and sagittal MPR images are also provided for interpretation. All CT scans at this facility use dose modulation, iterative reconstruction, and/or weight based dosing when appropriate to reduce the radiation dose to as low as reasonably achievable. FINDINGS: CT head: Mild cerebral volume loss. Diffuse periventricular and subcortical white matter hypodensities that likely relate to chronic microangiopathic disease. Ex vacuo dilatation of the ventricles. No midline shift or mass effect. No acute intracranial hemorrhage. No intracranial collection. Gray-white differentiation is unremarkable. The posterior fossa is unremarkable. The craniocervical junction is unremarkable. No acute bony abnormality. The  paranasal sinuses are clear. The  mastoid air cells are clear. The orbits are unremarkable. CT cervical spine: The bones are demineralized. Multilevel degenerative changes of the cervical spine. There is reversal of the cervical lordosis. No acute cervical spine fracture is seen. There is anterolisthesis of C3 and C4. Marked narrowing of the disc space at C4-C5, C5-C6 and C6-C7. Multilevel degenerative changes of these cervical spine. There is mild to moderate narrowing of the central canal at C5-C6 with mild bilateral neural foraminal narrowing. Mild central canal narrowing at C5-C6 with mild bilateral foraminal narrowing. Calcific biapical pleural scarring is seen. Carotid calcifications noted. No neck masses seen. CT HEAD: 1. No acute intracranial hemorrhage. 2. Sequela of chronic microvascular changes. 3. Mild diffuse cerebral volume loss with ex vacuo dilatation of the ventricles. CT CERVICAL SPINE 1.  No cervical spine fracture. 2. Multilevel degenerative changes of the cervical spine. 3. Reversal of cervical lordosis. 4. Other findings as described above. **This report has been created using voice recognition software. It may contain minor errors which are inherent in voice recognition technology. ** Final report electronically signed by Dr Herbie Toribio on 1/21/2022 2:14 PM    CTA Chest W WO Contrast    Result Date: 1/21/2022  PROCEDURE: CTA CHEST W WO CONTRAST, CT ABDOMEN PELVIS W IV CONTRAST CLINICAL INFORMATION: fall cp sob. Right lower quadrant pain COMPARISON: CT chest 2/15/2021 CT abdomen and pelvis 11/4/2018 TECHNIQUE: 1. Helical CT angiography of the chest was obtained with intravenous contrast. Multiplanar reformats are provided. PE protocol was utilized. 1  mm and 3  mm axial images were provided through the chest after the administration of IV contrast. A non-contrast localizer was obtained. 10 mm MIP reconstructions of the chest performed in coronal and sagittal planes 2. Helical acquisition of the abdomen, and pelvis with  contrast. Multiplanar reformats are provided All CT scans at this facility use dose modulation, iterative reconstruction, and/or weight based dosing when appropriate to reduce the radiation dose to as low as reasonably achievable. CONTRAST: 80  cc of Isovue-370  intravenously FINDINGS: CHEST: No filling defects are seen in the pulmonary arteries to reflect CT evidence of pulmonary embolus. Pacemaker with leads noted. Aortocoronary calcifications. The heart is mildly enlarged. A small hiatal hernia is present. Platelike atelectasis is seen in the lower lobes bilaterally. There is a geographic groundglass appearance of the lungs. Median sternotomy has been performed. The bones appear demineralized. Degenerative changes of the thoracic spine is noted. No focal pulmonary consolidation. No large pulmonary mass. Central airway is patent. No pleural effusions.  No significant pericardial effusion. The ascending thoracic aorta is not dilated. The main pulmonary artery is not dilated. No significant lymphadenopathy in the chest. No chest wall mass. The thyroid is not enlarged. ABDOMEN: Aortoiliac calcifications. Moderate atrophy of the pancreas. Stool fills the colon. Small fat-containing umbilical hernia is seen. Hepatomegaly. Otherwise, the gallbladder, biliary tree , adrenal glands, spleen, and kidneys are unremarkable. No bowel obstruction or acute inflammatory bowel process. No intrahepatic changes seen in the right lower quadrant. The abdominal aorta is not aneurysmal. No significantly enlarged lymph nodes are seen. The bladder is grossly unremarkable. The uterus is surgically absent. Bones: The bones appear demineralized. Degenerative changes of the lumbar spine. Right hip arthroplasty has been performed. Scoliosis of the lumbar spine. Surgical hardware is seen at L4-L5 in the spinous process region. CT CHEST: 1. No CT evidence of pulmonary embolus. 2. A geographic groundglass appearance of the lungs is noted. Differential consideration would include hypersensitivity pneumonitis, small airway disease, small vessel disease, or an infiltrative lung disease. 3. Mild cardiomegaly. CT ABDOMEN and PELVIS: 1. No acute inflammatory bowel process. No acute bowel obstruction. 2. Other findings as described above **This report has been created using voice recognition software. It may contain minor errors which are inherent in voice recognition technology. ** Final report electronically signed by Dr Peter Seay on 1/21/2022 2:31 PM    CT Cervical Spine WO Contrast    Result Date: 1/21/2022  PROCEDURE: CT HEAD WO CONTRAST, CT CERVICAL SPINE WO CONTRAST CLINICAL INFORMATION:fall COMPARISON: No prior study.  TECHNIQUE: 1. 5 mm axial imaging through the head without IV contrast. 2. Helical acquisition of the cervical spine was obtained without contrast. 3 mm axial images were provided for interpretation. Coronal and sagittal MPR images are also provided for interpretation. All CT scans at this facility use dose modulation, iterative reconstruction, and/or weight based dosing when appropriate to reduce the radiation dose to as low as reasonably achievable. FINDINGS: CT head: Mild cerebral volume loss. Diffuse periventricular and subcortical white matter hypodensities that likely relate to chronic microangiopathic disease. Ex vacuo dilatation of the ventricles. No midline shift or mass effect. No acute intracranial hemorrhage. No intracranial collection. Gray-white differentiation is unremarkable. The posterior fossa is unremarkable. The craniocervical junction is unremarkable. No acute bony abnormality. The  paranasal sinuses are clear. The  mastoid air cells are clear. The orbits are unremarkable. CT cervical spine: The bones are demineralized. Multilevel degenerative changes of the cervical spine. There is reversal of the cervical lordosis. No acute cervical spine fracture is seen. There is anterolisthesis of C3 and C4. Marked narrowing of the disc space at C4-C5, C5-C6 and C6-C7. Multilevel degenerative changes of these cervical spine. There is mild to moderate narrowing of the central canal at C5-C6 with mild bilateral neural foraminal narrowing. Mild central canal narrowing at C5-C6 with mild bilateral foraminal narrowing. Calcific biapical pleural scarring is seen. Carotid calcifications noted. No neck masses seen. CT HEAD: 1. No acute intracranial hemorrhage. 2. Sequela of chronic microvascular changes. 3. Mild diffuse cerebral volume loss with ex vacuo dilatation of the ventricles. CT CERVICAL SPINE 1. No cervical spine fracture. 2. Multilevel degenerative changes of the cervical spine. 3. Reversal of cervical lordosis. 4. Other findings as described above. **This report has been created using voice recognition software.   It may contain minor errors which are inherent in voice recognition technology. ** Final report electronically signed by Dr Alisha Wilhelm on 1/21/2022 2:14 PM    CT ABDOMEN PELVIS W IV CONTRAST Additional Contrast? None    Result Date: 1/21/2022  PROCEDURE: CTA CHEST W WO CONTRAST, CT ABDOMEN PELVIS W IV CONTRAST CLINICAL INFORMATION: fall cp sob. Right lower quadrant pain COMPARISON: CT chest 2/15/2021 CT abdomen and pelvis 11/4/2018 TECHNIQUE: 1. Helical CT angiography of the chest was obtained with intravenous contrast. Multiplanar reformats are provided. PE protocol was utilized. 1  mm and 3  mm axial images were provided through the chest after the administration of IV contrast. A non-contrast localizer was obtained. 10 mm MIP reconstructions of the chest performed in coronal and sagittal planes 2. Helical acquisition of the abdomen, and pelvis with  contrast. Multiplanar reformats are provided All CT scans at this facility use dose modulation, iterative reconstruction, and/or weight based dosing when appropriate to reduce the radiation dose to as low as reasonably achievable. CONTRAST: 80  cc of Isovue-370  intravenously FINDINGS: CHEST: No filling defects are seen in the pulmonary arteries to reflect CT evidence of pulmonary embolus. Pacemaker with leads noted. Aortocoronary calcifications. The heart is mildly enlarged. A small hiatal hernia is present. Platelike atelectasis is seen in the lower lobes bilaterally. There is a geographic groundglass appearance of the lungs. Median sternotomy has been performed. The bones appear demineralized. Degenerative changes of the thoracic spine is noted. No focal pulmonary consolidation. No large pulmonary mass. Central airway is patent. No pleural effusions. No significant pericardial effusion. The ascending thoracic aorta is not dilated. The main pulmonary artery is not dilated. No significant lymphadenopathy in the chest. No chest wall mass. The thyroid is not enlarged. ABDOMEN: Aortoiliac calcifications.  Moderate right clavicle is observed. Bones: Diffuse osteopenia is present. The visualized skeletal structures appear intact. COPD and chronic findings are discussed. No acute intrathoracic process. **This report has been created using voice recognition software. It may contain minor errors which are inherent in voice recognition technology. ** Final report electronically signed by Dr Brigido Chung on 1/21/2022 12:51 PM    XR HIP 2-3 VW W PELVIS RIGHT    Result Date: 1/21/2022  PROCEDURE: XR HIP 2-3 VW W PELVIS RIGHT CLINICAL INFORMATION: fall pain . TECHNIQUE: Single AP pelvis and AP and frog leg projections of the right hip, COMPARISON: 3/14/2021 FINDINGS: Prior total right hip arthroplasty. No evidence of acute fracture. Prosthetic components are unremarkable and stable. X-Stop device at L4-5. Bones are severely osteopenic. The lower portion of the sacrum can't be assessed. SI joint degenerative changes noted. Vascular calcifications are noted. No acute finding **This report has been created using voice recognition software. It may contain minor errors which are inherent in voice recognition technology. ** Final report electronically signed by Dr. Symone Luna on 1/21/2022 1:09 PM       Physical Exam:  Vitals: /68   Pulse 73   Temp 97.5 °F (36.4 °C) (Oral)   Resp 18   Ht 5' 4\" (1.626 m)   Wt 110 lb (49.9 kg)   SpO2 94%   BMI 18.88 kg/m²   24 hour intake/output:    Intake/Output Summary (Last 24 hours) at 1/22/2022 1301  Last data filed at 1/21/2022 2345  Gross per 24 hour   Intake --   Output 850 ml   Net -850 ml     Last 3 weights:   Wt Readings from Last 3 Encounters:   01/21/22 110 lb (49.9 kg)   08/31/21 122 lb 12.8 oz (55.7 kg)   07/27/21 119 lb (54 kg)       General appearance - chronically ill appearing  HEENT: Normocephalic, Atraumatic and Conjuctiva pink  Chest - clear to auscultation, no wheezes, rales or rhonchi, symmetric air entry  Cardiovascular - normal rate, regular rhythm, normal S1, S2, no murmurs, rubs, clicks or gallops  Abdomen - soft, nontender, nondistended, no masses or organomegaly   Neurological - Alert and oriented, Normal speech and No focal findings or movement disorder noted  Integumentary - Skin color, texture, turgor normal. No Rashes or lesions  Musculoskeletal -Full ROM times 4 extremities, No clubbing or cyanosis and No peripheral edema      DVT prophylaxis: [x] Lovenox                                 [] SCDs                                 [] SQ Heparin                                 [] Encourage ambulation           [] Already on Anticoagulation                 Assessment:    Acute respiratory failure with hypoxia ([address]): Clinically improved. She is not requiring oxygen at this point. Continue to monitor. Acute cystitis without hematuria: She is on antibiotics. To continue. Monitor cultures. Atrial fibrillation with RVR ([address]): Controlled. Cardiology has been consulted. Risk for falls: Neuro input appreciated. Patient has been started on midodrine. Cardiology to follow. Fall precautions. Resolved Problems:    * No resolved hospital problems.  *      Electronically signed by Padmini Muller MD on 1/22/2022 at 1:01 PM

## 2022-01-22 NOTE — PROGRESS NOTES
[facility] THERAPY MISSED TREATMENT NOTE  [facility] TELEMETRY 6K  6K-28/028-A      Date: 2022  Patient Name: Sidney Almazan        CSN: [id number]   : 1940  (80 y.o.)  Gender: female                REASON FOR MISSED TREATMENT: Pt refused OT despite max encouragement, reporting she was comfortable in bed and did not feel like OOB activity. Will check back later as time allows.

## 2022-01-22 NOTE — CONSULTS
Neurology Consult Note    Date:1/21/2022       RICK:[id number]  Patient Name:Chaparrita Almazan     YOB: 1940     Age:81 y.o. Requesting Physician: Eduardo Russo MD     Reason for Consult:  Evaluate for syncope      Chief Complaint: Syncope, Falls    Subjective     Alan Perez is a 33-year-old  female with past medical history significant for A. fib, CAD, CHF, hyperlipidemia, hypertension and orthostatic hypotension diagnosed March 2021 hospitalization with discharge date 3/17/21 on midodrine 2.5 mg 3 times daily who presented to [address] ED with complaint of multiple falls. Patient states that over the last 6 months she has had multiple falls averaging twice weekly or greater due to being unsteady after she stands up. Patient states that she will sit up in bed and wait for a minute to \"compose\" herself before standing up at which time she also waits to \"compose\" herself before she will begin walking. Patient states that when she falls she feels faint first and after falling she comes to without confusion, loss of bowel bladder control, tongue biting, abnormal movements, unilateral weakness, vision changes, slurred speech. She does not recall previous diagnosis of orthostatic hypotension however this is documented in hospitalizations in March and April 2021 and outpatient visit in May 2021 however it appears that plan was for prescription to be continued however it was not on med list and was not prescribed in subsequent visits. Review of Systems   Review of Systems   Constitutional: Negative for activity change, fatigue and fever. HENT: Negative for tinnitus and trouble swallowing. Eyes: Negative for photophobia and visual disturbance. Respiratory: Negative for cough and shortness of breath. Cardiovascular: Negative for chest pain and palpitations. Gastrointestinal: Negative for diarrhea, nausea and vomiting. Genitourinary: Negative for dysuria and flank pain. Musculoskeletal: Negative for neck pain and neck stiffness. Skin: Negative for color change and rash. Neurological: Positive for syncope and light-headedness. Negative for seizures, facial asymmetry, speech difficulty, weakness, numbness and headaches. Psychiatric/Behavioral: Negative for agitation and confusion. Medications   Scheduled Meds:    aspirin  81 mg Oral Daily    buPROPion  150 mg Oral Daily    atenolol  25 mg Oral Daily    clopidogrel  75 mg Oral Daily    [START ON 2022] pantoprazole  40 mg Oral Daily with breakfast    rosuvastatin  10 mg Oral Daily    sodium chloride flush  5-40 mL IntraVENous 2 times per day    enoxaparin  30 mg SubCUTAneous Daily    furosemide  20 mg IntraVENous TID    potassium chloride  10 mEq Oral TID    cefTRIAXone (ROCEPHIN) IV  1,000 mg IntraVENous Q24H     Continuous Infusions:    sodium chloride       PRN Meds: hydrOXYzine, sodium chloride flush, sodium chloride, acetaminophen **OR** acetaminophen, promethazine **OR** ondansetron, ipratropium-albuterol    Past History    Past Medical History:   has a past medical history of Atrial fibrillation ([facility] [address] CAD (coronary artery disease), Congestive heart failure (CHF) ([facility] [address] Hyperlipidemia, and Hypertension. Social History:   reports that she has never smoked. She has never used smokeless tobacco. She reports that she does not drink alcohol and does not use drugs. Family History:   Family History   Problem Relation Age of Onset    Other Mother         alzheimers    Heart Attack Father     Heart Attack Brother        Physical Examination      Vitals:  BP (!) 117/56   Pulse 72   Temp 98.2 °F (36.8 °C) (Oral)   Resp 18   Ht 5' 4\" (1.626 m)   Wt 110 lb (49.9 kg)   SpO2 96%   BMI 18.88 kg/m²   Temp (24hrs), Av.4 °F (36.9 °C), Min:98.2 °F (36.8 °C), Max:98.6 °F (37 °C)      I/O (24Hr): No intake or output data in the 24 hours ending 22    Physical Exam  Vitals reviewed. Constitutional:       Appearance: Normal appearance. HENT:      Head: Normocephalic and atraumatic. Right Ear: External ear normal.      Left Ear: External ear normal.      Nose: Nose normal.      Mouth/Throat:      Mouth: Mucous membranes are moist.      Pharynx: Oropharynx is clear. Eyes:      Extraocular Movements: Extraocular movements intact and EOM normal.      Pupils: Pupils are equal, round, and reactive to light. Cardiovascular:      Rate and Rhythm: Normal rate and regular rhythm. Comments: Positive orthostatic vital signs as documented in RN charting. Pulmonary:      Effort: Pulmonary effort is normal. No respiratory distress. Abdominal:      General: There is no distension. Palpations: Abdomen is soft. Tenderness: There is no abdominal tenderness. Musculoskeletal:         General: No deformity or signs of injury. Cervical back: No rigidity or tenderness. Skin:     General: Skin is warm and dry. Neurological:      Mental Status: She is alert and oriented to person, place, and time. Coordination: Finger-Nose-Finger Test, Heel to [employer] and Romberg Test normal.      Gait: Gait is intact. Deep Tendon Reflexes: Strength normal.      Reflex Scores:       Tricep reflexes are 2+ on the right side and 2+ on the left side. Bicep reflexes are 2+ on the right side and 2+ on the left side. Patellar reflexes are 2+ on the right side and 2+ on the left side. Psychiatric:         Mood and Affect: Mood normal.         Speech: Speech normal.         Behavior: Behavior normal.         Thought Content: Thought content normal.       Neurologic Exam     Mental Status   Oriented to person, place, and time. Follows 2 step commands. Attention: normal.   Speech: speech is normal   Level of consciousness: alert  Knowledge: good. Able to name object. Able to read. Able to repeat. Cranial Nerves     CN II   Visual fields full to confrontation.      CN III, IV, VI   Pupils are equal, round, and reactive to light. Extraocular motions are normal.     CN V   Facial sensation intact. CN VII   Facial expression full, symmetric. CN VIII   CN VIII normal.   Hearing: intact    CN IX, X   CN IX normal.   Palate: symmetric    CN XI   CN XI normal.   Right sternocleidomastoid strength: normal  Right trapezius strength: normal    CN XII   CN XII normal.   Tongue: not atrophic    Motor Exam   Muscle bulk: normal  Overall muscle tone: normal    Strength   Strength 5/5 throughout. Sensory Exam   Light touch normal.     Gait, Coordination, and Reflexes     Gait  Gait: normal    Coordination   Romberg: negative  Finger to nose coordination: normal  Heel to shin coordination: normal    Reflexes   Right biceps: 2+  Left biceps: 2+  Right triceps: 2+  Left triceps: 2+  Right patellar: 2+  Left patellar: 2+     Labs/Imaging/Diagnostics   Labs:  CBC:  Recent Labs     01/21/22  1230   WBC 9.1   RBC 3.67*   HGB 10.6*   HCT 32.1*   MCV 87.5        CHEMISTRIES:  Recent Labs     01/21/22  1230   *   K 4.6   CL 99   CO2 19*   BUN 28*   CREATININE 1.2   GLUCOSE 167*     PT/INR:No results for input(s): PROTIME, INR in the last 72 hours. APTT:No results for input(s): APTT in the last 72 hours. LIVER PROFILE:No results for input(s): AST, ALT, BILIDIR, BILITOT, ALKPHOS in the last 72 hours. Imaging Last 24 Hours:  CT Head WO Contrast    Result Date: 1/21/2022  PROCEDURE: CT HEAD WO CONTRAST, CT CERVICAL SPINE WO CONTRAST CLINICAL INFORMATION:fall COMPARISON: No prior study. TECHNIQUE: 1. 5 mm axial imaging through the head without IV contrast. 2. Helical acquisition of the cervical spine was obtained without contrast. 3 mm axial images were provided for interpretation. Coronal and sagittal MPR images are also provided for interpretation.  All CT scans at this facility use dose modulation, iterative reconstruction, and/or weight based dosing when appropriate to reduce the radiation dose to as low as reasonably achievable. FINDINGS: CT head: Mild cerebral volume loss. Diffuse periventricular and subcortical white matter hypodensities that likely relate to chronic microangiopathic disease. Ex vacuo dilatation of the ventricles. No midline shift or mass effect. No acute intracranial hemorrhage. No intracranial collection. Gray-white differentiation is unremarkable. The posterior fossa is unremarkable. The craniocervical junction is unremarkable. No acute bony abnormality. The  paranasal sinuses are clear. The  mastoid air cells are clear. The orbits are unremarkable. CT cervical spine: The bones are demineralized. Multilevel degenerative changes of the cervical spine. There is reversal of the cervical lordosis. No acute cervical spine fracture is seen. There is anterolisthesis of C3 and C4. Marked narrowing of the disc space at C4-C5, C5-C6 and C6-C7. Multilevel degenerative changes of these cervical spine. There is mild to moderate narrowing of the central canal at C5-C6 with mild bilateral neural foraminal narrowing. Mild central canal narrowing at C5-C6 with mild bilateral foraminal narrowing. Calcific biapical pleural scarring is seen. Carotid calcifications noted. No neck masses seen. CT HEAD: 1. No acute intracranial hemorrhage. 2. Sequela of chronic microvascular changes. 3. Mild diffuse cerebral volume loss with ex vacuo dilatation of the ventricles. CT CERVICAL SPINE 1. No cervical spine fracture. 2. Multilevel degenerative changes of the cervical spine. 3. Reversal of cervical lordosis. 4. Other findings as described above. **This report has been created using voice recognition software. It may contain minor errors which are inherent in voice recognition technology. ** Final report electronically signed by Dr Hilary Mesa on 1/21/2022 2:14 PM    CTA Chest W WO Contrast    Result Date: 1/21/2022  PROCEDURE: CTA CHEST W WO CONTRAST, CT ABDOMEN PELVIS W IV CONTRAST CLINICAL INFORMATION: fall cp sob. Right lower quadrant pain COMPARISON: CT chest 2/15/2021 CT abdomen and pelvis 11/4/2018 TECHNIQUE: 1. Helical CT angiography of the chest was obtained with intravenous contrast. Multiplanar reformats are provided. PE protocol was utilized. 1  mm and 3  mm axial images were provided through the chest after the administration of IV contrast. A non-contrast localizer was obtained. 10 mm MIP reconstructions of the chest performed in coronal and sagittal planes 2. Helical acquisition of the abdomen, and pelvis with  contrast. Multiplanar reformats are provided All CT scans at this facility use dose modulation, iterative reconstruction, and/or weight based dosing when appropriate to reduce the radiation dose to as low as reasonably achievable. CONTRAST: 80  cc of Isovue-370  intravenously FINDINGS: CHEST: No filling defects are seen in the pulmonary arteries to reflect CT evidence of pulmonary embolus. Pacemaker with leads noted. Aortocoronary calcifications. The heart is mildly enlarged. A small hiatal hernia is present. Platelike atelectasis is seen in the lower lobes bilaterally. There is a geographic groundglass appearance of the lungs. Median sternotomy has been performed. The bones appear demineralized. Degenerative changes of the thoracic spine is noted. No focal pulmonary consolidation. No large pulmonary mass. Central airway is patent. No pleural effusions. No significant pericardial effusion. The ascending thoracic aorta is not dilated. The main pulmonary artery is not dilated. No significant lymphadenopathy in the chest. No chest wall mass. The thyroid is not enlarged. ABDOMEN: Aortoiliac calcifications. Moderate atrophy of the pancreas. Stool fills the colon. Small fat-containing umbilical hernia is seen. Hepatomegaly. Otherwise, the gallbladder, biliary tree , adrenal glands, spleen, and kidneys are unremarkable. No bowel obstruction or acute inflammatory bowel process.  No intrahepatic changes seen in the right lower quadrant. The abdominal aorta is not aneurysmal. No significantly enlarged lymph nodes are seen. The bladder is grossly unremarkable. The uterus is surgically absent. Bones: The bones appear demineralized. Degenerative changes of the lumbar spine. Right hip arthroplasty has been performed. Scoliosis of the lumbar spine. Surgical hardware is seen at L4-L5 in the spinous process region. CT CHEST: 1. No CT evidence of pulmonary embolus. 2. A geographic groundglass appearance of the lungs is noted. Differential consideration would include hypersensitivity pneumonitis, small airway disease, small vessel disease, or an infiltrative lung disease. 3. Mild cardiomegaly. CT ABDOMEN and PELVIS: 1. No acute inflammatory bowel process. No acute bowel obstruction. 2. Other findings as described above **This report has been created using voice recognition software. It may contain minor errors which are inherent in voice recognition technology. ** Final report electronically signed by Dr Moiz Newman on 1/21/2022 2:31 PM    CT Cervical Spine WO Contrast    Result Date: 1/21/2022  PROCEDURE: CT HEAD WO CONTRAST, CT CERVICAL SPINE WO CONTRAST CLINICAL INFORMATION:fall COMPARISON: No prior study. TECHNIQUE: 1. 5 mm axial imaging through the head without IV contrast. 2. Helical acquisition of the cervical spine was obtained without contrast. 3 mm axial images were provided for interpretation. Coronal and sagittal MPR images are also provided for interpretation. All CT scans at this facility use dose modulation, iterative reconstruction, and/or weight based dosing when appropriate to reduce the radiation dose to as low as reasonably achievable. FINDINGS: CT head: Mild cerebral volume loss. Diffuse periventricular and subcortical white matter hypodensities that likely relate to chronic microangiopathic disease. Ex vacuo dilatation of the ventricles. No midline shift or mass effect.   No acute intracranial hemorrhage. No intracranial collection. Gray-white differentiation is unremarkable. The posterior fossa is unremarkable. The craniocervical junction is unremarkable. No acute bony abnormality. The  paranasal sinuses are clear. The  mastoid air cells are clear. The orbits are unremarkable. CT cervical spine: The bones are demineralized. Multilevel degenerative changes of the cervical spine. There is reversal of the cervical lordosis. No acute cervical spine fracture is seen. There is anterolisthesis of C3 and C4. Marked narrowing of the disc space at C4-C5, C5-C6 and C6-C7. Multilevel degenerative changes of these cervical spine. There is mild to moderate narrowing of the central canal at C5-C6 with mild bilateral neural foraminal narrowing. Mild central canal narrowing at C5-C6 with mild bilateral foraminal narrowing. Calcific biapical pleural scarring is seen. Carotid calcifications noted. No neck masses seen. CT HEAD: 1. No acute intracranial hemorrhage. 2. Sequela of chronic microvascular changes. 3. Mild diffuse cerebral volume loss with ex vacuo dilatation of the ventricles. CT CERVICAL SPINE 1. No cervical spine fracture. 2. Multilevel degenerative changes of the cervical spine. 3. Reversal of cervical lordosis. 4. Other findings as described above. **This report has been created using voice recognition software. It may contain minor errors which are inherent in voice recognition technology. ** Final report electronically signed by Dr Moiz Newman on 1/21/2022 2:14 PM    CT ABDOMEN PELVIS W IV CONTRAST Additional Contrast? None    Result Date: 1/21/2022  PROCEDURE: CTA CHEST W WO CONTRAST, CT ABDOMEN PELVIS W IV CONTRAST CLINICAL INFORMATION: fall cp sob. Right lower quadrant pain COMPARISON: CT chest 2/15/2021 CT abdomen and pelvis 11/4/2018 TECHNIQUE: 1. Helical CT angiography of the chest was obtained with intravenous contrast. Multiplanar reformats are provided.  PE protocol was utilized. 1  mm and 3  mm axial images were provided through the chest after the administration of IV contrast. A non-contrast localizer was obtained. 10 mm MIP reconstructions of the chest performed in coronal and sagittal planes 2. Helical acquisition of the abdomen, and pelvis with  contrast. Multiplanar reformats are provided All CT scans at this facility use dose modulation, iterative reconstruction, and/or weight based dosing when appropriate to reduce the radiation dose to as low as reasonably achievable. CONTRAST: 80  cc of Isovue-370  intravenously FINDINGS: CHEST: No filling defects are seen in the pulmonary arteries to reflect CT evidence of pulmonary embolus. Pacemaker with leads noted. Aortocoronary calcifications. The heart is mildly enlarged. A small hiatal hernia is present. Platelike atelectasis is seen in the lower lobes bilaterally. There is a geographic groundglass appearance of the lungs. Median sternotomy has been performed. The bones appear demineralized. Degenerative changes of the thoracic spine is noted. No focal pulmonary consolidation. No large pulmonary mass. Central airway is patent. No pleural effusions. No significant pericardial effusion. The ascending thoracic aorta is not dilated. The main pulmonary artery is not dilated. No significant lymphadenopathy in the chest. No chest wall mass. The thyroid is not enlarged. ABDOMEN: Aortoiliac calcifications. Moderate atrophy of the pancreas. Stool fills the colon. Small fat-containing umbilical hernia is seen. Hepatomegaly. Otherwise, the gallbladder, biliary tree , adrenal glands, spleen, and kidneys are unremarkable. No bowel obstruction or acute inflammatory bowel process. No intrahepatic changes seen in the right lower quadrant. The abdominal aorta is not aneurysmal. No significantly enlarged lymph nodes are seen. The bladder is grossly unremarkable. The uterus is surgically absent. Bones: The bones appear demineralized.  Degenerative changes of the lumbar spine. Right hip arthroplasty has been performed. Scoliosis of the lumbar spine. Surgical hardware is seen at L4-L5 in the spinous process region. CT CHEST: 1. No CT evidence of pulmonary embolus. 2. A geographic groundglass appearance of the lungs is noted. Differential consideration would include hypersensitivity pneumonitis, small airway disease, small vessel disease, or an infiltrative lung disease. 3. Mild cardiomegaly. CT ABDOMEN and PELVIS: 1. No acute inflammatory bowel process. No acute bowel obstruction. 2. Other findings as described above **This report has been created using voice recognition software. It may contain minor errors which are inherent in voice recognition technology. ** Final report electronically signed by Dr Hilary Mesa on 1/21/2022 2:31 PM    XR CHEST PORTABLE    Result Date: 1/21/2022  PROCEDURE: XR CHEST PORTABLE CLINICAL INFORMATION: Fall. Chest pain. Shortness of breath. COMPARISON: Chest x-ray 3/12/2021. TECHNIQUE: AP portable chest radiograph performed. FINDINGS: Lines/tubes: Left chest permanent pacemaker is stable. Heart/mediastinum: The heart size is normal. Median sternotomy wires are intact. Left atrial appendage clip is unchanged. The pulmonary vascularity is unremarkable. Lungs: The lungs remain hyperinflated. No focal consolidation, pleural effusion, or pneumothorax is observed. Stable chronic fracture deformity of the right clavicle is observed. Bones: Diffuse osteopenia is present. The visualized skeletal structures appear intact. COPD and chronic findings are discussed. No acute intrathoracic process. **This report has been created using voice recognition software. It may contain minor errors which are inherent in voice recognition technology. ** Final report electronically signed by Dr Chip Du on 1/21/2022 12:51 PM    XR HIP 2-3 VW W PELVIS RIGHT    Result Date: 1/21/2022  PROCEDURE: XR HIP 2-3 VW W PELVIS RIGHT CLINICAL INFORMATION: fall pain . TECHNIQUE: Single AP pelvis and AP and frog leg projections of the right hip, COMPARISON: 3/14/2021 FINDINGS: Prior total right hip arthroplasty. No evidence of acute fracture. Prosthetic components are unremarkable and stable. X-Stop device at L4-5. Bones are severely osteopenic. The lower portion of the sacrum can't be assessed. SI joint degenerative changes noted. Vascular calcifications are noted. No acute finding **This report has been created using voice recognition software. It may contain minor errors which are inherent in voice recognition technology. ** Final report electronically signed by Dr. Tere Kebede on 1/21/2022 1:09 PM        Assessment and Plan:        Hospital Problems           Last Modified POA    Acute respiratory failure with hypoxia ([address] 1/21/2022 Yes          1. Orthostatic Hypotension  · CT head- no ICH, mass-effect, midline shift, does demonstrate ex vacuo dilatation consistent with age  · Positive orthostatic vital signs tonight  · Restart midodrine 2.5 mg TID  · Cardiology on board, echo work-up pending. · No further recommendations or work-up per neurology, we will sign off. Thank you for the opportunity to be involved in the care of Yamile barkley. Please do not hesitate to call with any questions. This case was discussed with Dr. Jayne Hwang and he is in agreement with the assessment and plan.     Electronically signed by Toyin Matthews PA-C on 1/21/22 at 7:44 PM EST

## 2022-01-23 LAB
ORGANISM: ABNORMAL
URINE CULTURE REFLEX: ABNORMAL

## 2022-01-23 PROCEDURE — 6370000000 HC RX 637 (ALT 250 FOR IP): Performed by: INTERNAL MEDICINE

## 2022-01-23 PROCEDURE — 6370000000 HC RX 637 (ALT 250 FOR IP): Performed by: REGISTERED NURSE

## 2022-01-23 PROCEDURE — 1200000003 HC TELEMETRY R&B

## 2022-01-23 PROCEDURE — 2580000003 HC RX 258: Performed by: REGISTERED NURSE

## 2022-01-23 PROCEDURE — 6360000002 HC RX W HCPCS: Performed by: REGISTERED NURSE

## 2022-01-23 PROCEDURE — 6370000000 HC RX 637 (ALT 250 FOR IP): Performed by: SOCIAL WORKER

## 2022-01-23 RX ADMIN — SODIUM CHLORIDE, PRESERVATIVE FREE 10 ML: 5 INJECTION INTRAVENOUS at 20:56

## 2022-01-23 RX ADMIN — ACETAMINOPHEN 650 MG: 325 TABLET ORAL at 10:06

## 2022-01-23 RX ADMIN — MIDODRINE HYDROCHLORIDE 2.5 MG: 2.5 TABLET ORAL at 12:34

## 2022-01-23 RX ADMIN — SODIUM CHLORIDE, PRESERVATIVE FREE 10 ML: 5 INJECTION INTRAVENOUS at 10:09

## 2022-01-23 RX ADMIN — ACETAMINOPHEN 650 MG: 325 TABLET ORAL at 15:38

## 2022-01-23 RX ADMIN — MIDODRINE HYDROCHLORIDE 2.5 MG: 2.5 TABLET ORAL at 10:00

## 2022-01-23 RX ADMIN — FUROSEMIDE 20 MG: 10 INJECTION, SOLUTION INTRAMUSCULAR; INTRAVENOUS at 10:00

## 2022-01-23 RX ADMIN — ASPIRIN 81 MG: 81 TABLET, COATED ORAL at 10:00

## 2022-01-23 RX ADMIN — BUPROPION HYDROCHLORIDE 150 MG: 150 TABLET, EXTENDED RELEASE ORAL at 10:00

## 2022-01-23 RX ADMIN — CEFTRIAXONE SODIUM 1000 MG: 1 INJECTION, POWDER, FOR SOLUTION INTRAMUSCULAR; INTRAVENOUS at 15:40

## 2022-01-23 RX ADMIN — POTASSIUM CHLORIDE 10 MEQ: 750 TABLET, EXTENDED RELEASE ORAL at 10:00

## 2022-01-23 RX ADMIN — ATENOLOL 25 MG: 25 TABLET ORAL at 10:00

## 2022-01-23 RX ADMIN — CLOPIDOGREL BISULFATE 75 MG: 75 TABLET, FILM COATED ORAL at 10:00

## 2022-01-23 RX ADMIN — PANTOPRAZOLE SODIUM 40 MG: 40 TABLET, DELAYED RELEASE ORAL at 10:06

## 2022-01-23 RX ADMIN — ROSUVASTATIN 10 MG: 10 TABLET, FILM COATED ORAL at 10:00

## 2022-01-23 RX ADMIN — MIDODRINE HYDROCHLORIDE 2.5 MG: 2.5 TABLET ORAL at 15:38

## 2022-01-23 RX ADMIN — FUROSEMIDE 20 MG: 10 INJECTION, SOLUTION INTRAMUSCULAR; INTRAVENOUS at 12:34

## 2022-01-23 RX ADMIN — POTASSIUM CHLORIDE 10 MEQ: 750 TABLET, EXTENDED RELEASE ORAL at 12:34

## 2022-01-23 RX ADMIN — POTASSIUM CHLORIDE 10 MEQ: 750 TABLET, EXTENDED RELEASE ORAL at 20:56

## 2022-01-23 RX ADMIN — FUROSEMIDE 20 MG: 10 INJECTION, SOLUTION INTRAMUSCULAR; INTRAVENOUS at 20:56

## 2022-01-23 RX ADMIN — ENOXAPARIN SODIUM 30 MG: 100 INJECTION SUBCUTANEOUS at 10:00

## 2022-01-23 ASSESSMENT — PAIN SCALES - GENERAL
PAINLEVEL_OUTOF10: 0
PAINLEVEL_OUTOF10: 3
PAINLEVEL_OUTOF10: 4
PAINLEVEL_OUTOF10: 0
PAINLEVEL_OUTOF10: 0

## 2022-01-23 NOTE — PROGRESS NOTES
Per Dr. Sandra Almaraz to do orthostatic blood pressures. Notified Dr. Sandra Almaraz of positive results, no new orders at this time.

## 2022-01-24 ENCOUNTER — APPOINTMENT (OUTPATIENT)
Dept: CT IMAGING | Age: 82
DRG: 291 | End: 2022-01-24
Payer: MEDICARE

## 2022-01-24 PROCEDURE — 6370000000 HC RX 637 (ALT 250 FOR IP): Performed by: SOCIAL WORKER

## 2022-01-24 PROCEDURE — 13131 CMPLX RPR F/C/C/M/N/AX/G/H/F: CPT | Performed by: PHYSICIAN ASSISTANT

## 2022-01-24 PROCEDURE — 6370000000 HC RX 637 (ALT 250 FOR IP): Performed by: REGISTERED NURSE

## 2022-01-24 PROCEDURE — 0HQ0XZZ REPAIR SCALP SKIN, EXTERNAL APPROACH: ICD-10-PCS | Performed by: HOSPITALIST

## 2022-01-24 PROCEDURE — 6360000002 HC RX W HCPCS: Performed by: REGISTERED NURSE

## 2022-01-24 PROCEDURE — 1200000003 HC TELEMETRY R&B

## 2022-01-24 PROCEDURE — 70450 CT HEAD/BRAIN W/O DYE: CPT

## 2022-01-24 PROCEDURE — 97530 THERAPEUTIC ACTIVITIES: CPT

## 2022-01-24 PROCEDURE — APPSS30 APP SPLIT SHARED TIME 16-30 MINUTES: Performed by: NURSE PRACTITIONER

## 2022-01-24 PROCEDURE — 97161 PT EVAL LOW COMPLEX 20 MIN: CPT

## 2022-01-24 PROCEDURE — 97116 GAIT TRAINING THERAPY: CPT

## 2022-01-24 PROCEDURE — 2580000003 HC RX 258: Performed by: REGISTERED NURSE

## 2022-01-24 PROCEDURE — 6370000000 HC RX 637 (ALT 250 FOR IP): Performed by: INTERNAL MEDICINE

## 2022-01-24 PROCEDURE — 99232 SBSQ HOSP IP/OBS MODERATE 35: CPT | Performed by: INTERNAL MEDICINE

## 2022-01-24 RX ORDER — FUROSEMIDE 20 MG/1
20 TABLET ORAL 2 TIMES DAILY
Status: DISCONTINUED | OUTPATIENT
Start: 2022-01-24 | End: 2022-01-25 | Stop reason: HOSPADM

## 2022-01-24 RX ADMIN — MIDODRINE HYDROCHLORIDE 2.5 MG: 2.5 TABLET ORAL at 16:37

## 2022-01-24 RX ADMIN — SODIUM CHLORIDE, PRESERVATIVE FREE 10 ML: 5 INJECTION INTRAVENOUS at 20:03

## 2022-01-24 RX ADMIN — ACETAMINOPHEN 650 MG: 325 TABLET ORAL at 20:03

## 2022-01-24 RX ADMIN — ATENOLOL 25 MG: 25 TABLET ORAL at 07:59

## 2022-01-24 RX ADMIN — MIDODRINE HYDROCHLORIDE 2.5 MG: 2.5 TABLET ORAL at 07:59

## 2022-01-24 RX ADMIN — CEFTRIAXONE SODIUM 1000 MG: 1 INJECTION, POWDER, FOR SOLUTION INTRAMUSCULAR; INTRAVENOUS at 16:37

## 2022-01-24 RX ADMIN — MIDODRINE HYDROCHLORIDE 2.5 MG: 2.5 TABLET ORAL at 12:06

## 2022-01-24 RX ADMIN — ROSUVASTATIN 10 MG: 10 TABLET, FILM COATED ORAL at 07:59

## 2022-01-24 RX ADMIN — FUROSEMIDE 20 MG: 10 INJECTION, SOLUTION INTRAMUSCULAR; INTRAVENOUS at 07:58

## 2022-01-24 RX ADMIN — POTASSIUM CHLORIDE 10 MEQ: 750 TABLET, EXTENDED RELEASE ORAL at 07:59

## 2022-01-24 RX ADMIN — SODIUM CHLORIDE, PRESERVATIVE FREE 10 ML: 5 INJECTION INTRAVENOUS at 07:59

## 2022-01-24 RX ADMIN — PANTOPRAZOLE SODIUM 40 MG: 40 TABLET, DELAYED RELEASE ORAL at 07:59

## 2022-01-24 RX ADMIN — POTASSIUM CHLORIDE 10 MEQ: 750 TABLET, EXTENDED RELEASE ORAL at 14:50

## 2022-01-24 RX ADMIN — FUROSEMIDE 20 MG: 20 TABLET ORAL at 16:36

## 2022-01-24 RX ADMIN — POTASSIUM CHLORIDE 10 MEQ: 750 TABLET, EXTENDED RELEASE ORAL at 20:03

## 2022-01-24 RX ADMIN — BUPROPION HYDROCHLORIDE 150 MG: 150 TABLET, EXTENDED RELEASE ORAL at 07:59

## 2022-01-24 RX ADMIN — ACETAMINOPHEN 650 MG: 325 TABLET ORAL at 04:40

## 2022-01-24 ASSESSMENT — PAIN DESCRIPTION - LOCATION
LOCATION: HEAD
LOCATION: HEAD

## 2022-01-24 ASSESSMENT — PAIN - FUNCTIONAL ASSESSMENT: PAIN_FUNCTIONAL_ASSESSMENT: ACTIVITIES ARE NOT PREVENTED

## 2022-01-24 ASSESSMENT — PAIN SCALES - GENERAL
PAINLEVEL_OUTOF10: 4
PAINLEVEL_OUTOF10: 2
PAINLEVEL_OUTOF10: 5
PAINLEVEL_OUTOF10: 5
PAINLEVEL_OUTOF10: 0

## 2022-01-24 ASSESSMENT — PAIN DESCRIPTION - PAIN TYPE
TYPE: ACUTE PAIN
TYPE: ACUTE PAIN

## 2022-01-24 ASSESSMENT — PAIN DESCRIPTION - ORIENTATION: ORIENTATION: POSTERIOR

## 2022-01-24 ASSESSMENT — PAIN DESCRIPTION - DESCRIPTORS: DESCRIPTORS: ACHING

## 2022-01-24 ASSESSMENT — PAIN DESCRIPTION - PROGRESSION: CLINICAL_PROGRESSION: NOT CHANGED

## 2022-01-24 ASSESSMENT — PAIN DESCRIPTION - FREQUENCY: FREQUENCY: CONTINUOUS

## 2022-01-24 ASSESSMENT — PAIN DESCRIPTION - ONSET: ONSET: ON-GOING

## 2022-01-24 NOTE — FLOWSHEET NOTE
01/24/22 0202   Provider Notification   Reason for Communication Evaluate   Provider Name Dr. Evelyn Mendez   Provider Notification Physician   Method of Communication Secure Message   Response See orders   Notification Time [facility] Dr. Evelyn Mendez called regarding pt falling. It was unwitnessed, bleeding from posterior head. New order for CT head without contrast.     5480 - Dr. Evelyn Mendez called regarding results of CT scan, inquired to discuss it with Dr. Kirill Dickson.     1231 - updated that message was relayed to Dr. Shelby Johnson answering service. 8899 - Dr. Evelyn Mendez called regarding phone call discussion with Dr. Kirill Dickson. Ordered neuro checks every 2 hours, seizures precautions, hold blood thinners until assess by day shift team. Follow up with day shift regarding possible need for tetanus shot. Will consult with hospitalist to check wound in regards to possible suturing needed to the laceration on pt head.

## 2022-01-24 NOTE — CARE COORDINATION
DISCHARGE/PLANNING EVALUATION  1/24/22, 10:44 AM EST    Reason for Referral: discharge needs  Mental Status: pt is alert and oriented   Decision Making: pt is capable of making own decisions   Family/Social/Home Environment: pt resides at home alone in a condominium. Pt reports being independent with personal care, cooking and cleaning. Pts daughter, Melissa Liner provides transportation and is very attentive. Current Services including food security, transportation and housekeeping: see above   Current Equipment:elevated platform walker, shower chair, raised toilet   Payment Source:Medicare/[employer] [place]   Concerns or Barriers to Discharge: none noted  Post acute provider list with quality measures, geographic area and applicable managed care information provided. Questions regarding selection process answered:ECF list provided, requested [facility]     Teach Back Method used with pt regarding care plan   Patient and Melissa carmenr verbalize understanding of the plan of care and contribute to goal setting. Patient goals, treatment preferences and discharge plan:  BRIGIDA met with pt and Melissa carmenr. Discussed ECF for short rehab. Pt is requesting [facility], has been their in past.  Referral completed with [facility]. Electronically signed by ARTURO Hong on 1/24/2022 at 10:44 AM       2:48 PM update:  BRIGIDA spoke with pt and daughter, they are wanting [facility] [place] [facility].   Referral completed with Marysol Torres

## 2022-01-24 NOTE — PROGRESS NOTES
Post-fall pharmacy consult    Pharmacy has been consulted to review medication profile for fall risk. Medications increasing risk of falling: Atenolol, Midodrine, Furosemide, Bupropion, Atarax, Phenergan, Zofran    Medications increasing risk of bleeding: ASA, Lovenox, Plavix    Findings discussed with KELY Benitez who said fall was not witnessed but KELY Merlos found patient on the floor in pt's room. Bed was at the lowest position with bed alarm on, nonskid socks also on the pt and call light in reach. Pt did not call for help. She was confused when found and said she was looking for   but pt quickly regained orientation. Pt hit her head with laceration and small bleed on the back of head. RN said pt had four chacho. Dr. Con Perez informed. MD ordered CT Head and MD ordered Neuro check every 2 hours and seizure precautions. BP at the time of fall was 142/86. KELY Galeano said pt assisted back to bed. Quickly became alert and oriented to person, place, situation, and time. Also nurse supervisor Amara Ng and pt's daughter informed. Pt's medications that could contribute to a fall or bleed discussed with KELY Galeano. Recommendations: KELY Galeano informed to continue to monitor pt and inform MD of any unwanted effects, bruise or bleed.     [facility] 20[facility] 5:28 AM

## 2022-01-24 NOTE — PROGRESS NOTES
[address] [address]  INPATIENT PHYSICAL THERAPY  EVALUATION  [facility] RENAL TELEMETRY 6K - 6K-28/028-A    Time In: 0900  Time Out: 0940  Timed Code Treatment Minutes: 40 Minutes  Minutes: 40          Date: 2022  Patient Name: Mirian Worthy,  Gender:  female        MRN: [id number]  : 1940  (80 y.o.)  Referral Date : 22   Referring Practitioner: PHIL Mackey CNP  Diagnosis: Acute cystitis without hematuria  Treatment Diagnosis: Unsteady, dizziness, difficulty walking, fall risk, muscular weakness  Additional Pertinent Hx: Presented to ED 22. Multiple falls, dizzy/spinning sensation, Syncopal episodes. Vestibular PT consulted although BPPV would not typically cause pt to pass out. R sided rib pain from falling, R hip pain. Chest CT negative. Found to have UTI, hypoxia, CHF exacerbation. Pacemaker. Restrictions/Precautions:       Subjective:  Patient assessed for rehabilitation services?: Yes  Family / Caregiver Present: No       General:  Overall Orientation Status: Within Functional Limits    Vision: Within Functional Limits    Hearing: Within functional limits         Pain: 2/10: R ribs painful. Patient agreeable to walk in room. Reports feeling dizzy. She has never been treated for vertigo before.      Vitals: Vitals not assessed per clinical judgement, see nursing flowsheet    Social/Functional History:    Lives With: Alone  Type of Home: Apartment  Home Layout: One level  Home Access: Level entry  Home Equipment: Walker-Lift (Elevated platform walker)     Bathroom Shower/Tub: Shower chair with back,Walk-in shower  Bathroom Toilet: Handicap height  Bathroom Equipment: Grab bars in shower,Shower chair,Toilet raiser  Bathroom Accessibility: Walker accessible    Receives Help From: Family (Daughter checks on her)  ADL Assistance: [address]: Independent  Homemaking Responsibilities: Yes  Meal Prep Responsibility: Primary  Laundry Responsibility: Primary  Cleaning Responsibility: Primary  Ambulation Assistance: Needs assistance  Transfer Assistance: Independent       Occupation: Retired       OBJECTIVE:  Range of Motion:  Bilateral Lower Extremity: WFL - Right hip abduction/rotation WFL  Bilateral Upper Extremity:  WFL except R shoulder flexion 0-90 deg, painful ribs    Strength:  Bilateral Lower Extremity: B hip flexion 4-/5, B hip abd 4/5, B hip add 4/5, knee ext 3/5, ankle DF 3/5, PF 3/5  Bilateral Upper Extremity:  WFL    Balance:  Static Sitting Balance:  Stand By Assistance  Dynamic Sitting Balance: Contact Guard Assistance, Min A for scooting forward/retro while seated d/t rib pain  Static Standing Balance: Contact Guard Assistance, without walker and narrow stance increased sway  Dynamic Standing Balance: Minimal Assistance, narrow stance with perturbations causes LOB. Stood at bathroom sink while washing hands needing Min A for balance to reach soap    Bed Mobility:  Supine to Sit: Minimal Assistance, with head of bed raised, with rail  Sit to Supine: Moderate Assistance, with head of bed flat, with rail     Transfers:  Sit to Stand: [employer], cues for hand placement  Stand to Sit:Minimal Assistance, cues for hand placement, posterior LOB, difficulty bending forward due to rib pain  Stand Pivot:Contact Guard Assistance, using RW     Ambulation:  Minimal Assistance  Distance: 2x 15 ft to bathroom  Surface: Level Tile  Device:Rolling Walker  Gait Deviations: Forward Flexed Posture, Slow Estrella, Decreased Step Length Bilaterally, Lean to Right, Decreased Gait Speed, Decreased Heel Strike Bilaterally, Wide Base of Support and Mild Path Deviations    Exercise:  None performed today. Functional Outcome Measures: Completed  AM-PAC Inpatient Mobility Raw Score : 16  AM-PAC Inpatient T-Scale Score : 40.78    ASSESSMENT:  Activity Tolerance:  Patient tolerance of  treatment: good.  Patient fatigued after going to bathroom Treatment Initiated: Treatment and education initiated within context of evaluation. Evaluation time included review of current medical information, gathering information related to past medical, social and functional history, completion of standardized testing, formal and informal observation of tasks, assessment of data and development of plan of care and goals. Treatment time included skilled education and facilitation of tasks to increase safety and independence with functional mobility for improved independence and quality of life. Assessment: Body structures, Functions, Activity limitations: Decreased functional mobility ,Decreased ROM,Decreased strength,Increased pain,Decreased balance,Decreased posture,Decreased high-level IADLs,Vestibular Impairment,Decreased safe awareness  Assessment: Patient presents with symptoms of dizziness consistent with physician referral for vestibular therapy. She demonstrates abnormalities with smooth pursuit and sacchades, and reports sensation of spinning. However unable to perform BPPV positional testing due to complaint of R rib pain, unable to tolerate R sidelying. She will benefit from vestibular testing on an outpatient PT basis if symptoms are still present. Patient does also demonstrate limited endurance, decreased balance, and muscular weakness consistent with hospital admission for UTI, CHF exacerbation. She has fallen during her hospital admission and is at high risk of falls. She will benefit from PT during her hospital admission in order to return to prior level living alone and walking with walker around her home. Prognosis: Excellent    REQUIRES PT FOLLOW UP: Yes    Discharge Recommendations:  Discharge Recommendations: 24 hour supervision or assist,Patient would benefit from continued therapy after discharge,Subacute/Skilled Nursing Facility (Needs supervision for reminders of hand placement and to use her walker.  She admits to leaving it aside and falling frequently at home.)    Patient Education:  PT Education: Ulysess Shock of [employer] [place] Mobility Training,General Safety,Gait Training    Equipment Recommendations:  Equipment Needed: Yes  Mobility Devices: Martha Mattock: Rolling (Patient currently has platform 4 wheeled walker at home, but does not consistently use it, needs supervision for safety)    Plan:  Times per week: 5x GM  Specific instructions for Next Treatment: Check for BPPV with sidelying Hallpike Sandeep test if tolerated by R rib pain. Too painful at eval. Amb with walker, fall risk  Current Treatment Recommendations: Strengthening,ROM,Balance Training,Functional Mobility Training,Transfer Training,ADL/Self-care Training,Endurance Training,Gait Training,Stair training,Vestibular [employer] Education & Training    Goals:  Patient goals : Return home, decrease rib pain  Short term goals  Time Frame for Short term goals: By discharge  Short term goal 1: Patient will ambulate with RW x100 ft with Mod I in order to return to home alone. Short term goal 2: Patient will perform IND bed mobility with HOB flat and no bedrail in order to decrease risk of falls. Short term goal 3: Patient will transfer sit <> stand to rolling walker with IND no cues needed for hand placement in order to decrease risk of falling. Long term goals  Time Frame for Long term goals : NA due to short ELOS    Following session, patient left in safe position with all fall risk precautions in place.

## 2022-01-24 NOTE — PROGRESS NOTES
Post-Fall Assessment  Date of Fall:   1/24/2022  Time of Fall:   0200   Yes No N/A Comment   Was Patient on [place]alling Star Program? [x] [] []    Was the Fall Witnessed? [] [x] []    Were Clothes a Factor? [] [x] []    Was Patient Wearing Corrective Footwear? [x] [] []    Other Environmental Factors Involved? [] [x] []      Description of Fall  Who found the patient: Joey Munoz RN  Where was the patient at the time of the fall:  Floor in pt room  Brief description of fall: pt found on floor, was unwitnessed. Bed alarm on, bed lowest position, nonskid socks on bilaterally, call light in reach. She was confused to situation, time, and place but had quickly regained orientation. Small laceration on back of head bleeding. Assisted pt back to bed. Quickly became alert and oriented to person, place, situation, and time. Patient comments regarding fall:   She was looking for her   Medications potentially contributing to fall risk (such as Sedatives, Hypnotics, Antihypertensives, Narcotics, Psychotropics, Anticonvulsants): Wellbutrin, Atarax, Midodrine, Plavix, Phenergan, Aspirin, Atenolol. Patient Assessment of Injury    (Please document Vital Signs in Doc Flowsheet)     Yes No   Patient hit his/her head [x] []   Patient is taking an anticoagulant [x] []   CT of Head requested [x] []     Neurological Assessment Protocol: yes    If the patient has hit his/her head during this fall,   a Neurological Assessment must be completed every 2 hours for 12 hours;   every 3 hours for 24 hours;   then every 4 hours for 24 hours. Document in Doc Flowsheets. Neurological Assessment Protocol initiated  yes    If the patient did not hit his/her head during this fall, monitor vital signs every 8 hours. Notify the physician within 24 hours and document. Physician Notification  Please document under Provider Notification group within the Assessment (Complex Assessment) template of Doc Flowsheets.      Physician notified yes, Dr. Mynor Freitas at [address] [facility] notified yes Time Notified: 5247    House Supervisor/Clinical Manager Notified:   Jey Cordova RN  Date:   1/24/2022 Time:   0203  Family Member Notified:   Yes, daughter [facility]  Date:   1/24/2022 Time:   153

## 2022-01-24 NOTE — PROCEDURES
Laceration Repair Procedure Note    Indication: Laceration    Procedure: The patient was placed in the appropriate position and anesthesia around the laceration was not performed at the patient's request. The area was then cleansed using chlorhexidine. The laceration was closed with staples. There were no additional lacerations requiring repair. The wound area was then dressed with a bandage. Total repaired wound length: 2.5 cm. Other Items: Suture count: 4    The patient tolerated the procedure well.     Complications: None      Lora Moreno PA-C

## 2022-01-24 NOTE — CARE COORDINATION
1/24/22, 10:19 AM EST  DISCHARGE PLANNING EVALUATION:    Tish Almazan       Admitted: 1/21/2022/ [facility] day: 3   Location: [facility]28/028- Reason for admit: Acute respiratory failure with hypoxia ([facility] [J96.01]  Acute congestive heart failure, unspecified heart failure type ([facility] [I50.9]   PMH:  has a past medical history of [facility] CAD (coronary artery disease), Congestive heart failure (CHF) ([facility] Hyperlipidemia, and Hypertension. Procedure: Scalp lac sutured 1/24  Barriers to Discharge:  BNP 21149. Negative orthos this a.m.  IV rocephin, IV lasix tid. Cardiology consult pending. PT/OT. PCP: PHIL Walsh CNP  Readmission Risk Score: 15.8 ( )%    Patient Goals/Plan/Treatment Preferences: Lakisha is requesting ECF for rehab, [facility] following. Transportation/Food Security/Housekeeping Addressed:  No issues identified.

## 2022-01-24 NOTE — PROGRESS NOTES
Progress note      [facility]             Patient:  Abdirahman Almazan  YOB: 1940    MRN: [id number]   Acct:  [id number]   -[id number]  Primary Care Physician: PHIL Cerda - CNP    Admit Date: 1/21/2022           Subjective: Pt has been weaned off O2 with adequate diuresis. Pt is willing to d/c to ECF. Noted last night she got out of bed by herself and fell. CT head was negative for bleed but did show SC hematoma; hospitalist rounding provided staples to the wound. No signs of active bleeding. No acute neuro changes since the fall. Repeat orthostatics negative. Daughter at bedside is worried pt is having progressive memory problems and would like an assessment done. No chest pain or SOB.         Objective:      Physical Exam:    Vitals:Patient Vitals for the past 24 hrs:   BP Temp Temp src Pulse Resp SpO2   01/24/22 1104 116/62 97.7 °F (36.5 °C) Oral 70 16 90 %   01/24/22 0744 -- 97.6 °F (36.4 °C) Oral 76 16 91 %   01/24/22 0435 129/70 97.4 °F (36.3 °C) Oral 56 18 92 %   01/24/22 0200 (!) 142/86 -- -- 63 18 92 %   01/23/22 2327 114/65 97.9 °F (36.6 °C) Oral 55 18 93 %   01/23/22 2049 132/64 97.6 °F (36.4 °C) Oral 56 18 92 %   01/23/22 1513 -- 97.7 °F (36.5 °C) Oral -- 18 98 %     Weight: Weight: 110 lb (49.9 kg)     24 hour intake/output:    Intake/Output Summary (Last 24 hours) at 1/24/2022 1412  Last data filed at 1/24/2022 0744  Gross per 24 hour   Intake 420 ml   Output 950 ml   Net -530 ml     General appearance - alert, ill appearing, and in no distress  Eyes - pupils equal and reactive, extraocular eye movements intact  Mouth - mucous membranes moist, pharynx normal without lesions  Neck - supple, no significant adenopathy  Chest - clear to auscultation, no wheezes, rales or rhonchi, symmetric air entry  Heart - normal rate, regular rhythm, normal S1, S2, no murmurs, rubs, clicks or gallops  Abdomen - soft, nontender, nondistended, no masses or organomegaly, pos bs. Neurological - alert, oriented, normal speech, intermittent confusion  Musculoskeletal - no joint tenderness, deformity or swelling  Extremities - peripheral pulses normal, no pedal edema, no clubbing or cyanosis  Skin - normal coloration and turgor, no rashes, no suspicious skin lesions noted    Review of Labs and Diagnostic Testing:    CBC:   Recent Labs     01/22/22  0403   WBC 7.1   HGB 10.7*   HCT 33.3*   MCV 89.3        BMP:   Recent Labs     01/22/22  0403   *   K 4.1   CL 98   CO2 20*   BUN 27*   CREATININE 1.2   CALCIUM 8.5   GLUCOSE 133*     PT/INR: No results for input(s): PROTIME, INR in the last 72 hours. APTT: No results for input(s): APTT in the last 72 hours. Lipids: No results for input(s): ALKPHOS, ALT, AST, BILITOT, BILIDIR, LABALBU, AMYLASE, LIPASE in the last 72 hours. Troponin:   Recent Labs     01/21/22 2040   TROPONINT < 0.010        Imaging:  [unfilled]    EKG:      Diet: ADULT DIET; Regular;  Low Sodium (2 gm)        Data:   Scheduled Meds: Scheduled Meds:   aspirin  81 mg Oral Daily    buPROPion  150 mg Oral Daily    atenolol  25 mg Oral Daily    clopidogrel  75 mg Oral Daily    pantoprazole  40 mg Oral Daily with breakfast    rosuvastatin  10 mg Oral Daily    sodium chloride flush  5-40 mL IntraVENous 2 times per day    enoxaparin  30 mg SubCUTAneous Daily    furosemide  20 mg IntraVENous TID    potassium chloride  10 mEq Oral TID    cefTRIAXone (ROCEPHIN) IV  1,000 mg IntraVENous Q24H    midodrine  2.5 mg Oral TID      Continuous Infusions:   sodium chloride       PRN Meds:.hydrOXYzine, sodium chloride flush, sodium chloride, acetaminophen **OR** acetaminophen, promethazine **OR** ondansetron, ipratropium-albuterol  Continuous Infusions:   sodium chloride           Assessment/Plan   Syncopal episodes leading to falls with acute fall last night  Seen by neuro and believe to be due to orthostatic hypotension  Cont Midodrine  Repeat orthos this AM negative for orthostatic hypotension  S/P repair of laceration by on call hospitalist this AM; will hold Lovenox    ? 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 Electronically signed by PHIL Narayan CNP on 1/24/2022 at 2:12 PM  Addendum/attestation by Krystin Moreno MD:  I have seen and examined the patient independently. Face to face evaluation and examination was performed. The above evaluation and note has been reviewed. Laboratory and radiological data were reviewed. I Have discussed with Jay Finn CNP about this patient in detail. The above assessment and plan has been reviewed. Please see my modifications mentioned below.       My modifications: Dr Kori Banks resumes care in a.m    Electronically signed by Andrea Weldon MD on 1/24/2022 at 3:44 PM

## 2022-01-24 NOTE — SIGNIFICANT EVENT
Called to evaluate the patient for laceration repair following a fall. The patient had a fall in the room. CT head was negative but the patient did sustain a laceration to the scalp. There is a 2.5cm laceration on right high parietal aspect. See procedure note.       Zahraa Crook PA-C

## 2022-01-24 NOTE — PROGRESS NOTES
Notified by nurse that patient had fallen (unwitnessed). Had some transient confusion. CT was obtained which I discussed with radiology. No acute intracerebral lesion but she has a SC hematoma. No active bleeding according to nurse. Plans discussed with nurse include:  1. Fall precautions  2. Neuro checks Q2  3. Seizure precautions  4. Confirm tetanus status in the morning with PCP to determine if booster is needed. 5. Hold blood thinners until assessed by morning staff.

## 2022-01-24 NOTE — FLOWSHEET NOTE
01/24/22 5560   Provider Notification   Reason for Communication Evaluate   Provider Name Iva Butler [place]   Provider Notification Advance Practice Clinician (CNS/NP/CNM/CRNA/PA)   Method of Communication Call   Response No new orders   Notification Time [id number] [facility] [place] [facility] regarding conversation with Dr. Mynor Freitas about having pt laceration checked. Willing to come assess. [address] [place] [address] arrived to pt room, confirmed pt needed staples. She placed them.

## 2022-01-24 NOTE — PROGRESS NOTES
[facility]    Patient - Coral Almazan   MRN -  [id number]   Acct # - [de-identified]   - 1940      Date of Admission -  2022 12:10 PM  Date of evaluation -  2022  Room - [address] [place] [address] Minyd Westbrook MD Primary Care Physician - Mayur Easley, APRN - CNP   Reason for consult    Acute respiratory failure with hypoxia  Active Hospital Problem List      Active Hospital Problems    Diagnosis Date Noted    Acute cystitis without hematuria [N30.00] 2022     Priority: High     Class: Acute    Acute respiratory failure with hypoxia ([facility] [J96.01] 2022    Risk for falls [Z91.81] 2019    Atrial fibrillation with RVR ([facility] [address] [I48.91] 2018     HPI   The patient is a 80 y.o. female with pmhx coronary artery disease status post CABG and stent on aspirin and Plavix, of A fib s/p Watchman and pacemaker, CHF reduced EF, HTN, HLD, Valve replacements who presents with multiple falls over the past year in the setting of syncope. Per her daughter at bedside the falls are almost always unwitnessed . She does have bruises periodically from the falls but tries to hide them. Pt has no known hx of seizures. She denies cough or sputum production, she lives home alone but her daughter comes to check on her every day. In the emergency department pt was found to be hypoxic and placed on 3LNC. BNP was elevated from baseline, CXR shows COPD, CTA shows no PE mild atelectasis of the right lower lobe.   COVID-negative  Patient is non-smoker  Does not use home oxygen      Past 24 hrs   -On RA  -fell early this AM lac to head noted 4 staples placed overnight  -CT head completed see details below  All other systems reviewed    Past Medical History         Diagnosis Date    Atrial fibrillation ([address]     CAD (coronary artery disease)     Congestive heart failure (CHF) (HCC)     Hyperlipidemia     Hypertension       Past Surgical History           Procedure Laterality Date    ATRIAL ABLATION SURGERY      CARDIAC CATHETERIZATION      CARDIAC VALVE REPLACEMENT      CORONARY ANGIOPLASTY WITH STENT PLACEMENT  02/28/2019    CORONARY ARTERY BYPASS GRAFT      x1    ENDOSCOPY, COLON, DIAGNOSTIC      HYSTERECTOMY      JOINT REPLACEMENT      MITRAL VALVE REPLACEMENT      PACEMAKER PLACEMENT  05/2019    IN OFFICE/OUTPT VISIT,PROCEDURE ONLY N/A 11/12/2018    CABG X3, MITRAL VALVE REPAIR, MAZE, AND TRICUSPID VALVE REPAIR, DEANNA performed by Cheryle Shelter, MD at [facility] Right 3/14/2021    HIP TOTAL ARTHROPLASTY performed by Elise Patel MD at [facility] ENDOSCOPY  4/12/2021    EGD BIOPSY performed by Cain Duque MD at [facility]    UPPER GASTROINTESTINAL ENDOSCOPY  4/12/2021    EGD CONTROL HEMORRHAGE performed by Cain Duque MD at [facility]     Diet    ADULT DIET; Regular; Low Sodium (2 gm)  Allergies    Patient has no known allergies. Social History     Social History     Socioeconomic History    Marital status:       Spouse name: Not on file    Number of children: 1    Years of education: Not on file    Highest education level: Not on file   Occupational History    Not on file   Tobacco Use    Smoking status: Never Smoker    Smokeless tobacco: Never Used   Vaping Use    Vaping Use: Never used   Substance and Sexual Activity    Alcohol use: No    Drug use: No    Sexual activity: Not Currently   Other Topics Concern    Not on file   Social History Narrative    Not on file     Social Determinants of Health     Financial Resource Strain: Low Risk     Difficulty of Paying Living Expenses: Not hard at all   Food Insecurity: No Food Insecurity    Worried About Running Out of Food in the Last Year: Never true    Sierra of Food in the Last Year: Never true   Transportation Needs:     Lack of Transportation (Medical): Not on file    Lack of Transportation (Non-Medical): Not on file   Physical Activity:     Days of Exercise per Week: Not on file    Minutes of Exercise per Session: Not on file   Stress:     Feeling of Stress : Not on file   Social Connections:     Frequency of Communication with Friends and Family: Not on file    Frequency of Social Gatherings with Friends and Family: Not on file    Attends Anabaptist Services: Not on file    Active Member of Clubs or Organizations: Not on file    Attends Club or Organization Meetings: Not on file    Marital Status: Not on file   Intimate Partner Violence:     Fear of Current or Ex-Partner: Not on file    Emotionally Abused: Not on file    Physically Abused: Not on file    Sexually Abused: Not on file   Housing Stability:     Unable to Pay for Housing in the Last Year: Not on file    Number of Places Lived in the Last Year: Not on file    Unstable Housing in the Last Year: Not on file     Family History          Problem Relation Age of Onset    Other Mother         alzheimers    Heart Attack Father     Heart Attack Brother      Sleep History    No history    Meds    Current Medications    aspirin  81 mg Oral Daily    buPROPion  150 mg Oral Daily    atenolol  25 mg Oral Daily    clopidogrel  75 mg Oral Daily    pantoprazole  40 mg Oral Daily with breakfast    rosuvastatin  10 mg Oral Daily    sodium chloride flush  5-40 mL IntraVENous 2 times per day    enoxaparin  30 mg SubCUTAneous Daily    furosemide  20 mg IntraVENous TID    potassium chloride  10 mEq Oral TID    cefTRIAXone (ROCEPHIN) IV  1,000 mg IntraVENous Q24H    midodrine  2.5 mg Oral TID WC     hydrOXYzine, sodium chloride flush, sodium chloride, acetaminophen **OR** acetaminophen, promethazine **OR** ondansetron, ipratropium-albuterol  IV Drips/Infusions   sodium chloride       Vitals    Vitals    height is 5' 4\" (1.626 m) and weight is 110 lb (49.9 kg).  Her oral temperature is 97.6 °F (36.4 °C). Her blood pressure is 129/70 and her pulse is 76. Her respiration is 16 and oxygen saturation is 91%. O2 Flow Rate (L/min): 2 L/min  I/O    Intake/Output Summary (Last 24 hours) at 1/24/2022 1028  Last data filed at 1/24/2022 0744  Gross per 24 hour   Intake 420 ml   Output 950 ml   Net -530 ml     Patient Vitals for the past 96 hrs (Last 3 readings):   Weight   01/21/22 1212 110 lb (49.9 kg)     Exam   Physical Exam   Constitutional: No distress on RA. Patient appears elderly and thinly built  Head: Normocephalic noted 4 staples to parietal area. Mouth/Throat: Oropharynx is clear and moist.  No oral thrush. Eyes: Conjunctivae are normal. Pupils are equal, round. No scleral icterus. Neck: Neck supple. No tracheal deviation present. Cardiovascular: S1 and S2 with no murmur. No peripheral edema  Pulmonary/Chest: Normal effort with bilateral air entry, clear breath sounds. No stridor. No respiratory distress. Patient exhibits no tenderness. Abdominal: Soft. Bowel sounds audible. No distension or tenderness to palp. Musculoskeletal: Moves all extremities  Neurological: Patient is alert and oriented to person, place, and time. Skin: Warm and dry.      Labs   ABG  Lab Results   Component Value Date    PH 7.41 11/13/2018    PO2 110 11/13/2018    PCO2 42 11/13/2018    HCO3 27 11/13/2018    O2SAT 98 11/13/2018     Lab Results   Component Value Date    IFIO2 30 11/13/2018    MODE CPAP/PS 11/13/2018    SETTIDVOL 470 11/13/2018    SETPEEP 5.0 11/13/2018     CBC  Recent Labs     01/21/22  1230 01/22/22  0403   WBC 9.1 7.1   RBC 3.67* 3.73*   HGB 10.6* 10.7*   HCT 32.1* 33.3*   MCV 87.5 89.3   MCH 28.9 28.7   MCHC 33.0 32.1*    166   MPV 10.6 10.8      BMP  Recent Labs     01/21/22  1230 01/22/22  0403   * 132*   K 4.6 4.1   CL 99 98   CO2 19* 20*   BUN 28* 27*   CREATININE 1.2 1.2   GLUCOSE 167* 133*   CALCIUM 9.1 8.5     LFT  No results for input(s): AST, ALT, ALB, BILITOT, ALKPHOS, LIPASE in the last 72 hours. Invalid input(s): AMYLASE  TROP  Lab Results   Component Value Date    TROPONINT < 0.010 01/21/2022    TROPONINT < 0.010 01/21/2022    TROPONINT < 0.010 04/10/2021     BNP  Lab Results   Component Value Date    PROBNP 60586.0 01/21/2022    PROBNP 962.9 03/12/2021    PROBNP 3062.0 11/04/2018     D-Dimer  No results found for: DDIMER  Lactic Acid  No results for input(s): LACTA in the last 72 hours. INR  No results for input(s): INR, PROTIME in the last 72 hours. PTT  No results for input(s): APTT in the last 72 hours. Glucose  No results for input(s): POCGLU in the last 72 hours. UA   Recent Labs     01/21/22  1408   PHUR 5.0   COLORU YELLOW   PROTEINU 30*   BLOODU MODERATE*   RBCUA 0-2   WBCUA 15-25   BACTERIA MANY   NITRU NEGATIVE   GLUCOSEU NEGATIVE   BILIRUBINUR NEGATIVE   UROBILINOGEN 0.2   KETUA 15*   . PFTs   No record  Echo     Left ventricle size is normal.   Mild concentric left ventricular hypertrophy. Ejection fraction is visually estimated in the range of 40% to 45%. Mildly hypokinetic motion of the apical anteroseptal wall noted in the   left ventricle. Small-to-moderate myocardial infarction of the anteroseptal LV. Moderately dilated left atrium. watchman device   Aortic valve appears tricuspid. Aortic valve leaflets are Moderately   calcified. Mild aortic stenosis is present. Trivial aortic regurgitation   is noted. Mitral annular calcification is present. Decreased mitral valve mobility noted. mitral valve ring  Cultures    Procalcitonin  No results found for: [facility]    Radiology    CXR    XR CHEST PORTABLE   1/21/2022   FINDINGS: Lines/tubes: Left chest permanent pacemaker is stable. Heart/mediastinum: The heart size is normal. Median sternotomy wires are intact. Left atrial appendage clip is unchanged. The pulmonary vascularity is unremarkable. Lungs: The lungs remain hyperinflated.  No focal consolidation, pleural effusion, or pneumothorax is observed. Stable chronic fracture deformity of the right clavicle is observed. Bones: Diffuse osteopenia is present. The visualized skeletal structures appear intact. Impression:  COPD and chronic findings are discussed. No acute intrathoracic process. CT Scans    CTA CHEST W WO CONTRAST, CT ABDOMEN PELVIS W IV CONTRAST   1/21/2022   CT CHEST:   1. No CT evidence of pulmonary embolus. 2. A geographic groundglass appearance of the lungs is noted. Differential consideration would include hypersensitivity pneumonitis, small airway disease, small vessel disease, or an infiltrative lung disease. 3. Mild cardiomegaly. CT ABDOMEN and PELVIS:   1. No acute inflammatory bowel process. No acute bowel obstruction. 2. Other findings as described above       (See actual reports for details)    Assessment   -Acute/chronic respiratory failure, unclear etiology suspect related to congestive heart failure and right-sided atelectasis/infiltrate-improved.   She is currently on room air  -Syncope and recurrent falls  -Right-sided chest wall contusion associated with right-sided infiltrate/atelectasis   -Coronary disease status post CABG and stent  -Congestive heart failure with reduced ejection fraction ECHO 3/12/2022 LVEF 40-45  -Valvular heart disease  -Status post pacemaker  Recommendations   -On RA, may benefit from Home O2 eval prior to discharge 2/2 CHF  -Right-sided infiltrate could be related to poor inspiratory effort secondary to pain, could be aspiration as well, will continue supportive pain, lidocaine patch for chest wall pain  Cardiology consulted to assist with syncope/coronary disease management  -Stable from Pulmonary standpoint, schedule patient for follow-up at [facility]. [facility] in 3 months with CT chest  approximately 2 days prior to appointment with Cj Mcnair CNP or Dr. Domenic Curran MD  -Stable Pulmonary status at this time Pulmonary service will sign off please call with any questions or concerns    Case discussed with nurse and patient/family. Questions and concerns addressed. Meds and Orders reviewed. Electronically signed by   HPIL Wyman CNP on 1/24/2022 at 10:28 AM     Addendum by Dr. Ave Gallagher MD:  I have seen and examined the patient independently. Face to face evaluation and examination was performed. The above evaluation and note has been reviewed. Labs and radiographs were reviewed. I Have discussed with Mr. Juarez Hernandez CNP about this patient in detail. Physical exam was performed by me. See below for details. More than half of the total time for this encounter spent by me. The above assessment and plan has been reviewed. Please see my modifications mentioned below. My modifications:    Physical Exam:  Constitutional: Patient appears in no distress. Mouth/Throat: Oropharynx is clear and moist.   Neck: Neck supple. Cardiovascular: S1 and S2 heard. No murmurs. Pulmonary/Chest: Bilateral air entry present. Good breath sounds on both sides, diminished at bases. No wheezes. No rales. Abdominal: Soft. No tenderness. Extremities: Patient exhibits no edema. Neurological: Patient is awake and alert.      Plan:  Follow-up as above  Patient educated about my impression plan    Katina Jackson MD 1/24/2022 6:23 PM

## 2022-01-24 NOTE — PROGRESS NOTES
Patient not seen on rounds because she was not in her room when I rounded. Discussed with nursing staff. Mentally improved and eating well. ECHO report PND. She has also not been seen by cardiology yet and she was reminded to send another message to cardiology.  Obtain orthostatic BP measurements and if ok proceed with ambulating patient in anticipation of possible DC in am.

## 2022-01-25 VITALS
BODY MASS INDEX: 18.78 KG/M2 | HEIGHT: 64 IN | HEART RATE: 70 BPM | OXYGEN SATURATION: 93 % | DIASTOLIC BLOOD PRESSURE: 65 MMHG | SYSTOLIC BLOOD PRESSURE: 132 MMHG | RESPIRATION RATE: 16 BRPM | WEIGHT: 110 LBS | TEMPERATURE: 97.8 F

## 2022-01-25 LAB — SARS-COV-2, NAAT: NOT  DETECTED

## 2022-01-25 PROCEDURE — 87635 SARS-COV-2 COVID-19 AMP PRB: CPT

## 2022-01-25 PROCEDURE — 97166 OT EVAL MOD COMPLEX 45 MIN: CPT

## 2022-01-25 PROCEDURE — 6370000000 HC RX 637 (ALT 250 FOR IP): Performed by: REGISTERED NURSE

## 2022-01-25 PROCEDURE — 97535 SELF CARE MNGMENT TRAINING: CPT

## 2022-01-25 PROCEDURE — 2580000003 HC RX 258: Performed by: REGISTERED NURSE

## 2022-01-25 PROCEDURE — 6370000000 HC RX 637 (ALT 250 FOR IP): Performed by: INTERNAL MEDICINE

## 2022-01-25 PROCEDURE — 6370000000 HC RX 637 (ALT 250 FOR IP): Performed by: SOCIAL WORKER

## 2022-01-25 PROCEDURE — 92523 SPEECH SOUND LANG COMPREHEN: CPT

## 2022-01-25 RX ORDER — FUROSEMIDE 20 MG/1
20 TABLET ORAL 2 TIMES DAILY
Qty: 60 TABLET | Refills: 3 | DISCHARGE
Start: 2022-01-25

## 2022-01-25 RX ORDER — POTASSIUM CHLORIDE 750 MG/1
10 TABLET, FILM COATED, EXTENDED RELEASE ORAL 3 TIMES DAILY
Qty: 60 TABLET | Refills: 3 | DISCHARGE
Start: 2022-01-25

## 2022-01-25 RX ORDER — MIDODRINE HYDROCHLORIDE 2.5 MG/1
2.5 TABLET ORAL
Qty: 90 TABLET | Refills: 3 | DISCHARGE
Start: 2022-01-25

## 2022-01-25 RX ADMIN — ATENOLOL 25 MG: 25 TABLET ORAL at 09:37

## 2022-01-25 RX ADMIN — PANTOPRAZOLE SODIUM 40 MG: 40 TABLET, DELAYED RELEASE ORAL at 09:43

## 2022-01-25 RX ADMIN — POTASSIUM CHLORIDE 10 MEQ: 750 TABLET, EXTENDED RELEASE ORAL at 13:42

## 2022-01-25 RX ADMIN — MIDODRINE HYDROCHLORIDE 2.5 MG: 2.5 TABLET ORAL at 13:42

## 2022-01-25 RX ADMIN — FUROSEMIDE 20 MG: 20 TABLET ORAL at 09:41

## 2022-01-25 RX ADMIN — SODIUM CHLORIDE, PRESERVATIVE FREE 10 ML: 5 INJECTION INTRAVENOUS at 09:36

## 2022-01-25 RX ADMIN — ASPIRIN 81 MG: 81 TABLET, COATED ORAL at 09:43

## 2022-01-25 RX ADMIN — MIDODRINE HYDROCHLORIDE 2.5 MG: 2.5 TABLET ORAL at 09:42

## 2022-01-25 RX ADMIN — ROSUVASTATIN 10 MG: 10 TABLET, FILM COATED ORAL at 09:40

## 2022-01-25 RX ADMIN — ACETAMINOPHEN 650 MG: 325 TABLET ORAL at 13:42

## 2022-01-25 RX ADMIN — POTASSIUM CHLORIDE 10 MEQ: 750 TABLET, EXTENDED RELEASE ORAL at 09:39

## 2022-01-25 RX ADMIN — BUPROPION HYDROCHLORIDE 150 MG: 150 TABLET, EXTENDED RELEASE ORAL at 09:38

## 2022-01-25 RX ADMIN — CLOPIDOGREL BISULFATE 75 MG: 75 TABLET, FILM COATED ORAL at 10:41

## 2022-01-25 ASSESSMENT — PAIN SCALES - GENERAL
PAINLEVEL_OUTOF10: 7
PAINLEVEL_OUTOF10: 0
PAINLEVEL_OUTOF10: 8
PAINLEVEL_OUTOF10: 8

## 2022-01-25 ASSESSMENT — PAIN DESCRIPTION - LOCATION
LOCATION: RIB CAGE
LOCATION: RIB CAGE
LOCATION: RIB CAGE;OTHER (COMMENT)

## 2022-01-25 ASSESSMENT — PAIN DESCRIPTION - PAIN TYPE
TYPE: ACUTE PAIN

## 2022-01-25 NOTE — DISCHARGE SUMMARY
Discharge Summary  1/25/2022  12:54 PM    Patient:  Jc Almazan  YOB: 1940    MRN: [id number]   Acct: [de-identified]   [id number]   Primary Care Physician: PHIL Villegas CNP    Admit date:  1/21/2022    Discharge date:  1/25/2022    Discharge Diagnoses:    Fall  Syncope   Mult falls  ?  Dementia   Acute hypoxic resp failure, resolved   CHF exacerbation  UTI  A fib s/p Watchman and pacemaker   CAD s/p mult stents and hx CABG        Discharge Medications:         Medication List      START taking these medications    midodrine 2.5 MG tablet  Commonly known as: PROAMATINE  Take 1 tablet by mouth 3 times daily (with meals)     potassium chloride 10 MEQ extended release tablet  Commonly known as: KLOR-CON  Take 1 tablet by mouth 3 times daily        CHANGE how you take these medications    acetaminophen 325 MG tablet  Commonly known as: TYLENOL  Take 1 tablet by mouth every 4 hours as needed for Pain  What changed: additional instructions     furosemide 20 MG tablet  Commonly known as: LASIX  Take 1 tablet by mouth 2 times daily  What changed: when to take this        CONTINUE taking these medications    aspirin 81 MG tablet     atenolol 25 MG tablet  Commonly known as: Tenormin  Take 1 tablet by mouth daily Hold for SBP less than 100 mmHg, if HR less than 55 bpm     buPROPion 150 MG extended release tablet  Commonly known as: WELLBUTRIN XL  TAKE 1 TABLET BY MOUTH EVERY DAY in the morning     CALCIUM 1200 PO     clopidogrel 75 MG tablet  Commonly known as: Plavix  Take 1 tablet by mouth daily     hydrOXYzine 25 MG tablet  Commonly known as: ATARAX     pantoprazole 40 MG tablet  Commonly known as: Protonix  Take 1 tablet by mouth daily (with breakfast)     rosuvastatin 10 MG tablet  Commonly known as: Crestor  Take 1 tablet by mouth daily     VITAMIN D3 PO        STOP taking these medications    potassium chloride 20 MEQ extended release tablet  Commonly known as: KLOR-CON M     sertraline 100 MG tablet  Commonly known as: ZOLOFT           Where to Get Your Medications      Information about where to get these medications is not yet available    Ask your nurse or doctor about these medications  · furosemide 20 MG tablet  · midodrine 2.5 MG tablet  · potassium chloride 10 MEQ extended release tablet       Scheduled Meds: Scheduled Meds:   furosemide  20 mg Oral BID    aspirin  81 mg Oral Daily    buPROPion  150 mg Oral Daily    atenolol  25 mg Oral Daily    clopidogrel  75 mg Oral Daily    pantoprazole  40 mg Oral Daily with breakfast    rosuvastatin  10 mg Oral Daily    sodium chloride flush  5-40 mL IntraVENous 2 times per day    [Held by provider] enoxaparin  30 mg SubCUTAneous Daily    potassium chloride  10 mEq Oral TID    cefTRIAXone (ROCEPHIN) IV  1,000 mg IntraVENous Q24H    midodrine  2.5 mg Oral TID WC     Continuous Infusions:   sodium chloride       PRN Meds:.hydrOXYzine, sodium chloride flush, sodium chloride, acetaminophen **OR** acetaminophen, promethazine **OR** ondansetron, ipratropium-albuterol  Continuous Infusions:   sodium chloride         Intake/Output Summary (Last 24 hours) at 1/25/2022 1254  Last data filed at 1/25/2022 0936  Gross per 24 hour   Intake 10 ml   Output 0 ml   Net 10 ml       Diet:  ADULT DIET; Regular;  Low Sodium (2 gm)    Activity:  Activity as tolerated (Patient may move about with assist as indicated or with supervision.)    Follow-up:  in the next few weeks with PHIL Archer CNP,  in the next few weeks with neuro    Consultants:  Neuro, pulm    Procedures:  laceration repair with staples    Objective:  Lab Results   Component Value Date    WBC 7.1 01/22/2022    RBC 3.73 01/22/2022    RBC 3.33 05/26/2021    HGB 10.7 01/22/2022    HCT 33.3 01/22/2022    MCV 89.3 01/22/2022    MCH 28.7 01/22/2022    MCHC 32.1 01/22/2022    RDW 16.1 05/26/2021     01/22/2022    MPV 10.8 01/22/2022     Lab Results   Component Value Date     01/22/2022 K 4.1 01/22/2022    CL 98 01/22/2022    CO2 20 01/22/2022    BUN 27 01/22/2022    CREATININE 1.2 01/22/2022    GLUCOSE 133 01/22/2022    GLUCOSE 87 05/26/2021    CALCIUM 8.5 01/22/2022     Lab Results   Component Value Date    CALCIUM 8.5 01/22/2022     No results found for: IONCA  Lab Results   Component Value Date    MG 2.2 04/11/2021     Lab Results   Component Value Date    PHOS 4.6 06/05/2019     No results found for: BNP  Lab Results   Component Value Date    ALKPHOS 124 04/10/2021    ALT 9 04/10/2021    AST 15 04/10/2021    PROT 6.9 04/10/2021    BILITOT <0.2 04/10/2021    BILIDIR <0.2 04/10/2021    LABALBU 3.8 04/10/2021     Lab Results   Component Value Date    LACTA 1.2 02/15/2021     No results found for: AMYLASE  Lab Results   Component Value Date    LIPASE 38.2 04/10/2021     Lab Results   Component Value Date    CHOL 174 12/30/2020    TRIG 82 12/30/2020    HDL 71 12/30/2020    LDLCALC 87 12/30/2020     No results for input(s): POCGLU in the last 72 hours. No results for input(s): CKTOTAL, CKMB, TROPONINI in the last 72 hours. Lab Results   Component Value Date    LABA1C 5.2 03/14/2021     Lab Results   Component Value Date    INR 1.05 02/15/2021     No results found for: PHART, PO2ART, [id number], [id number], [facility] Course: clinical course has improved    Initial H+P:    The patient is a 80 y.o. female with pmhx of A fib s/p Watchman and pacemaker, CHF reduced EF, HTN, HLD, Valve replacements, CABG, CAD s/p multiple stents on ASA/plavix who presents with multiple falls over the past year and SOB that began the last few days. Per her daughter at bedside the falls are almost always unwitnessed but they believe she is actually passing out as she does not remember the episodes and they do not know how long she is down for each time. She does have bruises periodically from the falls but tries to hide them. Pt has no known hx of seizures.  She lives home alone but her daughter comes to check on encounter have been performed by me. I agree with the assessment, plan and orders as documented by the NP.     Electronically signed by Hayde Nelson MD on 1/25/2022 at 1:55 PM

## 2022-01-25 NOTE — PROGRESS NOTES
[address] [address]  SPEECH THERAPY  STRZ RENAL TELEMETRY 6K  Speech - Language - Cognitive Evaluation     SLP Individual Minutes  Time In: 7853  Time Out: 5992  Minutes: 26  Timed Code Treatment Minutes: 0 Minutes       Date: 2022  Patient Name: Zohreh Vee      CSN: [id number]   : 1940  (80 y.o.)  Gender: female   Referring Physician:  Hanford Cranker, APRN - CNP  Diagnosis: Acute respiratory failure with hypoxia   Secondary Diagnosis: Cognitive deficits   Precautions: Fall Risk  History of Present Illness/Injury: Patient admitted to [facility] with the above diagnosis. See H&P for full report. ST consulted to complete speech/language/cognitive evaluation due to concerns for confusion s/t multiple falls. Past Medical History:   Diagnosis Date    Atrial fibrillation ([address]     CAD (coronary artery disease)     Congestive heart failure (CHF) (HCC)     Hyperlipidemia     Hypertension        Pain: No pain reported. Subjective:  KELY Boyce approved ST attempt. Patient alert and pleasant. Nursing students present to administer pills to patient. Daughter present and supportive throughout    SOCIAL HISTORY:   Living Arrangements: independent at home, alone  Work History: Retired  Education Level: 11th grade  Finance Management: Assistance Required - daughter manages  Medication Management: Assistance Required - daughter picks up prescription, patient independent with taking medications   ADL's: Independent.    Vision Status: Glasses needed but not present during evaluation  Hearing: No hearing aids but patient commented \"maybe I need them\"     Type of Home: Apartment  Home Layout: One level  Home Access: Level entry  Home Equipment: Walker-Lift,4 wheeled walker    SPEECH / VOICE:  Speech and Voice appear to be grossly intact for basic and complex daily communication    LANGUAGE:  Receptive:  1 Step Commands: 2/2  2 Step Commands: 2/2     Expressive  Automatic Speech: 100% x2  Sentence Completion: 2/2  Confrontational Namin/3  Divergent Naming (abstract): 6 words/minute     Expressive and Receptive language appear to be grossly intact for basic daily communication - suspect increased difficulty with more complex language. Sylvain Cognitive Assessment [facility]) version 7.1 completed. Pt scored 8/25*. Normal is greater than or equal to 21/25*. *Inclusion of +1 point given highest level of education achieved less than/equal to 12th grade or GED with limited-0 post-secondary schooling   *score adjusted d/t inability to complete executive functioning/visuospatial reasoning section of MOCA d/t absence of glasses. COGNITION:  Orientation:   Immediate Recall:   Short-Term Recall: 2  Divergent Namin/11  Reasonin/2  Thought Organization: impaired   Insight: poor  Attention: 13  Math Computation: 0/3  Executive Functionin/5 due to needing glasses but not available          RECOMMENDATIONS/ASSESSMENT:  DIAGNOSTIC IMPRESSIONS:  Patient presents with moderate-severe cognitive-linguistic impairment characterized by a score of 8/25 on the [facility] with deficits in the domains of attention, immediate recall, delayed recall, working memory, thought organization, and verbal reasoning. Expressive and receptive language appear [facility] for basic daily communication - suspected increased difficulty with more complex language. No dysarthria or dysphonia present with speech intelligibility judged to be ~100% across contexts at the conversational level. Prior to hospitalization, patient lived at home, alone with assistance from daughter to manage medications and finances. Patient would greatly benefit from continued skilled ST services to address aforementioned deficits to make safe return home.  At this time, patient will require 24/7 supervision, total assistance with management of medications/finances, and should refrain from driving.     *Of note, Patient's daughter reported \"patient with hallucinations such as talking to people in the room who aren't present and seeing people. \" RN Roberth Fierro updated. Rehabilitation Potential: good    EDUCATION:  Learner: Patient and Family  Education:  Reviewed results and recommendations of this evaluation, Reviewed ST goals and Plan of Care and Reviewed recommendations for follow-up  Evaluation of Education: [place] understanding and Demonstrates with assistance    PLAN:  Skilled SLP intervention on acute care 3-5 x per week or until goals met and/or pt plateaus in function. Specific interventions for next session may include: cognitive tx. PATIENT GOAL:    Did not state. Will further assess during treatment. SHORT TERM GOALS:  Short-term Goals  Timeframe for Short-term Goals: 2 weeks  Goal 1: Patient will complete attention tasks (sustained, selective, divided) with no more than 5 errors within a structured tasks to improve attention during ADL completion.   Goal 2: Patient will complete memory tasks (immediate recall, delayed recall and working memory) with focus on memory strategies with 80% accuracy given mod cues to improve overall recall of daily and medical information  Goal 3: Patient will complete problem solving/reasoning tasks (basic money, medication, time) with 70% accuracy provided moderate cues to improve overall saftey within hospital and home envrionment  Goal 4: Patient will complete thought organization tasks (divergent/convergent/responsive naming, sequencing, calendar) with 75% accuracy given mod cues to improve processing speed and organization during ADL completion    LONG TERM GOALS:  No LTGs due to short term [address], Student Intern

## 2022-01-25 NOTE — PROGRESS NOTES
Report called to KELY Cardenas at [employer] [facility] [place]. Patient being transported by family with all documented belongings and AVS paperwork.

## 2022-01-25 NOTE — PROGRESS NOTES
Progress note      [facility]             Patient:  Chetan Almazan  YOB: 1940    MRN: [id number]   Acct:  [id number]   [id number]  Primary Care Physician: Ricard Phoenix, APRN - CNP    Admit Date: 1/21/2022           Subjective: No more falls. Pts laceration doing well no bleeding. Noted she did score low on MMSE. No chest pain, SOB, abdominal pain. Objective:      Physical Exam:    Vitals:  Patient Vitals for the past 24 hrs:   BP Temp Temp src Pulse Resp SpO2   01/25/22 0936 (!) 102/58 -- -- 70 -- --   01/25/22 0830 107/80 97.9 °F (36.6 °C) Oral 74 15 91 %   01/25/22 0407 118/65 97.7 °F (36.5 °C) Oral 56 14 94 %   01/24/22 2355 118/63 97.4 °F (36.3 °C) Oral 56 16 94 %   01/24/22 2001 130/71 97.3 °F (36.3 °C) Oral 58 16 91 %   01/24/22 1515 120/67 97.6 °F (36.4 °C) Oral 71 16 90 %     Weight: Weight: 110 lb (49.9 kg)     24 hour intake/output:    Intake/Output Summary (Last 24 hours) at 1/25/2022 1105  Last data filed at 1/25/2022 0936  Gross per 24 hour   Intake 10 ml   Output 0 ml   Net 10 ml     General appearance - alert, ill appearing, and in no distress  Eyes - pupils equal and reactive, extraocular eye movements intact  Mouth - mucous membranes moist, pharynx normal without lesions  Neck - supple, no significant adenopathy  Chest - clear to auscultation, no wheezes, rales or rhonchi, symmetric air entry  Heart - normal rate, regular rhythm, normal S1, S2, no murmurs, rubs, clicks or gallops  Abdomen - soft, nontender, nondistended, no masses or organomegaly, pos bs.   Neurological - alert, oriented, normal speech, intermittent confusion  Musculoskeletal - no joint tenderness, deformity or swelling  Extremities - peripheral pulses normal, no pedal edema, no clubbing or cyanosis  Skin - .laceration back of head with staples in place     Review of Labs and Diagnostic Testing:    CBC:   No results for input(s): WBC, HGB, HCT, MCV, PLT in the last 72 hours. BMP:   No results for input(s): NA, K, CL, CO2, PHOS, BUN, CREATININE, CALCIUM, GLUCOSE in the last 72 hours. PT/INR: No results for input(s): PROTIME, INR in the last 72 hours. APTT: No results for input(s): APTT in the last 72 hours. Lipids: No results for input(s): ALKPHOS, ALT, AST, BILITOT, BILIDIR, LABALBU, AMYLASE, LIPASE in the last 72 hours. Troponin:   No results for input(s): TROPONINT in the last 72 hours. Imaging:  [unfilled]    EKG:      Diet: ADULT DIET; Regular; Low Sodium (2 gm)        Data:   Scheduled Meds: Scheduled Meds:   furosemide  20 mg Oral BID    aspirin  81 mg Oral Daily    buPROPion  150 mg Oral Daily    atenolol  25 mg Oral Daily    clopidogrel  75 mg Oral Daily    pantoprazole  40 mg Oral Daily with breakfast    rosuvastatin  10 mg Oral Daily    sodium chloride flush  5-40 mL IntraVENous 2 times per day    [Held by provider] enoxaparin  30 mg SubCUTAneous Daily    potassium chloride  10 mEq Oral TID    cefTRIAXone (ROCEPHIN) IV  1,000 mg IntraVENous Q24H    midodrine  2.5 mg Oral TID WC     Continuous Infusions:   sodium chloride       PRN Meds:.hydrOXYzine, sodium chloride flush, sodium chloride, acetaminophen **OR** acetaminophen, promethazine **OR** ondansetron, ipratropium-albuterol  Continuous Infusions:   sodium chloride           Assessment/Plan   Syncopal episodes leading to falls with acute fall last night  Seen by neuro and believe to be due to orthostatic hypotension  Cont Midodrine  Repeat orthos negative for orthostatic hypotension  S/P repair of laceration on head due to fall    ?  Memory problems  SLP conducted MMSE--Scored 6/25  Will have pt follow up with neuro outpt    Acute hypoxic resp failure due to CHF exacerbation  Weaned off O2 s/p IV diuretics   Stopped IV diuretics and resume PO diuretics--increased lasix to BID (was on daily at home)  Dr Suha Smith was consulted but informed he is out of [facility]; pt is stable and no angina, no more hypoxia; no acute need for cardiology assessment  Echo no significant changes    Trops trended negative     UTI  Culture growing E Coli; sensitive to Rocephin  Complete tx    A Fib s/p Watchman and Pacemaker  Cont BB  Telemetry monitoring     CAD s/p mult stents and hx CABG  Cont ASA/Plavix    PT/OT    DVT prophylaxis  SCDs    D/C planning to ECF; most likely can accept later today; SS to update     Assessment and plan of care discussed with supervising physician, Dr Mindy Westbrook.     Electronically signed by PHIL Ballesteros CNP on 1/25/2022 at 11:05 AM

## 2022-01-25 NOTE — DISCHARGE INSTR - COC
Continuity of Care Form    Patient Name: Migue Noel   :  1940  MRN:  [id number]    Admit date:  2022  Discharge date:  2022    Code Status Order: Full Code   Advance Directives:      Admitting Physician:  Alla Fernandez MD  PCP: Luanne Hodgkin, APRN - CNP    Discharging Nurse: N[facility] Unit/Room#: 6K-28/028-A  Discharging Unit Phone Number: [phone]    Emergency Contact:   Extended Emergency Contact Information  Primary Emergency Contact: [address] [place] [address] Phone: [phone]  Relation: Child  Secondary Emergency Contact: [address] Phone: [phone]  Relation: Brother/Sister    Past Surgical History:  Past Surgical History:   Procedure Laterality Date    ATRIAL ABLATION [address] WITH STENT PLACEMENT  2019    CORONARY ARTERY BYPASS GRAFT      x1    ENDOSCOPY, COLON, DIAGNOSTIC      HYSTERECTOMY      JOINT REPLACEMENT      MITRAL VALVE REPLACEMENT      PACEMAKER PLACEMENT  2019    NE OFFICE/OUTPT VISIT,PROCEDURE ONLY N/A 2018    CABG X3, MITRAL VALVE REPAIR, MAZE, AND TRICUSPID VALVE REPAIR, DEANNA performed by Naeem Kim MD at [address] [place] Right 3/14/2021    HIP TOTAL ARTHROPLASTY performed by Di Marti MD at [address]  2021    EGD BIOPSY performed by Marquis Kirkland MD at [address]  2021    EGD CONTROL HEMORRHAGE performed by Marquis Kirkland MD at [facility]       Immunization History: There is no immunization history on file for this patient. Active Problems:  Patient Active Problem List   Diagnosis Code    Atrial fibrillation with RVR ([facility]) I48.91    S/P coronary artery bypass graft x 1, 18.  Z95.1    Status post tricuspid valve repair, 11/12/18.  H66.504    S/P mitral valve repair Z98.890    Situational depression F43.21    Hyperlipidemia E78.5    Coronary artery disease involving coronary bypass graft of native heart without angina pectoris I25.810    Risk for falls Z91.81    Physical deconditioning R53.81    CAD in native artery I25.10    Severe malnutrition (HCC) E43    Post PTCA Z98.61    Pacemaker Z95.0    Age-related osteoporosis without current pathological fracture M81.0    Hypertension I10    Closed fracture of left distal radius S52.502A    Orthostatic syncope I95.1    Moderate malnutrition (HCC) E44.0    Syncope and collapse R55    Closed subcapital fracture of femur, right, initial encounter ([facility] [address] S72.011A    Closed fracture of right hip ([facility]) S72.001A    Hyponatremia E87.1    Hyperglycemia R73.9    High anion gap metabolic acidosis G33.8    Hx of coronary artery disease Z86.79    HFrEF (heart failure with reduced ejection fraction) ([facility]) I50.20    History of atrial fibrillation Z86.79    Acute on chronic anemia D64.9    Acute blood loss anemia D62    Acute respiratory failure with hypoxia ([facility]) J96.01    Acute cystitis without hematuria N30.00    Acute congestive heart failure ([facility]) I50.9    Abnormal CT of the chest R93.89       Isolation/Infection:   Isolation            No Isolation          Patient Infection Status       Infection Onset Added Last Indicated Last Indicated By Review Planned Expiration Resolved Resolved By    None active    Resolved    COVID-19 (Rule Out) 01/21/22 01/21/22 01/21/22 SARS-CoV-2 NAAT (Rapid) (Ordered)   01/21/22 Rule-Out Test Resulted    COVID-19 (Rule Out) 04/14/21 04/14/21 04/14/21 COVID-19, Rapid (Ordered)   04/14/21 Rule-Out Test Resulted    COVID-19 (Rule Out) 04/11/21 04/11/21 04/11/21 COVID-19, Rapid (Ordered)   04/11/21 Rule-Out Test Resulted    COVID-19 (Rule Out) 03/17/21 03/17/21 03/17/21 COVID-19, Rapid (Ordered)   03/17/21 Rule-Out Test Resulted    COVID-19 (Rule Out) 02/18/21 02/18/21 02/18/21 COVID-19, Rapid (Ordered)   02/18/21 Rule-Out Test Resulted            Nurse Assessment:  Last Vital Signs: BP (!) 102/58   Pulse 70   Temp 97.9 °F (36.6 °C) (Oral)   Resp 15   Ht 5' 4\" (1.626 m)   Wt 110 lb (49.9 kg)   SpO2 91%   BMI 18.88 kg/m²     Last documented pain score (0-10 scale): Pain Level: 7  Last Weight:   Wt Readings from Last 1 Encounters:   01/21/22 110 lb (49.9 kg)     Mental Status:  oriented, alert, and confused at times    IV Access:  - None    Nursing Mobility/ADLs:  Walking   Assisted  Transfer  Assisted  Bathing  Assisted  Dressing  Assisted  Toileting  Assisted  Feeding  Independent  Med Admin  Assisted  Med Delivery   whole    Wound Care Documentation and Therapy:  Wound 01/24/22 Head Posterior laceration (Active)   Wound Etiology Traumatic 01/25/22 1000   Wound Cleansed Soap and water 01/24/22 0339   Dressing/Treatment Open to air 01/25/22 1000   Wound Assessment Pink/red; Other (Comment) 01/25/22 1000   Drainage Amount Small 01/24/22 0339   Number of days: 1        Elimination:  Continence: Bowel: Yes  Bladder: incontinent at times  Urinary Catheter: None   Colostomy/Ileostomy/Ileal Conduit: No       Date of Last BM: 1/21/2022    Intake/Output Summary (Last 24 hours) at 1/25/2022 1112  Last data filed at 1/25/2022 0936  Gross per 24 hour   Intake 10 ml   Output 0 ml   Net 10 ml     I/O last 3 completed shifts: In: 300 [P.O.:300]  Out: 950 [Urine:950]    Safety Concerns:     History of Falls (last 30 days)    Impairments/Disabilities:      None    Nutrition Therapy:  Current Nutrition Therapy:   - Oral Diet:  Cardiac    Routes of Feeding: Oral  Liquids: No Restrictions  Daily Fluid Restriction: no  Last Modified Barium Swallow with Video (Video Swallowing Test): not done    Treatments at the Time of Hospital Discharge:   Respiratory Treatments: N/A  Oxygen Therapy:  is not on home oxygen therapy.   Ventilator:    - No ventilator support    Rehab Therapies: Physical Therapy, Occupational Therapy, and Speech/Language Therapy  Weight Bearing Status/Restrictions: No weight bearing restirctions  Other Medical Equipment (for information only, NOT a DME order):  walker  Other Treatments: N/A    Patient's personal belongings (please select all that are sent with patient):  Glasses    RN SIGNATURE:  Electronically signed by Sole Noriega RN on 1/25/22 at 1:27 PM EST    CASE MANAGEMENT/SOCIAL WORK SECTION    Inpatient Status Date: 1/21/2022    Readmission Risk Assessment Score:  Readmission Risk              Risk of Unplanned Readmission:  16           Discharging to Facility/ Agency   Name: [facility] [place] [address]   Address: [address] [place] [address]. [address] [place] [place], [address] [place] [address]    Dialysis Facility (if applicable)   Name:  Address:  Dialysis Schedule:  Phone:  Fax:    / signature: Electronically signed by ARTURO Schmidt on 1/25/22 at 11:35 AM EST    PHYSICIAN SECTION    Prognosis: Good    Condition at Discharge: Stable    Rehab Potential (if transferring to Rehab): Good    Recommended Labs or Other Treatments After Discharge: na    Physician Certification: I certify the above information and transfer of Juan Almazan  is necessary for the continuing treatment of the diagnosis listed and that she requires [facility] for less 30 days.      Update Admission H&P: No change in H&P    PHYSICIAN SIGNATURE:  Electronically signed by Sophia Shaikh MD on 1/25/22 at 11:16 AM EST

## 2022-01-25 NOTE — PROGRESS NOTES
[facility]  INPATIENT OCCUPATIONAL THERAPY  STRZ RENAL TELEMETRY 6K  EVALUATION    Time:   Time In:   Time Out: 1148  Timed Code Treatment Minutes: 24 Minutes  Minutes: 32          Date: 2022  Patient Name: Neno Lowry,   Gender: female      MRN: [id number]  : 1940  (80 y.o.)  Referring Practitioner: PHIL Garrido CNP  Diagnosis: acute respiratory failure with hypoxia  Additional Pertinent Hx: Presented to ED 22. Multiple falls, dizzy/spinning sensation, Syncopal episodes. Vestibular PT consulted although BPPV would not typically cause pt to pass out. R sided rib pain from falling, R hip pain. Chest CT negative. Found to have UTI, hypoxia, CHF exacerbation. Pacemaker. Restrictions/Precautions:  Restrictions/Precautions: General Precautions,Fall Risk    Subjective  Chart Reviewed: Rick Chaidez and Physical  Patient assessed for rehabilitation services?: Yes  Response to previous treatment: Patient with no complaints from previous session  Family / Caregiver Present: Yes (daughter)    Subjective: RN approved of OT session. Pt agreeable to OT stating she needed to use the restroom. At end of session, pt left with student nurse to help pt with shower.       Pain:  Pain Assessment  Patient Currently in Pain: Yes  Pain Level: 8  Pain Type: Acute pain  Pain Location: Rib cage    Vitals: Vitals not assessed per clinical judgement, see nursing flowsheet    Social/Functional History:  Lives With: Alone  Type of Home: Apartment  Home Layout: One level  Home Access: Level entry  Home Equipment: Walker-Lift,4 wheeled walker   Bathroom Shower/Tub: Shower chair with back,Walk-in shower  Bathroom Toilet: Handicap height  Bathroom Equipment: Grab bars in shower,Shower chair,Toilet raiser  Bathroom Accessibility: Walker accessible    Receives Help From: Family  ADL Assistance: [facility] [address]: Independent  Homemaking Responsibilities: Yes  Ambulation Assistance: Needs assistance  Transfer Assistance: Independent       Occupation: Retired       VISION:Corrected    HEARING:  WFL    COGNITION: Slow Processing, Decreased Insight, Decreased Problem Solving and Decreased Safety Awareness    RANGE OF MOTION:  Bilateral Upper Extremity:  WFL    STRENGTH:  Bilateral Upper Extremity:  grossly deconditioned     SENSATION:   Not tested     ADL:   Grooming: with set-up. oral care sitting on tub transfer bench   Lower Extremity Dressing: Moderate Assistance. doff undergarments   Toileting: Contact Guard Assistance. for pericare and moderate assistance for clothing management   Toilet Transfer: Moderate Assistance. on to standard toilet   Shower Transfer: Moderate Assistance. on to shower bench. *student nurse took over and helped pt with shower. BALANCE:  Sitting Balance:  Supervision. d/t impuslvieness   Standing Balance: Contact Guard Assistance. with 1-2 UE support on RW     BED MOBILITY:  Supine to Sit: Minimal Assistance, X 1  \  Scooting: Minimal Assistance toward EOB     TRANSFERS:  Sit to Stand:  Minimal Assistance, X 1. with posterior lean noted. Pt educated on forward flexion at trunk during transfer however pt stated \"I can't it hurts my rib too much\"  Stand to Sit: Moderate Assistance, with increased time for completion. FUNCTIONAL MOBILITY:  Assistive Device: Rolling Walker  Assist Level:  Minimal Assistance and X 1. Distance: To and from bathroom  Slow pace, mod cues for safety and upright posture        Activity Tolerance:  Patient tolerance of  treatment: good. Assessment:  Assessment: pt requires assistance with ADLs, transfers, and functional mobility compared to  PLOF. Pt would benefit from skilled OT services to address these tasks, in addition to strength and endurance to return to PLOF.     Performance deficits / Impairments: Decreased functional mobility ,Decreased ADL status,Decreased strength,Decreased cognition,Decreased safe awareness,Decreased endurance  Prognosis: Good  REQUIRES OT FOLLOW UP: Yes  Decision Making: Medium Complexity    Treatment Initiated: Treatment and education initiated within context of evaluation. Evaluation time included review of current medical information, gathering information related to past medical, social and functional history, completion of standardized testing, formal and informal observation of tasks, assessment of data and development of plan of care and goals. Treatment time included skilled education and facilitation of tasks to increase safety and independence with ADL's for improved functional independence and quality of life. Discharge Recommendations:  ECF with OT,Patient would benefit from continued therapy after discharge    Patient Education:  OT Education: OT Role,Plan of Care,Home Exercise Program,ADL Adaptive Strategies,Transfer Training    Equipment Recommendations:  Equipment Needed: No    Plan:  Times per week: 5x  Times per day: Daily  Current Treatment Recommendations: Strengthening,Patient/Caregiver Education & Training,Functional Mobility Training,Endurance Training,Self-Care / ADL,Safety Education & Training. See long-term goal time frame for expected duration of plan of care. If no long-term goals established, a short length of stay is anticipated. Goals:  Patient goals : did not state  Short term goals  Time Frame for Short term goals: by discharge  Short term goal 1: pt will complete functional mobility to/from bathroom with CGA and 0 vc's for safety to access ADLs  Short term goal 2: pt will complete functional transfers with CGA with no mor fabiano 1 vc for correct technique in prep to perform ADLs  Short term goal 3: pt will complete LB ADLs with min A to increase ease with donning socks         Following session, patient left in safe position with all fall risk precautions in place.

## 2022-01-25 NOTE — CARE COORDINATION
1/25/22, 2:07 PM EST    Patient goals/plan/ treatment preferences discussed by  and . Patient goals/plan/ treatment preferences reviewed with patient/ family. Patient/ family verbalize understanding of discharge plan and are in agreement with goal/plan/treatment preferences. Understanding was demonstrated using the teach back method. AVS provided by RN at time of discharge, which includes all necessary medical information pertaining to the patients current course of illness, treatment, post-discharge goals of care, and treatment preferences. Services After Discharge  Services At/After Discharge: Nursing Services,Aide Services,Skilled Therapy ([facility] Letter  IMM Letter given to Patient/Family/Significant other/Guardian/POA/by[de-identified] Gina Dang CM  IMM Letter date given[de-identified] 01/25/22  IMM Letter time given[de-identified] [facility]       SW notified [address] [place] [address] [place] of discharge today. Pts Covid test is negative. [place] [facility] [place]. RN to fax AVS.  Pts daughter to transport. RN aware.

## 2022-01-25 NOTE — CARE COORDINATION
1/25/22, 11:36 AM EST    DISCHARGE PLANNING EVALUATION    BRIGIDA spoke with [address] [facility] can accept today. Pt will need Covid swab and will need to isolate for 14 days due to not being vaccinated. SW updated pt and daughter. Daughter will transport pt. RN updated. Await discharge orders.

## 2022-01-27 NOTE — PROGRESS NOTES
Physician Progress Note      Moi Britton  [facility] #:                  [id number]  :                       1940  ADMIT DATE:       2022 12:10 PM  [address] DATE:        2022 2:57 PM  RESPONDING  PROVIDER #:        Panfilo Ojeda          QUERY TEXT:    Attending,    Patient admitted with syncope due to orthostatic hypotension. If possible,   please respond below and document in the progress notes and discharge summary   if you are evaluating and/or treating any of the following: The medical record reflects the following:  Risk Factors: acute on chronic respiratory failure, acute on chronic HFrEF,   UTI, afib, CAD, HTN, advanced age  Clinical Indicators: syncope due to orthostatic hypotension  Treatment: Neurology consult, midodrine, labs, imaging, VS, PT/OT, tele    Thank you! Michael Silva, RN, BSN, RHIT, CCDS  RN Clinical   [phone]  Options provided:  -- Orthostatic hypotension due to dehydration  -- Orthostatic hypotension due to autonomic neuropathy  -- Orthostatic hypotension due to, please specify. -- Other - I will add my own diagnosis  -- Disagree - Not applicable / Not valid  -- Disagree - Clinically unable to determine / Unknown  -- Refer to Clinical Documentation Reviewer    PROVIDER RESPONSE TEXT:    Provider is clinically unable to determine a response to this query.     Query created by: Marysol Stark on 2022 3:02 PM      Electronically signed by:  Panfilo Ojeda 2022 2:54 PM

## 2022-02-24 ENCOUNTER — OFFICE VISIT (OUTPATIENT)
Dept: RHEUMATOLOGY | Age: 82
End: 2022-02-24
Payer: MEDICARE

## 2022-02-24 VITALS
SYSTOLIC BLOOD PRESSURE: 118 MMHG | HEIGHT: 64 IN | DIASTOLIC BLOOD PRESSURE: 60 MMHG | BODY MASS INDEX: 20.32 KG/M2 | WEIGHT: 119 LBS

## 2022-02-24 DIAGNOSIS — M81.0 AGE-RELATED OSTEOPOROSIS WITHOUT CURRENT PATHOLOGICAL FRACTURE: Primary | ICD-10-CM

## 2022-02-24 PROCEDURE — 1123F ACP DISCUSS/DSCN MKR DOCD: CPT | Performed by: NURSE PRACTITIONER

## 2022-02-24 PROCEDURE — 1090F PRES/ABSN URINE INCON ASSESS: CPT | Performed by: NURSE PRACTITIONER

## 2022-02-24 PROCEDURE — G8484 FLU IMMUNIZE NO ADMIN: HCPCS | Performed by: NURSE PRACTITIONER

## 2022-02-24 PROCEDURE — G8420 CALC BMI NORM PARAMETERS: HCPCS | Performed by: NURSE PRACTITIONER

## 2022-02-24 PROCEDURE — 1111F DSCHRG MED/CURRENT MED MERGE: CPT | Performed by: NURSE PRACTITIONER

## 2022-02-24 PROCEDURE — 1036F TOBACCO NON-USER: CPT | Performed by: NURSE PRACTITIONER

## 2022-02-24 PROCEDURE — 99213 OFFICE O/P EST LOW 20 MIN: CPT | Performed by: NURSE PRACTITIONER

## 2022-02-24 PROCEDURE — G8427 DOCREV CUR MEDS BY ELIG CLIN: HCPCS | Performed by: NURSE PRACTITIONER

## 2022-02-24 PROCEDURE — 4040F PNEUMOC VAC/ADMIN/RCVD: CPT | Performed by: NURSE PRACTITIONER

## 2022-02-24 PROCEDURE — G8399 PT W/DXA RESULTS DOCUMENT: HCPCS | Performed by: NURSE PRACTITIONER

## 2022-02-24 RX ORDER — ALBUTEROL SULFATE 90 UG/1
4 AEROSOL, METERED RESPIRATORY (INHALATION) PRN
Status: CANCELLED | OUTPATIENT
Start: 2022-02-24

## 2022-02-24 RX ORDER — EPINEPHRINE 1 MG/ML
0.3 INJECTION, SOLUTION, CONCENTRATE INTRAVENOUS PRN
Status: CANCELLED | OUTPATIENT
Start: 2022-02-24

## 2022-02-24 RX ORDER — ACETAMINOPHEN 325 MG/1
650 TABLET ORAL
Status: CANCELLED | OUTPATIENT
Start: 2022-02-24

## 2022-02-24 RX ORDER — ONDANSETRON 2 MG/ML
8 INJECTION INTRAMUSCULAR; INTRAVENOUS
Status: CANCELLED | OUTPATIENT
Start: 2022-02-24

## 2022-02-24 RX ORDER — SODIUM CHLORIDE 9 MG/ML
INJECTION, SOLUTION INTRAVENOUS CONTINUOUS
Status: CANCELLED | OUTPATIENT
Start: 2022-02-24

## 2022-02-24 RX ORDER — DIPHENHYDRAMINE HYDROCHLORIDE 50 MG/ML
50 INJECTION INTRAMUSCULAR; INTRAVENOUS
Status: CANCELLED | OUTPATIENT
Start: 2022-02-24

## 2022-02-24 ASSESSMENT — ENCOUNTER SYMPTOMS
VOMITING: 0
TROUBLE SWALLOWING: 0
EYE PAIN: 0
COUGH: 0
NAUSEA: 0
SHORTNESS OF BREATH: 1
EYE REDNESS: 0
BACK PAIN: 0
DIARRHEA: 0

## 2022-02-24 NOTE — PROGRESS NOTES
[facility]  Bone Fragility Follow up     Visit Date: 2/24/2022  MRN: [id number]  Cc:   Chief Complaint   Patient presents with    Follow-up         HPI:   Eugenia Almazan  is a(n)82 y.o. female here today for follow up of Osteoporosis. Multiple falls recently, blacking out. Hospitalization in January - treated for CHF and hypoxia. No new fractures. Compliant with prolia, taking calcium and vitamin D supplements. ROS:  Review of Systems   Constitutional: Positive for fatigue. Negative for chills and fever. HENT: Negative for congestion and trouble swallowing. Eyes: Negative for pain and redness. Respiratory: Positive for shortness of breath (with exertion). Negative for cough. Cardiovascular: Negative for chest pain and leg swelling. Gastrointestinal: Negative for diarrhea, nausea and vomiting. Endocrine: Negative for cold intolerance and heat intolerance. Genitourinary: Negative for difficulty urinating and urgency. Musculoskeletal: Negative for arthralgias, back pain and joint swelling. Neurological: Negative for dizziness, weakness and numbness. Hematological: Does not bruise/bleed easily. Psychiatric/Behavioral: The patient is not nervous/anxious. PAST MEDICAL HISTORY  Past Medical History:   Diagnosis Date    Atrial fibrillation ([facility] [address]     CAD (coronary artery disease)     Congestive heart failure (CHF) (HCC)     Hyperlipidemia     Hypertension        SOCIAL HISTORY  Social History     Socioeconomic History    Marital status:       Spouse name: Not on file    Number of children: 1    Years of education: Not on file    Highest education level: Not on file   Occupational History    Not on file   Tobacco Use    Smoking status: Never Smoker    Smokeless tobacco: Never Used   Vaping Use    Vaping Use: Never used   Substance and Sexual Activity    Alcohol use: No    Drug use: No    Sexual activity: Not Currently   Other Topics Concern  Not on file   Social History Narrative    Not on file     Social Determinants of Health     Financial Resource Strain: Low Risk     Difficulty of Paying Living Expenses: Not hard at all   Food Insecurity: No Food Insecurity    Worried About Running Out of Food in the Last Year: Never true    [address] in the Last Year: Never true   Transportation Needs:     Lack of Transportation (Medical): Not on file    Lack of Transportation (Non-Medical):  Not on file   Physical Activity:     Days of Exercise per Week: Not on file    Minutes of Exercise per Session: Not on file   Stress:     Feeling of Stress : Not on file   Social Connections:     Frequency of Communication with Friends and Family: Not on file    Frequency of Social Gatherings with Friends and Family: Not on file    Attends Hindu Services: Not on file    Active Member of [address] [place] [address] or Organizations: Not on file    Attends Club or Organization Meetings: Not on file    Marital Status: Not on file   Intimate Partner Violence:     Fear of Current or Ex-Partner: Not on file    Emotionally Abused: Not on file    Physically Abused: Not on file    Sexually Abused: Not on file   Housing Stability:     Unable to Pay for Housing in the Last Year: Not on file    Number of [place] in the Last Year: Not on file    Unstable Housing in the Last Year: Not on file       FAMILY HISTORY  Family History   Problem Relation Age of Onset    Other Mother         alzheimers    Heart Attack Father     Heart Attack Brother        SURGICAL HISTORY  Past Surgical History:   Procedure Laterality Date    ATRIAL ABLATION SURGERY      CARDIAC CATHETERIZATION      CARDIAC VALVE REPLACEMENT      CORONARY ANGIOPLASTY WITH STENT PLACEMENT  02/28/2019    CORONARY ARTERY BYPASS GRAFT      x1    ENDOSCOPY, COLON, DIAGNOSTIC      HYSTERECTOMY      JOINT REPLACEMENT      MITRAL VALVE REPLACEMENT      PACEMAKER PLACEMENT  05/2019    MN OFFICE/OUTPT VISIT,PROCEDURE ONLY N/A 11/12/2018    CABG X3, MITRAL VALVE REPAIR, MAZE, AND TRICUSPID VALVE REPAIR, DEANNA performed by Chasidy Payton MD at [address] Right 3/14/2021    HIP TOTAL ARTHROPLASTY performed by Evelina Geller MD at [facility] ENDOSCOPY  4/12/2021    EGD BIOPSY performed by Abel Liao MD at [facility]   Russ Meals  4/12/2021    [facility]       ALLERGIES  No Known Allergies    CURRENTMEDICATIONS  Current Outpatient Medications   Medication Sig Dispense Refill    furosemide (LASIX) 20 MG tablet Take 1 tablet by mouth 2 times daily 60 tablet 3    potassium chloride (KLOR-CON) 10 MEQ extended release tablet Take 1 tablet by mouth 3 times daily 60 tablet 3    midodrine (PROAMATINE) 2.5 MG tablet Take 1 tablet by mouth 3 times daily (with meals) 90 tablet 3    buPROPion (WELLBUTRIN XL) 150 MG extended release tablet TAKE 1 TABLET BY MOUTH EVERY DAY in the morning 30 tablet 5    hydrOXYzine (ATARAX) 25 MG tablet Take 1 tablet by mouth nightly as needed      Cholecalciferol (VITAMIN D3 PO) Take by mouth      clopidogrel (PLAVIX) 75 MG tablet Take 1 tablet by mouth daily 30 tablet 11    rosuvastatin (CRESTOR) 10 MG tablet Take 1 tablet by mouth daily 30 tablet 11    pantoprazole (PROTONIX) 40 MG tablet Take 1 tablet by mouth daily (with breakfast) 60 tablet 5    atenolol (TENORMIN) 25 MG tablet Take 1 tablet by mouth daily Hold for SBP less than 100 mmHg, if HR less than 55 bpm 30 tablet 11    Calcium Carbonate-Vit D-Min (CALCIUM 1200 PO) Take 1 tablet by mouth daily      aspirin 81 MG tablet Take 81 mg by mouth daily      acetaminophen (TYLENOL) 325 MG tablet Take 1 tablet by mouth every 4 hours as needed for Pain (Patient taking differently: Take 325 mg by mouth every 4 hours as needed for Pain Takes 1-2 tabs every 4 hours prn pain) 120 tablet 3     No current facility-administered medications for this visit. Objective:  Ht 5' 4\" (1.626 m)   BMI 18.88 kg/m²     General: No distress. Alert. Eyes: No conjunctival injection. Resp: No accessory muscle use. Lung sounds clear. CV: Regular rate. Regularrhythm. No mumur or rub. No edema. M/S:   Mild kyphosis  Upper extremities:  Muscle strength +4/5,    Lower Extremities:   Muscle strength 5/5    Neuro: Oriented to person, place, time. Psych:  No anxiety or agitation. Skin: Warm and dry. No rash on exposed extremities.        Labs:  CBC  Lab Results   Component Value Date    WBC 7.1 01/22/2022    RBC 3.73 01/22/2022    RBC 3.33 05/26/2021    HGB 10.7 01/22/2022    HCT 33.3 01/22/2022    MCV 89.3 01/22/2022    MCH 28.7 01/22/2022    MCHC 32.1 01/22/2022    RDW 16.1 05/26/2021     01/22/2022       CMP  Lab Results   Component Value Date    CALCIUM 8.5 01/22/2022    LABALBU 3.8 04/10/2021    PROT 6.9 04/10/2021     01/22/2022    K 4.1 01/22/2022    CO2 20 01/22/2022    CL 98 01/22/2022    BUN 27 01/22/2022    CREATININE 1.2 01/22/2022    ALKPHOS 124 04/10/2021    ALT 9 04/10/2021    AST 15 04/10/2021       HgBA1c: No components found for: HGBA1C    Lab Results   Component Value Date    TSH 1.270 02/15/2021     Lab Results   Component Value Date    VITD25 36 03/14/2021       Lab Results   Component Value Date    SEDRATE 25 (H) 10/14/2019     Lab Results   Component Value Date    CRP 0.04 06/05/2019       Lab Results   Component Value Date    VITD25 36 03/14/2021    CALCIUM 8.5 01/22/2022    MG 2.2 04/11/2021     Lab Results   Component Value Date     (L) 01/22/2022    K 4.1 01/22/2022    CL 98 01/22/2022    CO2 20 (L) 01/22/2022    BUN 27 (H) 01/22/2022    CREATININE 1.2 01/22/2022    GLUCOSE 133 (H) 01/22/2022    CALCIUM 8.5 01/22/2022    PROT 6.9 04/10/2021    LABALBU 3.8 04/10/2021    BILITOT <0.2 (L) 04/10/2021    ALKPHOS 124 04/10/2021 AST 15 04/10/2021    ALT 9 (L) 04/10/2021         RADIOLOGY:       DEXA 5/24/2021  Left hip: -3.3, 0.538  Lumbar spine: -2.70, 0.718  Right 1/3 forearm: T score -5.7, 0.351    DEXA 5/22/2019  Left hip: -3.80  Right hip: -3.60  Lumbar spine: -3.40      Assessment and plan:  Osteoporosis, postmenopausal  - A 70year old  female diagnosed with osteoporosis by recent DEXA on 5/22/2019 with T score -3.80 at left femoral neck. Patient with history of right clavicular fracture 15 years ago, otherwise no other fractures. No family history of osteoporosis or fracture. Has not been on any prior treatment for osteoporosis. - review of records indicates patient did have fall with right hip fracture s/p surgery in march- pt previously not compliant with prolia injection- now taking consistently  - unable to use evenity due to CAD, tymlos/forteo due to elevated PTH  -  Prolia 60 mg subcu q 6 months- checking BMP 2 weeks post injection (12/2019)  - Xray thoracic and lumbar spine with no compression fractures (12/2019)  - Continue calcium and vitamin D supplements  - Encouraged to participate in regular weight bearing exercises. - Discussed fall prevention  - Repeat DEXA 5/2023    Hypocalcemia- resolved    No follow-ups on file. Electronically signed by PHIL Pruitt - CNP on 2/24/2022 at 9:52 AM      Thank you for allowing me to participate in the care of this patient. Please call if there are any questions.

## 2022-02-28 ENCOUNTER — HOSPITAL ENCOUNTER (OUTPATIENT)
Dept: NURSING | Age: 82
Discharge: HOME | End: 2022-02-28

## 2022-03-02 ENCOUNTER — INITIAL CONSULT (OUTPATIENT)
Dept: NEUROLOGY | Age: 82
End: 2022-03-02
Payer: MEDICARE

## 2022-03-02 VITALS
HEIGHT: 64 IN | BODY MASS INDEX: 20.14 KG/M2 | WEIGHT: 118 LBS | DIASTOLIC BLOOD PRESSURE: 70 MMHG | HEART RATE: 80 BPM | SYSTOLIC BLOOD PRESSURE: 118 MMHG

## 2022-03-02 DIAGNOSIS — R55 SYNCOPE AND COLLAPSE: Primary | ICD-10-CM

## 2022-03-02 DIAGNOSIS — R29.2 HYPERREFLEXIA: ICD-10-CM

## 2022-03-02 DIAGNOSIS — R29.6 FREQUENT FALLS: ICD-10-CM

## 2022-03-02 DIAGNOSIS — I95.1 ORTHOSTATIC HYPOTENSION: ICD-10-CM

## 2022-03-02 PROCEDURE — G8484 FLU IMMUNIZE NO ADMIN: HCPCS | Performed by: PSYCHIATRY & NEUROLOGY

## 2022-03-02 PROCEDURE — 4040F PNEUMOC VAC/ADMIN/RCVD: CPT | Performed by: PSYCHIATRY & NEUROLOGY

## 2022-03-02 PROCEDURE — 99205 OFFICE O/P NEW HI 60 MIN: CPT | Performed by: PSYCHIATRY & NEUROLOGY

## 2022-03-02 PROCEDURE — 1090F PRES/ABSN URINE INCON ASSESS: CPT | Performed by: PSYCHIATRY & NEUROLOGY

## 2022-03-02 PROCEDURE — G8399 PT W/DXA RESULTS DOCUMENT: HCPCS | Performed by: PSYCHIATRY & NEUROLOGY

## 2022-03-02 PROCEDURE — 1123F ACP DISCUSS/DSCN MKR DOCD: CPT | Performed by: PSYCHIATRY & NEUROLOGY

## 2022-03-02 PROCEDURE — G8427 DOCREV CUR MEDS BY ELIG CLIN: HCPCS | Performed by: PSYCHIATRY & NEUROLOGY

## 2022-03-02 PROCEDURE — 1036F TOBACCO NON-USER: CPT | Performed by: PSYCHIATRY & NEUROLOGY

## 2022-03-02 PROCEDURE — G8420 CALC BMI NORM PARAMETERS: HCPCS | Performed by: PSYCHIATRY & NEUROLOGY

## 2022-03-02 NOTE — PATIENT INSTRUCTIONS
MRI cervical spine WO contrast  Wear support hose. Avoid standing for prolonged periods of time. Take plenty of fluids with electrolytes   Continue following with cardiology  Call with any new symptoms or concerns. Follow up in 1 month.

## 2022-03-09 ENCOUNTER — HOSPITAL ENCOUNTER (OUTPATIENT)
Dept: NURSING | Age: 82
Discharge: HOME | End: 2022-03-09
Payer: COMMERCIAL

## 2022-03-09 VITALS
SYSTOLIC BLOOD PRESSURE: 131 MMHG | RESPIRATION RATE: 18 BRPM | OXYGEN SATURATION: 95 % | DIASTOLIC BLOOD PRESSURE: 74 MMHG | TEMPERATURE: 96.9 F | HEART RATE: 87 BPM

## 2022-03-09 DIAGNOSIS — M81.0 AGE-RELATED OSTEOPOROSIS WITHOUT CURRENT PATHOLOGICAL FRACTURE: Primary | ICD-10-CM

## 2022-03-09 PROCEDURE — 96372 THER/PROPH/DIAG INJ SC/IM: CPT

## 2022-03-09 PROCEDURE — 6360000002 HC RX W HCPCS: Performed by: NURSE PRACTITIONER

## 2022-03-09 RX ORDER — SODIUM CHLORIDE 9 MG/ML
INJECTION, SOLUTION INTRAVENOUS CONTINUOUS
OUTPATIENT
Start: 2022-09-07

## 2022-03-09 RX ORDER — ONDANSETRON 2 MG/ML
8 INJECTION INTRAMUSCULAR; INTRAVENOUS
OUTPATIENT
Start: 2022-09-07

## 2022-03-09 RX ORDER — ALBUTEROL SULFATE 90 UG/1
4 AEROSOL, METERED RESPIRATORY (INHALATION) PRN
OUTPATIENT
Start: 2022-09-07

## 2022-03-09 RX ORDER — ACETAMINOPHEN 325 MG/1
650 TABLET ORAL
OUTPATIENT
Start: 2022-09-07

## 2022-03-09 RX ORDER — DIPHENHYDRAMINE HYDROCHLORIDE 50 MG/ML
50 INJECTION INTRAMUSCULAR; INTRAVENOUS
OUTPATIENT
Start: 2022-09-07

## 2022-03-09 RX ADMIN — DENOSUMAB 60 MG: 60 INJECTION SUBCUTANEOUS at 08:54

## 2022-03-09 NOTE — DISCHARGE INSTRUCTIONS
ACTIVITY:  Continue usual care with your doctor. Call your doctor immediately if any severe problems or go to the nearest emergency room. I have been treated and hereby acknowledge receiving this instruction sheet.           NEXT PROLIA INJECTION IS September 12TH AT 9:30AM

## 2022-03-09 NOTE — PROGRESS NOTES
0848 Pt arrives via wheelchair for prolia injection. Injection explained and questions answered. PT RIGHTS AND RESPONSIBILITIES OFFERED TO PT.  0854 injection given. Pt tolerated it well with no complaints. Pt discharged via wheelchair with instructions with no complaints.            __m__ Safety:       (Environmental)   [place] to environment   Ensure ID band is correct and in place/ allergy band as needed   Assess for fall risk   Initiate fall precautions as applicable (fall band, side rails, etc.)   Call light within reach   Bed in low position/ wheels locked    _m___ Pain:        Assess pain level and characteristics   Administer analgesics as ordered   Assess effectiveness of pain management and report to MD as needed    _m___ Knowledge Deficit:   Assess baseline knowledge   Provide teaching at level of understanding   Provide teaching via preferred learning method   Evaluate teaching effectiveness    _m___ Hemodynamic/Respiratory Status:       (Pre and Post Procedure Monitoring)   Assess/Monitor vital signs and LOC   Assess Baseline SpO2 prior to any sedation   Obtain weight/height   Assess vital signs/ LOC until patient meets discharge criteria   Monitor procedure site and notify MD of any issues

## 2022-03-15 NOTE — PROGRESS NOTES
Chief Complaint   Patient presents with    Consultation     syncope, memory problem        Sidney Almazan is a [de-identified] y.o. female who presents today for evaluation of episodes of syncope and collapse for at least the past 1 year. She was recently hospitalized for syncope in 1/2022. CT head done at the time showed No acute disease in the brain. Soft tissue swelling/hematoma is noted posterior to the calvarium. She reports that the majority of her symptoms occur while she is standing. She does get dizzy when going from sitting to standing position and feels she has to take a minute and wait for a minute and compose herself prior to walking. She has history of atrial fibrillation s/p watchmen procedure. She has cardiac pacemaker in place and is unable to have MRI. She had tilt table test done 2/15/21 that showed blood pressure dropped from 148/85 to 92/66. She had mild dizziness with that, but she has no loss of consciousness. She is on midodrine 2.5 tablets three times a day. She denies any history of seizure. She denies chest pain. No shortness of breath, no neck pain. No vision changes. No dysphagia. No fever. No rash. No weight loss. Patient is poor historian, she is accompanied by NH transport. Past Medical History:   Diagnosis Date    Atrial fibrillation ([facility] [address]     CAD (coronary artery disease)     Congestive heart failure (CHF) (HCC)     Hyperlipidemia     Hypertension        Patient Active Problem List   Diagnosis    Atrial fibrillation with RVR ([address]    S/P coronary artery bypass graft x 1, 11/12/18. 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     S/P mitral valve repair    Situational depression    Hyperlipidemia    Coronary artery disease involving coronary bypass graft of native heart without angina pectoris    Risk for falls    Physical deconditioning    CAD in native artery    Severe malnutrition (HCC)    Post PTCA    Pacemaker    Age-related osteoporosis without current pathological fracture    Hypertension    Closed fracture of left distal radius    Orthostatic syncope    Moderate malnutrition (HCC)    Syncope and collapse    Closed subcapital fracture of femur, right, initial encounter ([facility] [address]    Closed fracture of right hip (HCC)    Hyponatremia    Hyperglycemia    High anion gap metabolic acidosis    Hx of coronary artery disease    HFrEF [facility]heart failure with reduced ejection fraction) ([facility])    History of atrial fibrillation    Acute on chronic anemia    Acute blood loss anemia    Acute respiratory failure with hypoxia ([facility])    Acute cystitis without hematuria    Acute congestive heart failure ([facility])    Abnormal CT of the chest       No Known Allergies    Current Outpatient Medications   Medication Sig Dispense Refill    furosemide (LASIX) 20 MG tablet Take 1 tablet by mouth 2 times daily 60 tablet 3    potassium chloride (KLOR-CON) 10 MEQ extended release tablet Take 1 tablet by mouth 3 times daily 60 tablet 3    midodrine (PROAMATINE) 2.5 MG tablet Take 1 tablet by mouth 3 times daily (with meals) 90 tablet 3    buPROPion (WELLBUTRIN XL) 150 MG extended release tablet TAKE 1 TABLET BY MOUTH EVERY DAY in the morning 30 tablet 5    hydrOXYzine (ATARAX) 25 MG tablet Take 1 tablet by mouth nightly as needed      Cholecalciferol (VITAMIN D3 PO) Take by mouth      clopidogrel (PLAVIX) 75 MG tablet Take 1 tablet by mouth daily 30 tablet 11    rosuvastatin (CRESTOR) 10 MG tablet Take 1 tablet by mouth daily 30 tablet 11    pantoprazole (PROTONIX) 40 MG tablet Take 1 tablet by mouth daily (with breakfast) 60 tablet 5    atenolol (TENORMIN) 25 MG tablet Take 1 tablet by mouth daily Hold for SBP less than 100 mmHg, if HR less than 55 bpm 30 tablet 11    Calcium Carbonate-Vit D-Min (CALCIUM 1200 PO) Take 1 tablet by mouth daily      aspirin 81 MG tablet Take 81 mg by mouth daily      acetaminophen (TYLENOL) 325 MG tablet Take 1 tablet by mouth every 4 hours as needed for Pain (Patient taking differently: Take 325 mg by mouth every 4 hours as needed for Pain Takes 1-2 tabs every 4 hours prn pain) 120 tablet 3     No current facility-administered medications for this visit. Social History     Socioeconomic History    Marital status:      Spouse name: None    Number of children: 1    Years of education: None    Highest education level: None   Occupational History    None   Tobacco Use    Smoking status: Never Smoker    Smokeless tobacco: Never Used   Vaping Use    Vaping Use: Never used   Substance and Sexual Activity    Alcohol use: No    Drug use: No    Sexual activity: Not Currently   Other Topics Concern    None   Social History Narrative    None     Social Determinants of Health     Financial Resource Strain: Low Risk     Difficulty of Paying Living Expenses: Not hard at all   Food Insecurity: No Food Insecurity    Worried About Running Out of Food in the Last Year: Never true    [place] of Food in the Last Year: Never true   Transportation Needs:     Lack of Transportation (Medical): Not on file    Lack of Transportation (Non-Medical):  Not on file   Physical Activity:     Days of Exercise per Week: Not on file    Minutes of Exercise per Session: Not on file   Stress:     Feeling of Stress : Not on file   Social Connections:     Frequency of Communication with Friends and Family: Not on file    Frequency of Social Gatherings with Friends and Family: Not on file    Attends Evangelical Services: Not on file    Active Member of Clubs or Organizations: Not on file    Attends Club or Organization Meetings: Not on file    Marital Status: Not on file   Intimate Partner Violence:     Fear of Current or Ex-Partner: Not on file    Emotionally Abused: Not on file    Physically Abused: Not on file    Sexually Abused: Not on file   Housing Stability:     Unable to Pay for Housing in the Last Year: Not on file    Number of [place] in the Last Year: Not on file    Unstable Housing in the Last Year: Not on file       Family History   Problem Relation Age of Onset    Other Mother         alzheimers    Heart Attack Father     Heart Attack Brother          I reviewed the past medical history, allergies, medications, social history and family history. Review of Systems   All systems reviewed, and are all negative, except what is mentioned in HPI      Vitals:    03/02/22 1059   BP: 118/70   Pulse: 80   Weight: 118 lb (53.5 kg)   Height: 5' 4\" (1.626 m)       Physical Examination:  General appearance - alert, well appearing, and in no distress, oriented to person, place, and time and normal weight  Mental status- Level of Alertness: awake  Orientation: person, place, time  Memory: normal  Fund of Knowledge: normal  Attention/Concentration: normal  Language: normal. Mood is normal.   Neck - supple, no significant adenopathy, carotids upstroke normal bilaterally. There is no axillary lymphadenopathy. There is no carotid bruit. No neck lymphadenopathy. No thyroid enlargement   Neurological -   Cranial Mprrby-SJ-HEP:.   Cranial nerve II: Normal   Cranial nerve III: Pupils: equal, round, reactive to light  Cranial nerves III, IV, VI: Extraocular Movements: intact   Cranial nerve V: Facial sensation: intact   Cranial nerve VII:Facial strength: intact   Cranial nerve VIII: Hearing: intact   Cranial nerve IX: Palate Elevation intact bilaterally  Cranial nerve XI: Shoulder shrug intact bilaterally  Cranial nerve XII: Tongue midline   neck supple without rigidity, there is  limitation of range of motion of the neck, worse to the left  DTR's are brisk in bilateral upper and lower extremites  Babinski sign negative  Motor exam is 4/5 in the right upper and 5/5 in left upper and bilateral lower extremities. Normal muscle tone. There is no muscle atrophy. Sensory is intact for light touch.    Coordination: finger to nose, intact  Gait and station not tested, he is in wheelchair  . Abnormal movement none, vibration reduced distally  Skin - warm, dry to touch, normal coloration, no rashes, no suspicious skin lesions  Superficial temporal artery pulses are normal.   There is no resting tremor, no pin rolling, no bradykinesia, no Hypohonia, normal blink rate. Musculoskeletal: Has no hand arthritis, no limitation of ROM in any of the four extremities. There is no leg edema. The Heart was regular in rate and rhythm. No heart murmur  Chest Clear, with  good effort. Abdomen soft, intact bowel sounds. Results for orders placed during the hospital encounter of 01/21/22    CT HEAD WO CONTRAST    Narrative  CT BRAIN WITHOUT CONTRAST    COMPARISON: 3/12/2021. FINDINGS: There is mild parenchymal atrophy. There is no evidence of an  acute infarct or intraparenchymal hemorrhage. Remote infarct is again  noted in the left frontal white matter. Patchy areas of low attenuation  are noted in the subcortical and periventricular region which are  nonspecific but most likely represent chronic small vessel ischemic  disease. There is no mass effect, midline shift, or extra-axial blood. The  ventricle are normal in size without evidence of hydrocephalus. The bone  windows are unremarkable. Soft tissue swelling/hematoma is noted posterior to the calvarium,  predominantly to the right of midline on axial image 28. Impression  1. No acute disease in the brain. 2. Soft tissue swelling/hematoma is noted posterior to the calvarium. This document has been electronically signed by: Jose Manuel Jansen M.D. on  01/24/2022 03:03 AM    All CTs at this facility use dose modulation techniques and iterative  reconstructions, and/or weight-based dosing  when appropriate to reduce radiation to a low as reasonably achievable. We reviewed the patient records from referring provider and available information in the EHR       ASSESSMENT:      Diagnosis Orders   1.  Syncope and collapse     2. Orthostatic hypotension     3. Frequent falls     4. Hyperreflexia        This is an 29-year-old female who presents with episodes of syncope and collapse that have been ongoing for at least the past 1 year. She was recently hospitalized for syncope in 1/2022. CT head done at the time showed No acute disease in the brain. Soft tissue swelling/hematoma is noted posterior to the calvarium. I reviewed the CT brain and agree with interpretation. She reports that the majority of her symptoms occur while she is standing.  She does get dizzy when going from sitting to standing position and feels she has to take a minute and wait for a minute and compose herself prior to walking. She has history of atrial fibrillation s/p watchmen procedure. I also reviewed the patient pertinent labs and records in the EHR and from other providers. She has cardiac pacemaker in place and is unable to have MRI. She had tilt table test done 2/15/21 that showed blood pressure dropped from 148/85 to 92/66. She had mild dizziness with that, but she has no loss of consciousness. She is on midodrine 2.5 tablets three times a day. On exam, she is hyperreflexic in her bilateral upper and lower extremities, she is weaker with her right upper extremity and there is limited range of motion of the neck worse to the left. The patient was counseled about her symptoms and work up recommended. We will arrange for her to undergo an MRI of the cervical spine without contrast to evaluate for cervical myelopathy as an additional cause to her falling. She was counseled on the importance of wearing support hose, avoiding standing for prolonged periods of time, as well as taking plenty of fluids with electrolytes. She was recommended to continue following with cardiology. After detailed discussion with the patient and her nursing home caregiver we agreed on the following plan. Plan    1. 1.MRI cervical spine WO contrast  2.   2.Wear support hose. 3.  3.Avoid standing for prolonged periods of time. 4.  4.Take plenty of fluids with electrolytes   5. 5.Continue following with cardiology  6. 6.Call with any new symptoms or concerns. 7.  7.Follow up in 1 month.      Total time 61 min        Ki Wiggins MD

## 2022-03-22 ENCOUNTER — HOSPITAL ENCOUNTER (OUTPATIENT)
Dept: MRI IMAGING | Age: 82
Discharge: HOME | End: 2022-03-22
Payer: MEDICARE

## 2022-03-22 VITALS — RESPIRATION RATE: 20 BRPM | HEART RATE: 70 BPM | OXYGEN SATURATION: 94 %

## 2022-03-22 DIAGNOSIS — R55 SYNCOPE AND COLLAPSE: ICD-10-CM

## 2022-03-22 DIAGNOSIS — R29.6 FREQUENT FALLS: ICD-10-CM

## 2022-03-22 DIAGNOSIS — R29.2 HYPERREFLEXIA: ICD-10-CM

## 2022-03-22 DIAGNOSIS — I95.1 ORTHOSTATIC HYPOTENSION: ICD-10-CM

## 2022-03-22 PROCEDURE — 72141 MRI NECK SPINE W/O DYE: CPT

## 2022-03-22 NOTE — PROGRESS NOTES
1347 Pt in MRI-placed in MRI safe mode DOO 70 per [employer] rep. Pt offers no complaints. 1351 Pt placed on scanner and attached to monitor. 1353 Scan started. 1410 Scan complete. Assisted to wheelchair and returned to normal pacer settings per [employer] rep. Pt offers no complaints.

## 2022-03-23 ENCOUNTER — TELEPHONE (OUTPATIENT)
Dept: NEUROLOGY | Age: 82
End: 2022-03-23

## 2022-03-23 DIAGNOSIS — R55 SYNCOPE AND COLLAPSE: ICD-10-CM

## 2022-03-23 DIAGNOSIS — R93.7 ABNORMAL MRI, CERVICAL SPINE: ICD-10-CM

## 2022-03-23 DIAGNOSIS — R29.6 FREQUENT FALLS: Primary | ICD-10-CM

## 2022-03-23 NOTE — TELEPHONE ENCOUNTER
----- Message from PHIL Beard CNP sent at 3/23/2022  7:52 AM EDT -----  Please let patient know her MRI cervical spine showed C5-C6, at which level there is mild-to-moderate canal stenosis, indentation upon the underlying spinal cord and moderate to severe bilateral foraminal stenosis. She needs to be seen by spine specialist, does she have a preference?   Phan Navarro, CNP

## 2022-03-23 NOTE — TELEPHONE ENCOUNTER
Spoke with Michelle Hays, on HIPAA, who stated she will consult with family and call office back with preference.

## 2022-04-21 RX ORDER — MECLIZINE HCL 12.5 MG/1
TABLET ORAL
COMMUNITY

## 2022-04-26 ENCOUNTER — HOSPITAL ENCOUNTER (OUTPATIENT)
Dept: CT IMAGING | Age: 82
Discharge: HOME | End: 2022-04-26
Payer: MEDICARE

## 2022-04-26 DIAGNOSIS — R93.89 ABNORMAL CT OF THE CHEST: ICD-10-CM

## 2022-04-26 DIAGNOSIS — J96.01 ACUTE RESPIRATORY FAILURE WITH HYPOXIA (HCC): ICD-10-CM

## 2022-04-26 PROCEDURE — 71250 CT THORAX DX C-: CPT

## 2022-04-28 ENCOUNTER — OFFICE VISIT (OUTPATIENT)
Dept: PULMONOLOGY | Age: 82
End: 2022-04-28
Payer: MEDICARE

## 2022-04-28 VITALS
TEMPERATURE: 97.5 F | SYSTOLIC BLOOD PRESSURE: 120 MMHG | BODY MASS INDEX: 19.99 KG/M2 | HEIGHT: 65 IN | WEIGHT: 120 LBS | DIASTOLIC BLOOD PRESSURE: 68 MMHG | OXYGEN SATURATION: 95 % | HEART RATE: 74 BPM

## 2022-04-28 DIAGNOSIS — R93.89 ABNORMAL CT OF THE CHEST: Primary | ICD-10-CM

## 2022-04-28 PROCEDURE — G8399 PT W/DXA RESULTS DOCUMENT: HCPCS | Performed by: NURSE PRACTITIONER

## 2022-04-28 PROCEDURE — 1036F TOBACCO NON-USER: CPT | Performed by: NURSE PRACTITIONER

## 2022-04-28 PROCEDURE — G8420 CALC BMI NORM PARAMETERS: HCPCS | Performed by: NURSE PRACTITIONER

## 2022-04-28 PROCEDURE — 99214 OFFICE O/P EST MOD 30 MIN: CPT | Performed by: NURSE PRACTITIONER

## 2022-04-28 PROCEDURE — G8428 CUR MEDS NOT DOCUMENT: HCPCS | Performed by: NURSE PRACTITIONER

## 2022-04-28 PROCEDURE — 4040F PNEUMOC VAC/ADMIN/RCVD: CPT | Performed by: NURSE PRACTITIONER

## 2022-04-28 PROCEDURE — 1123F ACP DISCUSS/DSCN MKR DOCD: CPT | Performed by: NURSE PRACTITIONER

## 2022-04-28 PROCEDURE — 1090F PRES/ABSN URINE INCON ASSESS: CPT | Performed by: NURSE PRACTITIONER

## 2022-04-28 RX ORDER — ROPINIROLE 0.5 MG/1
0.5 TABLET, FILM COATED ORAL DAILY
COMMUNITY

## 2022-04-28 ASSESSMENT — ENCOUNTER SYMPTOMS
CHEST TIGHTNESS: 0
WHEEZING: 0
NAUSEA: 0
VOMITING: 0
SHORTNESS OF BREATH: 0
DIARRHEA: 0
STRIDOR: 0
COUGH: 0

## 2022-04-28 NOTE — PROGRESS NOTES
[facility]    Patient: Lupe Arechiga, [de-identified] y.o.   : 1940    Pt of Dr. Monae Howard   Patient presents with    Follow-up     3 mo [facility] f/u with ct prior        HPI  Gadiel Hightower is here for follow up after hospitalization at [facility]. Hospital HPI  The patient is a 78 y.o. female with pmhx coronary artery disease status post CABG and stent on aspirin and Plavix, of A fib s/p Watchman and pacemaker, CHF reduced EF, HTN, HLD, Valve replacements who presents with multiple falls over the past year in the setting of syncope. Per her daughter at bedside the falls are almost always unwitnessed . She does have bruises periodically from the falls but tries to hide them. Pt has no known hx of seizures.   She denies cough or sputum production, she lives home alone but her daughter comes to check on her every day. In the emergency department pt was found to be hypoxic and placed on 3LNC. BNP was elevated from baseline, CXR shows COPD, CTA shows no PE mild atelectasis of the right lower lobe. COVID-negative  Patient is non-smoker  Does not use home oxygen    Progress History:   Patient currently residing at [facility] accompanied by sister in law. Overall patient reports respiratory symptoms have resolved. Patient reports not currently using any breathing medications. Patient reports no physical limitation due to respiratory symptoms. Denies significant cough wheeze or chest pain. Denies hemoptysis. Here today to review Ct chest results     On RA  Non-smoker   In  due to history of falls     Since last visit any new medical issues? No  New ER or hospital visits? No  Any new or changes in medicines? No  Using inhalers? No  Pneumonia vaccine?  Not that she recalls not interested  Past Medical hx   PMH:  Past Medical History:   Diagnosis Date    Atrial fibrillation ([facility] [address]     CAD (coronary artery disease)     Congestive heart failure (CHF) ([address]  Hyperlipidemia     Hypertension      SURGICAL HISTORY:  Past Surgical History:   Procedure Laterality Date    ATRIAL ABLATION SURGERY      CARDIAC CATHETERIZATION      CARDIAC VALVE REPLACEMENT      CORONARY ANGIOPLASTY WITH STENT PLACEMENT  02/28/2019    CORONARY ARTERY BYPASS GRAFT      x1    ENDOSCOPY, COLON, DIAGNOSTIC      HYSTERECTOMY      JOINT REPLACEMENT      MITRAL VALVE REPLACEMENT      PACEMAKER PLACEMENT  05/2019    HI OFFICE/OUTPT VISIT,PROCEDURE ONLY N/A 11/12/2018    CABG X3, MITRAL VALVE REPAIR, MAZE, AND TRICUSPID VALVE REPAIR, DEANNA performed by Parker Owen MD at [facility] Right 3/14/2021    HIP TOTAL ARTHROPLASTY performed by Kira Nelson MD at [facility] ENDOSCOPY  4/12/2021    EGD BIOPSY performed by Funmi Mcintyre MD at [address] [facility] Endoscopy    UPPER GASTROINTESTINAL ENDOSCOPY  4/12/2021    EGD CONTROL HEMORRHAGE performed by Funmi Mcintyre MD at [address] Endoscopy     SOCIAL HISTORY:  Social History     Tobacco Use    Smoking status: Never Smoker    Smokeless tobacco: Never Used   Vaping Use    Vaping Use: Never used   Substance Use Topics    Alcohol use: No    Drug use: No     ALLERGIES:No Known Allergies  FAMILY HISTORY:  Family History   Problem Relation Age of Onset    Other Mother         alzheimers    Heart Attack Father     Heart Attack Brother      CURRENT MEDICATIONS:  Current Outpatient Medications   Medication Sig Dispense Refill    rOPINIRole (REQUIP) 0.5 MG tablet Take 0.5 mg by mouth daily      furosemide (LASIX) 20 MG tablet Take 1 tablet by mouth 2 times daily 60 tablet 3    potassium chloride (KLOR-CON) 10 MEQ extended release tablet Take 1 tablet by mouth 3 times daily 60 tablet 3    midodrine (PROAMATINE) 2.5 MG tablet Take 1 tablet by mouth 3 times daily (with meals) 90 tablet 3    buPROPion (WELLBUTRIN XL) 150 MG extended release tablet TAKE 1 TABLET BY MOUTH EVERY DAY in the morning 30 tablet 5    hydrOXYzine (ATARAX) 25 MG tablet Take 1 tablet by mouth nightly as needed      Cholecalciferol (VITAMIN D3 PO) Take by mouth      clopidogrel (PLAVIX) 75 MG tablet Take 1 tablet by mouth daily 30 tablet 11    rosuvastatin (CRESTOR) 10 MG tablet Take 1 tablet by mouth daily 30 tablet 11    pantoprazole (PROTONIX) 40 MG tablet Take 1 tablet by mouth daily (with breakfast) 60 tablet 5    atenolol (TENORMIN) 25 MG tablet Take 1 tablet by mouth daily Hold for SBP less than 100 mmHg, if HR less than 55 bpm 30 tablet 11    Calcium Carbonate-Vit D-Min (CALCIUM 1200 PO) Take 1 tablet by mouth daily      aspirin 81 MG tablet Take 81 mg by mouth daily      acetaminophen (TYLENOL) 325 MG tablet Take 1 tablet by mouth every 4 hours as needed for Pain (Patient taking differently: Take 325 mg by mouth every 4 hours as needed for Pain Takes 1-2 tabs every 4 hours prn pain) 120 tablet 3    meclizine (ANTIVERT) 12.5 MG tablet        No current facility-administered medications for this visit. Jaylin PIERRE   Review of Systems   Constitutional: Negative for chills, fever and unexpected weight change. Respiratory: Negative for cough, chest tightness, shortness of breath, wheezing and stridor. Cardiovascular: Negative for chest pain and leg swelling. Gastrointestinal: Negative for diarrhea, nausea and vomiting. Genitourinary: Negative for dysuria. Neurological: Positive for weakness. Physical exam   /68 (Site: Left Upper Arm, Position: Sitting, Cuff Size: Small Adult)   Pulse 74   Temp 97.5 °F (36.4 °C)   Ht 5' 5\" (1.651 m)   Wt 120 lb (54.4 kg)   SpO2 95% Comment: on r/a  BMI 19.97 kg/m²    Wt Readings from Last 3 Encounters:   04/28/22 120 lb (54.4 kg)   03/02/22 118 lb (53.5 kg)   02/24/22 119 lb (54 kg)       Physical Exam  Vitals and nursing note reviewed. Constitutional:       General: She is not in acute distress.      Appearance: She is well-developed. Comments: In WC, elderly, thinly built   HENT:      Head: Normocephalic and atraumatic. Neck:      Trachea: No tracheal deviation. Cardiovascular:      Rate and Rhythm: Normal rate and regular rhythm. Heart sounds: Normal heart sounds. No murmur heard. Pulmonary:      Effort: Pulmonary effort is normal. No respiratory distress. Breath sounds: Normal breath sounds. No stridor. No wheezing or rales. Chest:      Chest wall: No tenderness. Abdominal:      General: Bowel sounds are normal. There is no distension. Palpations: Abdomen is soft. Musculoskeletal:      Cervical back: Neck supple. Skin:     General: Skin is warm and dry. Capillary Refill: Capillary refill takes less than 2 seconds. Neurological:      Mental Status: She is alert and oriented to person, place, and time. Psychiatric:         Behavior: Behavior normal.         Thought Content: Thought content normal.          Results   Lung Nodule Screening     [] Qualifies    [x] Does not qualify   [] Declined    [] Completed  Non-smoker   The USPSTF recommends annual screening for lung cancer with low-dose computed tomography (LDCT) in adults aged 48 to [de-identified] years who have a 20 pack-year smoking history and currently smoke or have quit within the past 15 years. Screening should be discontinued once a person has not smoked for 15 years or develops a health problem that substantially limits life expectancy or the ability or willingness to have curative lung surgery. CTA CHEST W WO CONTRAST, CT ABDOMEN PELVIS W IV CONTRAST   1/21/2022   CT CHEST:   1. No CT evidence of pulmonary embolus. 2. A geographic groundglass appearance of the lungs is noted. Differential consideration would include hypersensitivity pneumonitis, small airway disease, small vessel disease, or an infiltrative lung disease. 3. Mild cardiomegaly.    CT ABDOMEN and PELVIS:   1. No acute inflammatory bowel process.  No acute bowel obstruction. 2. Other findings as described above          CT CHEST WO CONTRAST   4/26/2022   FINDINGS: The noncontrast CT limits the evaluation for solid organ pathology, vascular pathology, and lymphadenopathy. Mild areas of patchy groundglass opacities seen in the lung bases. A few patchy opacities seen in the right middle lobe. Biapical pleural scarring. Cardiac conduction device noted. Aortocoronary calcifications. No large pulmonary mass. Central airway is patent. No pleural effusions. No significant pericardial effusion. The heart is not enlarged. Ascending thoracic aorta is not dilated. The main pulmonary artery is not dilated. No significantly enlarged mediastinal or  axillary lymph node. The thyroid is not enlarged. No chest wall mass. Limited evaluation below the diaphragm is unremarkable. Bones: Median sternotomy has been performed. Degenerative changes of the thoracic spine. The bones are demineralized. Scoliosis. Impression:  1. Mild patchy areas of groundglass opacities seen in the right middle lobe and lung bases. Findings are nonspecific and may relate to infectious or inflammatory process   Left April, Right January      Assessment      Diagnosis Orders   1.  Abnormal CT of the chest       -Acute respiratory failure with hypoxia-resolved   -HFpEF-follows with Cardiology Dr. Bashir Figueredo   -Coronary artery disease status post CABG and stent on aspirin and Plavix,   -Hx A fib s/p Watchman and pacemaker  -CKD     Plan   -Reviewed CT chest improved from previous no further imaging recommended at this time   -Advised to maintain follow-up with other members of care team   -Denies respiratory complaint noted improvement in CT chest will see patient back as needed for new or worsening respiratory complaint   -Advised to consider pneumonia vaccine with PCP and to take flu vaccine this coming season.  -Advised patient to call office with any changes, questions, or concerns regarding respiratory status    Will see Lefty Wheatley as needed for respiratory complaint    Elvis Altamirano CNP  4/28/2022

## 2022-07-02 ENCOUNTER — PROCEDURE VISIT (OUTPATIENT)
Dept: CARDIOLOGY CLINIC | Age: 82
End: 2022-07-02
Payer: MEDICARE

## 2022-07-02 DIAGNOSIS — Z95.0 PACEMAKER: Primary | ICD-10-CM

## 2022-07-05 PROCEDURE — 93279 PRGRMG DEV EVAL PM/LDLS PM: CPT | Performed by: INTERNAL MEDICINE

## 2022-09-12 ENCOUNTER — HOSPITAL ENCOUNTER (OUTPATIENT)
Dept: NURSING | Age: 82
Discharge: HOME | End: 2022-09-12
Payer: MEDICARE

## 2022-09-12 VITALS
SYSTOLIC BLOOD PRESSURE: 138 MMHG | HEART RATE: 77 BPM | DIASTOLIC BLOOD PRESSURE: 72 MMHG | TEMPERATURE: 96 F | OXYGEN SATURATION: 97 % | RESPIRATION RATE: 18 BRPM

## 2022-09-12 DIAGNOSIS — M81.0 AGE-RELATED OSTEOPOROSIS WITHOUT CURRENT PATHOLOGICAL FRACTURE: Primary | ICD-10-CM

## 2022-09-12 PROCEDURE — 6360000002 HC RX W HCPCS: Performed by: NURSE PRACTITIONER

## 2022-09-12 PROCEDURE — 96372 THER/PROPH/DIAG INJ SC/IM: CPT

## 2022-09-12 RX ORDER — SODIUM CHLORIDE 9 MG/ML
INJECTION, SOLUTION INTRAVENOUS CONTINUOUS
OUTPATIENT
Start: 2023-03-06

## 2022-09-12 RX ORDER — ALBUTEROL SULFATE 90 UG/1
4 AEROSOL, METERED RESPIRATORY (INHALATION) PRN
OUTPATIENT
Start: 2023-03-06

## 2022-09-12 RX ORDER — ACETAMINOPHEN 325 MG/1
650 TABLET ORAL
OUTPATIENT
Start: 2023-03-06

## 2022-09-12 RX ORDER — ONDANSETRON 2 MG/ML
8 INJECTION INTRAMUSCULAR; INTRAVENOUS
OUTPATIENT
Start: 2023-03-06

## 2022-09-12 RX ORDER — DIPHENHYDRAMINE HYDROCHLORIDE 50 MG/ML
50 INJECTION INTRAMUSCULAR; INTRAVENOUS
OUTPATIENT
Start: 2023-03-06

## 2022-09-12 RX ADMIN — DENOSUMAB 60 MG: 60 INJECTION SUBCUTANEOUS at 09:34

## 2022-09-12 NOTE — PROGRESS NOTES
0930 pt arrives via wheelchair for prolia injection. Injection explained and questions answered. PT RIGHTS AND RESPONSIBILITIES OFFERED TO PT.   0934 injection given. Pt tolerated it well with no complaints. Pt discharged via wheelchair with instructions with no complaints.              _m___ Safety:       (Environmental)  [place] to environment  Ensure ID band is correct and in place/ allergy band as needed  Assess for fall risk  Initiate fall precautions as applicable (fall band, side rails, etc.)  Call light within reach  Bed in low position/ wheels locked    _m___ Pain:       Assess pain level and characteristics  Administer analgesics as ordered  Assess effectiveness of pain management and report to MD as needed    __m__ Knowledge Deficit:  Assess baseline knowledge  Provide teaching at level of understanding  Provide teaching via preferred learning method  Evaluate teaching effectiveness    __m__ Hemodynamic/Respiratory Status:       (Pre and Post Procedure Monitoring)  Assess/Monitor vital signs and LOC  Assess Baseline SpO2 prior to any sedation  Obtain weight/height  Assess vital signs/ LOC until patient meets discharge criteria  Monitor procedure site and notify MD of any issues

## 2022-09-15 PROCEDURE — 93296 REM INTERROG EVL PM/IDS: CPT | Performed by: INTERNAL MEDICINE

## 2022-09-15 PROCEDURE — 93294 REM INTERROG EVL PM/LDLS PM: CPT | Performed by: INTERNAL MEDICINE

## 2022-10-05 ENCOUNTER — PROCEDURE VISIT (OUTPATIENT)
Dept: CARDIOLOGY CLINIC | Age: 82
End: 2022-10-05
Payer: MEDICARE

## 2022-10-05 DIAGNOSIS — Z95.0 PACEMAKER: Primary | ICD-10-CM

## 2022-10-05 NOTE — PROGRESS NOTES
[facility]   Patient of Sachin    Battery 9.3 years    Presenting rhythm     RV impedance 418    RV sense 3.1    RV paced 100%    V Thresholds [email or web address]  V Amplitude 2.0 @ 0.4    Episodes none

## 2022-12-15 ENCOUNTER — PROCEDURE VISIT (OUTPATIENT)
Dept: CARDIOLOGY CLINIC | Age: 82
End: 2022-12-15
Payer: MEDICARE

## 2022-12-15 DIAGNOSIS — Z95.0 PACEMAKER: Primary | ICD-10-CM

## 2022-12-15 PROCEDURE — 93296 REM INTERROG EVL PM/IDS: CPT | Performed by: INTERNAL MEDICINE

## 2022-12-15 PROCEDURE — 93294 REM INTERROG EVL PM/LDLS PM: CPT | Performed by: INTERNAL MEDICINE

## 2022-12-15 NOTE — PROGRESS NOTES
[facility]     9.6 years on device   Rv imped 551  R waves 3.1  Threshold 0.75 @ 0.4  100% paced

## 2023-03-16 ENCOUNTER — PROCEDURE VISIT (OUTPATIENT)
Dept: CARDIOLOGY CLINIC | Age: 83
End: 2023-03-16
Payer: MEDICARE

## 2023-03-16 DIAGNOSIS — Z95.0 PACEMAKER: Primary | ICD-10-CM

## 2023-03-16 PROCEDURE — 93294 REM INTERROG EVL PM/LDLS PM: CPT | Performed by: INTERNAL MEDICINE

## 2023-03-16 PROCEDURE — 93296 REM INTERROG EVL PM/IDS: CPT | Performed by: INTERNAL MEDICINE

## 2023-03-16 NOTE — PROGRESS NOTES
[facility]     [facility]   RV imped 456  Threshold 0.75 @ 0.4  R waves 3.1  100% paced

## 2023-04-08 NOTE — DISCHARGE INSTR - COC
Continuity of Care Form    Patient Name: Tono Harmon   :  1940  MRN:  [id number]    Admit date:  3/12/2021  Discharge date:  ***    Code Status Order: Full Code   Advance Directives:   Advance Care Flowsheet Documentation     Date/Time Healthcare Directive Type of Healthcare Directive Copy in [address] Agent's Name Healthcare Agent's Phone Number    [phone]  Yes, patient has an advance directive for healthcare treatment  Durable power of  for health care  No, copy requested from family  --  --  --          Admitting Physician:  Charlotte Lechuga MD  PCP: Grabiel Funk APRN - CNP    Discharging Nurse: [address] [place] [address] Unit/Room#: 3B-30/030-A  Discharging Unit Phone Number: [phone]      Emergency Contact:   Extended Emergency Contact Information  Primary Emergency Contact: [address] [place] [address] Phone: [phone]  Relation: Child  Secondary Emergency Contact: [address] Phone: [phone]  Relation: Brother/Sister    Past Surgical History:  Past Surgical History:   Procedure Laterality Date    ATRIAL ABLATION [facility]  2019    CORONARY ARTERY BYPASS GRAFT      x1    HYSTERECTOMY      MITRAL VALVE REPLACEMENT      PACEMAKER PLACEMENT  2019    [place] OFFICE/OUTPT VISIT,PROCEDURE ONLY N/A 2018    CABG X3, MITRAL VALVE REPAIR, MAZE, AND TRICUSPID VALVE REPAIR, DEANNA performed by Lazaro Jackson MD at [place] [address]         Immunization History: There is no immunization history on file for this patient. Active Problems:  Patient Active Problem List   Diagnosis Code    Atrial fibrillation with RVR ([facility]) I48.91    S/P coronary artery bypass graft x 1, 18.  Z95.1    Status post tricuspid valve repair, 11/12/18. Z98.890    S/P mitral valve repair Z98.890    Situational depression F43.21    Hyperlipidemia E78.5    Coronary artery disease involving coronary bypass graft of native heart without angina pectoris I25.810    Risk for falls Z91.81    Physical deconditioning R53.81    CAD in native artery I25.10    Severe malnutrition (HCC) E43    Post PTCA Z98.61    Pacemaker Z95.0    Age-related osteoporosis without current pathological fracture M81.0    Fall W19. Baljit Noon Hypertension I10    Closed fracture of left distal radius S52.502A    Orthostatic syncope I95.1    Moderate malnutrition (HCC) E44.0    Syncope and collapse R55    Closed subcapital fracture of femur, right, initial encounter ([facility] S72.011A    Closed fracture of right hip ([facility]) S72.001A    Hyponatremia E87.1    Hyperglycemia R73.9    High anion gap metabolic acidosis A16.7    Hx of coronary artery disease Z86.79    HFrEF (heart failure with reduced ejection fraction) ([facility]) I50.20    History of atrial fibrillation Z86.79    Acute on chronic anemia D64.9       Isolation/Infection:   Isolation          No Isolation        Patient Infection Status     Infection Onset Added Last Indicated Last Indicated By Review Planned Expiration Resolved Resolved By    None active    Resolved    COVID-19 Rule Out 02/18/21 02/18/21 02/18/21 COVID-19, Rapid (Ordered)   02/18/21 Rule-Out Test Resulted          Nurse Assessment:  Last Vital Signs: /64   Pulse 86   Temp 97.7 °F (36.5 °C) (Oral)   Resp 18   Ht 5' 5\" (1.651 m)   Wt 128 lb 8 oz (58.3 kg)   SpO2 93%   BMI 21.38 kg/m²     Last documented pain score (0-10 scale): Pain Level: 8  Last Weight:   Wt Readings from Last 1 Encounters:   03/14/21 128 lb 8 oz (58.3 kg)     Mental Status:  oriented    IV Access:  - None    Nursing Mobility/ADLs:  Walking   Assisted  Transfer  Assisted  Bathing  Assisted  Dressing  Assisted  Toileting  Independent  Feeding  Independent  Med Admin Independent  Med Delivery   whole    Wound Care Documentation and Therapy:        Elimination:  Continence:   · Bowel: Yes  · Bladder: Yes  Urinary Catheter: None   Colostomy/Ileostomy/Ileal Conduit: No       Date of Last BM: 03/16/2021      Intake/Output Summary (Last 24 hours) at 3/15/2021 1116  Last data filed at 3/15/2021 0948  Gross per 24 hour   Intake 2940 ml   Output 700 ml   Net 2240 ml     I/O last 3 completed shifts: In: 2315 [P.O.:600; I.V.:1600; Blood:620]  Out: 700 [Urine:300; Blood:400]    Safety Concerns:     None    Impairments/Disabilities:      None    Nutrition Therapy:  Current Nutrition Therapy:   - Oral Diet:  Cardiac    Routes of Feeding: Oral  Liquids: No Restrictions  Daily Fluid Restriction: yes - amount 1500 ml  Last Modified Barium Swallow with Video (Video Swallowing Test): not done    Treatments at the Time of Hospital Discharge:   Respiratory Treatments: none  Oxygen Therapy:  is on oxygen at 2 L/min per nasal cannula. Ventilator:    - No ventilator support    Rehab Therapies: Physical Therapy and Occupational Therapy  Weight Bearing Status/Restrictions: No weight bearing restirctions  Other Medical Equipment (for information only, NOT a DME order):  walker  Other Treatments: ***    Patient's personal belongings (please select all that are sent with patient):  Glasses    RN SIGNATURE:  Electronically signed by Diamond Martinez RN on 3/17/21 at 9:39 AM EDT    CASE MANAGEMENT/SOCIAL WORK SECTION    Inpatient Status Date: 3/12/2021    Readmission Risk Assessment Score:  Readmission Risk              Risk of Unplanned Readmission:        20           Discharging to Facility/ Agency   · Name: [address] [place] [address]  · [address], [facility], [address] [place] [address]  · Phone:[phone]  · EQQ:[phone]    Dialysis Facility (if applicable)   · Name:  · Address:  · Dialysis Schedule:  · Phone:  · Fax:    / signature: Electronically signed by ARTURO Gonzalez on 3/15/21 at Labs/Imaging Studies

## 2023-06-15 ENCOUNTER — NURSE ONLY (OUTPATIENT)
Dept: CARDIOLOGY CLINIC | Age: 83
End: 2023-06-15
Payer: MEDICARE

## 2023-06-15 DIAGNOSIS — Z95.0 PACEMAKER: Primary | ICD-10-CM

## 2023-06-15 PROCEDURE — 93279 PRGRMG DEV EVAL PM/LDLS PM: CPT | Performed by: INTERNAL MEDICINE

## 2023-06-20 NOTE — PROGRESS NOTES
Dr Monalisa Soto pt   Medtronic single pacer check in office per tanisha Marin rep      Battery 8.7 yrs remaining    Vvir 70    Presents in  / dependent     Vent impedence 437    Vent threshold 0.75 @ 0.4    Vent amplitude 2 @ 0.4    Normal device function

## 2023-09-01 ENCOUNTER — APPOINTMENT (OUTPATIENT)
Dept: GENERAL RADIOLOGY | Age: 83
End: 2023-09-01
Payer: MEDICARE

## 2023-09-01 ENCOUNTER — APPOINTMENT (OUTPATIENT)
Dept: CT IMAGING | Age: 83
End: 2023-09-01
Payer: MEDICARE

## 2023-09-01 ENCOUNTER — HOSPITAL ENCOUNTER (EMERGENCY)
Age: 83
Discharge: HOME | End: 2023-09-01
Attending: EMERGENCY MEDICINE
Payer: MEDICARE

## 2023-09-01 VITALS
SYSTOLIC BLOOD PRESSURE: 130 MMHG | BODY MASS INDEX: 18.97 KG/M2 | TEMPERATURE: 97.3 F | RESPIRATION RATE: 15 BRPM | OXYGEN SATURATION: 100 % | WEIGHT: 114 LBS | HEART RATE: 69 BPM | DIASTOLIC BLOOD PRESSURE: 70 MMHG

## 2023-09-01 DIAGNOSIS — S00.93XA CONTUSION OF HEAD, UNSPECIFIED PART OF HEAD, INITIAL ENCOUNTER: ICD-10-CM

## 2023-09-01 DIAGNOSIS — S60.221A CONTUSION OF RIGHT HAND, INITIAL ENCOUNTER: ICD-10-CM
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
## 2023-09-01 PROCEDURE — 73080 X-RAY EXAM OF ELBOW: CPT

## 2023-09-01 PROCEDURE — 72125 CT NECK SPINE W/O DYE: CPT

## 2023-09-01 PROCEDURE — 73120 X-RAY EXAM OF HAND: CPT

## 2023-09-01 PROCEDURE — 99284 EMERGENCY DEPT VISIT MOD MDM: CPT

## 2023-09-01 PROCEDURE — 71045 X-RAY EXAM CHEST 1 VIEW: CPT

## 2023-09-01 PROCEDURE — 70450 CT HEAD/BRAIN W/O DYE: CPT

## 2023-09-01 ASSESSMENT — PAIN SCALES - GENERAL: PAINLEVEL_OUTOF10: 0

## 2023-09-01 ASSESSMENT — PAIN - FUNCTIONAL ASSESSMENT: PAIN_FUNCTIONAL_ASSESSMENT: 0-10

## 2023-09-01 NOTE — ED NOTES
Pt was able to ambulate unit. Returned to cot without incident. Pt remains in stable condition.       Paulina Mayer RN  09/01/23 2659

## 2023-09-01 NOTE — ED NOTES
Pt arrived to ED via EMS with complaints of falling and hitting her head. States she was standing at her dresser and lost her balance when opening a drawer. States she fell between night stand and bed and hit her head on bed frame. Not outward injury noted. Pt is on plavix per her nursing home. Treatment prior to arrival includes. Pt appears to be in stable condition with respirations even and unlabored. Pts call light in reach.        Paulina Mayer RN  09/01/23 9285

## 2023-09-01 NOTE — ED NOTES
Pt resting quietly on cot in stable condition. Denies any needs at this time. Call light in reach.       Reva Kim RN  09/01/23 9797

## 2023-09-01 NOTE — ED PROVIDER NOTES
normal.       FORMAL DIAGNOSTIC RESULTS     RADIOLOGY: Interpretation per the Radiologist below, if available at the time of this note (none if blank):    XR ELBOW RIGHT (MIN 3 VIEWS)   Final Result   1. No radiographic evidence of acute injury to the right elbow. This document has been electronically signed by: Mario Omalley MD on    09/01/2023 06:38 PM      XR CHEST PORTABLE   Final Result   1. Hyperinflation. No consolidation. This document has been electronically signed by: Mario Omalley MD on    09/01/2023 06:31 PM      XR HAND RIGHT (2 VIEWS)   Final Result   1. No radiographic evidence of acute injury to the right hand. 2. Osteopenia. This document has been electronically signed by: Mario Omalley MD on    09/01/2023 06:37 PM      CT Head W/O Contrast   Final Result   1. No acute intracranial findings. This document has been electronically signed by: Mario Omalley MD on    09/01/2023 06:21 PM      All CTs at this facility use dose modulation techniques and iterative    reconstructions, and/or weight-based dosing   when appropriate to reduce radiation to a low as reasonably achievable. CT CSpine W/O Contrast   Final Result   1. Reversal of normal cervical lordosis, most likely positional.   2. Moderate to advanced mid to lower cervical spondylosis. This document has been electronically signed by: Maroi Omalley MD on    09/01/2023 06:29 PM      All CTs at this facility use dose modulation techniques and iterative    reconstructions, and/or weight-based dosing   when appropriate to reduce radiation to a low as reasonably achievable. LABS: (none if blank)  Labs Reviewed - No data to display    (Any cultures that may have been sent were not resulted at the time of this patient visit)    [facility] / ED COURSE:     1) Number and Complexity of Problems            Problem List This Visit:         Chief Complaint   Patient presents with    Fall   Head trauma.   Right hand injury        Differential Diagnosis includes (but not limited to): Fall on blood thinners. Low-energy trauma. Head trauma. C-spine trauma. Right hand trauma. Right elbow trauma        Diagnoses Considered but I have low suspicion of: Intracranial bleeding. C-spine fracture. Metacarpal fracture right hand. Right elbow fracture             Pertinent Comorbid Conditions:    Previous medical history of CAD, A-fib, CHF, hypertension, pacemaker, current medications include Plavix    2)  Data Reviewed (none if left blank)          My Independent interpretations:     EKG:      None    Imaging: CT head scan no acute findings. CT C-spine no acute findings. Chest x-ray no acute findings. X-ray right hand no fractures. X-ray right elbow no fracture or dislocation    Labs:      None                 Decision Rules/Clinical Scores utilized:              External Documentation Reviewed:         Previous patient encounter documents & history available on EMR was reviewed              See Formal Diagnostic Results above for the lab and radiology tests and orders. 3)  Treatment and Disposition         ED Reassessment: Patient remains symptomatic for headache chest pain abdominal pain, able to ambulate within the room, no positive findings on images reason why we consider patient could be discharged with answered recommendation follow-up with primary care physician.          Case discussed with consulting clinician:           Shared Decision-Making was performed and disposition discussed with the        Patient/Family and questions answered          Social determinants of health impacting treatment or disposition:           Code Status: Full code      Summary of Patient Presentation:      MDM  Number of Diagnoses or Management Options  Contusion of head, unspecified part of head, initial encounter  Contusion of right hand, initial encounter  Fall, initial encounter  Diagnosis management comments: 82year-old patient

## 2023-09-04 ENCOUNTER — TELEPHONE (OUTPATIENT)
Dept: FAMILY MEDICINE CLINIC | Age: 83
End: 2023-09-04

## 2023-09-28 ENCOUNTER — PROCEDURE VISIT (OUTPATIENT)
Dept: CARDIOLOGY CLINIC | Age: 83
End: 2023-09-28

## 2023-09-28 DIAGNOSIS — Z95.0 PACEMAKER: Primary | ICD-10-CM

## 2023-09-28 NOTE — PROGRESS NOTES
[facility] medtronic single pacer   Sachin pt     8.7 years on device   RV imped 475  Threshold 0.75 @ 0.4  R waves 3.1  100% paced

## 2023-10-01 ENCOUNTER — APPOINTMENT (OUTPATIENT)
Dept: CT IMAGING | Age: 83
End: 2023-10-01
Payer: MEDICARE

## 2023-10-01 ENCOUNTER — APPOINTMENT (OUTPATIENT)
Dept: GENERAL RADIOLOGY | Age: 83
End: 2023-10-01
Payer: MEDICARE

## 2023-10-01 ENCOUNTER — HOSPITAL ENCOUNTER (OUTPATIENT)
Age: 83
Setting detail: OBSERVATION
Discharge: NURSING, REHAB OR LONG-TERM CARE | End: 2023-10-03
Attending: EMERGENCY MEDICINE
Payer: MEDICARE

## 2023-10-01 DIAGNOSIS — R55 SYNCOPE AND COLLAPSE: Primary | ICD-10-CM
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
## 2023-10-01 LAB
ANION GAP SERPL CALC-SCNC: 11 MEQ/L (ref 8–16)
BASOPHILS ABSOLUTE: 0 THOU/MM3 (ref 0–0.1)
BASOPHILS NFR BLD AUTO: 0.5 %
BUN SERPL-MCNC: 19 MG/DL (ref 7–22)
CALCIUM SERPL-MCNC: 9 MG/DL (ref 8.5–10.5)
CHLORIDE SERPL-SCNC: 101 MEQ/L (ref 98–111)
CO2 SERPL-SCNC: 28 MEQ/L (ref 23–33)
CREAT SERPL-MCNC: 1.5 MG/DL (ref 0.4–1.2)
DEPRECATED RDW RBC AUTO: 45.4 FL (ref 35–45)
EOSINOPHIL NFR BLD AUTO: 2.4 %
EOSINOPHILS ABSOLUTE: 0.2 THOU/MM3 (ref 0–0.4)
ERYTHROCYTE [DISTWIDTH] IN BLOOD BY AUTOMATED COUNT: 12.6 % (ref 11.5–14.5)
GFR SERPL CREATININE-BSD FRML MDRD: 35 ML/MIN/1.73M2
GLUCOSE SERPL-MCNC: 138 MG/DL (ref 70–108)
HCT VFR BLD AUTO: 33.5 % (ref 37–47)
HGB BLD-MCNC: 10.8 GM/DL (ref 12–16)
IMM GRANULOCYTES # BLD AUTO: 0.02 THOU/MM3 (ref 0–0.07)
IMM GRANULOCYTES NFR BLD AUTO: 0.3 %
LYMPHOCYTES ABSOLUTE: 0.6 THOU/MM3 (ref 1–4.8)
LYMPHOCYTES NFR BLD AUTO: 8.1 %
MCH RBC QN AUTO: 31.8 PG (ref 26–33)
MCHC RBC AUTO-ENTMCNC: 32.2 GM/DL (ref 32.2–35.5)
MCV RBC AUTO: 98.5 FL (ref 81–99)
MONOCYTES ABSOLUTE: 0.4 THOU/MM3 (ref 0.4–1.3)
MONOCYTES NFR BLD AUTO: 5.7 %
NEUTROPHILS NFR BLD AUTO: 83 %
NRBC BLD AUTO-RTO: 0 /100 WBC
OSMOLALITY SERPL CALC.SUM OF ELEC: 283.9 MOSMOL/KG (ref 275–300)
PLATELET # BLD AUTO: 193 THOU/MM3 (ref 130–400)
PMV BLD AUTO: 10.3 FL (ref 9.4–12.4)
POTASSIUM SERPL-SCNC: 4.1 MEQ/L (ref 3.5–5.2)
RBC # BLD AUTO: 3.4 MILL/MM3 (ref 4.2–5.4)
SEGMENTED NEUTROPHILS ABSOLUTE COUNT: 6.2 THOU/MM3 (ref 1.8–7.7)
SODIUM SERPL-SCNC: 140 MEQ/L (ref 135–145)
TROPONIN, HIGH SENSITIVITY: 20 NG/L (ref 0–12)
WBC # BLD AUTO: 7.5 THOU/MM3 (ref 4.8–10.8)

## 2023-10-01 PROCEDURE — 96361 HYDRATE IV INFUSION ADD-ON: CPT

## 2023-10-01 PROCEDURE — 36415 COLL VENOUS BLD VENIPUNCTURE: CPT

## 2023-10-01 PROCEDURE — G0378 HOSPITAL OBSERVATION PER HR: HCPCS

## 2023-10-01 PROCEDURE — 99223 1ST HOSP IP/OBS HIGH 75: CPT | Performed by: INTERNAL MEDICINE

## 2023-10-01 PROCEDURE — 6370000000 HC RX 637 (ALT 250 FOR IP)

## 2023-10-01 PROCEDURE — 2580000003 HC RX 258: Performed by: INTERNAL MEDICINE

## 2023-10-01 PROCEDURE — 2580000003 HC RX 258: Performed by: STUDENT IN AN ORGANIZED HEALTH CARE EDUCATION/TRAINING PROGRAM

## 2023-10-01 PROCEDURE — 84484 ASSAY OF TROPONIN QUANT: CPT

## 2023-10-01 PROCEDURE — 6370000000 HC RX 637 (ALT 250 FOR IP): Performed by: INTERNAL MEDICINE

## 2023-10-01 PROCEDURE — 72125 CT NECK SPINE W/O DYE: CPT

## 2023-10-01 PROCEDURE — 85025 COMPLETE CBC W/AUTO DIFF WBC: CPT

## 2023-10-01 PROCEDURE — 70450 CT HEAD/BRAIN W/O DYE: CPT

## 2023-10-01 PROCEDURE — 72170 X-RAY EXAM OF PELVIS: CPT

## 2023-10-01 PROCEDURE — 6360000002 HC RX W HCPCS: Performed by: INTERNAL MEDICINE

## 2023-10-01 PROCEDURE — 99285 EMERGENCY DEPT VISIT HI MDM: CPT

## 2023-10-01 PROCEDURE — 93005 ELECTROCARDIOGRAM TRACING: CPT | Performed by: STUDENT IN AN ORGANIZED HEALTH CARE EDUCATION/TRAINING PROGRAM

## 2023-10-01 PROCEDURE — 93010 ELECTROCARDIOGRAM REPORT: CPT | Performed by: INTERNAL MEDICINE

## 2023-10-01 PROCEDURE — 71045 X-RAY EXAM CHEST 1 VIEW: CPT

## 2023-10-01 PROCEDURE — 80048 BASIC METABOLIC PNL TOTAL CA: CPT

## 2023-10-01 PROCEDURE — 96372 THER/PROPH/DIAG INJ SC/IM: CPT

## 2023-10-01 RX ORDER — ENOXAPARIN SODIUM 100 MG/ML
30 INJECTION SUBCUTANEOUS EVERY 24 HOURS
Status: DISCONTINUED | OUTPATIENT
Start: 2023-10-01 | End: 2023-10-02

## 2023-10-01 RX ORDER — ACETAMINOPHEN 500 MG
500 TABLET ORAL EVERY 8 HOURS PRN
COMMUNITY

## 2023-10-01 RX ORDER — ACETAMINOPHEN 325 MG/1
650 TABLET ORAL EVERY 6 HOURS PRN
Status: DISCONTINUED | OUTPATIENT
Start: 2023-10-01 | End: 2023-10-03 | Stop reason: HOSPADM

## 2023-10-01 RX ORDER — FERROUS SULFATE TAB EC 324 MG (65 MG FE EQUIVALENT) 324 (65 FE) MG
324 TABLET DELAYED RESPONSE ORAL
COMMUNITY

## 2023-10-01 RX ORDER — SODIUM CHLORIDE 0.9 % (FLUSH) 0.9 %
5-40 SYRINGE (ML) INJECTION PRN
Status: DISCONTINUED | OUTPATIENT
Start: 2023-10-01 | End: 2023-10-03 | Stop reason: HOSPADM

## 2023-10-01 RX ORDER — ONDANSETRON 2 MG/ML
4 INJECTION INTRAMUSCULAR; INTRAVENOUS EVERY 6 HOURS PRN
Status: DISCONTINUED | OUTPATIENT
Start: 2023-10-01 | End: 2023-10-03 | Stop reason: HOSPADM

## 2023-10-01 RX ORDER — POTASSIUM CHLORIDE 20 MEQ/1
40 TABLET, EXTENDED RELEASE ORAL PRN
Status: DISCONTINUED | OUTPATIENT
Start: 2023-10-01 | End: 2023-10-03 | Stop reason: HOSPADM

## 2023-10-01 RX ORDER — CHOLECALCIFEROL (VITAMIN D3) 125 MCG
5 CAPSULE ORAL NIGHTLY
COMMUNITY

## 2023-10-01 RX ORDER — MAGNESIUM HYDROXIDE/ALUMINUM HYDROXICE/SIMETHICONE 120; 1200; 1200 MG/30ML; MG/30ML; MG/30ML
30 SUSPENSION ORAL EVERY 6 HOURS PRN
Status: DISCONTINUED | OUTPATIENT
Start: 2023-10-01 | End: 2023-10-03 | Stop reason: HOSPADM

## 2023-10-01 RX ORDER — QUETIAPINE FUMARATE 50 MG/1
50 TABLET, FILM COATED ORAL 2 TIMES DAILY
COMMUNITY

## 2023-10-01 RX ORDER — LANOLIN ALCOHOL/MO/W.PET/CERES
3 CREAM (GRAM) TOPICAL DAILY
COMMUNITY

## 2023-10-01 RX ORDER — SENNA AND DOCUSATE SODIUM 50; 8.6 MG/1; MG/1
1 TABLET, FILM COATED ORAL 2 TIMES DAILY
COMMUNITY

## 2023-10-01 RX ORDER — SODIUM CHLORIDE 9 MG/ML
INJECTION, SOLUTION INTRAVENOUS PRN
Status: DISCONTINUED | OUTPATIENT
Start: 2023-10-01 | End: 2023-10-03 | Stop reason: HOSPADM

## 2023-10-01 RX ORDER — ACETAMINOPHEN 650 MG/1
650 SUPPOSITORY RECTAL EVERY 6 HOURS PRN
Status: DISCONTINUED | OUTPATIENT
Start: 2023-10-01 | End: 2023-10-03 | Stop reason: HOSPADM

## 2023-10-01 RX ORDER — SODIUM CHLORIDE 0.9 % (FLUSH) 0.9 %
5-40 SYRINGE (ML) INJECTION EVERY 12 HOURS SCHEDULED
Status: DISCONTINUED | OUTPATIENT
Start: 2023-10-01 | End: 2023-10-03 | Stop reason: HOSPADM

## 2023-10-01 RX ORDER — ATORVASTATIN CALCIUM 40 MG/1
40 TABLET, FILM COATED ORAL NIGHTLY
COMMUNITY

## 2023-10-01 RX ORDER — TRAZODONE HYDROCHLORIDE 50 MG/1
25 TABLET ORAL NIGHTLY
COMMUNITY

## 2023-10-01 RX ORDER — SODIUM CHLORIDE 9 MG/ML
INJECTION, SOLUTION INTRAVENOUS CONTINUOUS
Status: DISCONTINUED | OUTPATIENT
Start: 2023-10-01 | End: 2023-10-03 | Stop reason: HOSPADM

## 2023-10-01 RX ORDER — ROPINIROLE 4 MG/1
4 TABLET, FILM COATED ORAL NIGHTLY
COMMUNITY

## 2023-10-01 RX ORDER — FAMOTIDINE 20 MG/1
20 TABLET, FILM COATED ORAL 2 TIMES DAILY
Status: DISCONTINUED | OUTPATIENT
Start: 2023-10-01 | End: 2023-10-01

## 2023-10-01 RX ORDER — FAMOTIDINE 20 MG/1
20 TABLET, FILM COATED ORAL DAILY
Status: DISCONTINUED | OUTPATIENT
Start: 2023-10-02 | End: 2023-10-03 | Stop reason: HOSPADM

## 2023-10-01 RX ORDER — 0.9 % SODIUM CHLORIDE 0.9 %
500 INTRAVENOUS SOLUTION INTRAVENOUS ONCE
Status: COMPLETED | OUTPATIENT
Start: 2023-10-01 | End: 2023-10-01

## 2023-10-01 RX ORDER — FAMOTIDINE 40 MG/1
40 TABLET, FILM COATED ORAL NIGHTLY
COMMUNITY

## 2023-10-01 RX ORDER — POLYETHYLENE GLYCOL 3350 17 G/17G
17 POWDER, FOR SOLUTION ORAL DAILY PRN
Status: DISCONTINUED | OUTPATIENT
Start: 2023-10-01 | End: 2023-10-03 | Stop reason: HOSPADM

## 2023-10-01 RX ORDER — POTASSIUM CHLORIDE 7.45 MG/ML
10 INJECTION INTRAVENOUS PRN
Status: DISCONTINUED | OUTPATIENT
Start: 2023-10-01 | End: 2023-10-03 | Stop reason: HOSPADM

## 2023-10-01 RX ORDER — ONDANSETRON 4 MG/1
4 TABLET, ORALLY DISINTEGRATING ORAL EVERY 8 HOURS PRN
Status: DISCONTINUED | OUTPATIENT
Start: 2023-10-01 | End: 2023-10-03 | Stop reason: HOSPADM

## 2023-10-01 RX ORDER — LIDOCAINE 4 G/G
1 PATCH TOPICAL 2 TIMES DAILY
COMMUNITY

## 2023-10-01 RX ORDER — ROPINIROLE 1 MG/1
4 TABLET, FILM COATED ORAL NIGHTLY
Status: DISCONTINUED | OUTPATIENT
Start: 2023-10-01 | End: 2023-10-02 | Stop reason: SDUPTHER

## 2023-10-01 RX ORDER — POLYETHYLENE GLYCOL 3350 17 G/17G
17 POWDER, FOR SOLUTION ORAL DAILY
COMMUNITY

## 2023-10-01 RX ORDER — MAGNESIUM SULFATE IN WATER 40 MG/ML
2000 INJECTION, SOLUTION INTRAVENOUS PRN
Status: DISCONTINUED | OUTPATIENT
Start: 2023-10-01 | End: 2023-10-03 | Stop reason: HOSPADM

## 2023-10-01 RX ADMIN — METOPROLOL TARTRATE 12.5 MG: 25 TABLET, FILM COATED ORAL at 22:06

## 2023-10-01 RX ADMIN — ENOXAPARIN SODIUM 30 MG: 100 INJECTION SUBCUTANEOUS at 15:46

## 2023-10-01 RX ADMIN — ROPINIROLE HYDROCHLORIDE 4 MG: 1 TABLET, FILM COATED ORAL at 22:10

## 2023-10-01 RX ADMIN — SODIUM CHLORIDE: 9 INJECTION, SOLUTION INTRAVENOUS at 14:08

## 2023-10-01 RX ADMIN — SODIUM CHLORIDE 500 ML: 9 INJECTION, SOLUTION INTRAVENOUS at 12:33

## 2023-10-01 ASSESSMENT — PAIN - FUNCTIONAL ASSESSMENT
PAIN_FUNCTIONAL_ASSESSMENT: 0-10
PAIN_FUNCTIONAL_ASSESSMENT: 0-10
PAIN_FUNCTIONAL_ASSESSMENT: NONE - DENIES PAIN
PAIN_FUNCTIONAL_ASSESSMENT: 0-10

## 2023-10-01 ASSESSMENT — PAIN DESCRIPTION - DESCRIPTORS: DESCRIPTORS: SORE

## 2023-10-01 ASSESSMENT — PAIN SCALES - GENERAL
PAINLEVEL_OUTOF10: 4

## 2023-10-01 ASSESSMENT — PAIN DESCRIPTION - LOCATION: LOCATION: HIP

## 2023-10-01 ASSESSMENT — PAIN DESCRIPTION - ORIENTATION: ORIENTATION: RIGHT

## 2023-10-01 NOTE — H&P
appearance of the chest. No evidence of an acute process. **This report has been created using voice recognition software. It may contain minor errors which are inherent in voice recognition technology. ** Final report electronically signed by Dr. Batool Henriquez on 10/1/2023 10:35 AM          Electronically signed by Luis M Enciso MD on 10/1/2023 at 8:06 PM

## 2023-10-01 NOTE — ED PROVIDER NOTES
· Present on admission likely secondary to COVID -19 viral pneumonia  · Requiring 2 L nasal cannula supplemental O2 on presentation  · Wean O2 as tolerated  · Goal spO2 > 88% software and electronically transcribed, and parts may have been transcribed with the assistance of an ED scribe. The transcription may contain errors not detected in proofreading.     Electronically Signed: Javier Muller MD, 10/01/23, 12:03 PM          Javier Muller MD  Resident  10/01/23 0825

## 2023-10-01 NOTE — ED TRIAGE NOTES
Pt presents to the ED via EMS from the [address] [place] [address] after a fall. Pt states that she was walking and talking and then her right leg gave out. Pt states she has problems with her right leg. Pt states she landed in a laundry basket. Pt does not believe that she hit her head. Pt complaining of right sided pain.

## 2023-10-01 NOTE — ED NOTES
Pt transported to [facility] on cot. Floor contacted prior to transport, spoke to floor KELY.      Sagar Richmond  10/01/23 5448

## 2023-10-02 ENCOUNTER — APPOINTMENT (OUTPATIENT)
Dept: INTERVENTIONAL RADIOLOGY/VASCULAR | Age: 83
End: 2023-10-02
Payer: MEDICARE

## 2023-10-02 LAB
ANION GAP SERPL CALC-SCNC: 11 MEQ/L (ref 8–16)
BACTERIA URNS QL MICRO: ABNORMAL /HPF
BASOPHILS ABSOLUTE: 0.1 THOU/MM3 (ref 0–0.1)
BASOPHILS NFR BLD AUTO: 0.8 %
BILIRUB UR QL STRIP.AUTO: NEGATIVE
BUN SERPL-MCNC: 16 MG/DL (ref 7–22)
CALCIUM SERPL-MCNC: 8.7 MG/DL (ref 8.5–10.5)
CASTS #/AREA URNS LPF: ABNORMAL /LPF
CASTS 2: ABNORMAL /LPF
CHARACTER UR: CLEAR
CHLORIDE SERPL-SCNC: 105 MEQ/L (ref 98–111)
CO2 SERPL-SCNC: 24 MEQ/L (ref 23–33)
COLOR: YELLOW
CREAT SERPL-MCNC: 1 MG/DL (ref 0.4–1.2)
CRYSTALS URNS MICRO: ABNORMAL
DEPRECATED RDW RBC AUTO: 46.5 FL (ref 35–45)
EKG ATRIAL RATE: 64 BPM
EKG Q-T INTERVAL: 422 MS
EKG QRS DURATION: 148 MS
EKG QTC CALCULATION (BAZETT): 490 MS
EKG R AXIS: 79 DEGREES
EKG T AXIS: -68 DEGREES
EKG VENTRICULAR RATE: 81 BPM
EOSINOPHIL NFR BLD AUTO: 2.4 %
EOSINOPHILS ABSOLUTE: 0.2 THOU/MM3 (ref 0–0.4)
EPITHELIAL CELLS, UA: ABNORMAL /HPF
ERYTHROCYTE [DISTWIDTH] IN BLOOD BY AUTOMATED COUNT: 12.6 % (ref 11.5–14.5)
GFR SERPL CREATININE-BSD FRML MDRD: 56 ML/MIN/1.73M2
GLUCOSE SERPL-MCNC: 89 MG/DL (ref 70–108)
GLUCOSE UR QL STRIP.AUTO: NEGATIVE MG/DL
HCT VFR BLD AUTO: 31.6 % (ref 37–47)
HGB BLD-MCNC: 10 GM/DL (ref 12–16)
HGB UR QL STRIP.AUTO: NEGATIVE
IMM GRANULOCYTES # BLD AUTO: 0.03 THOU/MM3 (ref 0–0.07)
IMM GRANULOCYTES NFR BLD AUTO: 0.4 %
KETONES UR QL STRIP.AUTO: NEGATIVE
LYMPHOCYTES ABSOLUTE: 0.7 THOU/MM3 (ref 1–4.8)
LYMPHOCYTES NFR BLD AUTO: 9.1 %
MCH RBC QN AUTO: 31.7 PG (ref 26–33)
MCHC RBC AUTO-ENTMCNC: 31.6 GM/DL (ref 32.2–35.5)
MCV RBC AUTO: 100.3 FL (ref 81–99)
MISCELLANEOUS 2: ABNORMAL
MONOCYTES ABSOLUTE: 0.4 THOU/MM3 (ref 0.4–1.3)
MONOCYTES NFR BLD AUTO: 5.8 %
NEUTROPHILS NFR BLD AUTO: 81.5 %
NITRITE UR QL STRIP: POSITIVE
NRBC BLD AUTO-RTO: 0 /100 WBC
PH UR STRIP.AUTO: 7 [PH] (ref 5–9)
PLATELET # BLD AUTO: 170 THOU/MM3 (ref 130–400)
PMV BLD AUTO: 10.9 FL (ref 9.4–12.4)
POTASSIUM SERPL-SCNC: 3.7 MEQ/L (ref 3.5–5.2)
PROT UR STRIP.AUTO-MCNC: NEGATIVE MG/DL
RBC # BLD AUTO: 3.15 MILL/MM3 (ref 4.2–5.4)
RBC URINE: ABNORMAL /HPF
RENAL EPI CELLS #/AREA URNS HPF: ABNORMAL /[HPF]
SEGMENTED NEUTROPHILS ABSOLUTE COUNT: 6.2 THOU/MM3 (ref 1.8–7.7)
SODIUM SERPL-SCNC: 140 MEQ/L (ref 135–145)
SP GR UR REFRACT.AUTO: 1.01 (ref 1–1.03)
UROBILINOGEN, URINE: 1 EU/DL (ref 0–1)
WBC # BLD AUTO: 7.6 THOU/MM3 (ref 4.8–10.8)
WBC #/AREA URNS HPF: ABNORMAL /HPF
WBC #/AREA URNS HPF: ABNORMAL /[HPF]
YEAST LIKE FUNGI URNS QL MICRO: ABNORMAL

## 2023-10-02 PROCEDURE — 80048 BASIC METABOLIC PNL TOTAL CA: CPT

## 2023-10-02 PROCEDURE — 6370000000 HC RX 637 (ALT 250 FOR IP): Performed by: PHYSICIAN ASSISTANT

## 2023-10-02 PROCEDURE — 96365 THER/PROPH/DIAG IV INF INIT: CPT

## 2023-10-02 PROCEDURE — 97110 THERAPEUTIC EXERCISES: CPT

## 2023-10-02 PROCEDURE — 99232 SBSQ HOSP IP/OBS MODERATE 35: CPT | Performed by: PHYSICIAN ASSISTANT

## 2023-10-02 PROCEDURE — 85025 COMPLETE CBC W/AUTO DIFF WBC: CPT

## 2023-10-02 PROCEDURE — 6370000000 HC RX 637 (ALT 250 FOR IP): Performed by: INTERNAL MEDICINE

## 2023-10-02 PROCEDURE — 96361 HYDRATE IV INFUSION ADD-ON: CPT

## 2023-10-02 PROCEDURE — 2580000003 HC RX 258: Performed by: INTERNAL MEDICINE

## 2023-10-02 PROCEDURE — 96372 THER/PROPH/DIAG INJ SC/IM: CPT

## 2023-10-02 PROCEDURE — 87086 URINE CULTURE/COLONY COUNT: CPT

## 2023-10-02 PROCEDURE — 6360000002 HC RX W HCPCS: Performed by: INTERNAL MEDICINE

## 2023-10-02 PROCEDURE — 97162 PT EVAL MOD COMPLEX 30 MIN: CPT

## 2023-10-02 PROCEDURE — G0378 HOSPITAL OBSERVATION PER HR: HCPCS

## 2023-10-02 PROCEDURE — 81001 URINALYSIS AUTO W/SCOPE: CPT

## 2023-10-02 PROCEDURE — 36415 COLL VENOUS BLD VENIPUNCTURE: CPT

## 2023-10-02 PROCEDURE — 93880 EXTRACRANIAL BILAT STUDY: CPT

## 2023-10-02 PROCEDURE — 6360000002 HC RX W HCPCS: Performed by: PHYSICIAN ASSISTANT

## 2023-10-02 PROCEDURE — 2580000003 HC RX 258: Performed by: PHYSICIAN ASSISTANT

## 2023-10-02 RX ORDER — BUPROPION HYDROCHLORIDE 300 MG/1
300 TABLET ORAL EVERY MORNING
Status: DISCONTINUED | OUTPATIENT
Start: 2023-10-03 | End: 2023-10-03 | Stop reason: HOSPADM

## 2023-10-02 RX ORDER — PANTOPRAZOLE SODIUM 40 MG/1
40 TABLET, DELAYED RELEASE ORAL
Status: DISCONTINUED | OUTPATIENT
Start: 2023-10-03 | End: 2023-10-03 | Stop reason: HOSPADM

## 2023-10-02 RX ORDER — ATORVASTATIN CALCIUM 40 MG/1
40 TABLET, FILM COATED ORAL NIGHTLY
Status: DISCONTINUED | OUTPATIENT
Start: 2023-10-02 | End: 2023-10-03 | Stop reason: HOSPADM

## 2023-10-02 RX ORDER — ASPIRIN 81 MG/1
81 TABLET, CHEWABLE ORAL DAILY
Status: DISCONTINUED | OUTPATIENT
Start: 2023-10-02 | End: 2023-10-03 | Stop reason: HOSPADM

## 2023-10-02 RX ORDER — FERROUS SULFATE 325(65) MG
324 TABLET ORAL
Status: DISCONTINUED | OUTPATIENT
Start: 2023-10-03 | End: 2023-10-03 | Stop reason: HOSPADM

## 2023-10-02 RX ORDER — QUETIAPINE FUMARATE 25 MG/1
50 TABLET, FILM COATED ORAL 2 TIMES DAILY
Status: DISCONTINUED | OUTPATIENT
Start: 2023-10-02 | End: 2023-10-03 | Stop reason: HOSPADM

## 2023-10-02 RX ORDER — POTASSIUM CHLORIDE 750 MG/1
10 TABLET, FILM COATED, EXTENDED RELEASE ORAL 3 TIMES DAILY
Status: DISCONTINUED | OUTPATIENT
Start: 2023-10-02 | End: 2023-10-03 | Stop reason: HOSPADM

## 2023-10-02 RX ORDER — CLOPIDOGREL BISULFATE 75 MG/1
75 TABLET ORAL DAILY
Status: DISCONTINUED | OUTPATIENT
Start: 2023-10-02 | End: 2023-10-03 | Stop reason: HOSPADM

## 2023-10-02 RX ORDER — FAMOTIDINE 20 MG/1
40 TABLET, FILM COATED ORAL NIGHTLY
Status: DISCONTINUED | OUTPATIENT
Start: 2023-10-02 | End: 2023-10-02 | Stop reason: SDUPTHER

## 2023-10-02 RX ORDER — MIDODRINE HYDROCHLORIDE 2.5 MG/1
2.5 TABLET ORAL
Status: DISCONTINUED | OUTPATIENT
Start: 2023-10-02 | End: 2023-10-03 | Stop reason: HOSPADM

## 2023-10-02 RX ORDER — ENOXAPARIN SODIUM 100 MG/ML
40 INJECTION SUBCUTANEOUS EVERY 24 HOURS
Status: DISCONTINUED | OUTPATIENT
Start: 2023-10-02 | End: 2023-10-03 | Stop reason: HOSPADM

## 2023-10-02 RX ORDER — TRAZODONE HYDROCHLORIDE 50 MG/1
25 TABLET ORAL NIGHTLY
Status: DISCONTINUED | OUTPATIENT
Start: 2023-10-02 | End: 2023-10-03 | Stop reason: HOSPADM

## 2023-10-02 RX ORDER — HYDROXYZINE HYDROCHLORIDE 25 MG/1
25 TABLET, FILM COATED ORAL NIGHTLY
Status: DISCONTINUED | OUTPATIENT
Start: 2023-10-02 | End: 2023-10-03 | Stop reason: HOSPADM

## 2023-10-02 RX ORDER — ROPINIROLE 1 MG/1
4 TABLET, FILM COATED ORAL NIGHTLY
Status: DISCONTINUED | OUTPATIENT
Start: 2023-10-02 | End: 2023-10-03 | Stop reason: HOSPADM

## 2023-10-02 RX ADMIN — ROPINIROLE 4 MG: 1 TABLET, FILM COATED ORAL at 20:36

## 2023-10-02 RX ADMIN — CEFTRIAXONE SODIUM 1000 MG: 1 INJECTION, POWDER, FOR SOLUTION INTRAMUSCULAR; INTRAVENOUS at 17:10

## 2023-10-02 RX ADMIN — SODIUM CHLORIDE: 9 INJECTION, SOLUTION INTRAVENOUS at 00:26

## 2023-10-02 RX ADMIN — MIDODRINE HYDROCHLORIDE 2.5 MG: 2.5 TABLET ORAL at 16:26

## 2023-10-02 RX ADMIN — HYDROXYZINE HYDROCHLORIDE 25 MG: 25 TABLET, FILM COATED ORAL at 20:35

## 2023-10-02 RX ADMIN — CLOPIDOGREL BISULFATE 75 MG: 75 TABLET ORAL at 16:26

## 2023-10-02 RX ADMIN — QUETIAPINE FUMARATE 50 MG: 25 TABLET ORAL at 16:26

## 2023-10-02 RX ADMIN — SODIUM CHLORIDE: 9 INJECTION, SOLUTION INTRAVENOUS at 16:53

## 2023-10-02 RX ADMIN — ATORVASTATIN CALCIUM 40 MG: 40 TABLET, FILM COATED ORAL at 20:33

## 2023-10-02 RX ADMIN — METOPROLOL TARTRATE 12.5 MG: 25 TABLET, FILM COATED ORAL at 08:51

## 2023-10-02 RX ADMIN — POTASSIUM CHLORIDE 10 MEQ: 750 TABLET, EXTENDED RELEASE ORAL at 20:35

## 2023-10-02 RX ADMIN — METOPROLOL TARTRATE 12.5 MG: 25 TABLET, FILM COATED ORAL at 20:35

## 2023-10-02 RX ADMIN — POTASSIUM CHLORIDE 10 MEQ: 750 TABLET, EXTENDED RELEASE ORAL at 16:26

## 2023-10-02 RX ADMIN — ASPIRIN 81 MG: 81 TABLET, CHEWABLE ORAL at 16:26

## 2023-10-02 RX ADMIN — TRAZODONE HYDROCHLORIDE 25 MG: 50 TABLET ORAL at 20:40

## 2023-10-02 RX ADMIN — ENOXAPARIN SODIUM 40 MG: 100 INJECTION SUBCUTANEOUS at 16:26

## 2023-10-02 RX ADMIN — FAMOTIDINE 20 MG: 20 TABLET, FILM COATED ORAL at 08:51

## 2023-10-02 RX ADMIN — QUETIAPINE FUMARATE 50 MG: 25 TABLET ORAL at 20:35

## 2023-10-02 ASSESSMENT — PAIN SCALES - GENERAL
PAINLEVEL_OUTOF10: 0
PAINLEVEL_OUTOF10: 0

## 2023-10-02 NOTE — PLAN OF CARE
SW consulted. Please see note 10/2/23.   Problem: Discharge Planning  Goal: Discharge to home or other facility with appropriate resources  10/2/2023 1024 by ARTURO Jose  Outcome: Progressing  10/1/2023 2153 by Clemente Flores RN  Outcome: Froilan Villalba (Taken 10/1/2023 2045)  Discharge to home or other facility with appropriate resources: Identify barriers to discharge with patient and caregiver

## 2023-10-02 NOTE — PROCEDURES
Medtronic Pacer Interrogate completed at 2040. Report will be sent to the floor via tube as soon as it arrives to our office.        [email or web address]  Smiley Zeng SR MRI [id number]  S/N: [id number]

## 2023-10-02 NOTE — PLAN OF CARE
Problem: Discharge Planning  Goal: Discharge to home or other facility with appropriate resources  10/1/2023 2153 by Marshal Morillo RN  Outcome: Gabriela Amanda (Taken 10/1/2023 2045)  Discharge to home or other facility with appropriate resources: Identify barriers to discharge with patient and caregiver  10/1/2023 1624 by Anisa Jimenez RN  Outcome: Progressing     Problem: Skin/Tissue Integrity  Goal: Absence of new skin breakdown  Description: 1. Monitor for areas of redness and/or skin breakdown  2. Assess vascular access sites hourly  3. Every 4-6 hours minimum:  Change oxygen saturation probe site  4. Every 4-6 hours:  If on nasal continuous positive airway pressure, respiratory therapy assess nares and determine need for appliance change or resting period.   10/1/2023 2153 by Marshal Morillo RN  Outcome: Progressing  10/1/2023 1624 by Anisa Jimenez RN  Outcome: Progressing     Problem: Safety - Adult  Goal: Free from fall injury  10/1/2023 2153 by Marshal Morillo RN  Outcome: Progressing  10/1/2023 1624 by Anisa Jimenez RN  Outcome: Progressing     Problem: Chronic Conditions and Co-morbidities  Goal: Patient's chronic conditions and co-morbidity symptoms are monitored and maintained or improved  10/1/2023 2153 by Marshal Morillo RN  Outcome: Progressing  [address] [address] (Taken 10/1/2023 2045)  Care Plan - Patient's Chronic Conditions and Co-Morbidity Symptoms are Monitored and Maintained or Improved: Monitor and assess patient's chronic conditions and comorbid symptoms for stability, deterioration, or improvement  10/1/2023 1624 by Anisa Jimenez RN  Outcome: Progressing

## 2023-10-02 NOTE — CARE COORDINATION
10/2/23, 8:52 AM EDT      DISCHARGE PLANNING EVALUATION    Siddhartha Almazan  Admitted: 10/1/2023  Hospital Day: 0    Location: 8A-16/016-A Reason for admit: Syncope and collapse [R55]  Accident due to mechanical fall without injury, initial encounter [W19. XXXA]    Past Medical History:   Diagnosis Date    Atrial fibrillation (HCC)     CAD (coronary artery disease)     Congestive heart failure (CHF) (HCC)     Hyperlipidemia     Hypertension        Procedure: 10/1 CT head WO: No evidence of an acute process. No change from prior. 10/1 XR Pelvis: No fracture. Demineralization. 10/1 CT C Spine WO: no fracture  Barriers to Discharge: Admit from ER as observation after fall at SNF with LOC. - Orthostatics in ER. Therapy to see. Creat 1.5 to 1.0 today. IVF. PCP: PHIL Lopez CNP    Readmission Risk Low 0-14, Mod 15-19), High > 20: No data recorded    Advance Directives:      Code Status: DNR-CCA   Patient's Primary Decision Maker is:      Primary Decision MakerSromulokerry Fernández Child - [phone]    Secondary Decision Maker: Rolly Thompson - Brother/Sister - [phone]    Patient Goals/Plan/Treatment Preferences: SW consulted as patient is from [facility]. Follow for their notes. Transportation/Food Security/Housekeeping Addressed: No issues identified. If patient is discharged prior to next notation, then this note serves as note for discharge by case management.

## 2023-10-02 NOTE — DISCHARGE INSTR - COC
[phone]  Fax: [phone]    Dialysis Facility (if applicable)   Name:  Address:  Dialysis Schedule:  Phone:  Fax:    / signature: Electronically signed by Denys Opitz, LSW on 10/2/23 at 10:58 AM EDT    PHYSICIAN SECTION    Prognosis: Fair    Condition at Discharge: Stable    Rehab Potential (if transferring to Rehab): Fair    Recommended Labs or Other Treatments After Discharge: Devaughn Dandy for UTI     Physician Certification: I certify the above information and transfer of Ailin Crowe A Guthrie  is necessary for the continuing treatment of the diagnosis listed and that she requires [address] for greater 30 days.      Update Admission H&P: No change in H&P    PHYSICIAN SIGNATURE:  Electronically signed by MARKUS Oropeza on 10/3/23 at 10:25 AM EDT

## 2023-10-02 NOTE — PLAN OF CARE
Problem: Discharge Planning  Goal: Discharge to home or other facility with appropriate resources  10/2/2023 1544 by Loly Bowers RN  Outcome: Progressing  Flowsheets (Taken 10/2/2023 1544)  Discharge to home or other facility with appropriate resources:   Identify barriers to discharge with patient and caregiver   Arrange for needed discharge resources and transportation as appropriate   Identify discharge learning needs (meds, wound care, etc)     Problem: Skin/Tissue Integrity  Goal: Absence of new skin breakdown  Description: 1. Monitor for areas of redness and/or skin breakdown  2. Assess vascular access sites hourly  3. Every 4-6 hours minimum:  Change oxygen saturation probe site  4. Every 4-6 hours:  If on nasal continuous positive airway pressure, respiratory therapy assess nares and determine need for appliance change or resting period. Outcome: Progressing     Problem: Safety - Adult  Goal: Free from fall injury  Outcome: Progressing  Flowsheets (Taken 10/2/2023 1544)  Free From Fall Injury: Instruct family/caregiver on patient safety     Problem: Chronic Conditions and Co-morbidities  Goal: Patient's chronic conditions and co-morbidity symptoms are monitored and maintained or improved  Outcome: Progressing  Flowsheets (Taken 10/2/2023 1544)  Care Plan - Patient's Chronic Conditions and Co-Morbidity Symptoms are Monitored and Maintained or Improved: Monitor and assess patient's chronic conditions and comorbid symptoms for stability, deterioration, or improvement   Care plan reviewed with patient. Patient verbalize understanding of the plan of care and contribute to goal setting.

## 2023-10-02 NOTE — CARE COORDINATION
10/2/23, 10:35 AM EDT  Discharge Planning Evaluation  Social work consult received, patient from [facility]. Patient/Family preference is to return to [id number] [place] [address]. The patient's current payor source at the facility is Medicare. Medicare skilled days available: Yes  Insurance precert:  No  Spoke with [employer] at the facility. Patient bed hold: Yes  Anticipated transport plan: [address] Decision Maker: Patient Declined (Legal Next of Kin Remains as Decision Maker)    Spoke with patient. Patient will return to SNF upon discharge. Readmission Risk Low 0-14, Mod 15-19), High > 20: No data recorded  Current PCP: PHIL Rivera CNP  PCP verified by CM? Yes    Patient Orientation: Alert and Oriented    Patient Cognition: Alert  History Provided by: Patient    Advance Directives:      Code Status: DNR-CCA   Patient's Primary Decision Maker is: Patient Declined (Legal Next of [address] as Decision Maker)    Primary Decision MakerRoarcynthia Boyd Child - [phone]    Secondary Decision Maker: Dary Boyd Brother/Sister - [phone]     Discharge Planning:    Patient lives with: Other (Comment) (ECF) Type of Home: [address]  Primary Care Giver: Other (Comment) (ECF)  Patient Support Systems include: Children, Other (Comment) (ECF)   Current Financial resources: Medicare  Current community resources: ECF/Home Care  Current services prior to admission: [address]            Current DME: Walker            Type of Home Care services:  [address]  Prior functional level: Assistance with the following:, Bathing, Housework, Cooking, Shopping, Mobility  Current functional level: Assistance with the following:, Bathing, Cooking, Housework, Shopping, Mobility    Family can provide assistance at DC:  Yes  Would you like Case Management to discuss the discharge plan with any other family members/significant others, and if so, who?

## 2023-10-03 VITALS
SYSTOLIC BLOOD PRESSURE: 128 MMHG | HEIGHT: 65 IN | DIASTOLIC BLOOD PRESSURE: 72 MMHG | OXYGEN SATURATION: 98 % | WEIGHT: 118.39 LBS | RESPIRATION RATE: 16 BRPM | BODY MASS INDEX: 19.72 KG/M2 | TEMPERATURE: 98 F | HEART RATE: 70 BPM

## 2023-10-03 PROCEDURE — 96372 THER/PROPH/DIAG INJ SC/IM: CPT

## 2023-10-03 PROCEDURE — G0378 HOSPITAL OBSERVATION PER HR: HCPCS

## 2023-10-03 PROCEDURE — 6360000002 HC RX W HCPCS: Performed by: INTERNAL MEDICINE

## 2023-10-03 PROCEDURE — 96361 HYDRATE IV INFUSION ADD-ON: CPT

## 2023-10-03 PROCEDURE — 99239 HOSP IP/OBS DSCHRG MGMT >30: CPT | Performed by: PHYSICIAN ASSISTANT

## 2023-10-03 PROCEDURE — 6370000000 HC RX 637 (ALT 250 FOR IP): Performed by: INTERNAL MEDICINE

## 2023-10-03 PROCEDURE — 6370000000 HC RX 637 (ALT 250 FOR IP): Performed by: PHYSICIAN ASSISTANT

## 2023-10-03 RX ORDER — CEFDINIR 300 MG/1
300 CAPSULE ORAL 2 TIMES DAILY
Qty: 8 CAPSULE | Refills: 0 | DISCHARGE
Start: 2023-10-03 | End: 2023-10-07

## 2023-10-03 RX ADMIN — METOPROLOL TARTRATE 12.5 MG: 25 TABLET, FILM COATED ORAL at 08:46

## 2023-10-03 RX ADMIN — ASPIRIN 81 MG: 81 TABLET, CHEWABLE ORAL at 08:46

## 2023-10-03 RX ADMIN — QUETIAPINE FUMARATE 50 MG: 25 TABLET ORAL at 08:46

## 2023-10-03 RX ADMIN — MIDODRINE HYDROCHLORIDE 2.5 MG: 2.5 TABLET ORAL at 13:38

## 2023-10-03 RX ADMIN — CLOPIDOGREL BISULFATE 75 MG: 75 TABLET ORAL at 08:46

## 2023-10-03 RX ADMIN — PANTOPRAZOLE SODIUM 40 MG: 40 TABLET, DELAYED RELEASE ORAL at 08:46

## 2023-10-03 RX ADMIN — MIDODRINE HYDROCHLORIDE 2.5 MG: 2.5 TABLET ORAL at 08:46

## 2023-10-03 RX ADMIN — FAMOTIDINE 20 MG: 20 TABLET, FILM COATED ORAL at 08:46

## 2023-10-03 RX ADMIN — POTASSIUM CHLORIDE 10 MEQ: 750 TABLET, EXTENDED RELEASE ORAL at 08:46

## 2023-10-03 RX ADMIN — POTASSIUM CHLORIDE 10 MEQ: 750 TABLET, EXTENDED RELEASE ORAL at 13:38

## 2023-10-03 RX ADMIN — ENOXAPARIN SODIUM 40 MG: 100 INJECTION SUBCUTANEOUS at 13:38

## 2023-10-03 RX ADMIN — BUPROPION HYDROCHLORIDE 300 MG: 300 TABLET, FILM COATED, EXTENDED RELEASE ORAL at 08:46

## 2023-10-03 RX ADMIN — FERROUS SULFATE TAB 325 MG (65 MG ELEMENTAL FE) 324 MG: 325 (65 FE) TAB at 08:46

## 2023-10-03 ASSESSMENT — PAIN SCALES - GENERAL
PAINLEVEL_OUTOF10: 0
PAINLEVEL_OUTOF10: 0

## 2023-10-03 NOTE — CARE COORDINATION
10/3/23, 10:33 AM EDT    Patient goals/plan/ treatment preferences discussed by  and . Patient goals/plan/ treatment preferences reviewed with patient/ family. Patient/ family verbalize understanding of discharge plan and are in agreement with goal/plan/treatment preferences. Understanding was demonstrated using the teach back method. AVS provided by RN at time of discharge, which includes all necessary medical information pertaining to the patients current course of illness, treatment, post-discharge goals of care, and treatment preferences. Services At/After Discharge: [facility] (SNF), Aide services, In [place], Nursing service, PT, and RT       IMM Letter  Observation Status Letter date given[de-identified] 10/01/23  Observation Status Letter time given[de-identified] 7921  Observation Status Letter given to Patient/Family/Significant other/Guardian/POA/by[de-identified] Pt. Access     Patient will return to [facility] today. Transport arranged by [employer] for 2pm. [employer] reported they will call if they can transport earlier. Patient will be transported by ambulette.  Informed Georgiana Duggan, RN, SNF, and Patient regarding discharge today

## 2023-10-03 NOTE — DISCHARGE SUMMARY
and/or weight-based dosing when appropriate to reduce radiation dose to as low as reasonably achievable. FINDINGS: The cervical vertebral bodies are normally aligned. There is no fracture. There is no prevertebral soft tissue swelling. There is degenerative disc disease which is most pronounced at C4-5, C5-6 and C6-7 levels. There are mild facet degenerative changes. No suspicious osseous lesions are present. There are no suspicious findings in the cervical soft tissues. There are no suspicious findings in the lung apices. There are biapical calcified pleural plaques. No fracture. **This report has been created using voice recognition software. It may contain minor errors which are inherent in voice recognition technology. ** Final report electronically signed by Dr. Herve Poole on 10/1/2023 10:55 AM    XR PELVIS (1-2 VIEWS)    Result Date: 10/1/2023  PROCEDURE: XR PELVIS (1-2 VIEWS) CLINICAL INFORMATION: fall. COMPARISON: 1/21/2022. TECHNIQUE: AP view of the pelvis. FINDINGS: The pelvic ring is intact. There is a replaced right hip. There are mild degenerative changes in the left hip. The superior and inferior pubic rami appear intact. The sacral struts appear intact. There are mild degenerative changes the sacroiliac joints. There are degenerative changes in the lower lumbar spine. There is demineralization. No fracture. Demineralization. **This report has been created using voice recognition software. It may contain minor errors which are inherent in voice recognition technology. ** Final report electronically signed by Dr. Herve Poole on 10/1/2023 10:52 AM    CT HEAD WO CONTRAST    Result Date: 10/1/2023  PROCEDURE: CT HEAD WO CONTRAST CLINICAL INFORMATION: fall, hit back of head. COMPARISON: Head CT 9/1/2023. TECHNIQUE: Noncontrast 5 mm axial images were obtained through the brain. Sagittal and coronal reconstructions were obtained.  . All CT scans at this facility use dose modulation,

## 2023-10-03 NOTE — PROGRESS NOTES
[address]   INPATIENT PHYSICAL THERAPY  EVALUATION  [facility] MED SURG 8A - 8A-16/016-A    Time In: 5865  Time Out: 1144  Timed Code Treatment Minutes: 10 Minutes  Minutes: 21          Date: 10/2/2023  Patient Name: Corwin Duarte,  Gender:  female        MRN: [id number]  : 1940  (82 y.o.)      Referring Practitioner: Rachele Santillan MD  Diagnosis: Accident due to mechanical fall without injury  Additional Pertinent Hx: Pt is a nursing home resident for the past 1.5 years given to the emergency room after having another fall and possible syncope. Patient is not certain about this and family daughter and her boyfriend are here they also aware of that issue . Her documented possible and head trauma transferred to the emergency room she apparently she fell backwards and hit the back of her head on the dresser, no  history of seizures. Per daughter she frequently has been falling down which led to her nursing home admission. She was recommended to ambulate with the help of a walker but at times chooses not to do that , and falls. She does have extensive cardiac history including history of previous CABG x3, MVR , Maze procedure and tricuspid valve repair done back in 2018 and has a permanent pacemaker. Today CT of the head was done along with other imaging studies there is no bleed or any acute findings. Hospitalist service was called to admit the patient because of her cardiac history and possible syncope. Restrictions/Precautions:  Restrictions/Precautions: Fall Risk    Subjective:  Chart Reviewed: Yes  Patient assessed for rehabilitation services?: Yes  Family / Caregiver Present: No  Subjective: RN approved session, pt is supine in bed, agreeable to PT.     General:  Overall Orientation Status: Within Functional Limits  Vision: Within Functional Limits  Hearing: Within functional limits       Pain: denies    Vitals: Vitals not assessed per clinical judgement, see nursing
Alethea Alcantara, from the [facility], provided with an update and informed patient will not be returning today.
[facility]  PHYSICAL THERAPY MISSED TREATMENT NOTE  [facility] MED SURG 8A    Date: 10/3/2023  Patient Name: Maxwell Peabody        MRN: [id number]   : 1940  (82 y.o.)  Gender: female   Referring Practitioner: Idalmis Guerra MD  Diagnosis: Accident due to mechanical fall without injury         REASON FOR MISSED TREATMENT:  Missed Treat. Pt refused PT x 2 this AM, pt states she is going back to SNF today.
Pharmacist Review and Automatic Dose Adjustment of Prophylactic Enoxaparin      The reviewing pharmacist has made an adjustment to the ordered enoxaparin dose or converted to UFH per the [facility] protocol and table as identified below. Emory Almazan is a 82 y.o. female. Recent Labs     10/01/23  1032 10/02/23  0538   CREATININE 1.5* 1.0       Estimated Creatinine Clearance: 37 mL/min (based on SCr of 1 mg/dL).     Height:   Ht Readings from Last 1 Encounters:   10/01/23 5' 5\" (1.651 m)     Weight:  Wt Readings from Last 1 Encounters:   10/01/23 118 lb 6.2 oz (53.7 kg)               Plan: Based upon the patient's weight and renal function     Ordered: Enoxaparin 30 mg SUBQ Q24H     Changed/converted to     New Order: Enoxaparin  40 mg SUBQ Q24H      Thank you,  Jennifer Smart, [facility]  10/2/2023, 10:57 AM
Pharmacy Renal Adjustment    Pharmacy renally adjusted the following medication(s) per P&T approved policy: Pepcid    Recent Labs     10/01/23  1032   BUN 19   CREATININE 1.5*     Estimated Creatinine Clearance: 25 mL/min (A) (based on SCr of 1.5 mg/dL (H)). Plan:   Decrease Pepcid 20 mg BID to Pepcid 20 mg daily    Please call pharmacy with any questions.     Taylor Cruz, PharmD, BCPS  10/1/2023  4:04 PM
Report called to The [address] [place] [address]. Report given to [facility]. Patient left via ambulette in stable condition. No c/o pain. No signs of distress.
voice recognition software. It may contain minor errors which are inherent in voice recognition technology. **      Final report electronically signed by Dr. Lorenzo Garcia on 10/2/2023 9:51 AM      CT HEAD WO CONTRAST   Final Result    No evidence of an acute process. No change from prior. **This report has been created using voice recognition software. It may contain minor errors which are inherent in voice recognition technology. **      Final report electronically signed by Dr. Jackie Talbert on 10/1/2023 10:37 AM      CT CERVICAL SPINE WO CONTRAST   Final Result    No fracture. **This report has been created using voice recognition software. It may contain minor errors which are inherent in voice recognition technology. **      Final report electronically signed by Dr. Jackie Talbert on 10/1/2023 10:55 AM      XR PELVIS (1-2 VIEWS)   Final Result    No fracture. Demineralization. **This report has been created using voice recognition software. It may contain minor errors which are inherent in voice recognition technology. **      Final report electronically signed by Dr. Jackie Talbert on 10/1/2023 10:52 AM      XR CHEST 1 VIEW   Final Result   Stable radiographic appearance of the chest. No evidence of an acute process. **This report has been created using voice recognition software. It may contain minor errors which are inherent in voice recognition technology. **      Final report electronically signed by Dr. Jackie Talbert on 10/1/2023 10:35 AM        Electronically signed by MARKUS Cooper on 10/2/2023 at 2:22 PM

## 2023-10-03 NOTE — PLAN OF CARE
Problem: Discharge Planning  Goal: Discharge to home or other facility with appropriate resources  Outcome: Progressing     Problem: Skin/Tissue Integrity  Goal: Absence of new skin breakdown  Description: 1. Monitor for areas of redness and/or skin breakdown  2. Assess vascular access sites hourly  3. Every 4-6 hours minimum:  Change oxygen saturation probe site  4. Every 4-6 hours:  If on nasal continuous positive airway pressure, respiratory therapy assess nares and determine need for appliance change or resting period. Outcome: Progressing     Problem: Safety - Adult  Goal: Free from fall injury  Outcome: Progressing     Problem: Chronic Conditions and Co-morbidities  Goal: Patient's chronic conditions and co-morbidity symptoms are monitored and maintained or improved  Outcome: Progressing     Problem: Pain  Goal: Verbalizes/displays adequate comfort level or baseline comfort level  Outcome: Progressing    Care plan reviewed with patient. Patient verbalizes understanding of the care plan and contributed to goal setting.

## 2023-10-04 LAB
BACTERIA UR CULT: ABNORMAL
ORGANISM: ABNORMAL

## 2023-12-26 ENCOUNTER — TELEPHONE (OUTPATIENT)
Dept: FAMILY MEDICINE CLINIC | Age: 83
End: 2023-12-26

## 2023-12-26 NOTE — TELEPHONE ENCOUNTER
Received a call from [facility] with Petey Kauffman asking if Dr. Vicki Ly will sign the patients death certificate? Please advise.

## 2024-10-11 NOTE — CARE COORDINATION
11/13/18, 2:15 PM      [address] [employer] [facility] day: 9  Location: [facility]05/005-A Reason for admit: Atrial fibrillation [facility]) [I48.91]   Procedure:   11/4 CTA Chest: Neg for PE; Moderate bilateral pleural effusions with adjacent atelectasis/infiltrate; pulmonary edema  11/4 CT Abd/pelvis: Minimal ascites; Possible pericholecystic fluid. If there is clinical concern for acute cholecystitis, nuclear medicine hepatobiliary scan is recommended for further evaluation  11/5 Echo with EF 40-45%; mod to severe mitral regurg; severe tricuspid regurg  11/7 Cardiac Cath: CAD, Severe mitral regurg; pulmonary hypertension  11/7 Vein mapping and Bilateral carotid dopplers  11/12 Mitral valve repair with a 26 mm Femi Bari 3D Rechord annuloplasty band; Tricuspid valve repair with 30 mm Bernardo MC3 annuloplasty band; 1v CABG; Left atrial maze procedure, with ablation of both the anterior and posterior mitral trigones; Ligation of the left atrial appendage  11/12 CVC  11/12 IABP inserted- 11/13 IABP removed  11/12 Intubated - 11/13 Extubated  11/13 CXR:   Improved bibasilar atelectasis. No pneumonia. Partial deflation of the aortic balloon pump balloon. Remaining life support lines and tubes unchanged. Stable less than 5% right apical pneumothorax. Left apical pneumothorax no longer visualized.         Treatment Plan of Care: POD #1. IABP removed today. Afib RVR - amio bolus and drip, now atrially paced. Short runs of VT last night - believed to possibly have been d/t primacor - dose decreased and ectopy improved. Extubated today to 4L O2. Now on cpap, FIO2 30%, sats 99%. Tmax 99.3. Follows commands. SGC, [place]. CT x4 to -20sx. CR/PT/OT. Dietitian consulted. Telemetry, I&O, daily weight, IS, sternal precautions, CT care, wound care, SCDs, gregory care, ambulate.  Amio @ 0.5 mg/min, cleviprex @ 1 mg/hr, insulin drip, primacor @ 0.125 mcg/kg/min, asa, lipitor, IV ancef, lovenox, pepcid, IV lasix 40 mg bid, lopressor, prn IV morphine, prn IV zofran, prn roxicodone, electrolyte replacement protocols. 1g Ca+ chloride x1 given. Mg 3.1, Hgb 7.8. Plt 48. PCP: Hanna Bowman MD  Readmission Risk Score: 14%  Discharge Plan: Plan for IP Rehab/TCU. Backup plan for [facility] [facility]. BRIGIDA on case. (M6) obeys commands

## 2025-07-14 NOTE — PROGRESS NOTES
Hospitalist Progress Note      Patient:  Mayco Almazan    Unit/Bed:3B-30/030-A  YOB: 1940  MRN: [id number]   Acct: [de-identified]   PCP: PHIL Jiménez - CNP  Date of Admission: 3/12/2021    Assessment/Plan:    Syncope and collapse     -no prodrome   -echo EF of 40 to 45%, mild aortic stenosis, small to moderate myocardial infarction of the anteroseptal LV (per Dr. Sera Mares this is an old infarct)  -carotid Doppler unremarkable  -will aim to do orthostatic VS   -CT head no acute findings  -pt has pacemaker, cardiology consulted for pacemaker interrogation   No arrhythmias on Tele. Mointor. discussed w/ pt syncope could be a frustrating Dx w/ up to 30% of pts remained w/o diagnosis despite extensive testing. 3/16: stable, awaiting Orthostatic VS, awaiting PPM iterrogation       Right Hip fracture status post total hip arthroplasty 3/14/2021     -Acute right subcapital femoral neck fracture s/p CMN; PT/OT to see; Ortho following     Mild hyponatremia, worsening     -likely d/t hypovolemia and diuretic therapy; Trial of fluid challenge and reassess w/ serial Na check  3/16: Resolved. Na 135. D/c'ed IVF. Encouraged hydration; will trend Na daily. Acute on chronic normocytic anemia     -Hemoglobin of 10.7 on arrival, down to 8.9 this morning, hemoglobin of 8.1 after surgery. Baseline hemoglobin around 8-10  -likely 2/2 fracture hematoma/intra-op blood loss; Hb stable at 8. 4. will trend. Anemia W/U pending    3/14:Hb stable ; anemia w/u c/w severe ALVARO plus possibly folate deficiency; no clinically evident bleed. Hd stable. Will start on replacements. Pt will benefit from OP GI referral screening C-scope +/- EGD as warranted     Hx of CAD:  Noted an old infarct (Small-to-moderate myocardial infarction of the anteroseptal LV per echocardiogram 3/13/2021). Stable. Continue atenolol, aspirin, Plavix, statin.   Continue tele      Chronic HFrEF: EF 40 to 45% per echocardiogram 3 /13/2021 (last in 4/11/19 showing EF 40-45%, No aortic valve disease). No signs of volume overload  D/c'ed IVf. Will monitor     HTN: controlled on home meds, BP marginally low w/ HR in 70-80s. Hold parameters in place. On IVF rate of which reduced to 75 cc from 125 hr.  3/16: stable. D/c'ed IVF and trend     HLD: on home rosuvastatin     Hx of Afib: Ventricular paced rhythm. on ASA/plavix. No AC. Cardio/PCP to consider adding NOAC and plavix only    PT/OT to follow; [address] [address] for transfer to Med/surg floor w/ Tele                Chief Complaint: Fall/ R PPF    Initial H and P:-    Admitted for management of Hip# and evaluation of Syncope. Ortho and cardiology services already following. Pt was transferred to 3B post-op. I took over care on 3/16/2021      Subjective (past 24 hours):   Feeling well; pain well-managed. Denies CP/SOB/presyncope/fever/chills/palpitation      Past medical history, family history, social history and allergies reviewed again and is unchanged since admission. ROS (12 point review of systems completed. Pertinent positives noted.  Otherwise ROS is negative)     Medications:  Reviewed    Infusion Medications    sodium chloride      sodium chloride      sodium chloride 75 mL/hr at 03/16/21 0345     Scheduled Medications    aspirin  81 mg Oral Daily    clopidogrel  75 mg Oral Daily    sodium chloride flush  10 mL Intravenous 2 times per day    enoxaparin  40 mg Subcutaneous Daily    polyethylene glycol  17 g Oral Daily    docusate sodium  100 mg Oral Daily    calcium replacement protocol   Other RX Placeholder    sodium chloride flush  10 mL Intravenous 2 times per day    acetaminophen  650 mg Oral 4x daily    amLODIPine  2.5 mg Oral BID    atenolol  25 mg Oral Daily    rosuvastatin  10 mg Oral Daily    sertraline  100 mg Oral Daily    Vitamin D  1,000 Units Oral Daily     PRN Meds: sodium chloride flush, sodium chloride, sodium chloride, sodium chloride flush, promethazine **OR** ondansetron, oxyCODONE, HYDROmorphone, oxyCODONE, meclizine      Intake/Output Summary (Last 24 hours) at 3/16/2021 0851  Last data filed at 3/16/2021 0341  Gross per 24 hour   Intake 94888 ml   Output 200 ml   Net 94266 ml       Diet:  DIET CARDIAC; Daily Fluid Restriction: 1500 ml  Dietary Nutrition Supplements: Standard High Calorie Oral Supplement    Exam:  BP (!) 111/55   Pulse 80   Temp 97.2 °F (36.2 °C) (Oral)   Resp 18   Ht 5' 5\" (1.651 m)   Wt 128 lb 8 oz (58.3 kg)   SpO2 92%   BMI 21.38 kg/m²   General appearance: No apparent distress, appears stated age and cooperative. HEENT: R racoon eye. Pupils equal, round, and reactive to light. Conjunctivae/corneas clear. Neck: Supple, with full range of motion. No jugular venous distention. Trachea midline. Respiratory:  Normal respiratory effort. Clear to auscultation, bilaterally without Rales/Wheezes/Rhonchi. Cardiovascular: Regular rate and rhythm with normal S1/S2 without murmurs, rubs or gallops. Abdomen: Soft, non-tender, non-distended with normal bowel sounds. Musculoskeletal: passive and active ROM x 4 extremities. L long arm cast; R hip dressing C/D/I  Skin: Skin color, texture, turgor normal.  No rashes or lesions. Neurologic:  Neurovascularly intact without any focal sensory/motor deficits.  Cranial nerves: II-XII intact, grossly non-focal.  Psychiatric: Alert and oriented, thought content appropriate, normal insight  Capillary Refill: Brisk,< 3 seconds   Peripheral Pulses: +2 palpable, equal bilaterally     Labs:   Recent Labs     03/13/21 1918 03/14/21  0644 03/14/21  1629 03/14/21 2238 03/15/21  1012   WBC 6.6 5.9  --   --  8.1   HGB 9.4* 8.9* 8.1* 8.4* 11.0*   HCT 30.0* 29.5* 27.5* 27.5* 36.9*    148  --   --  129*     Recent Labs     03/13/21 1918 03/14/21  0644 03/14/21  0644 03/15/21  1012 03/15/21  1012 03/15/21  2247 03/16/21  0349 03/16/21  0615   * 130*   < > 133*   < > 133* 134* 135   K 4.9 4.7  --  4.3  --   --   --   --     102  --  105  --   --   --   --    CO2 22* 18*  --  18*  --   --   --   --    BUN 18 14  --  15  --   --   --   --    CREATININE 0.7 0.5  --  0.7  --   --   --   --    CALCIUM 8.3* 7.9*  --  7.2*  --   --   --   --     < > = values in this interval not displayed. No results for input(s): AST, ALT, BILIDIR, BILITOT, ALKPHOS in the last 72 hours. No results for input(s): INR in the last 72 hours. No results for input(s): Lewanda Bene in the last 72 hours. Microbiology:    Blood culture #1: No results found for: BC    Blood culture #2:No results found for: BLOODCULT2    Organism:  Lab Results   Component Value Date    ORG Growth of Contaminants 11/04/2018       No results found for: LABGRAM    MRSA culture only:No results found for: [facility]    Urine culture: No results found for: LABURIN    Respiratory culture: No results found for: CULTRESP    Aerobic and Anaerobic :  No results found for: LABAERO  No results found for: LABANAE    Urinalysis:      Lab Results   Component Value Date    NITRU NEGATIVE 02/15/2021    WBCUA 5-9 02/15/2021    BACTERIA NONE SEEN 02/15/2021    RBCUA 0-2 02/15/2021    BLOODU TRACE 02/15/2021    SPECGRAV >1.030 02/15/2021    GLUCOSEU NEGATIVE 11/04/2018       Radiology:  VL DUP CAROTID BILATERAL   Final Result      1. RIGHT   ICA . Lysbeth Carrel Lysbeth Carrel Mild soft plaque, no appreciable stenosis. ..    ECA. . Mild intimal thickening, no appreciable stenosis. CCA. Unremarkable . Lysbeavery Carrel VERT Antegrade flow. ..      2. LEFT    ICA. .... Moderate soft plaque, mild stenosis, less than 50%. .. ECA. .. Unremarkable   CCA. .. Mild intimal thickening, no appreciable stenosis. Ginette Wall. Antegrade flow. **This report has been created using voice recognition software. It may contain minor errors which are inherent in voice recognition technology. **      Final report electronically signed by Dr. Sharad Hayes on 3/15/2021 8:37 AM      XR PELVIS (1-2 VIEWS)   Final Result       1. Patient has undergone interval right hip replacement which is in satisfactory position. 2. Postoperative changes lumbar spine. 3. Vascular calcification. .               **This report has been created using voice recognition software. It may contain minor errors which are inherent in voice recognition technology. **      Final report electronically signed by DR Tova Bustamante on 3/14/2021 2:00 PM      FLUORO FOR SURGICAL PROCEDURES   Final Result      XR HIP RIGHT (2-3 VIEWS)   Final Result       1. Status post right hip replacement which is in satisfactory position. .               **This report has been created using voice recognition software. It may contain minor errors which are inherent in voice recognition technology. **      Final report electronically signed by DR Tova Bustamante on 3/14/2021 2:02 PM      CT Head WO Contrast   Final Result   1. No acute intracranial findings. 2. Mild right lateral scalp hematoma. This document has been electronically signed by: Hugh Moise MD on    03/12/2021 09:50 PM      All CTs at this facility use dose modulation techniques and iterative    reconstructions, and/or weight-based dosing   when appropriate to reduce radiation to a low as reasonably achievable. CT CERVICAL SPINE WO CONTRAST   Final Result   No acute findings. This document has been electronically signed by: Hugh Moise MD on    03/12/2021 09:51 PM      All CTs at this facility use dose modulation techniques and iterative    reconstructions, and/or weight-based dosing   when appropriate to reduce radiation to a low as reasonably achievable. XR HIP 2-3 VW W PELVIS RIGHT   Final Result   Acute right subcapital femoral neck fracture with superior displacement of    the distal fracture fragment. This document has been electronically signed by: Hugh Moise MD on    03/12/2021 09:43 PM         XR CHEST 1 VIEW   Final Result   Stable emphysematous lungs without acute findings. This document has been electronically signed by: Radha Stanford MD on    03/12/2021 09:50 PM        Echo Complete 2d W Doppler W Color    Result Date: 3/14/2021  Transthoracic Echocardiography Report (TTE)  Demographics   Patient Name     Rajeev Pinzon                Female   MR #             [id number]       Race                                                      Ethnicity   Account #        [de-identified]       Room Number           3613   Accession Number [id number]      Date of Study         03/13/2021   Date of Birth    1940      Referring Physician   Sánchez Perez, MARKUS Grant   Age              80 year(s)      Kerri Luis MD                                   Physician  Procedure Type of Study   TTE procedure:ECHOCARDIOGRAM COMPLETE 2D W DOPPLER W COLOR. Procedure Date Date: 03/13/2021 Start: 12:57 PM Study Location: Bedside Technical Quality: Limited visualization due to restricted mobility. Indications:Syncope and Evaluate for valvular heart disease. Additional Medical History:Coronary artery disease, CABG, MV repair 26mm annuloplasty ring, TV repair 30 mm ring, CHF, Hypertension, Hyperlipidemia, A fib, Ablation Patient Status: Routine Height: 64.96 inches Weight: 126 pounds BSA: 1.62 m^2 BMI: 20.99 kg/m^2 BP: 117/69 mmHg Allergies   - No Known Allergies. - See Epic. - Other allergy:(Crestor). - Other allergy:(Crestor). Conclusions   Summary  Left ventricle size is normal.  Mild concentric left ventricular hypertrophy. Ejection fraction is visually estimated in the range of 40% to 45%. Mildly hypokinetic motion of the apical anteroseptal wall noted in the  left ventricle. Small-to-moderate myocardial infarction of the anteroseptal LV. Moderately dilated left atrium.  watchman device  Aortic valve appears no Dimension: 3.8  cm                        Dimension: 3.6 cmAO Root  cm              LV Volume Diastolic: 62   Dimension: 3.1 cmLA Area: 20.6  LV FS:21.1 %    ml                        cm^2  LV PW           LV Volume Systolic: 35 ml  Diastolic: 0.9  LV EDV/LV EDV Index: 62  cm              ml/38 m^2LV ESV/LV ESV  Septum          Index: 35 ml/22 m^2       RV Diastolic Dimension: 2.4 cm  Diastolic: 0.9  EF Calculated: 43.6 %  cm                                        LA/Aorta: 1.16                  LV Length: 6.4 cm                                            LA volume/Index: 67.6 ml /42m^2   LV Area  Diastolic: 20  cm^2  LV Area  Systolic: 63.4  cm^2  Doppler Measurements & Calculations   MV Peak E-Wave: 153 cm/s AV Peak Velocity: 105  LVOT Peak Velocity: 92.4  MV Peak A-Wave: 81.5     cm/s                   cm/s  cm/s                     AV Peak Gradient: 4.41 LVOT Peak Gradient: 3 mmHg  MV E/A Ratio: 1.88       mmHg  MV Peak Gradient: 9.36                          TV Peak E-Wave: 77.7 cm/s  mmHg                                            TV Peak A-Wave: 79.3 cm/s  MV Mean Gradient: 5 mmHg  MV Mean Velocity: 98.5                          TV Peak Gradient: 2.41  cm/s                     AV P1/2t: 800 msec     mmHg  MV Deceleration Time:    IVRT: 67 msec          TR Velocity:312 cm/s  246 msec                                        TR Gradient:38.94 mmHg  MV P1/2t: 70 msec  MVA by PHT:3.14 cm^2     AV DVI (Vmax):0.88   MR Velocity: 469 cm/s  [email or web address] [id number]    Ct Head Wo Contrast    Result Date: 3/12/2021  CT head without contrast Comparison:  CT,SR  - CT HEAD WO CONTRAST  - 02/15/2021 10:52 AM EST Findings: No intracranial mass, midline shift, hydrocephalus, or acute hemorrhage. Old infarct in the left frontal centrum semiovale.  Periventricular white matter hypoattenuation may relate to chronic small vessel ischemic changes. Mild generalized atrophy. Mild right lateral scalp hematoma. Mucosal thickening in the left sphenoid sinus. Mastoid air cells are clear. The orbits are within normal limits. There is no acute fracture. 1. No acute intracranial findings. 2. Mild right lateral scalp hematoma. This document has been electronically signed by: Hugh Moise MD on 03/12/2021 09:50 PM All CTs at this facility use dose modulation techniques and iterative reconstructions, and/or weight-based dosing when appropriate to reduce radiation to a low as reasonably achievable. Ct Cervical Spine Wo Contrast    Result Date: 3/12/2021  CT cervical spine without contrast Comparison:  CT,SR  - CT CERVICAL SPINE WO CONTRAST  - 02/15/2021 10:52 AM EST Findings: Visualized intracranial contents are unremarkable. Soft tissues of the neck are normal. Mild calcified biapical pleural-parenchymal thickening. Vertebral alignment is within normal limits. No acute fractures or dislocations. Disc osteophyte complexes and facet arthropathy causes bilateral neuroforaminal stenosis at C5-6 and C6-7. Central canal stenosis at C4-5 and C5-6. No acute findings. This document has been electronically signed by: Hugh Moise MD on 03/12/2021 09:51 PM All CTs at this facility use dose modulation techniques and iterative reconstructions, and/or weight-based dosing when appropriate to reduce radiation to a low as reasonably achievable. Xr Chest 1 View    Result Date: 3/12/2021  1 view chest x-ray Comparison:  CR,SR  - XR CHEST 1 VW  - 02/15/2021 11:05 AM EST Findings: Lungs are emphysematous. No consolidation or effusion. No pneumothorax. Normal size heart. No pulmonary vascular congestion. Left atrial appendage clip and left AICD leads are unchanged. Median sternotomy wires are intact. No acute fracture. Stable emphysematous lungs without acute findings.  This document has been electronically signed by: Hugh Moise MD on 03/12/2021 09:50 PM    Xr Hip 2-3 Vw W Pelvis Right    Result Date: 3/12/2021      ** ADDENDUM #1       ** This report was discussed with Stephani Rosales RN on Mar 12, 2021 21:50:00 EST. This document has been electronically signed by: Kenisha Lies on 03/12/2021 09:50 PM     ** ORIGINAL REPORT     ** 3 view, pelvis and right hip Comparison:  CR,SR  - XR PELVIS (1-2 VIEWS)  - 02/15/2021 11:05 AM EST Findings: Acute right subcapital femoral neck fracture with varus angulation and mild superior displacement of the distal fracture fragment. No dislocation the right femoral head. Degenerative changes in the bilateral sacroiliac joints. Left hip joint and pubic symphysis are intact. Disc spacer at L4-5. Surgical clip in the left inguinal region. Acute right subcapital femoral neck fracture with superior displacement of the distal fracture fragment. This document has been electronically signed by: Andreia Smith MD on 03/12/2021 09:43 PM     With RN in room, patient was updated about and agreed upon the treatment plan, all the questions and concerns were addressed.     Electronically signed by Sang Rider MD on 3/16/2021 at 8:51 AM no

## (undated) DEVICE — GLOVE ORANGE PI 7   MSG9070

## (undated) DEVICE — GOWN,SIRUS,NONRNF,SETINSLV,XL,20/CS: Brand: MEDLINE

## (undated) DEVICE — BASIC SINGLE BASIN BTC-LF: Brand: MEDLINE INDUSTRIES, INC.

## (undated) DEVICE — CLIP LIG SM TI 6 BLU HNDL FOR OPN AND ENDOSCP SGL APPL

## (undated) DEVICE — DRESSING FOAM DISK DIA1IN H 7MM HYDRPHLC CHG IMPREG IN SL

## (undated) DEVICE — SET AUTOTRNS C175ML BOWL BTM OUTLT RESERVOIRXTRA

## (undated) DEVICE — INTENDED FOR TISSUE SEPARATION, AND OTHER PROCEDURES THAT REQUIRE A SHARP SURGICAL BLADE TO PUNCTURE OR CUT.: Brand: BARD-PARKER ® CARBON RIB-BACK BLADES

## (undated) DEVICE — GLOVE ORANGE PI 7 1/2   MSG9075

## (undated) DEVICE — ATTACHMENT POS 2S FNGR DISP STABLESOFT

## (undated) DEVICE — LINER SUCT CANSTR 1500CC SEMI RIG W/ POR HYDROPHOBIC SHUT

## (undated) DEVICE — CANNULA PERF 24FR CONN SITE 3/8IN SGL STG VEN W/ R ANG MTL

## (undated) DEVICE — EXCEL 10FT (3.05 M) INSUFFLATION TUBING SET WITH 0.1 MICRON FILTER: Brand: EXCEL

## (undated) DEVICE — ATTACHMENT POSER 360DEG ARTC STBL FOR POS THE APEX OF THE

## (undated) DEVICE — SUTURE GOR TX SZ 4-0 L30IN NONABSORBABLE L13MM THC-13 1/2 5M12A

## (undated) DEVICE — GLOVE ORANGE PI 8 1/2   MSG9085

## (undated) DEVICE — Z DISCONTINUED USE 2717539 NO SUB IDED SUTURE ETHBND 2-0 L30IN NONABSORBABLE GRN WHT V-5 L17MM 1/2

## (undated) DEVICE — PROBE CRYOABLATION L10CM ALUM SMOOTH MAL RETRCT HND FLX TB

## (undated) DEVICE — CANNULA PERF 17FR L10IN SIL COR ART OSTIAL SFT BLB SHP TIP

## (undated) DEVICE — SOLUTION IV 1000ML 0.9% SOD CHL PH 5 INJ USP VIAFLX PLAS

## (undated) DEVICE — Device: Brand: MOUTHPIECE

## (undated) DEVICE — SKIN AFFIX SURG ADHESIVE 72/CS 0.55ML: Brand: MEDLINE

## (undated) DEVICE — SET PRSS DISPOSABLE LAY L45IN COIL TBNG SIL DIAPH PLAS DOME

## (undated) DEVICE — DRESSING TRNSPAR W4XL10IN FLM MIC POR SURESITE 123

## (undated) DEVICE — BLADE CLIPPER GEN PURP NS

## (undated) DEVICE — CONNECTOR PERF 3/8X3/8X3/8IN EQL WYE

## (undated) DEVICE — SET ADMIN 25ML L117IN PMP MOD CK VLV RLER CLMP 2 SMRTSITE

## (undated) DEVICE — CLAMP ABLAT JAW L53MM L CRV 2 ELECTRD BPLR

## (undated) DEVICE — SUTURE ETHBND 2-0 L30IN NONABSORBABLE GRN WHT V-5 L17IN 1/2 10X52

## (undated) DEVICE — PADDING,UNDERCAST,COTTON, 4"X4YD STERILE: Brand: MEDLINE

## (undated) DEVICE — SUTURE MCRYL SZ 2-0 L27IN ABSRB UD SH L26MM TAPERPOINT NDL Y417H

## (undated) DEVICE — SUTURE SILK PERMAHAND PRECUT 6 X 30 IN SZ 1 BLK BRAID A307H

## (undated) DEVICE — DECANTER FLD 9IN ST BG FOR ASEP TRNSF OF FLD

## (undated) DEVICE — PAD HIP IMMOB

## (undated) DEVICE — CANNULA PERF L5.5IN DIA9FR AORT ROOT AG STD TIP W/ VENT LN

## (undated) DEVICE — PACK SUCT CATHETER 14FR OPN WHSTL W NO VLV

## (undated) DEVICE — CONNECTOR STR 3/8IN ST 050506000 375STRCONN

## (undated) DEVICE — Z INACTIVE USE 2662641 SOLUTION IV 1000ML 140MEQ/L SOD 5MEQ/L K 3MEQ/L MG 27MEQ/L

## (undated) DEVICE — DUAL CUT SAGITTAL BLADE

## (undated) DEVICE — CLIP LIG M TI 6 SIL HNDL FOR OPN AND ENDOSCP SGL APPL

## (undated) DEVICE — BIT DRL L30MM DIA3.2MM DISP FOR G7 2 MOBILITY CONSTRUCT

## (undated) DEVICE — KIT VEN DRNGE VAC ACCSRY PERF VAVD STOCK W/ SPEC TRAP

## (undated) DEVICE — Device: Brand: SUCTION TIP

## (undated) DEVICE — THERMOFLECT CAP, UNIV, 100EA                                TS THERMOFLECT BOUFFANT CAP, ELASTIC, SILVER, 5/BG, 20 BG/CS: Brand: THERMOFLECT

## (undated) DEVICE — CONNECTOR PERF W3/8XL0.25IN STR REDUC

## (undated) DEVICE — GLOVE ORANGE PI 8   MSG9080

## (undated) DEVICE — VORTEX VACUUM MIXING SYSTEM: Brand: VORTEX

## (undated) DEVICE — GOWN,SIRUS,NON REINFRCD,LARGE,SET IN SL: Brand: MEDLINE

## (undated) DEVICE — 500ML,PRESSURE INFUSER W/STOPCOCK: Brand: MEDLINE

## (undated) DEVICE — PROBE VASC STD 1-1.5 MM 15 CM OLV

## (undated) DEVICE — TUBING, SUCTION, 1/4" X 20', STRAIGHT: Brand: MEDLINE INDUSTRIES, INC.

## (undated) DEVICE — SUTURE STRATAFIX SPRL SZ 2-0 L9IN ABSRB VLT MH L36MM 1/2 SXPD2B408

## (undated) DEVICE — SYSTEM ENDOSCP VES HARV W/ TOOL CANN SEAL SHT PRT BLNT TIP

## (undated) DEVICE — GLOVE SURG SZ 65 THK91MIL LTX FREE SYN POLYISOPRENE

## (undated) DEVICE — SUTURE ETHBND EXCEL SZ 5 L30IN NONABSORBABLE GRN L48MM V-40 MB46G

## (undated) DEVICE — LOOP VES W13MM THK09MM MINI RED SIL FLD REPELLENT

## (undated) DEVICE — WAX SURG 2.5GM HEMSTAT BNE BEESWAX PARAFFIN ISO PALMITATE

## (undated) DEVICE — SENSOR OXMTR L AD WT GREATER THAN 40KG FORE-SIGHT ELITE

## (undated) DEVICE — THORACIC CATHETER,STRAIGHT: Brand: ARGYLE

## (undated) DEVICE — SOLUTION IV 250ML 0.9% SOD CHL PH 5 INJ USP VIAFLX PLAS

## (undated) DEVICE — Device

## (undated) DEVICE — CONNECTOR PERF 1/2X3/8X3/8IN REDUC WYE

## (undated) DEVICE — GLOVE SURG SZ 6 THK91MIL LTX FREE SYN POLYISOPRENE ANTI

## (undated) DEVICE — 3M™ STERI-STRIP™ COMPOUND BENZOIN TINCTURE 40 BAGS/CARTON 4 CARTONS/CASE C1544: Brand: 3M™ STERI-STRIP™

## (undated) DEVICE — TUBING PRESSURE MONITORING FIX M TO M LUER

## (undated) DEVICE — STRIP,CLOSURE,WOUND,MEDI-STRIP,1/2X4: Brand: MEDLINE

## (undated) DEVICE — MARKER,SKIN,WI/RULER AND LABELS: Brand: MEDLINE

## (undated) DEVICE — TUBING PRSS 36 M F

## (undated) DEVICE — GLOVE SURG SZ 8 L12IN THK75MIL DK GRN LTX FREE

## (undated) DEVICE — YANKAUER,BULB TIP,W/O VENT,RIGID,STERILE: Brand: MEDLINE

## (undated) DEVICE — PACK-MAJOR

## (undated) DEVICE — THORACIC CATHETER,RIGHT ANGLE: Brand: ARGYLE

## (undated) DEVICE — SUTURE SZ 7 L18IN NONABSORBABLE SIL CCS L48MM 1/2 CIR STRNM M655G

## (undated) DEVICE — SUTURE VIC + LEN CP1 0 70CM VCP267H

## (undated) DEVICE — CONNECTOR PERF W3/8XL1/2IN STR REDUC

## (undated) DEVICE — SUTURE PROL SZ 4-0 L36IN NONABSORBABLE BLU L26MM SH 1/2 CIR 8521H

## (undated) DEVICE — KIT BLWR MISTER 5P 15L W/ TBNG SET IRRIG MIST TO IMPROVE

## (undated) DEVICE — EZ GLIDE AORTIC CANNULA: Brand: EDWARDS LIFESCIENCES EZ GLIDE AORTIC CANNULA

## (undated) DEVICE — CANNULA PVC RCSP 15FR SLD STYL W/ HNDL OVERALL LEN 11 IN

## (undated) DEVICE — SET TRNQT STD 12FR TB LEN 7 IN 2 RED 2 BLU 2 CLR 16FR 2 L

## (undated) DEVICE — SUTURE ABSORBABLE MONOFILAMENT 1-0 OS8 14 IN STRATAFIX SPRL SXPD2B202

## (undated) DEVICE — 450 ML BOTTLE OF 0.05% CHLORHEXIDINE GLUCONATE IN 99.95% STERILE WATER FOR IRRIGATION, USP AND APPLICATOR.: Brand: IRRISEPT ANTIMICROBIAL WOUND LAVAGE

## (undated) DEVICE — ROYAL SILK SURGICAL GOWN, XXL: Brand: CONVERTORS

## (undated) DEVICE — Z DUP USE 2582811 CATHETER IABP 8FR 50CC FBROPT CONN W/ INSRT KT TWO STATLOK

## (undated) DEVICE — DRESSING TRNSPAR W5XL4.5IN FLM SHT SEMIPERMEABLE WIND

## (undated) DEVICE — DRESSING TRNSPAR W2XL2.75IN FLM SHT SEMIPERMEABLE WIND

## (undated) DEVICE — GLOVE SURG SZ 65 L12IN THK75MIL DK GRN LTX FREE

## (undated) DEVICE — SOLUTION IV 500ML 0.9% SOD CHL PH 5 INJ USP VIAFLX PLAS

## (undated) DEVICE — Z DISCONTINUED USE 2717541 SUTURE STRATAFIX SZ 3-0 L30CM NONABSORBABLE UD L26MM FS 3/8

## (undated) DEVICE — GLOVE ORTHO 8   MSG9480

## (undated) DEVICE — SINGLE STAGE VENOUS RETURN CANNULA: Brand: THIN-FLEX SINGLE STAGE VENOUS DRAINAGE CANNULA

## (undated) DEVICE — SURGICAL PROCEDURE PACK OXGNTR ST MARYS

## (undated) DEVICE — FOGARTY - HYDRAGRIP SURGICAL - CLAMP INSERTS: Brand: FOGARTY SOFTJAW

## (undated) DEVICE — PREP IM ENCHANCED TOTAL HIP BONE                                    PREPARATION KIT: Brand: PREP-IM

## (undated) DEVICE — 48" PROBE COVER W/GEL, ULTRASOUND, STERILE: Brand: SITE-RITE

## (undated) DEVICE — RETRACTOR SURG INSRT SUT HLD OCTOBASE

## (undated) DEVICE — DRESSING ANITMICROBIAL FOAM OPTIFOAM POSTOP STRP 35 X 10 IN